# Patient Record
Sex: FEMALE | Race: WHITE | NOT HISPANIC OR LATINO | Employment: OTHER | URBAN - METROPOLITAN AREA
[De-identification: names, ages, dates, MRNs, and addresses within clinical notes are randomized per-mention and may not be internally consistent; named-entity substitution may affect disease eponyms.]

---

## 2018-04-25 ENCOUNTER — OFFICE VISIT (OUTPATIENT)
Dept: ORTHOPEDIC SURGERY | Facility: CLINIC | Age: 83
End: 2018-04-25

## 2018-04-25 VITALS
HEART RATE: 68 BPM | WEIGHT: 188.05 LBS | HEIGHT: 64 IN | DIASTOLIC BLOOD PRESSURE: 75 MMHG | SYSTOLIC BLOOD PRESSURE: 161 MMHG | BODY MASS INDEX: 32.11 KG/M2

## 2018-04-25 DIAGNOSIS — M17.0 PRIMARY OSTEOARTHRITIS OF BOTH KNEES: Primary | ICD-10-CM

## 2018-04-25 PROCEDURE — 99203 OFFICE O/P NEW LOW 30 MIN: CPT | Performed by: ORTHOPAEDIC SURGERY

## 2018-04-25 PROCEDURE — 20610 DRAIN/INJ JOINT/BURSA W/O US: CPT | Performed by: ORTHOPAEDIC SURGERY

## 2018-04-25 RX ORDER — EZETIMIBE 10 MG/1
TABLET ORAL
Refills: 11 | COMMUNITY
Start: 2018-04-17 | End: 2018-04-25

## 2018-04-25 RX ORDER — EZETIMIBE 10 MG/1
TABLET ORAL DAILY
COMMUNITY
Start: 2013-01-25

## 2018-04-25 RX ORDER — ROPIVACAINE HYDROCHLORIDE 5 MG/ML
4 INJECTION, SOLUTION EPIDURAL; INFILTRATION; PERINEURAL
Status: COMPLETED | OUTPATIENT
Start: 2018-04-25 | End: 2018-04-25

## 2018-04-25 RX ORDER — TRIAMCINOLONE ACETONIDE 40 MG/ML
40 INJECTION, SUSPENSION INTRA-ARTICULAR; INTRAMUSCULAR
Status: COMPLETED | OUTPATIENT
Start: 2018-04-25 | End: 2018-04-25

## 2018-04-25 RX ORDER — FLUOROMETHOLONE 0.1 %
SUSPENSION, DROPS(FINAL DOSAGE FORM)(ML) OPHTHALMIC (EYE)
Refills: 0 | COMMUNITY
Start: 2018-01-19

## 2018-04-25 RX ORDER — AMLODIPINE BESYLATE 5 MG/1
TABLET ORAL
Refills: 3 | COMMUNITY
Start: 2018-03-19

## 2018-04-25 RX ORDER — OMEPRAZOLE 40 MG/1
CAPSULE, DELAYED RELEASE ORAL
Refills: 3 | COMMUNITY
Start: 2018-03-19

## 2018-04-25 RX ORDER — CARVEDILOL 6.25 MG/1
6.25 TABLET ORAL 2 TIMES DAILY
Refills: 3 | COMMUNITY
Start: 2018-03-19

## 2018-04-25 RX ORDER — LOSARTAN POTASSIUM AND HYDROCHLOROTHIAZIDE 25; 100 MG/1; MG/1
TABLET ORAL
Refills: 3 | COMMUNITY
Start: 2018-04-13

## 2018-04-25 RX ORDER — GABAPENTIN 100 MG/1
300 CAPSULE ORAL
Refills: 1 | COMMUNITY
Start: 2018-02-16

## 2018-04-25 RX ORDER — LEVOTHYROXINE SODIUM 0.05 MG/1
TABLET ORAL
Refills: 3 | COMMUNITY
Start: 2018-04-02

## 2018-04-25 RX ADMIN — ROPIVACAINE HYDROCHLORIDE 4 ML: 5 INJECTION, SOLUTION EPIDURAL; INFILTRATION; PERINEURAL at 09:33

## 2018-04-25 RX ADMIN — TRIAMCINOLONE ACETONIDE 40 MG: 40 INJECTION, SUSPENSION INTRA-ARTICULAR; INTRAMUSCULAR at 09:35

## 2018-04-25 RX ADMIN — TRIAMCINOLONE ACETONIDE 40 MG: 40 INJECTION, SUSPENSION INTRA-ARTICULAR; INTRAMUSCULAR at 09:33

## 2018-04-25 RX ADMIN — ROPIVACAINE HYDROCHLORIDE 4 ML: 5 INJECTION, SOLUTION EPIDURAL; INFILTRATION; PERINEURAL at 09:35

## 2018-04-25 NOTE — PROGRESS NOTES
[facility] [place] [facility] [place]    Subjective     Chief Complaint   Patient presents with   • Right Knee - Pain   • Left Knee - Pain        HPI    Janna Willis is a 83 y.o. female. She presents today for bilateral knee pain.  She's had pain for several years, but worsening over the past few weeks.  The pain is mild-to-moderate, associated with swelling and stiffness, and worsens with standing and climbing stairs.  She responded well to Visco supplementation injections in the past.  She was interested in starting those injections today.      There is no problem list on file for this patient.    Past Medical History:   Diagnosis Date   • Diabetes       Past Surgical History:   Procedure Laterality Date   • OVARY SURGERY        Family History   Problem Relation Age of Onset   • Stroke Mother    • Heart attack Mother      Social History     Social History   • Marital status: Single     Spouse name: N/A   • Number of children: N/A   • Years of education: N/A     Occupational History   • Not on file.     Social History Main Topics   • Smoking status: Never Smoker   • Smokeless tobacco: Never Used   • Alcohol use No   • Drug use: No   • Sexual activity: Defer     Other Topics Concern   • Not on file     Social History Narrative   • No narrative on file      No current outpatient prescriptions on file prior to visit.     No current facility-administered medications on file prior to visit.       Allergies   Allergen Reactions   • 2,4-D Dimethylamine (Amisol) Hives   • Azithromycin Hives   • Lisinopril Hives   • Metoclopramide Hives   • Statins Hives        Review of Systems   Constitutional: Positive for fatigue.   HENT: Negative.    Eyes: Positive for pain and itching.   Respiratory: Negative.    Cardiovascular: Positive for leg swelling.   Gastrointestinal: Negative.    Endocrine: Negative.    Genitourinary: Negative.    Musculoskeletal: Positive for arthralgias.   Skin: Negative.    Allergic/Immunologic:  "Negative.    Neurological: Positive for dizziness and light-headedness.   Hematological: Negative.    Psychiatric/Behavioral: Positive for sleep disturbance.        Objective      Physical Exam  /75   Pulse 68   Ht 162.6 cm (64\")   Wt 85.3 kg (188 lb 0.8 oz)   BMI 32.28 kg/m²     Body mass index is 32.28 kg/m².    General:   Mental Status:  Alert   Appearance: Cooperative, in no acute distress   Build and Nutrition: Overweight female   Orientation: Alert and oriented to person, place and time   Posture: Normal   Gait: Antalgic on both lower extremities    Integument:   Right knee: no skin lesions, no rash, no ecchymosis   Left knee: no skin lesions, no rash, no ecchymosis    Neurologic:   Sensation:    Right foot: intact to light touch on the dorsal and plantar aspect    Left foot: intact to light touch on the dorsal and plantar aspect   Motor:  Right lower extremity: 5/5 quadriceps, hamstrings, ankle dorsiflexors, and ankle plantar flexors  Left lower extremity: 5/5 quadriceps, hamstrings, ankle dorsiflexors, and ankle plantar flexors  Vascular:   Right lower extremity: 2+ dorsalis pedis pulse, prompt capillary refill   Left lower extremity: 2+ dorsalis pedis pulse, prompt capillary refill    Lower Extremities:   Right Knee:    Tenderness:  Medial and lateral joint line tenderness    Effusion:  None    Swelling:  None    Crepitus:  Positive    Atrophy:  None    Range of motion:  Extension: 5°       Flexion: 120°  Instability:  No varus laxity, no valgus laxity, negative anterior drawer  Deformities:  None   Left Knee:    Tenderness:  Medial and lateral joint line tenderness    Effusion:  None    Swelling:  None    Crepitus: Positive    Atrophy:  None    Range of motion:  Extension: 5°       Flexion: 120°  Instability:  No varus laxity, no valgus laxity, negative anterior drawer  Deformities:  None      Imaging/Studies  Imaging Results (last 24 hours)     Procedure Component Value Units Date/Time    XR " Knee 4+ View Bilateral [[id number]] Resulted:  04/25/18 0925     Updated:  04/25/18 0926    Narrative:       Right Knee Radiographs  Indication: right knee pain  Views: Standing AP's and skiers of both knees, with lateral and sunrise   views of the right knee    Comparison: no prior studies available    Findings:   Arthritic changes are seen, with medial joint space narrowing and   patellofemoral bone-on-bone contact, with varus alignment.    Left Knee Radiographs  Indication: left knee pain  Views: Standing AP's and skiers of both knees, with lateral and sunrise   views of the left knee    Comparison: no prior studies available    Findings:   Arthritic changes are seen, with medial joint space narrowing and   patellofemoral bone-on-bone contact, with varus alignment.            Assessment and Plan     Janna was seen today for pain and pain.    Diagnoses and all orders for this visit:    Primary osteoarthritis of both knees  -     XR Knee 4+ View Bilateral  -     Large Joint Arthrocentesis  -     Large Joint Arthrocentesis  -     Large Joint Arthrocentesis        I reviewed my findings with patient today.  She has bilateral knee arthritis, and would like to try a Visco supplementation injections.  We will submit for approval, but in the meantime, we will do steroid injections.  I will see her back once the Visco supplementation injections are ready for administration.    Of note, she had 50% relief just a few minutes following the injection in both her knees.    Return for After approval of the visco injection.      Medical Decision Making  Management Options : prescription/IM medicine  Data/Risk: radiology tests and independent visualization of imaging, lab tests, or EMG/NCV      Kuldeep Jasso MD  04/25/18  9:47 AM

## 2018-04-25 NOTE — PROGRESS NOTES
Procedure   Large Joint Arthrocentesis  Date/Time: 4/25/2018 9:33 AM  Consent given by: patient  Site marked: site marked  Timeout: Immediately prior to procedure a time out was called to verify the correct patient, procedure, equipment, support staff and site/side marked as required   Supporting Documentation  Indications: pain   Procedure Details  Location: knee - R knee  Preparation: Patient was prepped and draped in the usual sterile fashion  Needle size: 22 G  Approach: anterolateral  Medications administered: 4 mL ropivacaine 0.5 %; 40 mg triamcinolone acetonide 40 MG/ML  Patient tolerance: patient tolerated the procedure well with no immediate complications

## 2018-04-25 NOTE — PROGRESS NOTES
Procedure   Large Joint Arthrocentesis  Date/Time: 4/25/2018 9:35 AM  Consent given by: patient  Site marked: site marked  Timeout: Immediately prior to procedure a time out was called to verify the correct patient, procedure, equipment, support staff and site/side marked as required   Supporting Documentation  Indications: pain   Procedure Details  Location: knee - L knee  Preparation: Patient was prepped and draped in the usual sterile fashion  Needle size: 22 G  Approach: anterolateral  Medications administered: 40 mg triamcinolone acetonide 40 MG/ML; 4 mL ropivacaine 0.5 %  Patient tolerance: patient tolerated the procedure well with no immediate complications

## 2018-06-06 ENCOUNTER — CLINICAL SUPPORT (OUTPATIENT)
Dept: ORTHOPEDIC SURGERY | Facility: CLINIC | Age: 83
End: 2018-06-06

## 2018-06-06 DIAGNOSIS — M17.0 PRIMARY OSTEOARTHRITIS OF BOTH KNEES: ICD-10-CM

## 2018-06-06 DIAGNOSIS — M17.0 BILATERAL PRIMARY OSTEOARTHRITIS OF KNEE: Primary | ICD-10-CM

## 2018-06-06 PROCEDURE — 20610 DRAIN/INJ JOINT/BURSA W/O US: CPT | Performed by: ORTHOPAEDIC SURGERY

## 2018-06-06 RX ORDER — ESCITALOPRAM OXALATE 20 MG/1
20 TABLET ORAL DAILY
Refills: 3 | COMMUNITY
Start: 2018-05-24

## 2018-06-06 NOTE — PROGRESS NOTES
[facility] – [place] [facility] Note    Subjective     Chief Complaint   Patient presents with   • Left Knee - Follow-up     Orthovisc Injection #1   • Right Knee - Follow-up     Orthovisc Injection #1        HPI    Janna Willis is a 83 y.o. female who presents for the First Orthovisc injections into both knee joints.    There is no problem list on file for this patient.    Past Medical History:   Diagnosis Date   • Diabetes       Past Surgical History:   Procedure Laterality Date   • OVARY SURGERY        Family History   Problem Relation Age of Onset   • Stroke Mother    • Heart attack Mother      Social History     Social History   • Marital status: Single     Spouse name: N/A   • Number of children: N/A   • Years of education: N/A     Occupational History   • Not on file.     Social History Main Topics   • Smoking status: Never Smoker   • Smokeless tobacco: Never Used   • Alcohol use No   • Drug use: No   • Sexual activity: Defer     Other Topics Concern   • Not on file     Social History Narrative   • No narrative on file      Current Outpatient Prescriptions on File Prior to Visit   Medication Sig Dispense Refill   • amLODIPine (NORVASC) 5 MG tablet TAKE ONE TABLET EVERY DAY FOR high blood PRESSURE  3   • carvedilol (COREG) 6.25 MG tablet Take 6.25 mg by mouth 2 (Two) Times a Day.  3   • ezetimibe (ZETIA) 10 MG tablet Take  by mouth Daily.     • fluorometholone (FML) 0.1 % ophthalmic suspension Instill 1 drop into both eyes three times a day  0   • gabapentin (NEURONTIN) 100 MG capsule Take 300 mg by mouth every night at bedtime.  1   • levothyroxine (SYNTHROID, LEVOTHROID) 50 MCG tablet TAKE ONE TABLET EVERY DAY FOR FOR THYROID  3   • losartan-hydrochlorothiazide (HYZAAR) 100-25 MG per tablet take ONE-HALF TABLET ONCE DAILY  3   • metFORMIN (GLUCOPHAGE) 500 MG tablet Take  by mouth 3 (Three) Times a Day.     • omeprazole (priLOSEC) 40 MG capsule TAKE ONE CAPSULE EVERY MORNING FOR gerd  3     No current  facility-administered medications on file prior to visit.       Allergies   Allergen Reactions   • 2,4-D Dimethylamine (Amisol) Hives   • Azithromycin Hives   • Lisinopril Hives   • Metoclopramide Hives   • Statins Hives        Review of Systems   Constitutional: Negative for activity change, appetite change, chills, diaphoresis, fatigue, fever and unexpected weight change.   HENT: Negative for congestion, dental problem, drooling, ear discharge, ear pain, facial swelling, hearing loss, mouth sores, nosebleeds, postnasal drip, rhinorrhea, sinus pressure, sneezing, sore throat, tinnitus, trouble swallowing and voice change.    Eyes: Negative for photophobia, pain, discharge, redness, itching and visual disturbance.   Respiratory: Negative for apnea, cough, choking, chest tightness, shortness of breath, wheezing and stridor.    Cardiovascular: Negative for chest pain, palpitations and leg swelling.   Gastrointestinal: Negative for abdominal distention, abdominal pain, anal bleeding, blood in stool, constipation, diarrhea, nausea, rectal pain and vomiting.   Endocrine: Negative for cold intolerance, heat intolerance, polydipsia, polyphagia and polyuria.   Genitourinary: Negative for decreased urine volume, difficulty urinating, dysuria, enuresis, flank pain, frequency, genital sores, hematuria and urgency.   Musculoskeletal: Positive for arthralgias. Negative for back pain, gait problem, joint swelling, myalgias, neck pain and neck stiffness.   Skin: Negative for color change, pallor, rash and wound.   Allergic/Immunologic: Negative for environmental allergies, food allergies and immunocompromised state.   Neurological: Negative for dizziness, tremors, seizures, syncope, facial asymmetry, speech difficulty, weakness, light-headedness, numbness and headaches.   Hematological: Negative for adenopathy. Does not bruise/bleed easily.   Psychiatric/Behavioral: Negative for agitation, behavioral problems, confusion, decreased  concentration, dysphoric mood, hallucinations, self-injury, sleep disturbance and suicidal ideas. The patient is not nervous/anxious and is not hyperactive.         Objective      Physical Exam  There were no vitals taken for this visit.    There is no height or weight on file to calculate BMI.    General:   Mental Status:  Alert   Appearance: Cooperative, in no acute distress   Posture: Normal    Assessment and Plan     Janna was seen today for follow-up and follow-up.    Diagnoses and all orders for this visit:    Bilateral primary osteoarthritis of knee  -     Large Joint Arthrocentesis        Administration of viscosupplementation today.  Please see my procedure note for details.    Return in about 1 week (around 6/13/2018) for Injection.    Kuldeep Jasso MD  06/06/18  9:30 AM

## 2018-06-06 NOTE — PROGRESS NOTES
Procedure   Large Joint Arthrocentesis  Date/Time: 6/6/2018 9:01 AM  Consent given by: patient  Site marked: site marked  Timeout: Immediately prior to procedure a time out was called to verify the correct patient, procedure, equipment, support staff and site/side marked as required   Supporting Documentation  Indications: pain   Procedure Details  Location: knee - R knee  Preparation: Patient was prepped and draped in the usual sterile fashion  Needle size: 22 G  Approach: anterolateral  Medications administered: 30 mg Hyaluronan 30 MG/2ML  Patient tolerance: patient tolerated the procedure well with no immediate complications    Large Joint Arthrocentesis  Date/Time: 6/6/2018 9:01 AM  Consent given by: patient  Site marked: site marked  Timeout: Immediately prior to procedure a time out was called to verify the correct patient, procedure, equipment, support staff and site/side marked as required   Supporting Documentation  Indications: pain   Procedure Details  Location: knee - L knee  Preparation: Patient was prepped and draped in the usual sterile fashion  Needle size: 22 G  Approach: anterolateral  Medications administered: 30 mg Hyaluronan 30 MG/2ML  Patient tolerance: patient tolerated the procedure well with no immediate complications

## 2018-06-13 ENCOUNTER — CLINICAL SUPPORT (OUTPATIENT)
Dept: ORTHOPEDIC SURGERY | Facility: CLINIC | Age: 83
End: 2018-06-13

## 2018-06-13 DIAGNOSIS — M17.0 PRIMARY OSTEOARTHRITIS OF BOTH KNEES: Primary | ICD-10-CM

## 2018-06-13 PROCEDURE — 20610 DRAIN/INJ JOINT/BURSA W/O US: CPT | Performed by: ORTHOPAEDIC SURGERY

## 2018-06-13 NOTE — PROGRESS NOTES
[facility] [place] [facility] [place]    Subjective     Chief Complaint   Patient presents with   • Left Knee - Follow-up     Bilateral Primary Osteoarthritis of Knee; Bilateral Knee Orthovisc Injection #2   • Right Knee - Follow-up     Bilateral Primary Osteoarthritis of Knee; Bilateral Knee Orthovisc Injection #2        HPI    Janna Willis is a 83 y.o. female who presents for the Second Orthovisc injections into both knees.  Of note, she has seen some improvement so far.    There is no problem list on file for this patient.    Past Medical History:   Diagnosis Date   • Diabetes       Past Surgical History:   Procedure Laterality Date   • OVARY SURGERY        Family History   Problem Relation Age of Onset   • Stroke Mother    • Heart attack Mother      Social History     Social History   • Marital status: Single     Spouse name: N/A   • Number of children: N/A   • Years of education: N/A     Occupational History   • Not on file.     Social History Main Topics   • Smoking status: Never Smoker   • Smokeless tobacco: Never Used   • Alcohol use No   • Drug use: No   • Sexual activity: Defer     Other Topics Concern   • Not on file     Social History Narrative   • No narrative on file      Current Outpatient Prescriptions on File Prior to Visit   Medication Sig Dispense Refill   • amLODIPine (NORVASC) 5 MG tablet TAKE ONE TABLET EVERY DAY FOR high blood PRESSURE  3   • carvedilol (COREG) 6.25 MG tablet Take 6.25 mg by mouth 2 (Two) Times a Day.  3   • escitalopram (LEXAPRO) 20 MG tablet Take 20 mg by mouth Daily.  3   • ezetimibe (ZETIA) 10 MG tablet Take  by mouth Daily.     • fluorometholone (FML) 0.1 % ophthalmic suspension Instill 1 drop into both eyes three times a day  0   • gabapentin (NEURONTIN) 100 MG capsule Take 300 mg by mouth every night at bedtime.  1   • levothyroxine (SYNTHROID, LEVOTHROID) 50 MCG tablet TAKE ONE TABLET EVERY DAY FOR FOR THYROID  3   • losartan-hydrochlorothiazide (HYZAAR) 100-25  MG per tablet take ONE-HALF TABLET ONCE DAILY  3   • metFORMIN (GLUCOPHAGE) 500 MG tablet Take  by mouth 3 (Three) Times a Day.     • omeprazole (priLOSEC) 40 MG capsule TAKE ONE CAPSULE EVERY MORNING FOR gerd  3     No current facility-administered medications on file prior to visit.       Allergies   Allergen Reactions   • 2,4-D Dimethylamine (Amisol) Hives   • Azithromycin Hives   • Lisinopril Hives   • Metoclopramide Hives   • Statins Hives        Review of Systems   Constitutional: Negative for activity change, appetite change, chills, diaphoresis, fatigue, fever and unexpected weight change.   HENT: Negative for congestion, dental problem, drooling, ear discharge, ear pain, facial swelling, hearing loss, mouth sores, nosebleeds, postnasal drip, rhinorrhea, sinus pressure, sneezing, sore throat, tinnitus, trouble swallowing and voice change.    Eyes: Negative for photophobia, pain, discharge, redness, itching and visual disturbance.   Respiratory: Negative for apnea, cough, choking, chest tightness, shortness of breath, wheezing and stridor.    Cardiovascular: Negative for chest pain, palpitations and leg swelling.   Gastrointestinal: Negative for abdominal distention, abdominal pain, anal bleeding, blood in stool, constipation, diarrhea, nausea, rectal pain and vomiting.   Endocrine: Negative for cold intolerance, heat intolerance, polydipsia, polyphagia and polyuria.   Genitourinary: Negative for decreased urine volume, difficulty urinating, dysuria, enuresis, flank pain, frequency, genital sores, hematuria and urgency.   Musculoskeletal: Positive for joint swelling. Negative for arthralgias, back pain, gait problem, myalgias, neck pain and neck stiffness.   Skin: Negative for color change, pallor, rash and wound.   Allergic/Immunologic: Negative for environmental allergies, food allergies and immunocompromised state.   Neurological: Negative for dizziness, tremors, seizures, syncope, facial asymmetry, speech  difficulty, weakness, light-headedness, numbness and headaches.   Hematological: Negative for adenopathy. Does not bruise/bleed easily.   Psychiatric/Behavioral: Negative for agitation, behavioral problems, confusion, decreased concentration, dysphoric mood, hallucinations, self-injury, sleep disturbance and suicidal ideas. The patient is not nervous/anxious and is not hyperactive.         Objective      Physical Exam  There were no vitals taken for this visit.    There is no height or weight on file to calculate BMI.    General:   Mental Status:  Alert   Appearance: Cooperative, in no acute distress   Posture: Normal    Assessment and Plan     Janna was seen today for follow-up and follow-up.    Diagnoses and all orders for this visit:    Primary osteoarthritis of both knees  -     Large Joint Arthrocentesis  -     Large Joint Arthrocentesis        Administration of viscosupplementation today.  Please see my procedure note for details.    Return in about 1 week (around 6/20/2018) for Injection.    Kuldeep Jasso MD  06/13/18  3:35 PM

## 2018-06-13 NOTE — PROGRESS NOTES
Procedure   Large Joint Injection   Date/Time: 6/13/2018 3:08 PM  Consent given by: patient  Site marked: site marked  Timeout: Immediately prior to procedure a time out was called to verify the correct patient, procedure, equipment, support staff and site/side marked as required   Supporting Documentation  Indications: pain   Procedure Details  Location: knee - R knee  Preparation: Patient was prepped and draped in the usual sterile fashion  Needle size: 22 G  Approach: anterolateral  Medications administered: 30 mg Hyaluronan 30 MG/2ML  Patient tolerance: patient tolerated the procedure well with no immediate complications    Large Joint Injection   Date/Time: 6/13/2018 3:09 PM  Consent given by: patient  Site marked: site marked  Timeout: Immediately prior to procedure a time out was called to verify the correct patient, procedure, equipment, support staff and site/side marked as required   Supporting Documentation  Indications: pain   Procedure Details  Location: knee - L knee  Preparation: Patient was prepped and draped in the usual sterile fashion  Needle size: 22 G  Approach: anterolateral  Medications administered: 30 mg Hyaluronan 30 MG/2ML  Patient tolerance: patient tolerated the procedure well with no immediate complications

## 2018-06-20 ENCOUNTER — CLINICAL SUPPORT (OUTPATIENT)
Dept: ORTHOPEDIC SURGERY | Facility: CLINIC | Age: 83
End: 2018-06-20

## 2018-06-20 DIAGNOSIS — M17.0 PRIMARY OSTEOARTHRITIS OF BOTH KNEES: Primary | ICD-10-CM

## 2018-06-20 PROCEDURE — 20610 DRAIN/INJ JOINT/BURSA W/O US: CPT | Performed by: ORTHOPAEDIC SURGERY

## 2018-06-20 NOTE — PROGRESS NOTES
Procedure   Large Joint Arthrocentesis  Date/Time: 6/20/2018 3:12 PM  Consent given by: patient  Site marked: site marked  Timeout: Immediately prior to procedure a time out was called to verify the correct patient, procedure, equipment, support staff and site/side marked as required   Supporting Documentation  Indications: pain   Procedure Details  Location: knee - R knee  Preparation: Patient was prepped and draped in the usual sterile fashion  Needle size: 22 G  Approach: anterolateral  Medications administered: 30 mg Hyaluronan 30 MG/2ML      Large Joint Arthrocentesis  Date/Time: 6/20/2018 3:13 PM  Consent given by: patient  Site marked: site marked  Timeout: Immediately prior to procedure a time out was called to verify the correct patient, procedure, equipment, support staff and site/side marked as required   Supporting Documentation  Indications: pain   Procedure Details  Location: knee - L knee  Needle size: 22 G  Approach: anterolateral  Medications administered: 30 mg Hyaluronan 30 MG/2ML

## 2018-06-20 NOTE — PROGRESS NOTES
[facility] [place] [facility] [place] [facility] [place]    Subjective     No chief complaint on file.       HPI    Janna Willis is a 83 y.o. female who presents for the Orthovisc injections into both knee joints.  Of note, she has already seen relief with the injection series.    There is no problem list on file for this patient.    Past Medical History:   Diagnosis Date   • Diabetes       Past Surgical History:   Procedure Laterality Date   • OVARY SURGERY        Family History   Problem Relation Age of Onset   • Stroke Mother    • Heart attack Mother      Social History     Social History   • Marital status: Single     Spouse name: N/A   • Number of children: N/A   • Years of education: N/A     Occupational History   • Not on file.     Social History Main Topics   • Smoking status: Never Smoker   • Smokeless tobacco: Never Used   • Alcohol use No   • Drug use: No   • Sexual activity: Defer     Other Topics Concern   • Not on file     Social History Narrative   • No narrative on file      Current Outpatient Prescriptions on File Prior to Visit   Medication Sig Dispense Refill   • amLODIPine (NORVASC) 5 MG tablet TAKE ONE TABLET EVERY DAY FOR high blood PRESSURE  3   • carvedilol (COREG) 6.25 MG tablet Take 6.25 mg by mouth 2 (Two) Times a Day.  3   • escitalopram (LEXAPRO) 20 MG tablet Take 20 mg by mouth Daily.  3   • ezetimibe (ZETIA) 10 MG tablet Take  by mouth Daily.     • fluorometholone (FML) 0.1 % ophthalmic suspension Instill 1 drop into both eyes three times a day  0   • gabapentin (NEURONTIN) 100 MG capsule Take 300 mg by mouth every night at bedtime.  1   • levothyroxine (SYNTHROID, LEVOTHROID) 50 MCG tablet TAKE ONE TABLET EVERY DAY FOR FOR THYROID  3   • losartan-hydrochlorothiazide (HYZAAR) 100-25 MG per tablet take ONE-HALF TABLET ONCE DAILY  3   • metFORMIN (GLUCOPHAGE) 500 MG tablet Take  by mouth 3 (Three) Times a Day.     • omeprazole (priLOSEC) 40 MG capsule TAKE ONE CAPSULE EVERY MORNING FOR gerd  3     No  current facility-administered medications on file prior to visit.       Allergies   Allergen Reactions   • 2,4-D Dimethylamine (Amisol) Hives   • Azithromycin Hives   • Lisinopril Hives   • Metoclopramide Hives   • Statins Hives        Review of Systems   Constitutional: Negative.    HENT: Negative.    Eyes: Negative.    Respiratory: Negative.    Cardiovascular: Negative.    Gastrointestinal: Negative.    Endocrine: Negative.    Genitourinary: Negative.    Musculoskeletal: Positive for arthralgias.   Skin: Negative.    Allergic/Immunologic: Negative.    Neurological: Negative.    Hematological: Negative.    Psychiatric/Behavioral: Negative.         Objective      Physical Exam  There were no vitals taken for this visit.    There is no height or weight on file to calculate BMI.    General:   Mental Status:  Alert   Appearance: Cooperative, in no acute distress   Posture: Normal    Assessment and Plan     Diagnoses and all orders for this visit:    Primary osteoarthritis of both knees  -     Large Joint Arthrocentesis  -     Large Joint Arthrocentesis        Administration of viscosupplementation today.  Please see my procedure note for details.    Return in about 6 months (around 12/20/2018).    Kuldeep Jasso MD  06/20/18  3:17 PM

## 2018-12-19 ENCOUNTER — OFFICE VISIT (OUTPATIENT)
Dept: ORTHOPEDIC SURGERY | Facility: CLINIC | Age: 83
End: 2018-12-19

## 2018-12-19 VITALS — HEIGHT: 66 IN | OXYGEN SATURATION: 97 % | WEIGHT: 195.99 LBS | BODY MASS INDEX: 31.5 KG/M2 | HEART RATE: 76 BPM

## 2018-12-19 DIAGNOSIS — M17.0 PRIMARY OSTEOARTHRITIS OF BOTH KNEES: Primary | ICD-10-CM

## 2018-12-19 PROCEDURE — 99213 OFFICE O/P EST LOW 20 MIN: CPT | Performed by: ORTHOPAEDIC SURGERY

## 2018-12-19 NOTE — PROGRESS NOTES
[facility] [place] [facility]opa[facility] Surgery Clinic Note    Subjective     Chief Complaint   Patient presents with   • Right Knee - Follow-up     6 month follow up    • Left Knee - Follow-up     6 month follow up        HPI    Janna Willis is a 83 y.o. female.  She presents today for both her knees.  She states had pain in both knees, 6 out of 10, aching and throbbing, worsening over time, with improvement with Visco supplementation injections in the past.  She believes that the Supartz worked better than the Orthovisc for her.      There is no problem list on file for this patient.    Past Medical History:   Diagnosis Date   • Diabetes (CMS/HCC)       Past Surgical History:   Procedure Laterality Date   • OVARY SURGERY        Family History   Problem Relation Age of Onset   • Stroke Mother    • Heart attack Mother      Social History     Socioeconomic History   • Marital status: Single     Spouse name: Not on file   • Number of children: Not on file   • Years of education: Not on file   • Highest education level: Not on file   Social Needs   • Financial resource strain: Not on file   • Food insecurity - worry: Not on file   • Food insecurity - inability: Not on file   • Transportation needs - medical: Not on file   • Transportation needs - non-medical: Not on file   Occupational History   • Not on file   Tobacco Use   • Smoking status: Never Smoker   • Smokeless tobacco: Never Used   Substance and Sexual Activity   • Alcohol use: No   • Drug use: No   • Sexual activity: Defer   Other Topics Concern   • Not on file   Social History Narrative   • Not on file      Current Outpatient Medications on File Prior to Visit   Medication Sig Dispense Refill   • amLODIPine (NORVASC) 5 MG tablet TAKE ONE TABLET EVERY DAY FOR high blood PRESSURE  3   • carvedilol (COREG) 6.25 MG tablet Take 6.25 mg by mouth 2 (Two) Times a Day.  3   • escitalopram (LEXAPRO) 20 MG tablet Take 20 mg by mouth Daily.  3   • ezetimibe (ZETIA) 10 MG tablet Take   "by mouth Daily.     • fluorometholone (FML) 0.1 % ophthalmic suspension Instill 1 drop into both eyes three times a day  0   • gabapentin (NEURONTIN) 100 MG capsule Take 300 mg by mouth every night at bedtime.  1   • levothyroxine (SYNTHROID, LEVOTHROID) 50 MCG tablet TAKE ONE TABLET EVERY DAY FOR FOR THYROID  3   • losartan-hydrochlorothiazide (HYZAAR) 100-25 MG per tablet take ONE-HALF TABLET ONCE DAILY  3   • metFORMIN (GLUCOPHAGE) 500 MG tablet Take  by mouth 3 (Three) Times a Day.     • omeprazole (priLOSEC) 40 MG capsule TAKE ONE CAPSULE EVERY MORNING FOR gerd  3     No current facility-administered medications on file prior to visit.       Allergies   Allergen Reactions   • 2,4-D Dimethylamine (Amisol) Hives   • Azithromycin Hives   • Lisinopril Hives   • Metoclopramide Hives   • Statins Hives        Review of Systems   Constitutional: Positive for fatigue.   HENT: Negative.    Eyes: Negative.    Respiratory: Negative.    Cardiovascular: Positive for leg swelling.   Gastrointestinal: Negative.    Endocrine: Negative.    Genitourinary: Negative.    Musculoskeletal: Positive for arthralgias and joint swelling.   Skin: Negative.    Allergic/Immunologic: Negative.    Neurological: Negative.    Hematological: Negative.    Psychiatric/Behavioral: Negative.         Objective      Physical Exam  Pulse 76   Ht 166.4 cm (65.5\")   Wt 88.9 kg (195 lb 15.8 oz)   SpO2 97%   BMI 32.12 kg/m²     Body mass index is 32.12 kg/m².    General:   Mental Status:  Alert   Appearance: Cooperative, in no acute distress   Build and Nutrition: Overweight female   Orientation: Alert and oriented to person, place and time   Posture: Normal   Gait: Normal    Integument:   Right knee: no skin lesions, no rash, no ecchymosis   Left knee: no skin lesions, no rash, no ecchymosis    Lower Extremities:   Right Knee:    Tenderness:  Medial and lateral joint line tenderness    Effusion:  None    Swelling:  None    Crepitus: "  Positive    Atrophy:  None    Range of motion:  Extension: 5°       Flexion: 120°  Instability:  No varus laxity, no valgus laxity, negative anterior drawer  Deformities:  None   Left Knee:    Tenderness:  Medial and lateral joint line tenderness    Effusion:  None    Swelling:  None    Crepitus: Positive    Atrophy:  None    Range of motion:  Extension: 5°       Flexion: 120°  Instability:  No varus laxity, no valgus laxity, negative anterior drawer  Deformities:  None          Assessment and Plan     Janna was seen today for follow-up and follow-up.    Diagnoses and all orders for this visit:    Primary osteoarthritis of both knees  -     Large Joint Arthrocentesis        I reviewed my findings with patient today.  She continues to complain of bilateral knee pain, and is not interested in surgical intervention.  She would prefer Visco supplementation injections shots, and the Supartz has worked better for her in the past.  We will submit for approval, and I will see her back once they are ready for administration.    Return for After approval of the visco injection.      Medical Decision Making  Management Options : prescription/IM medicine      Kuldeep Jasso MD  12/19/18  3:01 PM

## 2019-01-30 ENCOUNTER — CLINICAL SUPPORT (OUTPATIENT)
Dept: ORTHOPEDIC SURGERY | Facility: CLINIC | Age: 84
End: 2019-01-30

## 2019-01-30 DIAGNOSIS — M17.0 PRIMARY OSTEOARTHRITIS OF BOTH KNEES: Primary | ICD-10-CM

## 2019-01-30 PROCEDURE — 20610 DRAIN/INJ JOINT/BURSA W/O US: CPT | Performed by: ORTHOPAEDIC SURGERY

## 2019-01-30 NOTE — PROGRESS NOTES
[facility] [place] [facility] [place]    Subjective     Chief Complaint   Patient presents with   • Left Knee - Follow-up     Supartz injection #1 of series.   • Right Knee - Follow-up     Supartz injection #1 of series.        HPI    Janna Willis is a 83 y.o. female who presents for the first Supartz injections for both knees.    There is no problem list on file for this patient.    Past Medical History:   Diagnosis Date   • Diabetes (CMS/HCC)       Past Surgical History:   Procedure Laterality Date   • OVARY SURGERY        Family History   Problem Relation Age of Onset   • Stroke Mother    • Heart attack Mother      Social History     Socioeconomic History   • Marital status: Single     Spouse name: Not on file   • Number of children: Not on file   • Years of education: Not on file   • Highest education level: Not on file   Social Needs   • Financial resource strain: Not on file   • Food insecurity - worry: Not on file   • Food insecurity - inability: Not on file   • Transportation needs - medical: Not on file   • Transportation needs - non-medical: Not on file   Occupational History   • Not on file   Tobacco Use   • Smoking status: Never Smoker   • Smokeless tobacco: Never Used   Substance and Sexual Activity   • Alcohol use: No   • Drug use: No   • Sexual activity: Defer   Other Topics Concern   • Not on file   Social History Narrative   • Not on file      Current Outpatient Medications on File Prior to Visit   Medication Sig Dispense Refill   • amLODIPine (NORVASC) 5 MG tablet TAKE ONE TABLET EVERY DAY FOR high blood PRESSURE  3   • carvedilol (COREG) 6.25 MG tablet Take 6.25 mg by mouth 2 (Two) Times a Day.  3   • escitalopram (LEXAPRO) 20 MG tablet Take 20 mg by mouth Daily.  3   • ezetimibe (ZETIA) 10 MG tablet Take  by mouth Daily.     • fluorometholone (FML) 0.1 % ophthalmic suspension Instill 1 drop into both eyes three times a day  0   • gabapentin (NEURONTIN) 100 MG capsule Take 300 mg by mouth  every night at bedtime.  1   • levothyroxine (SYNTHROID, LEVOTHROID) 50 MCG tablet TAKE ONE TABLET EVERY DAY FOR FOR THYROID  3   • losartan-hydrochlorothiazide (HYZAAR) 100-25 MG per tablet take ONE-HALF TABLET ONCE DAILY  3   • metFORMIN (GLUCOPHAGE) 500 MG tablet Take  by mouth 3 (Three) Times a Day.     • omeprazole (priLOSEC) 40 MG capsule TAKE ONE CAPSULE EVERY MORNING FOR gerd  3     No current facility-administered medications on file prior to visit.       Allergies   Allergen Reactions   • 2,4-D Dimethylamine (Amisol) Hives   • Azithromycin Hives   • Lisinopril Hives   • Metoclopramide Hives   • Statins Hives        Review of Systems   Constitutional: Positive for activity change.   HENT: Negative.    Eyes: Negative.    Respiratory: Negative.    Cardiovascular: Negative.    Gastrointestinal: Negative.    Endocrine: Negative.    Genitourinary: Negative.    Musculoskeletal: Positive for arthralgias and joint swelling.   Skin: Negative.    Allergic/Immunologic: Negative.    Neurological: Negative.    Hematological: Negative.    Psychiatric/Behavioral: Negative.         Objective      Physical Exam  There were no vitals taken for this visit.    There is no height or weight on file to calculate BMI.    General:   Mental Status:  Alert   Appearance: Cooperative, in no acute distress   Posture: Normal    Assessment and Plan     Janna was seen today for follow-up and follow-up.    Diagnoses and all orders for this visit:    Primary osteoarthritis of both knees  -     Large Joint Arthrocentesis: R knee  -     Large Joint Arthrocentesis: L knee        Administration of viscosupplementation today.  Please see my procedure note for details.    Return in about 1 week (around 2/6/2019) for Injection.    Kuldeep Jasso MD  01/30/19  11:54 AM

## 2019-01-30 NOTE — PROGRESS NOTES
Procedure   Large Joint Arthrocentesis: R knee  Date/Time: 1/30/2019 11:40 AM  Consent given by: patient  Site marked: site marked  Timeout: Immediately prior to procedure a time out was called to verify the correct patient, procedure, equipment, support staff and site/side marked as required   Supporting Documentation  Indications: pain   Procedure Details  Location: knee - R knee  Preparation: Patient was prepped and draped in the usual sterile fashion  Needle size: 22 G  Medications administered: 25 mg sodium hyaluronate 25 MG/2.5ML  Patient tolerance: patient tolerated the procedure well with no immediate complications    Large Joint Arthrocentesis: L knee  Date/Time: 1/30/2019 11:41 AM  Consent given by: patient  Site marked: site marked  Timeout: Immediately prior to procedure a time out was called to verify the correct patient, procedure, equipment, support staff and site/side marked as required   Supporting Documentation  Indications: pain   Procedure Details  Location: knee - L knee  Preparation: Patient was prepped and draped in the usual sterile fashion  Needle size: 22 G  Medications administered: 25 mg sodium hyaluronate 25 MG/2.5ML  Patient tolerance: patient tolerated the procedure well with no immediate complications

## 2019-02-04 ENCOUNTER — CLINICAL SUPPORT (OUTPATIENT)
Dept: ORTHOPEDIC SURGERY | Facility: CLINIC | Age: 84
End: 2019-02-04

## 2019-02-04 DIAGNOSIS — M17.0 PRIMARY OSTEOARTHRITIS OF BOTH KNEES: Primary | ICD-10-CM

## 2019-02-04 PROCEDURE — 20610 DRAIN/INJ JOINT/BURSA W/O US: CPT | Performed by: ORTHOPAEDIC SURGERY

## 2019-02-04 NOTE — PROGRESS NOTES
[facility] [place] Orthopa[facility] Surgery Clinic Note    Subjective     Chief Complaint   Patient presents with   • Injections     Primary Osteoarthritis of Both Knees; Bilateral Knee Supartz Injection #2        HPI    Janna Willis is a 83 y.o. female who presents for the second Supartz injections into both knees.  Minimal relief with the first round.    There is no problem list on file for this patient.    Past Medical History:   Diagnosis Date   • Diabetes (CMS/HCC)       Past Surgical History:   Procedure Laterality Date   • OVARY SURGERY        Family History   Problem Relation Age of Onset   • Stroke Mother    • Heart attack Mother      Social History     Socioeconomic History   • Marital status: Single     Spouse name: Not on file   • Number of children: Not on file   • Years of education: Not on file   • Highest education level: Not on file   Social Needs   • Financial resource strain: Not on file   • Food insecurity - worry: Not on file   • Food insecurity - inability: Not on file   • Transportation needs - medical: Not on file   • Transportation needs - non-medical: Not on file   Occupational History   • Not on file   Tobacco Use   • Smoking status: Never Smoker   • Smokeless tobacco: Never Used   Substance and Sexual Activity   • Alcohol use: No   • Drug use: No   • Sexual activity: Defer   Other Topics Concern   • Not on file   Social History Narrative   • Not on file      Current Outpatient Medications on File Prior to Visit   Medication Sig Dispense Refill   • amLODIPine (NORVASC) 5 MG tablet TAKE ONE TABLET EVERY DAY FOR high blood PRESSURE  3   • carvedilol (COREG) 6.25 MG tablet Take 6.25 mg by mouth 2 (Two) Times a Day.  3   • escitalopram (LEXAPRO) 20 MG tablet Take 20 mg by mouth Daily.  3   • ezetimibe (ZETIA) 10 MG tablet Take  by mouth Daily.     • fluorometholone (FML) 0.1 % ophthalmic suspension Instill 1 drop into both eyes three times a day  0   • gabapentin (NEURONTIN) 100 MG capsule Take 300 mg by  mouth every night at bedtime.  1   • levothyroxine (SYNTHROID, LEVOTHROID) 50 MCG tablet TAKE ONE TABLET EVERY DAY FOR FOR THYROID  3   • losartan-hydrochlorothiazide (HYZAAR) 100-25 MG per tablet take ONE-HALF TABLET ONCE DAILY  3   • metFORMIN (GLUCOPHAGE) 500 MG tablet Take  by mouth 3 (Three) Times a Day.     • omeprazole (priLOSEC) 40 MG capsule TAKE ONE CAPSULE EVERY MORNING FOR gerd  3     No current facility-administered medications on file prior to visit.       Allergies   Allergen Reactions   • 2,4-D Dimethylamine (Amisol) Hives   • Azithromycin Hives   • Lisinopril Hives   • Metoclopramide Hives   • Statins Hives        Review of Systems   Constitutional: Negative for activity change, appetite change, chills, diaphoresis, fatigue, fever and unexpected weight change.   HENT: Negative for congestion, dental problem, drooling, ear discharge, ear pain, facial swelling, hearing loss, mouth sores, nosebleeds, postnasal drip, rhinorrhea, sinus pressure, sneezing, sore throat, tinnitus, trouble swallowing and voice change.    Eyes: Negative for photophobia, pain, discharge, redness, itching and visual disturbance.   Respiratory: Negative for apnea, cough, choking, chest tightness, shortness of breath, wheezing and stridor.    Cardiovascular: Negative for chest pain, palpitations and leg swelling.   Gastrointestinal: Negative for abdominal distention, abdominal pain, anal bleeding, blood in stool, constipation, diarrhea, nausea, rectal pain and vomiting.   Endocrine: Negative for cold intolerance, heat intolerance, polydipsia, polyphagia and polyuria.   Genitourinary: Negative for decreased urine volume, difficulty urinating, dysuria, enuresis, flank pain, frequency, genital sores, hematuria and urgency.   Musculoskeletal: Positive for joint swelling. Negative for arthralgias, back pain, gait problem, myalgias, neck pain and neck stiffness.   Skin: Negative for color change, pallor, rash and wound.    Allergic/Immunologic: Negative for environmental allergies, food allergies and immunocompromised state.   Neurological: Negative for dizziness, tremors, seizures, syncope, facial asymmetry, speech difficulty, weakness, light-headedness, numbness and headaches.   Hematological: Negative for adenopathy. Does not bruise/bleed easily.   Psychiatric/Behavioral: Negative for agitation, behavioral problems, confusion, decreased concentration, dysphoric mood, hallucinations, self-injury, sleep disturbance and suicidal ideas. The patient is not nervous/anxious and is not hyperactive.         Objective      Physical Exam  There were no vitals taken for this visit.    There is no height or weight on file to calculate BMI.    General:   Mental Status:  Alert   Appearance: Cooperative, in no acute distress   Posture: Normal    Assessment and Plan     Janna was seen today for injections.    Diagnoses and all orders for this visit:    Primary osteoarthritis of both knees  -     Large Joint Arthrocentesis: R knee  -     Large Joint Arthrocentesis: L knee        Administration of viscosupplementation today.  Please see my procedure note for details.    Return in about 1 week (around 2/11/2019) for Injection.    Kuldeep Jasso MD  02/07/19  9:32 AM

## 2019-02-11 ENCOUNTER — CLINICAL SUPPORT (OUTPATIENT)
Dept: ORTHOPEDIC SURGERY | Facility: CLINIC | Age: 84
End: 2019-02-11

## 2019-02-11 DIAGNOSIS — M17.0 PRIMARY OSTEOARTHRITIS OF BOTH KNEES: Primary | ICD-10-CM

## 2019-02-11 PROCEDURE — 20610 DRAIN/INJ JOINT/BURSA W/O US: CPT | Performed by: ORTHOPAEDIC SURGERY

## 2019-02-11 NOTE — PROGRESS NOTES
[facility] [place] [facility] [place]    Subjective     Chief Complaint   Patient presents with   • Follow-up     Supartz injection #3 bilateral        HPI    Janna Willis is a 83 y.o. female who presents for the third Supartz injections into both knees.  Of note, she has seen no significant relief so far.    There is no problem list on file for this patient.    Past Medical History:   Diagnosis Date   • Diabetes (CMS/HCC)       Past Surgical History:   Procedure Laterality Date   • OVARY SURGERY        Family History   Problem Relation Age of Onset   • Stroke Mother    • Heart attack Mother      Social History     Socioeconomic History   • Marital status: Single     Spouse name: Not on file   • Number of children: Not on file   • Years of education: Not on file   • Highest education level: Not on file   Social Needs   • Financial resource strain: Not on file   • Food insecurity - worry: Not on file   • Food insecurity - inability: Not on file   • Transportation needs - medical: Not on file   • Transportation needs - non-medical: Not on file   Occupational History   • Not on file   Tobacco Use   • Smoking status: Never Smoker   • Smokeless tobacco: Never Used   Substance and Sexual Activity   • Alcohol use: No   • Drug use: No   • Sexual activity: Defer   Other Topics Concern   • Not on file   Social History Narrative   • Not on file      Current Outpatient Medications on File Prior to Visit   Medication Sig Dispense Refill   • amLODIPine (NORVASC) 5 MG tablet TAKE ONE TABLET EVERY DAY FOR high blood PRESSURE  3   • carvedilol (COREG) 6.25 MG tablet Take 6.25 mg by mouth 2 (Two) Times a Day.  3   • escitalopram (LEXAPRO) 20 MG tablet Take 20 mg by mouth Daily.  3   • ezetimibe (ZETIA) 10 MG tablet Take  by mouth Daily.     • fluorometholone (FML) 0.1 % ophthalmic suspension Instill 1 drop into both eyes three times a day  0   • gabapentin (NEURONTIN) 100 MG capsule Take 300 mg by mouth every night at bedtime.   1   • levothyroxine (SYNTHROID, LEVOTHROID) 50 MCG tablet TAKE ONE TABLET EVERY DAY FOR FOR THYROID  3   • losartan-hydrochlorothiazide (HYZAAR) 100-25 MG per tablet take ONE-HALF TABLET ONCE DAILY  3   • metFORMIN (GLUCOPHAGE) 500 MG tablet Take  by mouth 3 (Three) Times a Day.     • omeprazole (priLOSEC) 40 MG capsule TAKE ONE CAPSULE EVERY MORNING FOR gerd  3     No current facility-administered medications on file prior to visit.       Allergies   Allergen Reactions   • 2,4-D Dimethylamine (Amisol) Hives   • Azithromycin Hives   • Lisinopril Hives   • Metoclopramide Hives   • Statins Hives        Review of Systems     Objective      Physical Exam  There were no vitals taken for this visit.    There is no height or weight on file to calculate BMI.    General:   Mental Status:  Alert   Appearance: Cooperative, in no acute distress   Posture: Normal    Assessment and Plan     Janna was seen today for follow-up.    Diagnoses and all orders for this visit:    Primary osteoarthritis of both knees  -     Large Joint Arthrocentesis: R knee  -     Large Joint Arthrocentesis: L knee        Administration of viscosupplementation today.  Please see my procedure note for details.    Return in about 1 week (around 2/18/2019) for Injection.    Kuldeep Jasso MD  02/11/19  10:09 AM

## 2019-02-11 NOTE — PROGRESS NOTES
Procedure   Large Joint Arthrocentesis: R knee  Date/Time: 2/11/2019 9:57 AM  Consent given by: patient  Site marked: site marked  Timeout: Immediately prior to procedure a time out was called to verify the correct patient, procedure, equipment, support staff and site/side marked as required   Supporting Documentation  Indications: pain   Procedure Details  Location: knee - R knee  Preparation: Patient was prepped and draped in the usual sterile fashion  Needle size: 20 G  Approach: anterolateral  Medications administered: 25 mg sodium hyaluronate 25 MG/2.5ML  Patient tolerance: patient tolerated the procedure well with no immediate complications    Large Joint Arthrocentesis: L knee  Date/Time: 2/11/2019 9:59 AM  Consent given by: patient  Site marked: site marked  Timeout: Immediately prior to procedure a time out was called to verify the correct patient, procedure, equipment, support staff and site/side marked as required   Supporting Documentation  Indications: pain   Procedure Details  Location: knee - L knee  Preparation: Patient was prepped and draped in the usual sterile fashion  Needle size: 20 G  Approach: anterolateral  Medications administered: 25 mg sodium hyaluronate 25 MG/2.5ML  Patient tolerance: patient tolerated the procedure well with no immediate complications

## 2019-02-18 ENCOUNTER — CLINICAL SUPPORT (OUTPATIENT)
Dept: ORTHOPEDIC SURGERY | Facility: CLINIC | Age: 84
End: 2019-02-18

## 2019-02-18 DIAGNOSIS — M17.0 PRIMARY OSTEOARTHRITIS OF BOTH KNEES: Primary | ICD-10-CM

## 2019-02-18 PROCEDURE — 20610 DRAIN/INJ JOINT/BURSA W/O US: CPT | Performed by: ORTHOPAEDIC SURGERY

## 2019-02-18 NOTE — PROGRESS NOTES
[facility] [place] [facility] Note    Subjective     Chief Complaint   Patient presents with   • Injections     Primary Osteoarthritis of Both Knees; Bilateral Knee Supartz Injection #4        HPI    Janna Willis is a 83 y.o. female who presents for the fourth Supartz injection into both knees.  Of note, she has seen some relief so far.    There is no problem list on file for this patient.    Past Medical History:   Diagnosis Date   • Diabetes (CMS/HCC)       Past Surgical History:   Procedure Laterality Date   • OVARY SURGERY        Family History   Problem Relation Age of Onset   • Stroke Mother    • Heart attack Mother      Social History     Socioeconomic History   • Marital status: Single     Spouse name: Not on file   • Number of children: Not on file   • Years of education: Not on file   • Highest education level: Not on file   Social Needs   • Financial resource strain: Not on file   • Food insecurity - worry: Not on file   • Food insecurity - inability: Not on file   • Transportation needs - medical: Not on file   • Transportation needs - non-medical: Not on file   Occupational History   • Not on file   Tobacco Use   • Smoking status: Never Smoker   • Smokeless tobacco: Never Used   Substance and Sexual Activity   • Alcohol use: No   • Drug use: No   • Sexual activity: Defer   Other Topics Concern   • Not on file   Social History Narrative   • Not on file      Current Outpatient Medications on File Prior to Visit   Medication Sig Dispense Refill   • amLODIPine (NORVASC) 5 MG tablet TAKE ONE TABLET EVERY DAY FOR high blood PRESSURE  3   • carvedilol (COREG) 6.25 MG tablet Take 6.25 mg by mouth 2 (Two) Times a Day.  3   • escitalopram (LEXAPRO) 20 MG tablet Take 20 mg by mouth Daily.  3   • ezetimibe (ZETIA) 10 MG tablet Take  by mouth Daily.     • fluorometholone (FML) 0.1 % ophthalmic suspension Instill 1 drop into both eyes three times a day  0   • gabapentin (NEURONTIN) 100 MG capsule Take 300 mg  by mouth every night at bedtime.  1   • levothyroxine (SYNTHROID, LEVOTHROID) 50 MCG tablet TAKE ONE TABLET EVERY DAY FOR FOR THYROID  3   • losartan-hydrochlorothiazide (HYZAAR) 100-25 MG per tablet take ONE-HALF TABLET ONCE DAILY  3   • metFORMIN (GLUCOPHAGE) 500 MG tablet Take  by mouth 3 (Three) Times a Day.     • omeprazole (priLOSEC) 40 MG capsule TAKE ONE CAPSULE EVERY MORNING FOR gerd  3     No current facility-administered medications on file prior to visit.       Allergies   Allergen Reactions   • 2,4-D Dimethylamine (Amisol) Hives   • Azithromycin Hives   • Lisinopril Hives   • Metoclopramide Hives   • Statins Hives        Review of Systems   Constitutional: Negative for activity change, appetite change, chills, diaphoresis, fatigue, fever and unexpected weight change.   HENT: Negative for congestion, dental problem, drooling, ear discharge, ear pain, facial swelling, hearing loss, mouth sores, nosebleeds, postnasal drip, rhinorrhea, sinus pressure, sneezing, sore throat, tinnitus, trouble swallowing and voice change.    Eyes: Negative for photophobia, pain, discharge, redness, itching and visual disturbance.   Respiratory: Negative for apnea, cough, choking, chest tightness, shortness of breath, wheezing and stridor.    Cardiovascular: Negative for chest pain, palpitations and leg swelling.   Gastrointestinal: Negative for abdominal distention, abdominal pain, anal bleeding, blood in stool, constipation, diarrhea, nausea, rectal pain and vomiting.   Endocrine: Negative for cold intolerance, heat intolerance, polydipsia, polyphagia and polyuria.   Genitourinary: Negative for decreased urine volume, difficulty urinating, dysuria, enuresis, flank pain, frequency, genital sores, hematuria and urgency.   Musculoskeletal: Positive for joint swelling. Negative for arthralgias, back pain, gait problem, myalgias, neck pain and neck stiffness.   Skin: Negative for color change, pallor, rash and wound.    Allergic/Immunologic: Negative for environmental allergies, food allergies and immunocompromised state.   Neurological: Negative for dizziness, tremors, seizures, syncope, facial asymmetry, speech difficulty, weakness, light-headedness, numbness and headaches.   Hematological: Negative for adenopathy. Does not bruise/bleed easily.   Psychiatric/Behavioral: Negative for agitation, behavioral problems, confusion, decreased concentration, dysphoric mood, hallucinations, self-injury, sleep disturbance and suicidal ideas. The patient is not nervous/anxious and is not hyperactive.         Objective      Physical Exam  There were no vitals taken for this visit.    There is no height or weight on file to calculate BMI.    General:   Mental Status:  Alert   Appearance: Cooperative, in no acute distress   Posture: Normal    Assessment and Plan     Janna was seen today for injections.    Diagnoses and all orders for this visit:    Primary osteoarthritis of both knees  -     Large Joint Arthrocentesis: R knee  -     Large Joint Arthrocentesis: L knee        Administration of viscosupplementation today.  Please see my procedure note for details.    Return in about 1 week (around 2/25/2019) for Injection.    Kuldeep Jasso MD  02/18/19  1:51 PM

## 2019-02-18 NOTE — PROGRESS NOTES
Procedure   Large Joint Arthrocentesis: R knee  Date/Time: 2/18/2019 1:45 PM  Consent given by: patient  Site marked: site marked  Timeout: Immediately prior to procedure a time out was called to verify the correct patient, procedure, equipment, support staff and site/side marked as required   Supporting Documentation  Indications: pain   Procedure Details  Location: knee - R knee  Preparation: Patient was prepped and draped in the usual sterile fashion  Needle size: 22 G  Approach: anterolateral  Medications administered: 25 mg sodium hyaluronate 25 MG/2.5ML  Patient tolerance: patient tolerated the procedure well with no immediate complications    Large Joint Arthrocentesis: L knee  Date/Time: 2/18/2019 1:45 PM  Consent given by: patient  Site marked: site marked  Timeout: Immediately prior to procedure a time out was called to verify the correct patient, procedure, equipment, support staff and site/side marked as required   Supporting Documentation  Indications: pain   Procedure Details  Location: knee - L knee  Preparation: Patient was prepped and draped in the usual sterile fashion  Needle size: 22 G  Approach: anterolateral  Medications administered: 25 mg sodium hyaluronate 25 MG/2.5ML  Patient tolerance: patient tolerated the procedure well with no immediate complications

## 2019-02-27 ENCOUNTER — CLINICAL SUPPORT (OUTPATIENT)
Dept: ORTHOPEDIC SURGERY | Facility: CLINIC | Age: 84
End: 2019-02-27

## 2019-02-27 DIAGNOSIS — M17.0 PRIMARY OSTEOARTHRITIS OF BOTH KNEES: Primary | ICD-10-CM

## 2019-02-27 PROCEDURE — 20610 DRAIN/INJ JOINT/BURSA W/O US: CPT | Performed by: ORTHOPAEDIC SURGERY

## 2019-02-27 NOTE — PROGRESS NOTES
[facility] [place] [facility] [place]    Subjective     Chief Complaint   Patient presents with   • Follow-up     1 week - Bilateral knee #5 Supartz injections        HPI    Janna Willis is a 83 y.o. female who presents for the fifth and final Supartz injections into both knees.  Of note, she has seen some improvement with the injections to this point.    There is no problem list on file for this patient.    Past Medical History:   Diagnosis Date   • Diabetes (CMS/HCC)       Past Surgical History:   Procedure Laterality Date   • OVARY SURGERY        Family History   Problem Relation Age of Onset   • Stroke Mother    • Heart attack Mother      Social History     Socioeconomic History   • Marital status: Single     Spouse name: Not on file   • Number of children: Not on file   • Years of education: Not on file   • Highest education level: Not on file   Social Needs   • Financial resource strain: Not on file   • Food insecurity - worry: Not on file   • Food insecurity - inability: Not on file   • Transportation needs - medical: Not on file   • Transportation needs - non-medical: Not on file   Occupational History   • Not on file   Tobacco Use   • Smoking status: Never Smoker   • Smokeless tobacco: Never Used   Substance and Sexual Activity   • Alcohol use: No   • Drug use: No   • Sexual activity: Defer   Other Topics Concern   • Not on file   Social History Narrative   • Not on file      Current Outpatient Medications on File Prior to Visit   Medication Sig Dispense Refill   • amLODIPine (NORVASC) 5 MG tablet TAKE ONE TABLET EVERY DAY FOR high blood PRESSURE  3   • carvedilol (COREG) 6.25 MG tablet Take 6.25 mg by mouth 2 (Two) Times a Day.  3   • escitalopram (LEXAPRO) 20 MG tablet Take 20 mg by mouth Daily.  3   • ezetimibe (ZETIA) 10 MG tablet Take  by mouth Daily.     • fluorometholone (FML) 0.1 % ophthalmic suspension Instill 1 drop into both eyes three times a day  0   • gabapentin (NEURONTIN) 100 MG  capsule Take 300 mg by mouth every night at bedtime.  1   • levothyroxine (SYNTHROID, LEVOTHROID) 50 MCG tablet TAKE ONE TABLET EVERY DAY FOR FOR THYROID  3   • losartan-hydrochlorothiazide (HYZAAR) 100-25 MG per tablet take ONE-HALF TABLET ONCE DAILY  3   • metFORMIN (GLUCOPHAGE) 500 MG tablet Take  by mouth 3 (Three) Times a Day.     • omeprazole (priLOSEC) 40 MG capsule TAKE ONE CAPSULE EVERY MORNING FOR gerd  3     No current facility-administered medications on file prior to visit.       Allergies   Allergen Reactions   • 2,4-D Dimethylamine (Amisol) Hives   • Azithromycin Hives   • Lisinopril Hives   • Metoclopramide Hives   • Statins Hives        Review of Systems   Constitutional: Negative.    HENT: Negative.    Eyes: Negative.    Respiratory: Negative.    Cardiovascular: Negative.    Gastrointestinal: Negative.    Endocrine: Negative.    Genitourinary: Negative.    Musculoskeletal: Positive for arthralgias and joint swelling.   Skin: Negative.    Allergic/Immunologic: Negative.    Neurological: Negative.    Hematological: Negative.    Psychiatric/Behavioral: Negative.         Objective      Physical Exam  There were no vitals taken for this visit.    There is no height or weight on file to calculate BMI.    General:   Mental Status:  Alert   Appearance: Cooperative, in no acute distress   Posture: Normal    Assessment and Plan     Janna was seen today for follow-up.    Diagnoses and all orders for this visit:    Primary osteoarthritis of both knees  -     Large Joint Arthrocentesis: R knee  -     Large Joint Arthrocentesis: L knee        Administration of viscosupplementation today.  Please see my procedure note for details.    Return in about 6 months (around 8/27/2019).    Kuldeep Jasso MD  02/27/19  12:25 PM

## 2019-02-27 NOTE — PROGRESS NOTES
Procedure   Large Joint Arthrocentesis: L knee  Date/Time: 2/27/2019 12:10 PM  Consent given by: patient  Site marked: site marked  Timeout: Immediately prior to procedure a time out was called to verify the correct patient, procedure, equipment, support staff and site/side marked as required   Supporting Documentation  Indications: pain   Procedure Details  Location: knee - L knee  Preparation: Patient was prepped and draped in the usual sterile fashion  Needle size: 22 G  Approach: anterolateral  Medications administered: 25 mg sodium hyaluronate 25 MG/2.5ML  Patient tolerance: patient tolerated the procedure well with no immediate complications

## 2019-02-27 NOTE — PROGRESS NOTES
Procedure   Large Joint Arthrocentesis: R knee  Date/Time: 2/27/2019 12:09 PM  Consent given by: patient  Site marked: site marked  Timeout: Immediately prior to procedure a time out was called to verify the correct patient, procedure, equipment, support staff and site/side marked as required   Supporting Documentation  Indications: pain   Procedure Details  Location: knee - R knee  Preparation: Patient was prepped and draped in the usual sterile fashion  Needle size: 22 G  Approach: anterolateral  Medications administered: 25 mg sodium hyaluronate 25 MG/2.5ML  Patient tolerance: patient tolerated the procedure well with no immediate complications

## 2019-08-28 ENCOUNTER — OFFICE VISIT (OUTPATIENT)
Dept: ORTHOPEDIC SURGERY | Facility: CLINIC | Age: 84
End: 2019-08-28

## 2019-08-28 VITALS — WEIGHT: 192.46 LBS | HEART RATE: 66 BPM | OXYGEN SATURATION: 98 % | HEIGHT: 66 IN | BODY MASS INDEX: 30.93 KG/M2

## 2019-08-28 DIAGNOSIS — M17.0 PRIMARY OSTEOARTHRITIS OF BOTH KNEES: Primary | ICD-10-CM

## 2019-08-28 PROCEDURE — 99213 OFFICE O/P EST LOW 20 MIN: CPT | Performed by: ORTHOPAEDIC SURGERY

## 2019-08-28 NOTE — PROGRESS NOTES
[facility] [place] [facility] [place] [facility] [place]    Subjective     Chief Complaint   Patient presents with   • Left Knee - Follow-up     6 month f/u  Primary Osteoarthritis of Both Knees  Bilateral Knee Orthovisc Injections 6 months ago   • Right Knee - Follow-up     6 month f/u  Primary Osteoarthritis of Both Knees  Bilateral Knee Orthovisc Injections 6 months ago        HPI    Janna Willis is a 84 y.o. female.  She follows up today for both her knees.  Knees are doing well today, with no pain.  Injection 6 months ago seem to have helped, which were Orthovisc injections.  Fully ambulatory without external aids.  She does have some pain with climbing stairs, with grinding and stiffness.      There is no problem list on file for this patient.    Past Medical History:   Diagnosis Date   • Diabetes (CMS/HCC)       Past Surgical History:   Procedure Laterality Date   • OVARY SURGERY        Family History   Problem Relation Age of Onset   • Stroke Mother    • Heart attack Mother      Social History     Socioeconomic History   • Marital status: Single     Spouse name: Not on file   • Number of children: Not on file   • Years of education: Not on file   • Highest education level: Not on file   Tobacco Use   • Smoking status: Never Smoker   • Smokeless tobacco: Never Used   Substance and Sexual Activity   • Alcohol use: No   • Drug use: No   • Sexual activity: Defer      Current Outpatient Medications on File Prior to Visit   Medication Sig Dispense Refill   • amLODIPine (NORVASC) 5 MG tablet TAKE ONE TABLET EVERY DAY FOR high blood PRESSURE  3   • carvedilol (COREG) 6.25 MG tablet Take 6.25 mg by mouth 2 (Two) Times a Day.  3   • escitalopram (LEXAPRO) 20 MG tablet Take 20 mg by mouth Daily.  3   • ezetimibe (ZETIA) 10 MG tablet Take  by mouth Daily.     • fluorometholone (FML) 0.1 % ophthalmic suspension Instill 1 drop into both eyes three times a day  0   • gabapentin (NEURONTIN) 100 MG capsule Take 300 mg by mouth every  night at bedtime.  1   • levothyroxine (SYNTHROID, LEVOTHROID) 50 MCG tablet TAKE ONE TABLET EVERY DAY FOR FOR THYROID  3   • losartan-hydrochlorothiazide (HYZAAR) 100-25 MG per tablet take ONE-HALF TABLET ONCE DAILY  3   • metFORMIN (GLUCOPHAGE) 500 MG tablet Take  by mouth 3 (Three) Times a Day.     • omeprazole (priLOSEC) 40 MG capsule TAKE ONE CAPSULE EVERY MORNING FOR gerd  3     No current facility-administered medications on file prior to visit.       Allergies   Allergen Reactions   • 2,4-D Dimethylamine (Amisol) Hives   • Azithromycin Hives   • Lisinopril Hives   • Metoclopramide Hives   • Statins Hives        Review of Systems   Constitutional: Negative for activity change, appetite change, chills, diaphoresis, fatigue, fever and unexpected weight change.   HENT: Negative for congestion, dental problem, drooling, ear discharge, ear pain, facial swelling, hearing loss, mouth sores, nosebleeds, postnasal drip, rhinorrhea, sinus pressure, sneezing, sore throat, tinnitus, trouble swallowing and voice change.    Eyes: Negative for photophobia, pain, discharge, redness, itching and visual disturbance.   Respiratory: Negative for apnea, cough, choking, chest tightness, shortness of breath, wheezing and stridor.    Cardiovascular: Negative for chest pain, palpitations and leg swelling.   Gastrointestinal: Negative for abdominal distention, abdominal pain, anal bleeding, blood in stool, constipation, diarrhea, nausea, rectal pain and vomiting.   Endocrine: Negative for cold intolerance, heat intolerance, polydipsia, polyphagia and polyuria.   Genitourinary: Negative for decreased urine volume, difficulty urinating, dysuria, enuresis, flank pain, frequency, genital sores, hematuria and urgency.   Musculoskeletal: Positive for arthralgias. Negative for back pain, gait problem, joint swelling, myalgias, neck pain and neck stiffness.   Skin: Negative for color change, pallor, rash and wound.   Allergic/Immunologic:  "Negative for environmental allergies, food allergies and immunocompromised state.   Neurological: Negative for dizziness, tremors, seizures, syncope, facial asymmetry, speech difficulty, weakness, light-headedness, numbness and headaches.   Hematological: Negative for adenopathy. Does not bruise/bleed easily.   Psychiatric/Behavioral: Negative for agitation, behavioral problems, confusion, decreased concentration, dysphoric mood, hallucinations, self-injury, sleep disturbance and suicidal ideas. The patient is not nervous/anxious and is not hyperactive.         Objective      Physical Exam  Pulse 66   Ht 166.4 cm (65.51\")   Wt 87.3 kg (192 lb 7.4 oz)   SpO2 98%   Breastfeeding? No   BMI 31.53 kg/m²     Body mass index is 31.53 kg/m².    General:   Mental Status:  Alert   Appearance: Cooperative, in no acute distress   Build and Nutrition: Well-nourished well-developed female   Orientation: Alert and oriented to person, place and time   Posture: Normal   Gait: Normal    Integument:   Right knee: no skin lesions, no rash, no ecchymosis   Left knee: no skin lesions, no rash, no ecchymosis    Lower Extremities:   Right Knee:    Tenderness:  None    Effusion:  None    Swelling:  None    Crepitus:  Positive    Atrophy:  None    Range of motion:  Extension: 0°       Flexion: 120°  Instability:  No varus laxity, no valgus laxity, negative anterior drawer  Deformities:  None   Left Knee:    Tenderness:  None    Effusion:  None    Swelling:  None    Crepitus: Positive    Atrophy:  None    Range of motion:  Extension: 0°       Flexion: 120°  Instability:  No varus laxity, no valgus laxity, negative anterior drawer  Deformities:  None      Imaging/Studies  Imaging Results (last 24 hours)     Procedure Component Value Units Date/Time    XR Knee 4+ View Bilateral [[id number]] Resulted:  08/28/19 1113     Updated:  08/28/19 1114    Narrative:       Right Knee Radiographs  Indication: right knee pain  Views: Standing AP's and " skiers of both knees, with lateral and sunrise   views of the right knee    Comparison: 4/25/2018    Findings:   Medial joint space narrowing, with advanced patellofemoral degeneration,   with no acute bony abnormalities.  No worsening compared to the previous   films.    Left Knee Radiographs  Indication: left knee pain  Views: Standing AP's and skiers of both knees, with lateral and sunrise   views of the left knee    Comparison: 4/25/2018    Findings:   Medial joint space narrowing, with advanced patellofemoral degeneration,   with no acute bony abnormalities.  No significant changes compared to   previous films.            Assessment and Plan     Janna was seen today for follow-up and follow-up.    Diagnoses and all orders for this visit:    Primary osteoarthritis of both knees  -     XR Knee 4+ View Bilateral        1. Primary osteoarthritis of both knees        I reviewed my findings with the patient today.  She has arthritis in both knees, but seems to have responded well to the Visco supplementation injections.  No pain today.  I will see her back in 6 months, but sooner for any worsening or problems.  Radiographs are stable.    Return in about 6 months (around 2/28/2020).      Medical Decision Making  Data/Risk: radiology tests and independent visualization of imaging, lab tests, or EMG/NCV      Kuldeep Jasso MD  08/28/19  11:20 AM

## 2020-03-02 ENCOUNTER — OFFICE VISIT (OUTPATIENT)
Dept: ORTHOPEDIC SURGERY | Facility: CLINIC | Age: 85
End: 2020-03-02

## 2020-03-02 VITALS — OXYGEN SATURATION: 95 % | HEART RATE: 77 BPM | HEIGHT: 66 IN | BODY MASS INDEX: 31.32 KG/M2 | WEIGHT: 194.89 LBS

## 2020-03-02 DIAGNOSIS — M17.0 PRIMARY OSTEOARTHRITIS OF BOTH KNEES: Primary | ICD-10-CM

## 2020-03-02 PROCEDURE — 99212 OFFICE O/P EST SF 10 MIN: CPT | Performed by: ORTHOPAEDIC SURGERY

## 2020-03-02 NOTE — PROGRESS NOTES
[facility] [place] [facility] Note    Subjective     Chief Complaint   Patient presents with   • Follow-up     6 months follow up for Primary osteoarthritis of both knees         HPI    It has been 6  month(s) since Ms. Willis's last visit. She returns to clinic today for follow-up of bilateral knee arthritis. She rates her pain a 7/10 on the pain scale. Previous/current treatments: weight loss. Current symptoms: pain and swelling. The pain is worse with walking, standing and climbing stairs; resting improve the pain. Overall, she is doing better.  No new complaints today.  Knees are tolerable.  Viscosupplementation injections helped in the past.    I have reviewed the following portions of the patient's history:History of Present Illness        There is no problem list on file for this patient.    Past Medical History:   Diagnosis Date   • Diabetes (CMS/HCC)       Past Surgical History:   Procedure Laterality Date   • OVARY SURGERY        Family History   Problem Relation Age of Onset   • Stroke Mother    • Heart attack Mother      Social History     Socioeconomic History   • Marital status: Single     Spouse name: Not on file   • Number of children: Not on file   • Years of education: Not on file   • Highest education level: Not on file   Tobacco Use   • Smoking status: Never Smoker   • Smokeless tobacco: Never Used   Substance and Sexual Activity   • Alcohol use: No   • Drug use: No   • Sexual activity: Defer      Current Outpatient Medications on File Prior to Visit   Medication Sig Dispense Refill   • amLODIPine (NORVASC) 5 MG tablet TAKE ONE TABLET EVERY DAY FOR high blood PRESSURE  3   • carvedilol (COREG) 6.25 MG tablet Take 6.25 mg by mouth 2 (Two) Times a Day.  3   • escitalopram (LEXAPRO) 20 MG tablet Take 20 mg by mouth Daily.  3   • ezetimibe (ZETIA) 10 MG tablet Take  by mouth Daily.     • fluorometholone (FML) 0.1 % ophthalmic suspension Instill 1 drop into both eyes three times a day  0   •  "gabapentin (NEURONTIN) 100 MG capsule Take 300 mg by mouth every night at bedtime.  1   • levothyroxine (SYNTHROID, LEVOTHROID) 50 MCG tablet TAKE ONE TABLET EVERY DAY FOR FOR THYROID  3   • losartan-hydrochlorothiazide (HYZAAR) 100-25 MG per tablet take ONE-HALF TABLET ONCE DAILY  3   • metFORMIN (GLUCOPHAGE) 500 MG tablet Take  by mouth 3 (Three) Times a Day.     • omeprazole (priLOSEC) 40 MG capsule TAKE ONE CAPSULE EVERY MORNING FOR gerd  3     No current facility-administered medications on file prior to visit.       Allergies   Allergen Reactions   • 2,4-D Dimethylamine (Amisol) Hives   • Azithromycin Hives   • Lisinopril Hives   • Metoclopramide Hives   • Statins Hives        Review of Systems   Constitutional: Negative.    HENT: Negative.    Eyes: Negative.    Respiratory: Negative.    Cardiovascular: Negative.    Gastrointestinal: Negative.    Endocrine: Negative.    Genitourinary: Negative.    Musculoskeletal: Positive for arthralgias.   Skin: Negative.    Allergic/Immunologic: Negative.    Neurological: Negative.    Hematological: Negative.    Psychiatric/Behavioral: Negative.         Objective      Physical Exam  Pulse 77   Ht 166.4 cm (65.51\")   Wt 88.4 kg (194 lb 14.2 oz)   SpO2 95%   BMI 31.93 kg/m²     Body mass index is 31.93 kg/m².    General:   Mental Status:  Alert   Appearance: Cooperative, in no acute distress   Build and Nutrition: Well-nourished well-developed female   Orientation: Alert and oriented to person, place and time   Posture: Normal   Gait: Normal    Lower Extremities:              Right Knee:                          Tenderness:    None                          Effusion:          None                          Swelling:          None                          Crepitus:          Positive                          Atrophy:           None                          Range of motion:        Extension:       0°                                                              Flexion:         "   120°  Instability:        No varus laxity, no valgus laxity, negative anterior drawer  Deformities:     None              Left Knee:                          Tenderness:    None                          Effusion:          None                          Swelling:          None                          Crepitus:          Positive                          Atrophy:           None                          Range of motion:        Extension:       0°                                                              Flexion:           120°  Instability:        No varus laxity, no valgus laxity, negative anterior drawer  Deformities:     None      Assessment and Plan     Janna was seen today for follow-up.    Diagnoses and all orders for this visit:    Primary osteoarthritis of both knees        1. Primary osteoarthritis of both knees        I reviewed my findings with the patient today.  She states that the pain is tolerable at the current time, and therefore no intervention is required at this time.  However she has worsening between now and his 6-month follow-up, she may be interested in a repeat Visco supplementation injection series.  If that is the case, we could get that approved before she comes.    Return in about 6 months (around 9/2/2020).            Kuldeep Jasso MD  03/02/20  10:26 AM

## 2020-08-24 ENCOUNTER — TELEPHONE (OUTPATIENT)
Dept: ORTHOPEDIC SURGERY | Facility: CLINIC | Age: 85
End: 2020-08-24

## 2020-08-24 DIAGNOSIS — M17.0 PRIMARY OSTEOARTHRITIS OF BOTH KNEES: Primary | ICD-10-CM

## 2020-08-24 NOTE — TELEPHONE ENCOUNTER
PATIENT IN ASKING IF SHE COULD HAVE THE SUPARTZ INJECTIONS AGAIN. PATIENT'S APPOINTMENT IS ON 09/02/2020. CAN YOU PUT AN ORDER IN SO THE PREAUTH CAN BE STARTED.

## 2020-09-14 ENCOUNTER — CLINICAL SUPPORT (OUTPATIENT)
Dept: ORTHOPEDIC SURGERY | Facility: CLINIC | Age: 85
End: 2020-09-14

## 2020-09-14 DIAGNOSIS — M17.0 PRIMARY OSTEOARTHRITIS OF BOTH KNEES: ICD-10-CM

## 2020-09-14 DIAGNOSIS — M17.12 PRIMARY OSTEOARTHRITIS OF LEFT KNEE: Primary | ICD-10-CM

## 2020-09-14 DIAGNOSIS — M17.11 PRIMARY OSTEOARTHRITIS OF RIGHT KNEE: ICD-10-CM

## 2020-09-14 PROCEDURE — 20610 DRAIN/INJ JOINT/BURSA W/O US: CPT | Performed by: ORTHOPAEDIC SURGERY

## 2020-09-14 NOTE — PROGRESS NOTES
[facility] [place] [facility] Note    Subjective     Chief Complaint   Patient presents with   • Injections     Bilateral knee supartz injection #1        HPI    Janna Willis is a 85 y.o. female who presents for the first Supartz injections into both knees.    There is no problem list on file for this patient.    Past Medical History:   Diagnosis Date   • Diabetes (CMS/HCC)       Past Surgical History:   Procedure Laterality Date   • OVARY SURGERY        Family History   Problem Relation Age of Onset   • Stroke Mother    • Heart attack Mother      Social History     Socioeconomic History   • Marital status: Single     Spouse name: Not on file   • Number of children: Not on file   • Years of education: Not on file   • Highest education level: Not on file   Tobacco Use   • Smoking status: Never Smoker   • Smokeless tobacco: Never Used   Substance and Sexual Activity   • Alcohol use: No   • Drug use: No   • Sexual activity: Defer      Current Outpatient Medications on File Prior to Visit   Medication Sig Dispense Refill   • amLODIPine (NORVASC) 5 MG tablet TAKE ONE TABLET EVERY DAY FOR high blood PRESSURE  3   • carvedilol (COREG) 6.25 MG tablet Take 6.25 mg by mouth 2 (Two) Times a Day.  3   • escitalopram (LEXAPRO) 20 MG tablet Take 20 mg by mouth Daily.  3   • ezetimibe (ZETIA) 10 MG tablet Take  by mouth Daily.     • fluorometholone (FML) 0.1 % ophthalmic suspension Instill 1 drop into both eyes three times a day  0   • gabapentin (NEURONTIN) 100 MG capsule Take 300 mg by mouth every night at bedtime.  1   • levothyroxine (SYNTHROID, LEVOTHROID) 50 MCG tablet TAKE ONE TABLET EVERY DAY FOR FOR THYROID  3   • losartan-hydrochlorothiazide (HYZAAR) 100-25 MG per tablet take ONE-HALF TABLET ONCE DAILY  3   • metFORMIN (GLUCOPHAGE) 500 MG tablet Take  by mouth 3 (Three) Times a Day.     • omeprazole (priLOSEC) 40 MG capsule TAKE ONE CAPSULE EVERY MORNING FOR gerd  3     No current facility-administered  medications on file prior to visit.       Allergies   Allergen Reactions   • 2,4-D Dimethylamine (Amisol) Hives   • Azithromycin Hives   • Lisinopril Hives   • Metoclopramide Hives   • Statins Hives        Review of Systems   Constitutional: Negative.    HENT: Negative.    Eyes: Negative.    Respiratory: Negative.    Cardiovascular: Negative.    Gastrointestinal: Negative.    Endocrine: Negative.    Genitourinary: Negative.    Musculoskeletal: Positive for arthralgias.   Skin: Negative.    Allergic/Immunologic: Negative.    Neurological: Negative.    Hematological: Negative.    Psychiatric/Behavioral: Negative.         Objective      Physical Exam  There were no vitals taken for this visit.    There is no height or weight on file to calculate BMI.    General:   Mental Status:  Alert   Appearance: Cooperative, in no acute distress   Posture: Normal    Assessment and Plan     Janna was seen today for injections.    Diagnoses and all orders for this visit:    Primary osteoarthritis of left knee  -     Large Joint Arthrocentesis: L knee    Primary osteoarthritis of right knee        1. Primary osteoarthritis of left knee    2. Primary osteoarthritis of right knee        Procedure Note:  The potential benefits of performing a therapeutic knee joint visco supplementation injection, as well as potential risks (including, but not limited to infection, swelling, pain, bleeding, bruising, nerve/blood vessel damage, and pseudoseptic reaction) have been discussed with the patient.  After informed consent, timeout procedure was performed, and the skin on the both knees were prepped with chlorhexidine soap and alcohol, after which ethyl chloride was applied to the skin at the injection sites. Via the anterolateral approach, Supartz was injected into the knee joints.  The patient tolerated the procedure well. There were no complications.  Band-Aids were applied to the injection sites. Post-procedural instructions discussed with the  patient and/or their caregiver.    Return in about 1 week (around 9/21/2020) for Injection.    Kuldeep Jasso MD  09/14/20  14:20 EDT    Dragon disclaimer:  Much of this encounter note is an electronic transcription/translation of spoken language to printed text. The electronic translation of spoken language may permit erroneous, or at times, nonsensical words or phrases to be inadvertently transcribed; Although I have reviewed the note for such errors, some may still exist.

## 2020-09-14 NOTE — PROGRESS NOTES
Procedure   Large Joint Arthrocentesis: R knee  Date/Time: 9/14/2020 1:58 PM  Consent given by: patient  Site marked: site marked  Timeout: Immediately prior to procedure a time out was called to verify the correct patient, procedure, equipment, support staff and site/side marked as required   Supporting Documentation  Indications: pain   Procedure Details  Location: knee - R knee  Preparation: Patient was prepped and draped in the usual sterile fashion  Needle size: 22 G  Approach: anterolateral  Medications administered: 25 mg sodium hyaluronate 25 MG/2.5ML  Patient tolerance: patient tolerated the procedure well with no immediate complications    Large Joint Arthrocentesis: L knee  Date/Time: 9/14/2020 1:59 PM  Consent given by: patient  Site marked: site marked  Timeout: Immediately prior to procedure a time out was called to verify the correct patient, procedure, equipment, support staff and site/side marked as required   Supporting Documentation  Indications: pain   Procedure Details  Location: knee - L knee  Preparation: Patient was prepped and draped in the usual sterile fashion  Needle size: 22 G  Approach: anterolateral  Medications administered: 25 mg sodium hyaluronate 25 MG/2.5ML  Patient tolerance: patient tolerated the procedure well with no immediate complications

## 2020-09-21 ENCOUNTER — CLINICAL SUPPORT (OUTPATIENT)
Dept: ORTHOPEDIC SURGERY | Facility: CLINIC | Age: 85
End: 2020-09-21

## 2020-09-21 DIAGNOSIS — M17.0 PRIMARY OSTEOARTHRITIS OF BOTH KNEES: Primary | ICD-10-CM

## 2020-09-21 PROCEDURE — 20610 DRAIN/INJ JOINT/BURSA W/O US: CPT | Performed by: ORTHOPAEDIC SURGERY

## 2020-09-21 NOTE — PROGRESS NOTES
Procedure   Large Joint Arthrocentesis: R knee  Date/Time: 9/21/2020 1:51 PM  Consent given by: patient  Site marked: site marked  Timeout: Immediately prior to procedure a time out was called to verify the correct patient, procedure, equipment, support staff and site/side marked as required   Supporting Documentation  Indications: pain   Procedure Details  Location: knee - R knee  Preparation: Patient was prepped and draped in the usual sterile fashion  Needle size: 22 G  Approach: anterolateral  Medications administered: 25 mg sodium hyaluronate 25 MG/2.5ML  Patient tolerance: patient tolerated the procedure well with no immediate complications    Large Joint Arthrocentesis: L knee  Date/Time: 9/21/2020 1:52 PM  Consent given by: patient  Site marked: site marked  Timeout: Immediately prior to procedure a time out was called to verify the correct patient, procedure, equipment, support staff and site/side marked as required   Supporting Documentation  Indications: pain   Procedure Details  Location: knee - L knee  Preparation: Patient was prepped and draped in the usual sterile fashion  Needle size: 22 G  Approach: anterolateral  Medications administered: 25 mg sodium hyaluronate 25 MG/2.5ML  Patient tolerance: patient tolerated the procedure well with no immediate complications

## 2020-09-21 NOTE — PROGRESS NOTES
[facility] [place] [facility] [place]    Subjective     Chief Complaint   Patient presents with   • Injections     Bilateral knee Supartz injection #2        HPI    Janna Willis is a 85 y.o. female who presents for the second Supartz injections into both knees.  Of note, no significant improvement so far.    There is no problem list on file for this patient.    Past Medical History:   Diagnosis Date   • Diabetes (CMS/HCC)       Past Surgical History:   Procedure Laterality Date   • OVARY SURGERY        Family History   Problem Relation Age of Onset   • Stroke Mother    • Heart attack Mother      Social History     Socioeconomic History   • Marital status: Single     Spouse name: Not on file   • Number of children: Not on file   • Years of education: Not on file   • Highest education level: Not on file   Tobacco Use   • Smoking status: Never Smoker   • Smokeless tobacco: Never Used   Substance and Sexual Activity   • Alcohol use: No   • Drug use: No   • Sexual activity: Defer      Current Outpatient Medications on File Prior to Visit   Medication Sig Dispense Refill   • amLODIPine (NORVASC) 5 MG tablet TAKE ONE TABLET EVERY DAY FOR high blood PRESSURE  3   • carvedilol (COREG) 6.25 MG tablet Take 6.25 mg by mouth 2 (Two) Times a Day.  3   • escitalopram (LEXAPRO) 20 MG tablet Take 20 mg by mouth Daily.  3   • ezetimibe (ZETIA) 10 MG tablet Take  by mouth Daily.     • fluorometholone (FML) 0.1 % ophthalmic suspension Instill 1 drop into both eyes three times a day  0   • gabapentin (NEURONTIN) 100 MG capsule Take 300 mg by mouth every night at bedtime.  1   • levothyroxine (SYNTHROID, LEVOTHROID) 50 MCG tablet TAKE ONE TABLET EVERY DAY FOR FOR THYROID  3   • losartan-hydrochlorothiazide (HYZAAR) 100-25 MG per tablet take ONE-HALF TABLET ONCE DAILY  3   • metFORMIN (GLUCOPHAGE) 500 MG tablet Take  by mouth 3 (Three) Times a Day.     • omeprazole (priLOSEC) 40 MG capsule TAKE ONE CAPSULE EVERY MORNING FOR gerd  3       No current facility-administered medications on file prior to visit.       Allergies   Allergen Reactions   • 2,4-D Dimethylamine (Amisol) Hives   • Azithromycin Hives   • Lisinopril Hives   • Metoclopramide Hives   • Statins Hives        Review of Systems   Constitutional: Negative.    HENT: Negative.    Eyes: Negative.    Respiratory: Negative.    Cardiovascular: Negative.    Gastrointestinal: Negative.    Endocrine: Negative.    Genitourinary: Negative.    Musculoskeletal: Positive for arthralgias.   Skin: Negative.    Allergic/Immunologic: Negative.    Neurological: Negative.    Hematological: Negative.    Psychiatric/Behavioral: Negative.         Objective      Physical Exam  There were no vitals taken for this visit.    There is no height or weight on file to calculate BMI.    General:   Mental Status:  Alert   Appearance: Cooperative, in no acute distress   Posture: Normal    Assessment and Plan     Janna was seen today for injections.    Diagnoses and all orders for this visit:    Primary osteoarthritis of both knees  -     Large Joint Arthrocentesis: R knee  -     Large Joint Arthrocentesis: L knee        1. Primary osteoarthritis of both knees        Procedure Note:  The potential benefits of performing a therapeutic knee joint visco supplementation injection, as well as potential risks (including, but not limited to infection, swelling, pain, bleeding, bruising, nerve/blood vessel damage, and pseudoseptic reaction) have been discussed with the patient.  After informed consent, timeout procedure was performed, and the skin on the right and left knees were prepped with chlorhexidine soap and alcohol, after which ethyl chloride was applied to the skin at the injection site. Via the anterolateral approach, Supartz was injected into the knee joints.  The patient tolerated the procedure well. There were no complications.  Band-Aids were applied to the injection sites. Post-procedural instructions discussed  with the patient and/or their caregiver.    Return in about 1 week (around 9/28/2020) for Injection.    Kuldeep Jasso MD  09/21/20  14:03 EDT    Dragon disclaimer:  Much of this encounter note is an electronic transcription/translation of spoken language to printed text. The electronic translation of spoken language may permit erroneous, or at times, nonsensical words or phrases to be inadvertently transcribed; Although I have reviewed the note for such errors, some may still exist.

## 2020-09-28 ENCOUNTER — CLINICAL SUPPORT (OUTPATIENT)
Dept: ORTHOPEDIC SURGERY | Facility: CLINIC | Age: 85
End: 2020-09-28

## 2020-09-28 DIAGNOSIS — M17.0 PRIMARY OSTEOARTHRITIS OF BOTH KNEES: Primary | ICD-10-CM

## 2020-09-28 PROCEDURE — 20610 DRAIN/INJ JOINT/BURSA W/O US: CPT | Performed by: ORTHOPAEDIC SURGERY

## 2020-09-28 NOTE — PROGRESS NOTES
Procedure   Large Joint Arthrocentesis: R knee  Date/Time: 9/28/2020 1:09 PM  Consent given by: patient  Site marked: site marked  Timeout: Immediately prior to procedure a time out was called to verify the correct patient, procedure, equipment, support staff and site/side marked as required   Supporting Documentation  Indications: pain   Procedure Details  Location: knee - R knee  Preparation: Patient was prepped and draped in the usual sterile fashion  Needle size: 22 G  Approach: anterolateral  Medications administered: 25 mg sodium hyaluronate 25 MG/2.5ML  Patient tolerance: patient tolerated the procedure well with no immediate complications    Large Joint Arthrocentesis: L knee  Date/Time: 9/28/2020 1:09 PM  Consent given by: patient  Site marked: site marked  Timeout: Immediately prior to procedure a time out was called to verify the correct patient, procedure, equipment, support staff and site/side marked as required   Supporting Documentation  Indications: pain   Procedure Details  Location: knee - L knee  Preparation: Patient was prepped and draped in the usual sterile fashion  Needle size: 22 G  Approach: anterolateral  Medications administered: 25 mg sodium hyaluronate 25 MG/2.5ML  Patient tolerance: patient tolerated the procedure well with no immediate complications

## 2020-09-28 NOTE — PROGRESS NOTES
[facility] [place] [facility] [place]    Subjective     Chief Complaint   Patient presents with   • Follow-up     bilateral knees supartz #3        HPI    Janna Willis is a 85 y.o. female who presents for the third Supartz injection into both knees.  Of note, she has seen some improvement up to this point.    There is no problem list on file for this patient.    Past Medical History:   Diagnosis Date   • Diabetes (CMS/HCC)       Past Surgical History:   Procedure Laterality Date   • OVARY SURGERY        Family History   Problem Relation Age of Onset   • Stroke Mother    • Heart attack Mother      Social History     Socioeconomic History   • Marital status: Single     Spouse name: Not on file   • Number of children: Not on file   • Years of education: Not on file   • Highest education level: Not on file   Tobacco Use   • Smoking status: Never Smoker   • Smokeless tobacco: Never Used   Substance and Sexual Activity   • Alcohol use: No   • Drug use: No   • Sexual activity: Defer      Current Outpatient Medications on File Prior to Visit   Medication Sig Dispense Refill   • amLODIPine (NORVASC) 5 MG tablet TAKE ONE TABLET EVERY DAY FOR high blood PRESSURE  3   • carvedilol (COREG) 6.25 MG tablet Take 6.25 mg by mouth 2 (Two) Times a Day.  3   • escitalopram (LEXAPRO) 20 MG tablet Take 20 mg by mouth Daily.  3   • ezetimibe (ZETIA) 10 MG tablet Take  by mouth Daily.     • fluorometholone (FML) 0.1 % ophthalmic suspension Instill 1 drop into both eyes three times a day  0   • gabapentin (NEURONTIN) 100 MG capsule Take 300 mg by mouth every night at bedtime.  1   • levothyroxine (SYNTHROID, LEVOTHROID) 50 MCG tablet TAKE ONE TABLET EVERY DAY FOR FOR THYROID  3   • losartan-hydrochlorothiazide (HYZAAR) 100-25 MG per tablet take ONE-HALF TABLET ONCE DAILY  3   • metFORMIN (GLUCOPHAGE) 500 MG tablet Take  by mouth 3 (Three) Times a Day.     • omeprazole (priLOSEC) 40 MG capsule TAKE ONE CAPSULE EVERY MORNING FOR gerd  3      No current facility-administered medications on file prior to visit.       Allergies   Allergen Reactions   • 2,4-D Dimethylamine (Amisol) Hives   • Azithromycin Hives   • Lisinopril Hives   • Metoclopramide Hives   • Statins Hives        Review of Systems     Objective      Physical Exam  There were no vitals taken for this visit.    There is no height or weight on file to calculate BMI.    General:   Mental Status:  Alert   Appearance: Cooperative, in no acute distress   Posture: Normal    Assessment and Plan     Janna was seen today for follow-up.    Diagnoses and all orders for this visit:    Primary osteoarthritis of both knees  -     Large Joint Arthrocentesis: R knee  -     Large Joint Arthrocentesis: L knee        1. Primary osteoarthritis of both knees        Procedure Note:  The potential benefits of performing a therapeutic knee joint visco supplementation injection, as well as potential risks (including, but not limited to infection, swelling, pain, bleeding, bruising, nerve/blood vessel damage, and pseudoseptic reaction) have been discussed with the patient.  After informed consent, timeout procedure was performed, and the skin on the right and left knee was prepped with chlorhexidine soap and alcohol, after which ethyl chloride was applied to the skin at the injection site. Via the anterolateral approach, Supartz was injected into the knee joints.  The patient tolerated the procedure well. There were no complications.  Band-Aids were applied to the injection sites. Post-procedural instructions discussed with the patient and/or their caregiver.    Return in about 1 week (around 10/5/2020) for Injection.    Kuldeep Jasso MD  09/28/20  13:58 BERONICAT    Dragon disclaimer:  Much of this encounter note is an electronic transcription/translation of spoken language to printed text. The electronic translation of spoken language may permit erroneous, or at times, nonsensical words or phrases to be  inadvertently transcribed; Although I have reviewed the note for such errors, some may still exist.

## 2020-10-05 ENCOUNTER — CLINICAL SUPPORT (OUTPATIENT)
Dept: ORTHOPEDIC SURGERY | Facility: CLINIC | Age: 85
End: 2020-10-05

## 2020-10-05 DIAGNOSIS — M17.0 PRIMARY OSTEOARTHRITIS OF BOTH KNEES: Primary | ICD-10-CM

## 2020-10-05 PROCEDURE — 20610 DRAIN/INJ JOINT/BURSA W/O US: CPT | Performed by: ORTHOPAEDIC SURGERY

## 2020-10-05 NOTE — PROGRESS NOTES
[facility] – [place] Orthopaedic Surgery Clinic Note    Subjective     Chief Complaint   Patient presents with   • Injections     Bilateral knee supartz injection #4         HPI    Janna Willis is a 85 y.o. female who presents for the fourth Supartz injection into both knees.  Of note, she has seen improvement so far.    There is no problem list on file for this patient.    Past Medical History:   Diagnosis Date   • Diabetes (CMS/HCC)       Past Surgical History:   Procedure Laterality Date   • OVARY SURGERY        Family History   Problem Relation Age of Onset   • Stroke Mother    • Heart attack Mother      Social History     Socioeconomic History   • Marital status: Single     Spouse name: Not on file   • Number of children: Not on file   • Years of education: Not on file   • Highest education level: Not on file   Tobacco Use   • Smoking status: Never Smoker   • Smokeless tobacco: Never Used   Substance and Sexual Activity   • Alcohol use: No   • Drug use: No   • Sexual activity: Defer      Current Outpatient Medications on File Prior to Visit   Medication Sig Dispense Refill   • amLODIPine (NORVASC) 5 MG tablet TAKE ONE TABLET EVERY DAY FOR high blood PRESSURE  3   • carvedilol (COREG) 6.25 MG tablet Take 6.25 mg by mouth 2 (Two) Times a Day.  3   • escitalopram (LEXAPRO) 20 MG tablet Take 20 mg by mouth Daily.  3   • ezetimibe (ZETIA) 10 MG tablet Take  by mouth Daily.     • fluorometholone (FML) 0.1 % ophthalmic suspension Instill 1 drop into both eyes three times a day  0   • gabapentin (NEURONTIN) 100 MG capsule Take 300 mg by mouth every night at bedtime.  1   • levothyroxine (SYNTHROID, LEVOTHROID) 50 MCG tablet TAKE ONE TABLET EVERY DAY FOR FOR THYROID  3   • losartan-hydrochlorothiazide (HYZAAR) 100-25 MG per tablet take ONE-HALF TABLET ONCE DAILY  3   • metFORMIN (GLUCOPHAGE) 500 MG tablet Take  by mouth 3 (Three) Times a Day.     • omeprazole (priLOSEC) 40 MG capsule TAKE ONE CAPSULE EVERY MORNING FOR gerd  3         No current facility-administered medications on file prior to visit.       Allergies   Allergen Reactions   • 2,4-D Dimethylamine (Amisol) Hives   • Azithromycin Hives   • Lisinopril Hives   • Metoclopramide Hives   • Statins Hives        Review of Systems   Constitutional: Negative.    HENT: Negative.    Eyes: Negative.    Respiratory: Negative.    Cardiovascular: Negative.    Gastrointestinal: Negative.    Endocrine: Negative.    Genitourinary: Negative.    Musculoskeletal: Positive for arthralgias.   Skin: Negative.    Allergic/Immunologic: Negative.    Neurological: Negative.    Hematological: Negative.    Psychiatric/Behavioral: Negative.         Objective      Physical Exam  There were no vitals taken for this visit.    There is no height or weight on file to calculate BMI.    General:   Mental Status:  Alert   Appearance: Cooperative, in no acute distress   Posture: Normal    Assessment and Plan     Janna was seen today for injections.    Diagnoses and all orders for this visit:    Primary osteoarthritis of both knees  -     Large Joint Arthrocentesis: R knee  -     Large Joint Arthrocentesis: L knee        1. Primary osteoarthritis of both knees        Procedure Note:  The potential benefits of performing a therapeutic knee joint visco supplementation injection, as well as potential risks (including, but not limited to infection, swelling, pain, bleeding, bruising, nerve/blood vessel damage, and pseudoseptic reaction) have been discussed with the patient.  After informed consent, timeout procedure was performed, and the skin on the right and left knee was prepped with chlorhexidine soap and alcohol, after which ethyl chloride was applied to the skin at the injection site. Via the anterolateral approach, Supartz was injected into the knee joint.  The patient tolerated the procedure well. There were no complications.  Band-Aid was applied to the injection site. Post-procedural instructions discussed with  the patient and/or their caregiver.    Return in about 1 week (around 10/12/2020) for Injection.    Kuldeep Jasso MD  10/05/20  13:29 EDT    Dragon disclaimer:  Much of this encounter note is an electronic transcription/translation of spoken language to printed text. The electronic translation of spoken language may permit erroneous, or at times, nonsensical words or phrases to be inadvertently transcribed; Although I have reviewed the note for such errors, some may still exist.

## 2020-10-05 NOTE — PROGRESS NOTES
Procedure   Large Joint Arthrocentesis: R knee  Date/Time: 10/5/2020 1:19 PM  Consent given by: patient  Site marked: site marked  Timeout: Immediately prior to procedure a time out was called to verify the correct patient, procedure, equipment, support staff and site/side marked as required   Supporting Documentation  Indications: pain   Procedure Details  Location: knee - R knee  Preparation: Patient was prepped and draped in the usual sterile fashion  Needle size: 22 G  Approach: anterolateral  Medications administered: 25 mg sodium hyaluronate 25 MG/2.5ML  Patient tolerance: patient tolerated the procedure well with no immediate complications    Large Joint Arthrocentesis: L knee  Date/Time: 10/5/2020 1:19 PM  Consent given by: patient  Site marked: site marked  Timeout: Immediately prior to procedure a time out was called to verify the correct patient, procedure, equipment, support staff and site/side marked as required   Supporting Documentation  Indications: pain   Procedure Details  Location: knee - L knee  Preparation: Patient was prepped and draped in the usual sterile fashion  Needle size: 22 G  Approach: anterolateral  Medications administered: 25 mg sodium hyaluronate 25 MG/2.5ML  Patient tolerance: patient tolerated the procedure well with no immediate complications

## 2020-10-12 ENCOUNTER — CLINICAL SUPPORT (OUTPATIENT)
Dept: ORTHOPEDIC SURGERY | Facility: CLINIC | Age: 85
End: 2020-10-12

## 2020-10-12 DIAGNOSIS — M17.0 PRIMARY OSTEOARTHRITIS OF BOTH KNEES: Primary | ICD-10-CM

## 2020-10-12 PROCEDURE — 20610 DRAIN/INJ JOINT/BURSA W/O US: CPT | Performed by: ORTHOPAEDIC SURGERY

## 2020-10-12 NOTE — PROGRESS NOTES
Procedure   Large Joint Arthrocentesis: R knee  Date/Time: 10/12/2020 1:19 PM  Consent given by: patient  Site marked: site marked  Timeout: Immediately prior to procedure a time out was called to verify the correct patient, procedure, equipment, support staff and site/side marked as required   Supporting Documentation  Indications: pain   Procedure Details  Location: knee - R knee  Preparation: Patient was prepped and draped in the usual sterile fashion  Needle size: 22 G  Approach: anterolateral  Medications administered: 25 mg sodium hyaluronate 25 MG/2.5ML  Patient tolerance: patient tolerated the procedure well with no immediate complications    Large Joint Arthrocentesis: L knee  Date/Time: 10/12/2020 1:20 PM  Consent given by: patient  Site marked: site marked  Timeout: Immediately prior to procedure a time out was called to verify the correct patient, procedure, equipment, support staff and site/side marked as required   Supporting Documentation  Indications: pain   Procedure Details  Location: knee - L knee  Preparation: Patient was prepped and draped in the usual sterile fashion  Needle size: 22 G  Approach: anterolateral  Medications administered: 25 mg sodium hyaluronate 25 MG/2.5ML  Patient tolerance: patient tolerated the procedure well with no immediate complications

## 2020-10-12 NOTE — PROGRESS NOTES
[facility] – [place] Orthopaedic Surgery Clinic Note    Subjective     Chief Complaint   Patient presents with   • Injections     Bilateral knee Supartz injections #5        HPI    Janna Willis is a 85 y.o. female who presents for the fifth and final Supartz injection into both knees.  Of note, she has seen improvement so far.    There is no problem list on file for this patient.    Past Medical History:   Diagnosis Date   • Diabetes (CMS/HCC)       Past Surgical History:   Procedure Laterality Date   • OVARY SURGERY        Family History   Problem Relation Age of Onset   • Stroke Mother    • Heart attack Mother      Social History     Socioeconomic History   • Marital status: Single     Spouse name: Not on file   • Number of children: Not on file   • Years of education: Not on file   • Highest education level: Not on file   Tobacco Use   • Smoking status: Never Smoker   • Smokeless tobacco: Never Used   Substance and Sexual Activity   • Alcohol use: No   • Drug use: No   • Sexual activity: Defer      Current Outpatient Medications on File Prior to Visit   Medication Sig Dispense Refill   • amLODIPine (NORVASC) 5 MG tablet TAKE ONE TABLET EVERY DAY FOR high blood PRESSURE  3   • carvedilol (COREG) 6.25 MG tablet Take 6.25 mg by mouth 2 (Two) Times a Day.  3   • escitalopram (LEXAPRO) 20 MG tablet Take 20 mg by mouth Daily.  3   • ezetimibe (ZETIA) 10 MG tablet Take  by mouth Daily.     • fluorometholone (FML) 0.1 % ophthalmic suspension Instill 1 drop into both eyes three times a day  0   • gabapentin (NEURONTIN) 100 MG capsule Take 300 mg by mouth every night at bedtime.  1   • levothyroxine (SYNTHROID, LEVOTHROID) 50 MCG tablet TAKE ONE TABLET EVERY DAY FOR FOR THYROID  3   • losartan-hydrochlorothiazide (HYZAAR) 100-25 MG per tablet take ONE-HALF TABLET ONCE DAILY  3   • metFORMIN (GLUCOPHAGE) 500 MG tablet Take  by mouth 3 (Three) Times a Day.     • omeprazole (priLOSEC) 40 MG capsule TAKE ONE CAPSULE EVERY MORNING FOR  gerd  3     No current facility-administered medications on file prior to visit.       Allergies   Allergen Reactions   • 2,4-D Dimethylamine (Amisol) Hives   • Azithromycin Hives   • Lisinopril Hives   • Metoclopramide Hives   • Statins Hives        Review of Systems   Constitutional: Negative for activity change, appetite change, chills, diaphoresis, fatigue, fever and unexpected weight change.   HENT: Negative for congestion, dental problem, drooling, ear discharge, ear pain, facial swelling, hearing loss, mouth sores, nosebleeds, postnasal drip, rhinorrhea, sinus pressure, sneezing, sore throat, tinnitus, trouble swallowing and voice change.    Eyes: Negative for photophobia, pain, discharge, redness, itching and visual disturbance.   Respiratory: Negative for apnea, cough, choking, chest tightness, shortness of breath, wheezing and stridor.    Cardiovascular: Negative for chest pain, palpitations and leg swelling.   Gastrointestinal: Negative for abdominal distention, abdominal pain, anal bleeding, blood in stool, constipation, diarrhea, nausea, rectal pain and vomiting.   Endocrine: Negative for cold intolerance, heat intolerance, polydipsia, polyphagia and polyuria.   Genitourinary: Negative for decreased urine volume, difficulty urinating, dysuria, enuresis, flank pain, frequency, genital sores, hematuria and urgency.   Musculoskeletal: Positive for arthralgias. Negative for back pain, gait problem, joint swelling, myalgias, neck pain and neck stiffness.   Skin: Negative for color change, pallor, rash and wound.   Allergic/Immunologic: Negative for environmental allergies, food allergies and immunocompromised state.   Neurological: Negative for dizziness, tremors, seizures, syncope, facial asymmetry, speech difficulty, weakness, light-headedness, numbness and headaches.   Hematological: Negative for adenopathy. Does not bruise/bleed easily.   Psychiatric/Behavioral: Negative for agitation, behavioral  problems, confusion, decreased concentration, dysphoric mood, hallucinations, self-injury, sleep disturbance and suicidal ideas. The patient is not nervous/anxious and is not hyperactive.         Objective      Physical Exam  There were no vitals taken for this visit.    There is no height or weight on file to calculate BMI.    General:   Mental Status:  Alert   Appearance: Cooperative, in no acute distress   Posture: Normal    Assessment and Plan     Janna was seen today for injections.    Diagnoses and all orders for this visit:    Primary osteoarthritis of both knees  -     Large Joint Arthrocentesis: R knee  -     Large Joint Arthrocentesis: L knee        1. Primary osteoarthritis of both knees        Procedure Note:  The potential benefits of performing a therapeutic knee joint visco supplementation injection, as well as potential risks (including, but not limited to infection, swelling, pain, bleeding, bruising, nerve/blood vessel damage, and pseudoseptic reaction) have been discussed with the patient.  After informed consent, timeout procedure was performed, and the skin on the right and left knee was prepped with chlorhexidine soap and alcohol, after which ethyl chloride was applied to the skin at the injection site. Via the anterolateral approach, Supartz was injected into the knee joint.  The patient tolerated the procedure well. There were no complications.  Band-Aid was applied to the injection site. Post-procedural instructions discussed with the patient and/or their caregiver.    Return in about 6 months (around 4/12/2021).    Kuldeep Jasso MD  10/12/20  13:30 BERONICAT    Dragon disclaimer:  Much of this encounter note is an electronic transcription/translation of spoken language to printed text. The electronic translation of spoken language may permit erroneous, or at times, nonsensical words or phrases to be inadvertently transcribed; Although I have reviewed the note for such errors, some may still  exist.

## 2021-06-11 ENCOUNTER — TELEPHONE (OUTPATIENT)
Dept: ORTHOPEDIC SURGERY | Facility: CLINIC | Age: 86
End: 2021-06-11

## 2021-06-11 DIAGNOSIS — M17.0 PRIMARY OSTEOARTHRITIS OF BOTH KNEES: Primary | ICD-10-CM

## 2021-06-11 NOTE — TELEPHONE ENCOUNTER
Caller: BRE ROSENTHAL    Relationship to patient: SELF    Best call back number: [phone]    Chief complaint: SCHEDULING REQUEST    Type of visit: FOLLOW UP    Requested date: ASAP    If rescheduling, when is the original appointment: 8/2/21    Additional notes: PATIENT CALLED STATING THAT SHE IS HAVING BILATERAL KNEE PAIN. PATIENT WAS SCHEDULED FOR F/U FOR NEXT AVAILABLE. SHE STATED THAT SHE DOES NOT DRIVE SO HER SISTER TAKES HER TO APPT'S. HER SISTER WILL BE OUT OF TOWN 7/28 AND THEN HAVING A TKA THE FOLLOWING WEEK. PATIENT WANTED TO SEE IF IT MIGHT BE POSSIBLE TO SCHEDULE THAT FOLLOW UP SOONER ON ACCOUNT OF HER OWN SCHEDULING DIFFICULTIES. PATIENT IS VERY APPRECIATIVE OF ANY ACCOMMODATIONS THAT COULD BE MADE.     PLEASE ADVISE PATIENT.

## 2021-06-14 NOTE — TELEPHONE ENCOUNTER
Spoke to patient and offered her cancellations on Dr. Jasso's schedule for this Wednesday, 6/16. Patient unable to make this. Patient then stated she would like gel injections at this visit. I told patient we would work on getting these approved through her insurance company then call her back to scheduled. Dr. Jasso, can you place order for viscosupplementation for patient?

## 2021-06-15 NOTE — TELEPHONE ENCOUNTER
Orthovis approved and scheduled- Visit 1 & 2 with Anh Alejo (patient understands and is agreeable- wants pain relief as soon as possible), and shot 3 with Dr. Jasso.

## 2021-06-23 ENCOUNTER — CLINICAL SUPPORT (OUTPATIENT)
Dept: ORTHOPEDIC SURGERY | Facility: CLINIC | Age: 86
End: 2021-06-23

## 2021-06-23 DIAGNOSIS — M17.0 PRIMARY OSTEOARTHRITIS OF BOTH KNEES: Primary | ICD-10-CM

## 2021-06-23 PROCEDURE — 20610 DRAIN/INJ JOINT/BURSA W/O US: CPT | Performed by: PHYSICIAN ASSISTANT

## 2021-06-23 NOTE — PROGRESS NOTES
Procedure   Large Joint Arthrocentesis: R knee  Date/Time: 6/23/2021 2:28 PM  Consent given by: patient  Site marked: site marked  Timeout: Immediately prior to procedure a time out was called to verify the correct patient, procedure, equipment, support staff and site/side marked as required   Supporting Documentation  Indications: pain   Procedure Details  Location: knee - R knee  Preparation: Patient was prepped and draped in the usual sterile fashion  Needle size: 23 G  Approach: anterolateral  Medications administered: 30 mg Hyaluronan 30 MG/2ML  Patient tolerance: patient tolerated the procedure well with no immediate complications    Large Joint Arthrocentesis: L knee  Date/Time: 6/23/2021 2:28 PM  Consent given by: patient  Site marked: site marked  Timeout: Immediately prior to procedure a time out was called to verify the correct patient, procedure, equipment, support staff and site/side marked as required   Supporting Documentation  Indications: pain   Procedure Details  Location: knee - L knee  Preparation: Patient was prepped and draped in the usual sterile fashion  Needle size: 23 G  Approach: anterolateral  Medications administered: 30 mg Hyaluronan 30 MG/2ML  Patient tolerance: patient tolerated the procedure well with no immediate complications

## 2021-06-23 NOTE — PROGRESS NOTES
Using sterile technique, the left knee was sterilely prepped with Hibiclens.  Following time out, using a 22 gauge needle the left knee was aspirated and then injected with 2 ml Orthovisc. Approximately 0.5 mm of straw-colored fluid was obtained.  Patient tolerated the procedure well.  No complications.      Using sterile technique, the right knee was sterilely prepped with Hibiclens.  Following time out, using a 22 gauge needle the right knee was aspirated and then injected with 2 ml Orthovisc. Approximately 0.5 mm of straw-colored fluid was obtained.  Patient tolerated the procedure well.  No complications.      RTC 1 week

## 2021-06-30 ENCOUNTER — CLINICAL SUPPORT (OUTPATIENT)
Dept: ORTHOPEDIC SURGERY | Facility: CLINIC | Age: 86
End: 2021-06-30

## 2021-06-30 DIAGNOSIS — M17.0 PRIMARY OSTEOARTHRITIS OF BOTH KNEES: Primary | ICD-10-CM

## 2021-06-30 PROCEDURE — 20610 DRAIN/INJ JOINT/BURSA W/O US: CPT | Performed by: PHYSICIAN ASSISTANT

## 2021-07-01 NOTE — PROGRESS NOTES
Patient presents for the final injection of Orthovisc in bilateral knees.  She has tolerated previous injections well.    Using sterile technique, the left knee was sterilely prepped with Hibiclens.  Following time out, using a 22 gauge needle the left knee was aspirated and then injected with 2 ml Orthovisc. Approximately 0.5 mm of straw-colored fluid was obtained.  Patient tolerated the procedure well.  No complications.      Using sterile technique, the right knee was sterilely prepped with Hibiclens.  Following time out, using a 22 gauge needle the right knee was aspirated and then injected with 2 ml Orthovisc. Approximately 0.5 mm of straw-colored fluid was obtained.  Patient tolerated the procedure well.  No complications.      Return in 6 months to see Dr. Jasso or sooner if needed.

## 2021-07-14 ENCOUNTER — CLINICAL SUPPORT (OUTPATIENT)
Dept: ORTHOPEDIC SURGERY | Facility: CLINIC | Age: 86
End: 2021-07-14

## 2021-07-14 DIAGNOSIS — M17.0 PRIMARY OSTEOARTHRITIS OF BOTH KNEES: Primary | ICD-10-CM

## 2021-07-14 PROCEDURE — 20610 DRAIN/INJ JOINT/BURSA W/O US: CPT | Performed by: ORTHOPAEDIC SURGERY

## 2021-07-14 NOTE — PROGRESS NOTES
[facility] [place] [facility] Note    Subjective     Chief Complaint   Patient presents with   • Injections     Bilateral knee OrthoVisc injection #3        HPI    Janna Willis is a 86 y.o. female who presents for the third and final Orthovisc injection into the both knees.  Of note, she has seen some improvement so far.    There is no problem list on file for this patient.    Past Medical History:   Diagnosis Date   • Diabetes (CMS/HCC)       Past Surgical History:   Procedure Laterality Date   • OVARY SURGERY        Family History   Problem Relation Age of Onset   • Stroke Mother    • Heart attack Mother      Social History     Socioeconomic History   • Marital status: Single     Spouse name: Not on file   • Number of children: Not on file   • Years of education: Not on file   • Highest education level: Not on file   Tobacco Use   • Smoking status: Never Smoker   • Smokeless tobacco: Never Used   Substance and Sexual Activity   • Alcohol use: No   • Drug use: No   • Sexual activity: Defer      Current Outpatient Medications on File Prior to Visit   Medication Sig Dispense Refill   • amLODIPine (NORVASC) 5 MG tablet TAKE ONE TABLET EVERY DAY FOR high blood PRESSURE  3   • carvedilol (COREG) 6.25 MG tablet Take 6.25 mg by mouth 2 (Two) Times a Day.  3   • escitalopram (LEXAPRO) 20 MG tablet Take 20 mg by mouth Daily.  3   • ezetimibe (ZETIA) 10 MG tablet Take  by mouth Daily.     • fluorometholone (FML) 0.1 % ophthalmic suspension Instill 1 drop into both eyes three times a day  0   • gabapentin (NEURONTIN) 100 MG capsule Take 300 mg by mouth every night at bedtime.  1   • levothyroxine (SYNTHROID, LEVOTHROID) 50 MCG tablet TAKE ONE TABLET EVERY DAY FOR FOR THYROID  3   • losartan-hydrochlorothiazide (HYZAAR) 100-25 MG per tablet take ONE-HALF TABLET ONCE DAILY  3   • metFORMIN (GLUCOPHAGE) 500 MG tablet Take  by mouth 3 (Three) Times a Day.     • omeprazole (priLOSEC) 40 MG capsule TAKE ONE CAPSULE EVERY  MORNING FOR gerd  3     No current facility-administered medications on file prior to visit.      Allergies   Allergen Reactions   • 2,4-D Dimethylamine (Amisol) Hives   • Azithromycin Hives   • Lisinopril Hives   • Metoclopramide Hives   • Statins Hives        Review of Systems   Constitutional: Negative for activity change, appetite change, chills, diaphoresis, fatigue, fever and unexpected weight change.   HENT: Negative for congestion, dental problem, drooling, ear discharge, ear pain, facial swelling, hearing loss, mouth sores, nosebleeds, postnasal drip, rhinorrhea, sinus pressure, sneezing, sore throat, tinnitus, trouble swallowing and voice change.    Eyes: Negative for photophobia, pain, discharge, redness, itching and visual disturbance.   Respiratory: Negative for apnea, cough, choking, chest tightness, shortness of breath, wheezing and stridor.    Cardiovascular: Negative for chest pain, palpitations and leg swelling.   Gastrointestinal: Negative for abdominal distention, abdominal pain, anal bleeding, blood in stool, constipation, diarrhea, nausea, rectal pain and vomiting.   Endocrine: Negative for cold intolerance, heat intolerance, polydipsia, polyphagia and polyuria.   Genitourinary: Negative for decreased urine volume, difficulty urinating, dysuria, enuresis, flank pain, frequency, genital sores, hematuria and urgency.   Musculoskeletal: Positive for arthralgias. Negative for back pain, gait problem, joint swelling, myalgias, neck pain and neck stiffness.   Skin: Negative for color change, pallor, rash and wound.   Allergic/Immunologic: Negative for environmental allergies, food allergies and immunocompromised state.   Neurological: Negative for dizziness, tremors, seizures, syncope, facial asymmetry, speech difficulty, weakness, light-headedness, numbness and headaches.   Hematological: Negative for adenopathy. Does not bruise/bleed easily.   Psychiatric/Behavioral: Negative for agitation,  behavioral problems, confusion, decreased concentration, dysphoric mood, hallucinations, self-injury, sleep disturbance and suicidal ideas. The patient is not nervous/anxious and is not hyperactive.         Objective      Physical Exam  There were no vitals taken for this visit.    There is no height or weight on file to calculate BMI.    General:   Mental Status:  Alert   Appearance: Cooperative, in no acute distress   Posture: Normal    Assessment and Plan     Diagnoses and all orders for this visit:    1. Primary osteoarthritis of both knees (Primary)  -     Large Joint Arthrocentesis: R knee  -     Large Joint Arthrocentesis: L knee        1. Primary osteoarthritis of both knees        Procedure Note:  The potential benefits of performing a therapeutic knee joint visco supplementation injection, as well as potential risks (including, but not limited to infection, swelling, pain, bleeding, bruising, nerve/blood vessel damage, and pseudoseptic reaction) have been discussed with the patient.  After informed consent, timeout procedure was performed, and the skin on the right and left knee was prepped with chlorhexidine soap and alcohol, after which ethyl chloride was applied to the skin at the injection site. Via the anterolateral approach, Orthovisc was injected into the knee joint.  The patient tolerated the procedure well. There were no complications.  Band-Aid was applied to the injection site. Post-procedural instructions discussed with the patient and/or their caregiver.    Return in about 6 months (around 1/14/2022).    Kuldeep Jasso MD  07/14/21  14:19 EDT

## 2022-02-21 ENCOUNTER — TELEPHONE (OUTPATIENT)
Dept: ORTHOPEDIC SURGERY | Facility: CLINIC | Age: 87
End: 2022-02-21

## 2022-02-21 DIAGNOSIS — M17.0 PRIMARY OSTEOARTHRITIS OF BOTH KNEES: Primary | ICD-10-CM

## 2022-02-23 ENCOUNTER — CLINICAL SUPPORT (OUTPATIENT)
Dept: ORTHOPEDIC SURGERY | Facility: CLINIC | Age: 87
End: 2022-02-23

## 2022-02-23 DIAGNOSIS — M17.11 PRIMARY OSTEOARTHRITIS OF RIGHT KNEE: ICD-10-CM

## 2022-02-23 DIAGNOSIS — M17.12 PRIMARY OSTEOARTHRITIS OF LEFT KNEE: Primary | ICD-10-CM

## 2022-02-23 PROCEDURE — 20610 DRAIN/INJ JOINT/BURSA W/O US: CPT | Performed by: ORTHOPAEDIC SURGERY

## 2022-02-23 RX ORDER — VENLAFAXINE HYDROCHLORIDE 150 MG/1
CAPSULE, EXTENDED RELEASE ORAL
COMMUNITY
Start: 2021-12-03

## 2022-02-23 RX ORDER — OMEPRAZOLE 20 MG/1
CAPSULE, DELAYED RELEASE ORAL
COMMUNITY
Start: 2021-12-08

## 2022-02-23 NOTE — PROGRESS NOTES
Procedure   Large Joint Arthrocentesis: R knee  Date/Time: 2/23/2022 3:18 PM  Consent given by: patient  Site marked: site marked  Timeout: Immediately prior to procedure a time out was called to verify the correct patient, procedure, equipment, support staff and site/side marked as required   Supporting Documentation  Indications: pain   Procedure Details  Location: knee - R knee  Preparation: Patient was prepped and draped in the usual sterile fashion  Needle size: 22 G  Approach: anterolateral  Medications administered: 30 mg Hyaluronan 30 MG/2ML  Patient tolerance: patient tolerated the procedure well with no immediate complications    Large Joint Arthrocentesis: L knee  Date/Time: 2/23/2022 3:18 PM  Consent given by: patient  Site marked: site marked  Timeout: Immediately prior to procedure a time out was called to verify the correct patient, procedure, equipment, support staff and site/side marked as required   Supporting Documentation  Indications: pain   Procedure Details  Location: knee - L knee  Preparation: Patient was prepped and draped in the usual sterile fashion  Needle size: 22 G  Approach: anterolateral  Medications administered: 30 mg Hyaluronan 30 MG/2ML  Patient tolerance: patient tolerated the procedure well with no immediate complications

## 2022-02-23 NOTE — PROGRESS NOTES
Skin    [facility] [place] Orthopaedic Surgery Clinic Note    Subjective     Chief Complaint   Patient presents with   • Follow-up     bilateral knees orthovisc #1 injections        HPI    Janna Willis is a 86 y.o. female who presents for the first Orthovisc injections into both knees.  Previous Orthovisc injections have provided relief.  She is not interested in surgical intervention.    There is no problem list on file for this patient.    Past Medical History:   Diagnosis Date   • Diabetes (HCC)       Past Surgical History:   Procedure Laterality Date   • OVARY SURGERY        Family History   Problem Relation Age of Onset   • Stroke Mother    • Heart attack Mother      Social History     Socioeconomic History   • Marital status: Single   Tobacco Use   • Smoking status: Never Smoker   • Smokeless tobacco: Never Used   Substance and Sexual Activity   • Alcohol use: No   • Drug use: No   • Sexual activity: Defer      Current Outpatient Medications on File Prior to Visit   Medication Sig Dispense Refill   • amLODIPine (NORVASC) 5 MG tablet TAKE ONE TABLET EVERY DAY FOR high blood PRESSURE  3   • carvedilol (COREG) 6.25 MG tablet Take 6.25 mg by mouth 2 (Two) Times a Day.  3   • escitalopram (LEXAPRO) 20 MG tablet Take 20 mg by mouth Daily.  3   • ezetimibe (ZETIA) 10 MG tablet Take  by mouth Daily.     • fluorometholone (FML) 0.1 % ophthalmic suspension Instill 1 drop into both eyes three times a day  0   • gabapentin (NEURONTIN) 100 MG capsule Take 300 mg by mouth every night at bedtime.  1   • levothyroxine (SYNTHROID, LEVOTHROID) 50 MCG tablet TAKE ONE TABLET EVERY DAY FOR FOR THYROID  3   • losartan-hydrochlorothiazide (HYZAAR) 100-25 MG per tablet take ONE-HALF TABLET ONCE DAILY  3   • metFORMIN (GLUCOPHAGE) 500 MG tablet Take  by mouth 3 (Three) Times a Day.     • omeprazole (priLOSEC) 20 MG capsule take 1 capsule each morning for gerd.     • omeprazole (priLOSEC) 40 MG capsule TAKE ONE CAPSULE EVERY MORNING FOR gerd  3    • venlafaxine XR (EFFEXOR-XR) 150 MG 24 hr capsule elva 1 capsule with food Once a day for 90 days       No current facility-administered medications on file prior to visit.      Allergies   Allergen Reactions   • 2,4-D Dimethylamine (Amisol) Hives   • Azithromycin Hives   • Lisinopril Hives   • Metoclopramide Hives   • Statins Hives        Review of Systems   Constitutional: Negative for activity change, appetite change, chills, diaphoresis, fatigue, fever and unexpected weight change.   HENT: Negative for congestion, dental problem, drooling, ear discharge, ear pain, facial swelling, hearing loss, mouth sores, nosebleeds, postnasal drip, rhinorrhea, sinus pressure, sneezing, sore throat, tinnitus, trouble swallowing and voice change.    Eyes: Negative for photophobia, pain, discharge, redness, itching and visual disturbance.   Respiratory: Negative for apnea, cough, choking, chest tightness, shortness of breath, wheezing and stridor.    Cardiovascular: Negative for chest pain, palpitations and leg swelling.   Gastrointestinal: Negative for abdominal distention, abdominal pain, anal bleeding, blood in stool, constipation, diarrhea, nausea, rectal pain and vomiting.   Endocrine: Negative for cold intolerance, heat intolerance, polydipsia, polyphagia and polyuria.   Genitourinary: Negative for decreased urine volume, difficulty urinating, dysuria, enuresis, flank pain, frequency, genital sores, hematuria and urgency.   Musculoskeletal: Positive for arthralgias. Negative for back pain, gait problem, joint swelling, myalgias, neck pain and neck stiffness.   Skin: Negative for color change, pallor, rash and wound.   Allergic/Immunologic: Negative for environmental allergies, food allergies and immunocompromised state.   Neurological: Negative for dizziness, tremors, seizures, syncope, facial asymmetry, speech difficulty, weakness, light-headedness, numbness and headaches.   Hematological: Negative for adenopathy. Does  not bruise/bleed easily.   Psychiatric/Behavioral: Negative for agitation, behavioral problems, confusion, decreased concentration, dysphoric mood, hallucinations, self-injury, sleep disturbance and suicidal ideas. The patient is not nervous/anxious and is not hyperactive.         Objective      Physical Exam  There were no vitals taken for this visit.    There is no height or weight on file to calculate BMI.    General:   Mental Status:  Alert   Appearance: Cooperative, in no acute distress   Posture: Normal    Assessment and Plan     Diagnoses and all orders for this visit:    1. Primary osteoarthritis of left knee (Primary)  -     Large Joint Arthrocentesis: L knee    2. Primary osteoarthritis of right knee  -     Large Joint Arthrocentesis: R knee        1. Primary osteoarthritis of left knee    2. Primary osteoarthritis of right knee        Procedure Note:  The potential benefits of performing a therapeutic knee joint visco supplementation injection, as well as potential risks (including, but not limited to infection, swelling, pain, bleeding, bruising, nerve/blood vessel damage, and pseudoseptic reaction) have been discussed with the patient.  After informed consent, timeout procedure was performed, and the skin on the left right and left knee was prepped with chlorhexidine soap and alcohol, after which ethyl chloride was applied to the skin at the injection site. Via the anterolateral approach, Orthovisc was injected into the knee joint.  The patient tolerated the procedure well. There were no complications.  Band-Aid was applied to the injection site. Post-procedural instructions discussed with the patient and/or their caregiver.    Return in about 1 week (around 3/2/2022) for Injection.    Kuldeep Jasso MD  02/23/22  15:50 EST

## 2022-03-02 ENCOUNTER — CLINICAL SUPPORT (OUTPATIENT)
Dept: ORTHOPEDIC SURGERY | Facility: CLINIC | Age: 87
End: 2022-03-02

## 2022-03-02 DIAGNOSIS — M17.0 PRIMARY OSTEOARTHRITIS OF BOTH KNEES: ICD-10-CM

## 2022-03-02 PROCEDURE — 20610 DRAIN/INJ JOINT/BURSA W/O US: CPT | Performed by: ORTHOPAEDIC SURGERY

## 2022-03-02 NOTE — PROGRESS NOTES
[facility] [place] [facility] Clinic Note    Subjective     Chief Complaint   Patient presents with   • Injections     Bilateral Knee Orthovisc #2        HPI    Janna Willis is a 86 y.o. female who presents for the second Orthovisc injection into both knees.  Of note, no significant improvement so far.    There is no problem list on file for this patient.    Past Medical History:   Diagnosis Date   • Diabetes (HCC)       Past Surgical History:   Procedure Laterality Date   • OVARY SURGERY        Family History   Problem Relation Age of Onset   • Stroke Mother    • Heart attack Mother      Social History     Socioeconomic History   • Marital status: Single   Tobacco Use   • Smoking status: Never Smoker   • Smokeless tobacco: Never Used   Substance and Sexual Activity   • Alcohol use: No   • Drug use: No   • Sexual activity: Defer      Current Outpatient Medications on File Prior to Visit   Medication Sig Dispense Refill   • amLODIPine (NORVASC) 5 MG tablet TAKE ONE TABLET EVERY DAY FOR high blood PRESSURE  3   • carvedilol (COREG) 6.25 MG tablet Take 6.25 mg by mouth 2 (Two) Times a Day.  3   • escitalopram (LEXAPRO) 20 MG tablet Take 20 mg by mouth Daily.  3   • ezetimibe (ZETIA) 10 MG tablet Take  by mouth Daily.     • fluorometholone (FML) 0.1 % ophthalmic suspension Instill 1 drop into both eyes three times a day  0   • gabapentin (NEURONTIN) 100 MG capsule Take 300 mg by mouth every night at bedtime.  1   • levothyroxine (SYNTHROID, LEVOTHROID) 50 MCG tablet TAKE ONE TABLET EVERY DAY FOR FOR THYROID  3   • losartan-hydrochlorothiazide (HYZAAR) 100-25 MG per tablet take ONE-HALF TABLET ONCE DAILY  3   • metFORMIN (GLUCOPHAGE) 500 MG tablet Take  by mouth 3 (Three) Times a Day.     • omeprazole (priLOSEC) 20 MG capsule take 1 capsule each morning for gerd.     • omeprazole (priLOSEC) 40 MG capsule TAKE ONE CAPSULE EVERY MORNING FOR gerd  3   • venlafaxine XR (EFFEXOR-XR) 150 MG 24 hr capsule elva 1 capsule with  food Once a day for 90 days       No current facility-administered medications on file prior to visit.      Allergies   Allergen Reactions   • 2,4-D Dimethylamine (Amisol) Hives   • Azithromycin Hives   • Lisinopril Hives   • Metoclopramide Hives   • Statins Hives        Review of Systems     Objective      Physical Exam  There were no vitals taken for this visit.    There is no height or weight on file to calculate BMI.    General:   Mental Status:  Alert   Appearance: Cooperative, in no acute distress   Posture: Normal    Assessment and Plan     Diagnoses and all orders for this visit:    1. Primary osteoarthritis of both knees  -     Large Joint Arthrocentesis  -     Large Joint Arthrocentesis: R knee  -     Large Joint Arthrocentesis: L knee        1. Primary osteoarthritis of both knees        Procedure Note:  The potential benefits of performing a therapeutic knee joint visco supplementation injection, as well as potential risks (including, but not limited to infection, swelling, pain, bleeding, bruising, nerve/blood vessel damage, and pseudoseptic reaction) have been discussed with the patient.  After informed consent, timeout procedure was performed, and the skin on the right and left knee was prepped with chlorhexidine soap and alcohol, after which ethyl chloride was applied to the skin at the injection site. Via the anterolateral approach, Orthovisc was injected into the knee joint.  The patient tolerated the procedure well. There were no complications.  Band-Aid was applied to the injection site. Post-procedural instructions discussed with the patient and/or their caregiver.    Return in about 1 week (around 3/9/2022) for Injection.    Kuldeep Jasso MD  03/02/22  14:11 EST

## 2022-03-02 NOTE — PROGRESS NOTES
Procedure   Large Joint Arthrocentesis: R knee  Date/Time: 3/2/2022 1:59 PM  Consent given by: patient  Site marked: site marked  Timeout: Immediately prior to procedure a time out was called to verify the correct patient, procedure, equipment, support staff and site/side marked as required   Supporting Documentation  Indications: pain   Procedure Details  Location: knee - R knee  Preparation: Patient was prepped and draped in the usual sterile fashion  Needle size: 22 G  Approach: anterolateral  Medications administered: 30 mg Hyaluronan 30 MG/2ML  Patient tolerance: patient tolerated the procedure well with no immediate complications    Large Joint Arthrocentesis: L knee  Date/Time: 3/2/2022 1:59 PM  Consent given by: patient  Site marked: site marked  Timeout: Immediately prior to procedure a time out was called to verify the correct patient, procedure, equipment, support staff and site/side marked as required   Supporting Documentation  Indications: pain   Procedure Details  Location: knee - L knee  Preparation: Patient was prepped and draped in the usual sterile fashion  Needle size: 22 G  Approach: anterolateral  Medications administered: 30 mg Hyaluronan 30 MG/2ML  Patient tolerance: patient tolerated the procedure well with no immediate complications

## 2022-03-09 ENCOUNTER — CLINICAL SUPPORT (OUTPATIENT)
Dept: ORTHOPEDIC SURGERY | Facility: CLINIC | Age: 87
End: 2022-03-09

## 2022-03-09 DIAGNOSIS — M17.0 PRIMARY OSTEOARTHRITIS OF BOTH KNEES: Primary | ICD-10-CM

## 2022-03-09 PROCEDURE — 20610 DRAIN/INJ JOINT/BURSA W/O US: CPT | Performed by: ORTHOPAEDIC SURGERY

## 2022-03-09 NOTE — PROGRESS NOTES
[facility] [place] Orthopaedic Surgery Clinic Note    Subjective     Chief Complaint   Patient presents with   • Follow-up     Bilateral knees orthovisc #3 injections        HPI    Janna Willis is a 86 y.o. female who presents for the third and final Orthovisc injections into both knees.  Of note, she has seen some improvement so far.    There is no problem list on file for this patient.    Past Medical History:   Diagnosis Date   • Diabetes (HCC)       Past Surgical History:   Procedure Laterality Date   • OVARY SURGERY        Family History   Problem Relation Age of Onset   • Stroke Mother    • Heart attack Mother      Social History     Socioeconomic History   • Marital status: Single   Tobacco Use   • Smoking status: Never Smoker   • Smokeless tobacco: Never Used   Substance and Sexual Activity   • Alcohol use: No   • Drug use: No   • Sexual activity: Defer      Current Outpatient Medications on File Prior to Visit   Medication Sig Dispense Refill   • amLODIPine (NORVASC) 5 MG tablet TAKE ONE TABLET EVERY DAY FOR high blood PRESSURE  3   • carvedilol (COREG) 6.25 MG tablet Take 6.25 mg by mouth 2 (Two) Times a Day.  3   • escitalopram (LEXAPRO) 20 MG tablet Take 20 mg by mouth Daily.  3   • ezetimibe (ZETIA) 10 MG tablet Take  by mouth Daily.     • fluorometholone (FML) 0.1 % ophthalmic suspension Instill 1 drop into both eyes three times a day  0   • gabapentin (NEURONTIN) 100 MG capsule Take 300 mg by mouth every night at bedtime.  1   • levothyroxine (SYNTHROID, LEVOTHROID) 50 MCG tablet TAKE ONE TABLET EVERY DAY FOR FOR THYROID  3   • losartan-hydrochlorothiazide (HYZAAR) 100-25 MG per tablet take ONE-HALF TABLET ONCE DAILY  3   • metFORMIN (GLUCOPHAGE) 500 MG tablet Take  by mouth 3 (Three) Times a Day.     • omeprazole (priLOSEC) 20 MG capsule take 1 capsule each morning for gerd.     • omeprazole (priLOSEC) 40 MG capsule TAKE ONE CAPSULE EVERY MORNING FOR gerd  3   • venlafaxine XR (EFFEXOR-XR) 150 MG 24 hr  capsule elva 1 capsule with food Once a day for 90 days       No current facility-administered medications on file prior to visit.      Allergies   Allergen Reactions   • 2,4-D Dimethylamine (Amisol) Hives   • Azithromycin Hives   • Lisinopril Hives   • Metoclopramide Hives   • Statins Hives        Review of Systems   Constitutional: Negative for activity change, appetite change, chills, diaphoresis, fatigue, fever and unexpected weight change.   HENT: Negative for congestion, dental problem, drooling, ear discharge, ear pain, facial swelling, hearing loss, mouth sores, nosebleeds, postnasal drip, rhinorrhea, sinus pressure, sneezing, sore throat, tinnitus, trouble swallowing and voice change.    Eyes: Negative for photophobia, pain, discharge, redness, itching and visual disturbance.   Respiratory: Negative for apnea, cough, choking, chest tightness, shortness of breath, wheezing and stridor.    Cardiovascular: Negative for chest pain, palpitations and leg swelling.   Gastrointestinal: Negative for abdominal distention, abdominal pain, anal bleeding, blood in stool, constipation, diarrhea, nausea, rectal pain and vomiting.   Endocrine: Negative for cold intolerance, heat intolerance, polydipsia, polyphagia and polyuria.   Genitourinary: Negative for decreased urine volume, difficulty urinating, dysuria, enuresis, flank pain, frequency, genital sores, hematuria and urgency.   Musculoskeletal: Positive for arthralgias. Negative for back pain, gait problem, joint swelling, myalgias, neck pain and neck stiffness.   Skin: Negative for color change, pallor, rash and wound.   Allergic/Immunologic: Negative for environmental allergies, food allergies and immunocompromised state.   Neurological: Negative for dizziness, tremors, seizures, syncope, facial asymmetry, speech difficulty, weakness, light-headedness, numbness and headaches.   Hematological: Negative for adenopathy. Does not bruise/bleed easily.    Psychiatric/Behavioral: Negative for agitation, behavioral problems, confusion, decreased concentration, dysphoric mood, hallucinations, self-injury, sleep disturbance and suicidal ideas. The patient is not nervous/anxious and is not hyperactive.         Objective      Physical Exam  There were no vitals taken for this visit.    There is no height or weight on file to calculate BMI.    General:   Mental Status:  Alert   Appearance: Cooperative, in no acute distress   Posture: Normal    Assessment and Plan     Diagnoses and all orders for this visit:    1. Primary osteoarthritis of both knees (Primary)  -     Large Joint Arthrocentesis: R knee  -     Large Joint Arthrocentesis: L knee        1. Primary osteoarthritis of both knees        Procedure Note:  The potential benefits of performing a therapeutic knee joint visco supplementation injection, as well as potential risks (including, but not limited to infection, swelling, pain, bleeding, bruising, nerve/blood vessel damage, and pseudoseptic reaction) have been discussed with the patient.  After informed consent, timeout procedure was performed, and the skin on the right and left knee was prepped with chlorhexidine soap and alcohol, after which ethyl chloride was applied to the skin at the injection site. Via the anterolateral approach, Orthovisc was injected into the knee joint.  The patient tolerated the procedure well. There were no complications.  Band-Aid was applied to the injection site. Post-procedural instructions discussed with the patient and/or their caregiver.    Return in about 6 months (around 9/9/2022).    Kuldeep Jasso MD  03/09/22  14:17 EST

## 2022-03-09 NOTE — PROGRESS NOTES
Procedure   Large Joint Arthrocentesis: R knee  Date/Time: 3/9/2022 2:06 PM  Consent given by: patient  Site marked: site marked  Timeout: Immediately prior to procedure a time out was called to verify the correct patient, procedure, equipment, support staff and site/side marked as required   Supporting Documentation  Indications: pain   Procedure Details  Location: knee - R knee  Preparation: Patient was prepped and draped in the usual sterile fashion  Needle size: 22 G  Approach: anterolateral  Medications administered: 30 mg Hyaluronan 30 MG/2ML  Patient tolerance: patient tolerated the procedure well with no immediate complications    Large Joint Arthrocentesis: L knee  Date/Time: 3/9/2022 2:06 PM  Consent given by: patient  Site marked: site marked  Timeout: Immediately prior to procedure a time out was called to verify the correct patient, procedure, equipment, support staff and site/side marked as required   Supporting Documentation  Indications: pain   Procedure Details  Location: knee - L knee  Preparation: Patient was prepped and draped in the usual sterile fashion  Needle size: 22 G  Approach: anterolateral  Medications administered: 30 mg Hyaluronan 30 MG/2ML  Patient tolerance: patient tolerated the procedure well with no immediate complications

## 2023-06-05 ENCOUNTER — APPOINTMENT (OUTPATIENT)
Dept: GENERAL RADIOLOGY | Facility: HOSPITAL | Age: 88
End: 2023-06-05
Payer: MEDICARE

## 2023-06-05 ENCOUNTER — HOSPITAL ENCOUNTER (INPATIENT)
Facility: HOSPITAL | Age: 88
LOS: 10 days | Discharge: NURSING, REHAB OR LONG-TERM CARE | End: 2023-06-15
Attending: EMERGENCY MEDICINE | Admitting: INTERNAL MEDICINE
Payer: MEDICARE

## 2023-06-05 DIAGNOSIS — R55 NEAR SYNCOPE: ICD-10-CM

## 2023-06-05 DIAGNOSIS — I44.2 THIRD DEGREE HEART BLOCK: ICD-10-CM

## 2023-06-05 DIAGNOSIS — I44.2 HEART BLOCK AV THIRD DEGREE: Primary | ICD-10-CM

## 2023-06-05 DIAGNOSIS — R53.1 GENERALIZED WEAKNESS: ICD-10-CM

## 2023-06-05 LAB
ALBUMIN SERPL-MCNC: 3.3 G/DL (ref 3.5–5.2)
ALBUMIN/GLOB SERPL: 1.1 G/DL
ALP SERPL-CCNC: 60 U/L (ref 39–117)
ALT SERPL W P-5'-P-CCNC: 70 U/L (ref 1–33)
ANION GAP SERPL CALCULATED.3IONS-SCNC: 13 MMOL/L (ref 5–15)
APTT PPP: 24.2 SECONDS (ref 60–90)
AST SERPL-CCNC: 78 U/L (ref 1–32)
BASOPHILS # BLD AUTO: 0.03 10*3/MM3 (ref 0–0.2)
BASOPHILS NFR BLD AUTO: 0.4 % (ref 0–1.5)
BILIRUB SERPL-MCNC: 0.6 MG/DL (ref 0–1.2)
BUN BLDA-MCNC: 25 MG/DL (ref 8–26)
BUN SERPL-MCNC: 23 MG/DL (ref 8–23)
BUN/CREAT SERPL: 18.4 (ref 7–25)
CA-I BLDA-SCNC: 1.15 MMOL/L (ref 1.2–1.32)
CALCIUM SPEC-SCNC: 8.5 MG/DL (ref 8.6–10.5)
CHLORIDE BLDA-SCNC: 98 MMOL/L (ref 98–109)
CHLORIDE SERPL-SCNC: 97 MMOL/L (ref 98–107)
CO2 BLDA-SCNC: 21 MMOL/L (ref 24–29)
CO2 SERPL-SCNC: 22 MMOL/L (ref 22–29)
CREAT BLDA-MCNC: 1.4 MG/DL (ref 0.6–1.3)
CREAT SERPL-MCNC: 1.25 MG/DL (ref 0.57–1)
D DIMER PPP FEU-MCNC: 0.44 MCGFEU/ML (ref 0–0.87)
DEPRECATED RDW RBC AUTO: 41.7 FL (ref 37–54)
EGFRCR SERPLBLD CKD-EPI 2021: 36.5 ML/MIN/1.73
EGFRCR SERPLBLD CKD-EPI 2021: 41.8 ML/MIN/1.73
EOSINOPHIL # BLD AUTO: 0.1 10*3/MM3 (ref 0–0.4)
EOSINOPHIL NFR BLD AUTO: 1.3 % (ref 0.3–6.2)
ERYTHROCYTE [DISTWIDTH] IN BLOOD BY AUTOMATED COUNT: 12.7 % (ref 12.3–15.4)
GLOBULIN UR ELPH-MCNC: 3.1 GM/DL
GLUCOSE BLDC GLUCOMTR-MCNC: 171 MG/DL (ref 70–130)
GLUCOSE SERPL-MCNC: 175 MG/DL (ref 65–99)
HCT VFR BLD AUTO: 37.8 % (ref 34–46.6)
HCT VFR BLDA CALC: 35 % (ref 38–51)
HGB BLD-MCNC: 12.2 G/DL (ref 12–15.9)
HGB BLDA-MCNC: 11.9 G/DL (ref 12–17)
HOLD SPECIMEN: NORMAL
IMM GRANULOCYTES # BLD AUTO: 0.04 10*3/MM3 (ref 0–0.05)
IMM GRANULOCYTES NFR BLD AUTO: 0.5 % (ref 0–0.5)
INR PPP: 1.07 (ref 0.89–1.12)
LYMPHOCYTES # BLD AUTO: 1.31 10*3/MM3 (ref 0.7–3.1)
LYMPHOCYTES NFR BLD AUTO: 17.5 % (ref 19.6–45.3)
MAGNESIUM SERPL-MCNC: 1.9 MG/DL (ref 1.6–2.4)
MCH RBC QN AUTO: 29.3 PG (ref 26.6–33)
MCHC RBC AUTO-ENTMCNC: 32.3 G/DL (ref 31.5–35.7)
MCV RBC AUTO: 90.9 FL (ref 79–97)
MONOCYTES # BLD AUTO: 0.51 10*3/MM3 (ref 0.1–0.9)
MONOCYTES NFR BLD AUTO: 6.8 % (ref 5–12)
NEUTROPHILS NFR BLD AUTO: 5.48 10*3/MM3 (ref 1.7–7)
NEUTROPHILS NFR BLD AUTO: 73.5 % (ref 42.7–76)
NRBC BLD AUTO-RTO: 0 /100 WBC (ref 0–0.2)
NT-PROBNP SERPL-MCNC: 6329 PG/ML (ref 0–1800)
PLATELET # BLD AUTO: 142 10*3/MM3 (ref 140–450)
PMV BLD AUTO: 10.2 FL (ref 6–12)
POTASSIUM BLDA-SCNC: 4.6 MMOL/L (ref 3.5–4.9)
POTASSIUM SERPL-SCNC: 4.7 MMOL/L (ref 3.5–5.2)
PROT SERPL-MCNC: 6.4 G/DL (ref 6–8.5)
PROTHROMBIN TIME: 14 SECONDS (ref 12.2–14.5)
RBC # BLD AUTO: 4.16 10*6/MM3 (ref 3.77–5.28)
SODIUM BLD-SCNC: 132 MMOL/L (ref 138–146)
SODIUM SERPL-SCNC: 132 MMOL/L (ref 136–145)
T4 FREE SERPL-MCNC: 1 NG/DL (ref 0.93–1.7)
TROPONIN T SERPL HS-MCNC: 30 NG/L
TSH SERPL DL<=0.05 MIU/L-ACNC: 11.53 UIU/ML (ref 0.27–4.2)
UFH PPP CHRO-ACNC: 0.1 IU/ML (ref 0.3–0.7)
WBC NRBC COR # BLD: 7.47 10*3/MM3 (ref 3.4–10.8)
WHOLE BLOOD HOLD COAG: NORMAL
WHOLE BLOOD HOLD SPECIMEN: NORMAL

## 2023-06-05 PROCEDURE — 71045 X-RAY EXAM CHEST 1 VIEW: CPT

## 2023-06-05 PROCEDURE — C1887 CATHETER, GUIDING: HCPCS | Performed by: INTERNAL MEDICINE

## 2023-06-05 PROCEDURE — C1769 GUIDE WIRE: HCPCS | Performed by: INTERNAL MEDICINE

## 2023-06-05 PROCEDURE — 99222 1ST HOSP IP/OBS MODERATE 55: CPT | Performed by: INTERNAL MEDICINE

## 2023-06-05 PROCEDURE — 93005 ELECTROCARDIOGRAM TRACING: CPT

## 2023-06-05 PROCEDURE — 85379 FIBRIN DEGRADATION QUANT: CPT | Performed by: INTERNAL MEDICINE

## 2023-06-05 PROCEDURE — 85610 PROTHROMBIN TIME: CPT | Performed by: INTERNAL MEDICINE

## 2023-06-05 PROCEDURE — 25010000002 LORAZEPAM PER 2 MG: Performed by: EMERGENCY MEDICINE

## 2023-06-05 PROCEDURE — C9460 INJECTION, CANGRELOR: HCPCS | Performed by: INTERNAL MEDICINE

## 2023-06-05 PROCEDURE — 83880 ASSAY OF NATRIURETIC PEPTIDE: CPT | Performed by: EMERGENCY MEDICINE

## 2023-06-05 PROCEDURE — 99223 1ST HOSP IP/OBS HIGH 75: CPT | Performed by: INTERNAL MEDICINE

## 2023-06-05 PROCEDURE — 25010000002 DOPAMINE PER 40 MG: Performed by: EMERGENCY MEDICINE

## 2023-06-05 PROCEDURE — C1894 INTRO/SHEATH, NON-LASER: HCPCS | Performed by: INTERNAL MEDICINE

## 2023-06-05 PROCEDURE — 93571 IV DOP VEL&/PRESS C FLO 1ST: CPT | Performed by: INTERNAL MEDICINE

## 2023-06-05 PROCEDURE — 80047 BASIC METABLC PNL IONIZED CA: CPT

## 2023-06-05 PROCEDURE — 85025 COMPLETE CBC W/AUTO DIFF WBC: CPT

## 2023-06-05 PROCEDURE — C1874 STENT, COATED/COV W/DEL SYS: HCPCS | Performed by: INTERNAL MEDICINE

## 2023-06-05 PROCEDURE — 85520 HEPARIN ASSAY: CPT | Performed by: INTERNAL MEDICINE

## 2023-06-05 PROCEDURE — 84439 ASSAY OF FREE THYROXINE: CPT | Performed by: INTERNAL MEDICINE

## 2023-06-05 PROCEDURE — 25010000002 MORPHINE PER 10 MG: Performed by: NURSE PRACTITIONER

## 2023-06-05 PROCEDURE — 25510000001 IOPAMIDOL PER 1 ML: Performed by: INTERNAL MEDICINE

## 2023-06-05 PROCEDURE — 80053 COMPREHEN METABOLIC PANEL: CPT | Performed by: EMERGENCY MEDICINE

## 2023-06-05 PROCEDURE — 25010000002 HEPARIN (PORCINE) 25000-0.45 UT/250ML-% SOLUTION: Performed by: INTERNAL MEDICINE

## 2023-06-05 PROCEDURE — 93458 L HRT ARTERY/VENTRICLE ANGIO: CPT | Performed by: INTERNAL MEDICINE

## 2023-06-05 PROCEDURE — 25010000002 FENTANYL CITRATE (PF) 50 MCG/ML SOLUTION: Performed by: INTERNAL MEDICINE

## 2023-06-05 PROCEDURE — C1725 CATH, TRANSLUMIN NON-LASER: HCPCS | Performed by: INTERNAL MEDICINE

## 2023-06-05 PROCEDURE — 84484 ASSAY OF TROPONIN QUANT: CPT | Performed by: EMERGENCY MEDICINE

## 2023-06-05 PROCEDURE — C9600 PERC DRUG-EL COR STENT SING: HCPCS | Performed by: INTERNAL MEDICINE

## 2023-06-05 PROCEDURE — 93005 ELECTROCARDIOGRAM TRACING: CPT | Performed by: EMERGENCY MEDICINE

## 2023-06-05 PROCEDURE — 85730 THROMBOPLASTIN TIME PARTIAL: CPT | Performed by: INTERNAL MEDICINE

## 2023-06-05 PROCEDURE — 83735 ASSAY OF MAGNESIUM: CPT | Performed by: EMERGENCY MEDICINE

## 2023-06-05 PROCEDURE — 25010000002 CANGRELOR TETRASODIUM 50 MG RECONSTITUTED SOLUTION 1 EACH VIAL: Performed by: INTERNAL MEDICINE

## 2023-06-05 PROCEDURE — 92928 PRQ TCAT PLMT NTRAC ST 1 LES: CPT | Performed by: INTERNAL MEDICINE

## 2023-06-05 PROCEDURE — 25010000002 MIDAZOLAM PER 1 MG: Performed by: INTERNAL MEDICINE

## 2023-06-05 PROCEDURE — 85014 HEMATOCRIT: CPT

## 2023-06-05 PROCEDURE — 25010000002 HEPARIN (PORCINE) PER 1000 UNITS: Performed by: INTERNAL MEDICINE

## 2023-06-05 PROCEDURE — 84443 ASSAY THYROID STIM HORMONE: CPT | Performed by: EMERGENCY MEDICINE

## 2023-06-05 PROCEDURE — 99284 EMERGENCY DEPT VISIT MOD MDM: CPT

## 2023-06-05 PROCEDURE — C1760 CLOSURE DEV, VASC: HCPCS | Performed by: INTERNAL MEDICINE

## 2023-06-05 DEVICE — XIENCE SKYPOINT™ EVEROLIMUS ELUTING CORONARY STENT SYSTEM 2.25 MM X 12 MM / RAPID-EXCHANGE
Type: IMPLANTABLE DEVICE | Status: FUNCTIONAL
Brand: XIENCE SKYPOINT™

## 2023-06-05 RX ORDER — HEPARIN SODIUM 1000 [USP'U]/ML
4000 INJECTION, SOLUTION INTRAVENOUS; SUBCUTANEOUS ONCE
Status: COMPLETED | OUTPATIENT
Start: 2023-06-05 | End: 2023-06-05

## 2023-06-05 RX ORDER — ASPIRIN 300 MG/1
300 SUPPOSITORY RECTAL ONCE
Status: DISCONTINUED | OUTPATIENT
Start: 2023-06-05 | End: 2023-06-06

## 2023-06-05 RX ORDER — LANOLIN ALCOHOL/MO/W.PET/CERES
1000 CREAM (GRAM) TOPICAL WEEKLY
COMMUNITY

## 2023-06-05 RX ORDER — ACETAMINOPHEN 325 MG/1
650 TABLET ORAL EVERY 4 HOURS PRN
Status: DISCONTINUED | OUTPATIENT
Start: 2023-06-05 | End: 2023-06-15 | Stop reason: HOSPADM

## 2023-06-05 RX ORDER — ASPIRIN 81 MG/1
81 TABLET ORAL DAILY
COMMUNITY
End: 2023-06-15 | Stop reason: HOSPADM

## 2023-06-05 RX ORDER — CLOPIDOGREL BISULFATE 75 MG/1
75 TABLET ORAL DAILY
Status: DISCONTINUED | OUTPATIENT
Start: 2023-06-06 | End: 2023-06-15 | Stop reason: HOSPADM

## 2023-06-05 RX ORDER — NICOTINE POLACRILEX 4 MG
15 LOZENGE BUCCAL
Status: DISCONTINUED | OUTPATIENT
Start: 2023-06-05 | End: 2023-06-15 | Stop reason: HOSPADM

## 2023-06-05 RX ORDER — MIDAZOLAM HYDROCHLORIDE 1 MG/ML
INJECTION INTRAMUSCULAR; INTRAVENOUS
Status: DISCONTINUED | OUTPATIENT
Start: 2023-06-05 | End: 2023-06-05 | Stop reason: HOSPADM

## 2023-06-05 RX ORDER — FENTANYL CITRATE 50 UG/ML
INJECTION, SOLUTION INTRAMUSCULAR; INTRAVENOUS
Status: DISCONTINUED | OUTPATIENT
Start: 2023-06-05 | End: 2023-06-05 | Stop reason: HOSPADM

## 2023-06-05 RX ORDER — HEPARIN SODIUM 1000 [USP'U]/ML
2000 INJECTION, SOLUTION INTRAVENOUS; SUBCUTANEOUS AS NEEDED
Status: DISCONTINUED | OUTPATIENT
Start: 2023-06-05 | End: 2023-06-05

## 2023-06-05 RX ORDER — MELATONIN
2000 DAILY
COMMUNITY

## 2023-06-05 RX ORDER — HEPARIN SODIUM 1000 [USP'U]/ML
INJECTION, SOLUTION INTRAVENOUS; SUBCUTANEOUS
Status: DISCONTINUED | OUTPATIENT
Start: 2023-06-05 | End: 2023-06-05 | Stop reason: HOSPADM

## 2023-06-05 RX ORDER — INSULIN LISPRO 100 [IU]/ML
2-9 INJECTION, SOLUTION INTRAVENOUS; SUBCUTANEOUS
Status: DISCONTINUED | OUTPATIENT
Start: 2023-06-06 | End: 2023-06-14

## 2023-06-05 RX ORDER — HEPARIN SODIUM 10000 [USP'U]/100ML
11 INJECTION, SOLUTION INTRAVENOUS
Status: DISCONTINUED | OUTPATIENT
Start: 2023-06-05 | End: 2023-06-05

## 2023-06-05 RX ORDER — DOPAMINE HYDROCHLORIDE 160 MG/100ML
2-20 INJECTION, SOLUTION INTRAVENOUS
Status: DISCONTINUED | OUTPATIENT
Start: 2023-06-05 | End: 2023-06-07

## 2023-06-05 RX ORDER — ASPIRIN 81 MG/1
81 TABLET ORAL DAILY
Status: DISCONTINUED | OUTPATIENT
Start: 2023-06-06 | End: 2023-06-15 | Stop reason: HOSPADM

## 2023-06-05 RX ORDER — CLOPIDOGREL BISULFATE 75 MG/1
600 TABLET ORAL ONCE
Status: COMPLETED | OUTPATIENT
Start: 2023-06-05 | End: 2023-06-05

## 2023-06-05 RX ORDER — HEPARIN SODIUM 1000 [USP'U]/ML
4000 INJECTION, SOLUTION INTRAVENOUS; SUBCUTANEOUS AS NEEDED
Status: DISCONTINUED | OUTPATIENT
Start: 2023-06-05 | End: 2023-06-05

## 2023-06-05 RX ORDER — LEVOTHYROXINE SODIUM 0.05 MG/1
50 TABLET ORAL
Status: DISCONTINUED | OUTPATIENT
Start: 2023-06-06 | End: 2023-06-15 | Stop reason: HOSPADM

## 2023-06-05 RX ORDER — MORPHINE SULFATE 2 MG/ML
1 INJECTION, SOLUTION INTRAMUSCULAR; INTRAVENOUS EVERY 4 HOURS PRN
Status: DISPENSED | OUTPATIENT
Start: 2023-06-05 | End: 2023-06-12

## 2023-06-05 RX ORDER — PANTOPRAZOLE SODIUM 40 MG/1
40 TABLET, DELAYED RELEASE ORAL EVERY MORNING
Status: DISCONTINUED | OUTPATIENT
Start: 2023-06-06 | End: 2023-06-15 | Stop reason: HOSPADM

## 2023-06-05 RX ORDER — HYDRALAZINE HYDROCHLORIDE 25 MG/1
25 TABLET, FILM COATED ORAL EVERY 6 HOURS PRN
Status: DISCONTINUED | OUTPATIENT
Start: 2023-06-05 | End: 2023-06-15 | Stop reason: HOSPADM

## 2023-06-05 RX ORDER — DEXTROSE MONOHYDRATE 25 G/50ML
25 INJECTION, SOLUTION INTRAVENOUS
Status: DISCONTINUED | OUTPATIENT
Start: 2023-06-05 | End: 2023-06-15 | Stop reason: HOSPADM

## 2023-06-05 RX ORDER — NITROGLYCERIN 0.4 MG/1
0.4 TABLET SUBLINGUAL
Status: DISCONTINUED | OUTPATIENT
Start: 2023-06-05 | End: 2023-06-15 | Stop reason: HOSPADM

## 2023-06-05 RX ORDER — LORAZEPAM 2 MG/ML
1 INJECTION INTRAMUSCULAR ONCE
Status: COMPLETED | OUTPATIENT
Start: 2023-06-05 | End: 2023-06-05

## 2023-06-05 RX ORDER — VENLAFAXINE HYDROCHLORIDE 75 MG/1
150 CAPSULE, EXTENDED RELEASE ORAL
Status: DISCONTINUED | OUTPATIENT
Start: 2023-06-06 | End: 2023-06-13

## 2023-06-05 RX ORDER — IPRATROPIUM BROMIDE AND ALBUTEROL SULFATE 2.5; .5 MG/3ML; MG/3ML
3 SOLUTION RESPIRATORY (INHALATION) EVERY 4 HOURS PRN
Status: DISCONTINUED | OUTPATIENT
Start: 2023-06-05 | End: 2023-06-15 | Stop reason: HOSPADM

## 2023-06-05 RX ORDER — SODIUM CHLORIDE 0.9 % (FLUSH) 0.9 %
10 SYRINGE (ML) INJECTION AS NEEDED
Status: DISCONTINUED | OUTPATIENT
Start: 2023-06-05 | End: 2023-06-06

## 2023-06-05 RX ORDER — LIDOCAINE HYDROCHLORIDE 10 MG/ML
INJECTION, SOLUTION EPIDURAL; INFILTRATION; INTRACAUDAL; PERINEURAL
Status: DISCONTINUED | OUTPATIENT
Start: 2023-06-05 | End: 2023-06-05 | Stop reason: HOSPADM

## 2023-06-05 RX ADMIN — HEPARIN SODIUM 4000 UNITS: 1000 INJECTION INTRAVENOUS; SUBCUTANEOUS at 18:20

## 2023-06-05 RX ADMIN — DOPAMINE HYDROCHLORIDE IN DEXTROSE 5 MCG/KG/MIN: 1.6 INJECTION, SOLUTION INTRAVENOUS at 18:21

## 2023-06-05 RX ADMIN — MORPHINE SULFATE 1 MG: 2 INJECTION, SOLUTION INTRAMUSCULAR; INTRAVENOUS at 21:34

## 2023-06-05 RX ADMIN — CLOPIDOGREL BISULFATE 600 MG: 75 TABLET ORAL at 22:06

## 2023-06-05 RX ADMIN — LORAZEPAM 1 MG: 2 INJECTION INTRAMUSCULAR; INTRAVENOUS at 17:58

## 2023-06-05 RX ADMIN — ISOPROTERENOL HYDROCHLORIDE 2 MCG/MIN: 0.2 INJECTION, SOLUTION INTRACARDIAC; INTRAMUSCULAR; INTRAVENOUS; SUBCUTANEOUS at 23:59

## 2023-06-05 RX ADMIN — HEPARIN SODIUM 11 UNITS/KG/HR: 10000 INJECTION, SOLUTION INTRAVENOUS at 18:27

## 2023-06-05 NOTE — Clinical Note
First balloon inflation max pressure = 12 onur. First balloon inflation duration = 10 seconds. Second inflation of balloon - Max pressure = 12 onur. 2nd Inflation of balloon - Duration = 10 seconds. 2nd inflation was done at 19:18 EDT.

## 2023-06-05 NOTE — Clinical Note
A 6 fr sheath was  inserted using micropuncture technique with ultrasound guidance into the right femoral vein.

## 2023-06-05 NOTE — Clinical Note
8 fr Dilator exchanged for 12fr dilator    33-year-old with dysphagia unlikely IBS D that improved on Xifaxan here for evaluation of intermittent hematochezia

## 2023-06-05 NOTE — PROGRESS NOTES
HEPARIN INFUSION  Janna Willis is a  88 y.o. female receiving heparin infusion.     Therapy for (VTE/Cardiac):  Cardiac   Patient Weight: 90.7 kg   Initial Bolus (Y/N):  Yes  Any Bolus (Y/N):  Yes        Signs or Symptoms of Bleeding: No    Cardiac or Other (Not VTE)   Initial Bolus: 60 units/kg (Max 4,000 units)  Initial rate: 12 units/kg/hr (Max 1,000 units/hr)   Anti Xa Rebolus Infusion Hold time Change infusion Dose (Units/kg/hr) Next Anti Xa or aPTT Level Due   < 0.11 50 Units/kg  (4000 Units Max) None Increase by  3 Units/kg/hr 6 hours   0.11- 0.19 25 Units/kg  (2000 Units Max) None Increase by  2 Units/kg/hr 6 hours   0.2 - 0.29 0 None Increase by  1 Units/kg/hr 6 hours   0.3 - 0.5 0 None No Change 6 hours (after 2 consecutive levels in range check qAM)   0.51 - 0.6 0 None Decrease by  1 Units/kg/hr 6 hours   0.61 - 0.8 0 30 Minutes Decrease by  2 Units/kg/hr 6 hours   0.81 - 1 0 60 Minutes Decrease by  3 Units/kg/hr 6 hours   >1 0 Hold  After Anti Xa less than 0.5 decrease previous rate by  4 Units/kg/hr  Every 2 hours until Anti Xa  less than 0.5 then when infusion restarts in 6 hours     Recommend Xa every 6 hours.     Results from last 7 days   Lab Units 06/05/23  1757 06/05/23  1756   HEMOGLOBIN g/dL 12.2  --    HEMOGLOBIN, POC g/dL  --  11.9*   HEMATOCRIT % 37.8  --    HEMATOCRIT POC %  --  35*   PLATELETS 10*3/mm3 142  --           Date   Time   Anti-Xa Current Rate (Unit/kg/hr) Bolus   (Units) Rate Change   (Unit/kg/hr) New Rate (Unit/kg/hr) Next   Anti-Xa Comments  Pump Check Daily   6/5 STAT  -- New 4000 +11 [phone] D/w BLANKA Maldonado [facility]  6/5/2023  18:27 EDT

## 2023-06-05 NOTE — Clinical Note
First balloon inflation max pressure = 14 onur. First balloon inflation duration = 10 seconds. Second inflation of balloon - Max pressure = 14 onur. 2nd Inflation of balloon - Duration = 10 seconds. 2nd inflation was done at 19:23 EDT.

## 2023-06-05 NOTE — H&P
[facility] Cardiology   [address], [address]  [place], [place], [address]    [phone]  WWW.[facility]           INPATIENT HISTORY AND PHYSICAL NOTE    Patient Care Team:  Patient Care Team:  Hever Ibrahim APRN as PCP - General (Nurse Practitioner)      Chief complaint:   Chief Complaint   Patient presents with    Slow Heart Rate            Subjective:     Cardiac focused problem list:  Chest pain  Complete heart block  Hypertension  Mixed hyperlipidemia  Hypothyroidism  GERD    HPI:      Janna Willis is a 88 y.o. female.  Patient presents with acute onset chest pressure earlier this afternoon.  Called her sister, who upon arrival noticed the patient become unresponsive, have a blank stare, become pale.  Had a brief tremor of the left hand.  Once the patient regained consciousness, she complained of chest pressure/heaviness, centrally located.  EMS called and upon arrival noted to be hypertensive and developed eventually bradycardia and heart block.    No prior cardiac diagnosis    Review of Systems:  As noted in the HPI    PFSH:  There is no problem list on file for this patient.      No current facility-administered medications on file prior to encounter.     Current Outpatient Medications on File Prior to Encounter   Medication Sig Dispense Refill    amLODIPine (NORVASC) 5 MG tablet TAKE ONE TABLET EVERY DAY FOR high blood PRESSURE  3    carvedilol (COREG) 6.25 MG tablet Take 6.25 mg by mouth 2 (Two) Times a Day.  3    escitalopram (LEXAPRO) 20 MG tablet Take 20 mg by mouth Daily.  3    ezetimibe (ZETIA) 10 MG tablet Take  by mouth Daily.      fluorometholone (FML) 0.1 % ophthalmic suspension Instill 1 drop into both eyes three times a day  0    gabapentin (NEURONTIN) 100 MG capsule Take 300 mg by mouth every night at bedtime.  1    levothyroxine (SYNTHROID, LEVOTHROID) 50 MCG tablet TAKE ONE TABLET EVERY DAY FOR FOR THYROID  3    losartan-hydrochlorothiazide  (HYZAAR) 100-25 MG per tablet take ONE-HALF TABLET ONCE DAILY  3    metFORMIN (GLUCOPHAGE) 500 MG tablet Take  by mouth 3 (Three) Times a Day.      omeprazole (priLOSEC) 20 MG capsule take 1 capsule each morning for gerd.      omeprazole (priLOSEC) 40 MG capsule TAKE ONE CAPSULE EVERY MORNING FOR gerd  3    venlafaxine XR (EFFEXOR-XR) 150 MG 24 hr capsule elva 1 capsule with food Once a day for 90 days         Social History     Socioeconomic History    Marital status: Single   Tobacco Use    Smoking status: Never    Smokeless tobacco: Never   Substance and Sexual Activity    Alcohol use: No    Drug use: No    Sexual activity: Defer            Objective:     Vital Sign Min/Max for last 24 hours  No data recorded   BP  Min: 133/121  Max: 133/121   Pulse  Min: 0  Max: 0   Resp  Min: 24  Max: 24   SpO2  Min: 91 %  Max: 91 %   No data recorded    No intake or output data in the 24 hours ending 06/05/23 1826        Vitals:    06/05/23 1800   BP:    Pulse:    Resp: 24   SpO2:      CONSTITUTIONAL: Sedated, in mild distress  RESPIRATORY: Normal effort. Clear to auscultation bilaterally without rhonchi anteriorly  CARDIOVASCULAR: Regular rate and rhythm with normal S1 and S2. Without murmur.  PERIPHERAL VASCULAR: Normal radial pulse. There is no lower extremity edema bilaterally.  Normal right femoral pulse.  Nonpalpable pedal pulses.    Labs and radiologic results:  Today's results were reviewed by myself.    Cardiac Data:    EKG: Complete heart block, very slow ventricular escape        Tele: V paced         Assessment and Plan:     Problem list:    * No active hospital problems. *      ASSESSMENT:  Chest pain  Inferior ST depression on EMS twelve-lead  Complete heart block  Very slow ventricular escape rhythm  Respiratory distress  History of hypertension  Dyslipidemia    PLAN:  Recommend emergent venous temporary pacemaker wire placement, coronary angiogram, possible PCI.  Discussed with patient as well as her sister, Carlee  CARMELA Kohli.  Both are agreeable to proceed in an urgent fashion  BiPap  Labs, chest x-ray pending  Aspirin suppository, heparin bolus  ICU admission post procedurally  Further recommendations to follow    Kuldeep Miner MD, MSc, FACC, [facility]  Interventional Cardiology  [facility]

## 2023-06-06 ENCOUNTER — APPOINTMENT (OUTPATIENT)
Dept: CARDIOLOGY | Facility: HOSPITAL | Age: 88
End: 2023-06-06
Payer: MEDICARE

## 2023-06-06 PROBLEM — E66.9 CLASS 1 OBESITY IN ADULT: Status: ACTIVE | Noted: 2023-06-06

## 2023-06-06 PROBLEM — K21.9 GERD (GASTROESOPHAGEAL REFLUX DISEASE): Status: ACTIVE | Noted: 2023-06-06

## 2023-06-06 PROBLEM — E78.2 MIXED HYPERLIPIDEMIA: Status: ACTIVE | Noted: 2023-06-06

## 2023-06-06 PROBLEM — E66.811 CLASS 1 OBESITY IN ADULT: Status: ACTIVE | Noted: 2023-06-06

## 2023-06-06 PROBLEM — I25.10 CAD (CORONARY ARTERY DISEASE): Status: ACTIVE | Noted: 2023-06-06

## 2023-06-06 PROBLEM — E03.9 HYPOTHYROID: Status: ACTIVE | Noted: 2023-06-06

## 2023-06-06 PROBLEM — I10 PRIMARY HYPERTENSION: Status: ACTIVE | Noted: 2023-06-06

## 2023-06-06 LAB
ANION GAP SERPL CALCULATED.3IONS-SCNC: 13 MMOL/L (ref 5–15)
BASOPHILS # BLD AUTO: 0.04 10*3/MM3 (ref 0–0.2)
BASOPHILS NFR BLD AUTO: 0.4 % (ref 0–1.5)
BH CV ECHO MEAS - AO MAX PG: 14.6 MMHG
BH CV ECHO MEAS - AO MEAN PG: 8 MMHG
BH CV ECHO MEAS - AO ROOT DIAM: 2.7 CM
BH CV ECHO MEAS - AO V2 MAX: 191 CM/SEC
BH CV ECHO MEAS - AO V2 VTI: 40.9 CM
BH CV ECHO MEAS - AVA(I,D): 1.67 CM2
BH CV ECHO MEAS - EDV(CUBED): 74.1 ML
BH CV ECHO MEAS - EDV(MOD-SP2): 64.5 ML
BH CV ECHO MEAS - EDV(MOD-SP4): 57.2 ML
BH CV ECHO MEAS - EF(MOD-BP): 61.7 %
BH CV ECHO MEAS - EF(MOD-SP2): 60.9 %
BH CV ECHO MEAS - EF(MOD-SP4): 62.2 %
BH CV ECHO MEAS - ESV(CUBED): 32.8 ML
BH CV ECHO MEAS - ESV(MOD-SP2): 25.2 ML
BH CV ECHO MEAS - ESV(MOD-SP4): 21.6 ML
BH CV ECHO MEAS - FS: 23.8 %
BH CV ECHO MEAS - IVS/LVPW: 1.1 CM
BH CV ECHO MEAS - IVSD: 1.1 CM
BH CV ECHO MEAS - LA DIMENSION: 4.6 CM
BH CV ECHO MEAS - LAT PEAK E' VEL: 6.7 CM/SEC
BH CV ECHO MEAS - LV MASS(C)D: 147 GRAMS
BH CV ECHO MEAS - LV MAX PG: 4.1 MMHG
BH CV ECHO MEAS - LV MEAN PG: 2 MMHG
BH CV ECHO MEAS - LV V1 MAX: 101 CM/SEC
BH CV ECHO MEAS - LV V1 VTI: 21.8 CM
BH CV ECHO MEAS - LVIDD: 4.2 CM
BH CV ECHO MEAS - LVIDS: 3.2 CM
BH CV ECHO MEAS - LVOT AREA: 3.1 CM2
BH CV ECHO MEAS - LVOT DIAM: 2 CM
BH CV ECHO MEAS - LVPWD: 1 CM
BH CV ECHO MEAS - MED PEAK E' VEL: 6.5 CM/SEC
BH CV ECHO MEAS - MV A MAX VEL: 104 CM/SEC
BH CV ECHO MEAS - MV DEC SLOPE: 375 CM/SEC2
BH CV ECHO MEAS - MV DEC TIME: 0.23 MSEC
BH CV ECHO MEAS - MV E MAX VEL: 148 CM/SEC
BH CV ECHO MEAS - MV E/A: 1.42
BH CV ECHO MEAS - MV MAX PG: 10.7 MMHG
BH CV ECHO MEAS - MV MEAN PG: 5.6 MMHG
BH CV ECHO MEAS - MV P1/2T: 121.8 MSEC
BH CV ECHO MEAS - MV V2 VTI: 52.6 CM
BH CV ECHO MEAS - MVA(P1/2T): 1.81 CM2
BH CV ECHO MEAS - MVA(VTI): 1.3 CM2
BH CV ECHO MEAS - PA ACC TIME: 0.09 SEC
BH CV ECHO MEAS - PA PR(ACCEL): 37.6 MMHG
BH CV ECHO MEAS - PI END-D VEL: 136 CM/SEC
BH CV ECHO MEAS - RAP SYSTOLE: 3 MMHG
BH CV ECHO MEAS - RVSP: 40 MMHG
BH CV ECHO MEAS - SV(LVOT): 68.5 ML
BH CV ECHO MEAS - SV(MOD-SP2): 39.3 ML
BH CV ECHO MEAS - SV(MOD-SP4): 35.6 ML
BH CV ECHO MEAS - TAPSE (>1.6): 2.24 CM
BH CV ECHO MEAS - TR MAX PG: 37.4 MMHG
BH CV ECHO MEAS - TR MAX VEL: 303.8 CM/SEC
BH CV ECHO MEASUREMENTS AVERAGE E/E' RATIO: 22.42
BH CV VAS BP RIGHT ARM: NORMAL MMHG
BH CV XLRA - RV BASE: 3.5 CM
BH CV XLRA - RV LENGTH: 7.3 CM
BH CV XLRA - RV MID: 2.9 CM
BH CV XLRA - TDI S': 10.3 CM/SEC
BUN SERPL-MCNC: 18 MG/DL (ref 8–23)
BUN/CREAT SERPL: 21.7 (ref 7–25)
CALCIUM SPEC-SCNC: 8.6 MG/DL (ref 8.6–10.5)
CHLORIDE SERPL-SCNC: 100 MMOL/L (ref 98–107)
CHOLEST SERPL-MCNC: 150 MG/DL (ref 0–200)
CO2 SERPL-SCNC: 22 MMOL/L (ref 22–29)
CREAT SERPL-MCNC: 0.83 MG/DL (ref 0.57–1)
DEPRECATED RDW RBC AUTO: 42.4 FL (ref 37–54)
EGFRCR SERPLBLD CKD-EPI 2021: 68.3 ML/MIN/1.73
EOSINOPHIL # BLD AUTO: 0.05 10*3/MM3 (ref 0–0.4)
EOSINOPHIL NFR BLD AUTO: 0.5 % (ref 0.3–6.2)
ERYTHROCYTE [DISTWIDTH] IN BLOOD BY AUTOMATED COUNT: 12.9 % (ref 12.3–15.4)
GLUCOSE BLDC GLUCOMTR-MCNC: 114 MG/DL (ref 70–130)
GLUCOSE BLDC GLUCOMTR-MCNC: 133 MG/DL (ref 70–130)
GLUCOSE BLDC GLUCOMTR-MCNC: 140 MG/DL (ref 70–130)
GLUCOSE BLDC GLUCOMTR-MCNC: 144 MG/DL (ref 70–130)
GLUCOSE SERPL-MCNC: 119 MG/DL (ref 65–99)
HCT VFR BLD AUTO: 35.4 % (ref 34–46.6)
HDLC SERPL-MCNC: 48 MG/DL (ref 40–60)
HGB BLD-MCNC: 11.3 G/DL (ref 12–15.9)
IMM GRANULOCYTES # BLD AUTO: 0.03 10*3/MM3 (ref 0–0.05)
IMM GRANULOCYTES NFR BLD AUTO: 0.3 % (ref 0–0.5)
LDLC SERPL CALC-MCNC: 84 MG/DL (ref 0–100)
LDLC/HDLC SERPL: 1.73 {RATIO}
LEFT ATRIUM VOLUME INDEX: 38.3 ML/M2
LYMPHOCYTES # BLD AUTO: 1.62 10*3/MM3 (ref 0.7–3.1)
LYMPHOCYTES NFR BLD AUTO: 16.6 % (ref 19.6–45.3)
MCH RBC QN AUTO: 28.8 PG (ref 26.6–33)
MCHC RBC AUTO-ENTMCNC: 31.9 G/DL (ref 31.5–35.7)
MCV RBC AUTO: 90.3 FL (ref 79–97)
MONOCYTES # BLD AUTO: 0.88 10*3/MM3 (ref 0.1–0.9)
MONOCYTES NFR BLD AUTO: 9 % (ref 5–12)
NEUTROPHILS NFR BLD AUTO: 7.15 10*3/MM3 (ref 1.7–7)
NEUTROPHILS NFR BLD AUTO: 73.2 % (ref 42.7–76)
NRBC BLD AUTO-RTO: 0 /100 WBC (ref 0–0.2)
PA ADP PRP-ACNC: 274 PRU
PLATELET # BLD AUTO: 151 10*3/MM3 (ref 140–450)
PMV BLD AUTO: 10.3 FL (ref 6–12)
POTASSIUM SERPL-SCNC: 3.8 MMOL/L (ref 3.5–5.2)
RBC # BLD AUTO: 3.92 10*6/MM3 (ref 3.77–5.28)
SODIUM SERPL-SCNC: 135 MMOL/L (ref 136–145)
TRIGL SERPL-MCNC: 94 MG/DL (ref 0–150)
VLDLC SERPL-MCNC: 18 MG/DL (ref 5–40)
WBC NRBC COR # BLD: 9.77 10*3/MM3 (ref 3.4–10.8)

## 2023-06-06 PROCEDURE — 80048 BASIC METABOLIC PNL TOTAL CA: CPT | Performed by: INTERNAL MEDICINE

## 2023-06-06 PROCEDURE — 85025 COMPLETE CBC W/AUTO DIFF WBC: CPT | Performed by: INTERNAL MEDICINE

## 2023-06-06 PROCEDURE — 25010000002 ONDANSETRON PER 1 MG: Performed by: NURSE PRACTITIONER

## 2023-06-06 PROCEDURE — 99232 SBSQ HOSP IP/OBS MODERATE 35: CPT

## 2023-06-06 PROCEDURE — C1894 INTRO/SHEATH, NON-LASER: HCPCS | Performed by: INTERNAL MEDICINE

## 2023-06-06 PROCEDURE — C1769 GUIDE WIRE: HCPCS | Performed by: INTERNAL MEDICINE

## 2023-06-06 PROCEDURE — 83036 HEMOGLOBIN GLYCOSYLATED A1C: CPT | Performed by: INTERNAL MEDICINE

## 2023-06-06 PROCEDURE — 25010000002 KETOROLAC TROMETHAMINE PER 15 MG: Performed by: INTERNAL MEDICINE

## 2023-06-06 PROCEDURE — 80061 LIPID PANEL: CPT | Performed by: INTERNAL MEDICINE

## 2023-06-06 PROCEDURE — 25010000002 MIDAZOLAM PER 1 MG: Performed by: INTERNAL MEDICINE

## 2023-06-06 PROCEDURE — 99152 MOD SED SAME PHYS/QHP 5/>YRS: CPT | Performed by: INTERNAL MEDICINE

## 2023-06-06 PROCEDURE — 93306 TTE W/DOPPLER COMPLETE: CPT | Performed by: INTERNAL MEDICINE

## 2023-06-06 PROCEDURE — 25510000001 IOPAMIDOL PER 1 ML: Performed by: INTERNAL MEDICINE

## 2023-06-06 PROCEDURE — 25010000002 CEFAZOLIN IN DEXTROSE 2-4 GM/100ML-% SOLUTION: Performed by: PHYSICIAN ASSISTANT

## 2023-06-06 PROCEDURE — 82948 REAGENT STRIP/BLOOD GLUCOSE: CPT

## 2023-06-06 PROCEDURE — 85576 BLOOD PLATELET AGGREGATION: CPT | Performed by: INTERNAL MEDICINE

## 2023-06-06 PROCEDURE — 25010000002 FENTANYL CITRATE (PF) 50 MCG/ML SOLUTION: Performed by: INTERNAL MEDICINE

## 2023-06-06 PROCEDURE — C1786 PMKR, SINGLE, RATE-RESP: HCPCS | Performed by: INTERNAL MEDICINE

## 2023-06-06 PROCEDURE — 93306 TTE W/DOPPLER COMPLETE: CPT

## 2023-06-06 PROCEDURE — 0 LIDOCAINE 1 % SOLUTION: Performed by: INTERNAL MEDICINE

## 2023-06-06 PROCEDURE — 33274 TCAT INSJ/RPL PERM LDLS PM: CPT | Performed by: INTERNAL MEDICINE

## 2023-06-06 PROCEDURE — 25010000002 MORPHINE PER 10 MG: Performed by: NURSE PRACTITIONER

## 2023-06-06 PROCEDURE — 93005 ELECTROCARDIOGRAM TRACING: CPT | Performed by: INTERNAL MEDICINE

## 2023-06-06 PROCEDURE — 99153 MOD SED SAME PHYS/QHP EA: CPT | Performed by: INTERNAL MEDICINE

## 2023-06-06 DEVICE — GEN PM TPS LEADLESS MICRA/AV VVIR RR 1.75G 0.8CC 18MM: Type: IMPLANTABLE DEVICE | Status: FUNCTIONAL

## 2023-06-06 RX ORDER — LIDOCAINE HYDROCHLORIDE 10 MG/ML
INJECTION, SOLUTION INFILTRATION; PERINEURAL
Status: DISCONTINUED | OUTPATIENT
Start: 2023-06-06 | End: 2023-06-06 | Stop reason: HOSPADM

## 2023-06-06 RX ORDER — SODIUM CHLORIDE 0.9 % (FLUSH) 0.9 %
3 SYRINGE (ML) INJECTION EVERY 12 HOURS SCHEDULED
Status: DISCONTINUED | OUTPATIENT
Start: 2023-06-06 | End: 2023-06-15 | Stop reason: HOSPADM

## 2023-06-06 RX ORDER — MIDAZOLAM HYDROCHLORIDE 1 MG/ML
INJECTION INTRAMUSCULAR; INTRAVENOUS
Status: DISCONTINUED | OUTPATIENT
Start: 2023-06-06 | End: 2023-06-06 | Stop reason: HOSPADM

## 2023-06-06 RX ORDER — KETOROLAC TROMETHAMINE 15 MG/ML
15 INJECTION, SOLUTION INTRAMUSCULAR; INTRAVENOUS EVERY 8 HOURS
Status: DISPENSED | OUTPATIENT
Start: 2023-06-06 | End: 2023-06-08

## 2023-06-06 RX ORDER — MECLIZINE HYDROCHLORIDE 25 MG/1
25 TABLET ORAL 3 TIMES DAILY PRN
COMMUNITY

## 2023-06-06 RX ORDER — SODIUM CHLORIDE 0.9 % (FLUSH) 0.9 %
10 SYRINGE (ML) INJECTION AS NEEDED
Status: DISCONTINUED | OUTPATIENT
Start: 2023-06-06 | End: 2023-06-15 | Stop reason: HOSPADM

## 2023-06-06 RX ORDER — FENTANYL CITRATE 50 UG/ML
INJECTION, SOLUTION INTRAMUSCULAR; INTRAVENOUS
Status: DISCONTINUED | OUTPATIENT
Start: 2023-06-06 | End: 2023-06-06 | Stop reason: HOSPADM

## 2023-06-06 RX ORDER — SODIUM CHLORIDE 9 MG/ML
INJECTION, SOLUTION INTRAVENOUS
Status: COMPLETED | OUTPATIENT
Start: 2023-06-06 | End: 2023-06-06

## 2023-06-06 RX ORDER — BUPIVACAINE HYDROCHLORIDE 5 MG/ML
INJECTION, SOLUTION PERINEURAL
Status: DISCONTINUED | OUTPATIENT
Start: 2023-06-06 | End: 2023-06-06 | Stop reason: HOSPADM

## 2023-06-06 RX ORDER — SODIUM CHLORIDE 9 MG/ML
40 INJECTION, SOLUTION INTRAVENOUS AS NEEDED
Status: DISCONTINUED | OUTPATIENT
Start: 2023-06-06 | End: 2023-06-15 | Stop reason: HOSPADM

## 2023-06-06 RX ORDER — ONDANSETRON 2 MG/ML
4 INJECTION INTRAMUSCULAR; INTRAVENOUS EVERY 6 HOURS PRN
Status: DISCONTINUED | OUTPATIENT
Start: 2023-06-06 | End: 2023-06-15 | Stop reason: HOSPADM

## 2023-06-06 RX ORDER — CEFAZOLIN SODIUM 2 G/100ML
2 INJECTION, SOLUTION INTRAVENOUS ONCE
Status: COMPLETED | OUTPATIENT
Start: 2023-06-06 | End: 2023-06-06

## 2023-06-06 RX ADMIN — ASPIRIN 81 MG: 81 TABLET, COATED ORAL at 09:06

## 2023-06-06 RX ADMIN — ISOPROTERENOL HYDROCHLORIDE 2 MCG/MIN: 0.2 INJECTION, SOLUTION INTRACARDIAC; INTRAMUSCULAR; INTRAVENOUS; SUBCUTANEOUS at 07:32

## 2023-06-06 RX ADMIN — KETOROLAC TROMETHAMINE 15 MG: 15 INJECTION, SOLUTION INTRAMUSCULAR; INTRAVENOUS at 19:42

## 2023-06-06 RX ADMIN — MORPHINE SULFATE 1 MG: 2 INJECTION, SOLUTION INTRAMUSCULAR; INTRAVENOUS at 05:48

## 2023-06-06 RX ADMIN — ONDANSETRON 4 MG: 2 INJECTION INTRAMUSCULAR; INTRAVENOUS at 04:31

## 2023-06-06 RX ADMIN — Medication 3 ML: at 20:26

## 2023-06-06 RX ADMIN — ACETAMINOPHEN 650 MG: 325 TABLET ORAL at 05:40

## 2023-06-06 RX ADMIN — VENLAFAXINE HYDROCHLORIDE 150 MG: 75 CAPSULE, EXTENDED RELEASE ORAL at 09:07

## 2023-06-06 RX ADMIN — CLOPIDOGREL BISULFATE 75 MG: 75 TABLET ORAL at 09:06

## 2023-06-06 RX ADMIN — Medication 10 ML: at 20:26

## 2023-06-06 RX ADMIN — LEVOTHYROXINE SODIUM 50 MCG: 0.05 TABLET ORAL at 05:40

## 2023-06-06 RX ADMIN — CEFAZOLIN SODIUM 2 G: 2 INJECTION, SOLUTION INTRAVENOUS at 16:39

## 2023-06-06 RX ADMIN — PANTOPRAZOLE SODIUM 40 MG: 40 TABLET, DELAYED RELEASE ORAL at 09:06

## 2023-06-06 NOTE — CASE MANAGEMENT/SOCIAL WORK
Discharge Planning Assessment  [facility]     Patient Name: Janna Willis  MRN: [id number]  Today's Date: 6/6/2023    Admit Date: 6/5/2023    Plan: Home   Discharge Needs Assessment       Row Name 06/06/23 1524       Living Environment    People in Home alone    Current Living Arrangements home    Primary Care Provided by self    Provides Primary Care For no one    Family Caregiver if Needed other relative(s);sibling(s)    Able to Return to Prior Arrangements yes       Transition Planning    Patient/Family Anticipates Transition to home    Transportation Anticipated family or friend will provide       Discharge Needs Assessment    Readmission Within the Last 30 Days no previous admission in last 30 days    Equipment Currently Used at Home none    Current Discharge Risk lives alone                   Discharge Plan       Row Name 06/06/23 1525       Plan    Plan Home    Patient/Family in Agreement with Plan yes    Plan Comments I spoke with Ms. Willis's niece, Chuyita, on the phone today and she provides all the following information. Ms. Willis lives alone in [facility]. She is independent with mobility and activities of daily living. She drives herself when leaving the home and is not current with any home or outpatient services. She has no medical equipment and denies any difficulty affording her medications. She anticipates home at discharge with family to transport. No discharge needs identified at this time. CM will continue to follow.    Final Discharge Disposition Code 01 - home or self-care                  Continued Care and Services - Admitted Since 6/5/2023    Coordination has not been started for this encounter.       Expected Discharge Date and Time       Expected Discharge Date Expected Discharge Time    Jun 9, 2023            Demographic Summary       Row Name 06/06/23 1519       General Information    General Information Comments Confirmed PCP to be Hever Ibrahim and [employer] to be  insurer.                   Functional Status       Row Name 06/06/23 1524       Functional Status, IADL    Medications independent    Meal Preparation independent    Housekeeping independent    Laundry independent    Shopping independent       Employment/    Employment Status retired                   Psychosocial    No documentation.                  Abuse/Neglect    No documentation.                  Legal    No documentation.                  Substance Abuse    No documentation.                  Patient Forms    No documentation.                     Michael Gold RN

## 2023-06-06 NOTE — PROGRESS NOTES
"Intensive Care Follow-up     Hospital:  LOS: 1 day   Ms. Janna Willis, 88 y.o. female is followed for:   Heart block AV third degree      Subjective   Interval History:  Chart reviewed. VSS. Patient alert and oriented, pleasant and conversant, lying in the bed. Echo at bedside. Patient states she is \"feeling good but ready to get out of here.\" Patient endorses pain and soreness at right groin site with transvenous pacemaker. Denies SOA, chest pain, palpitations, N/V. No other complaints at this time.     The patient's past medical, surgical and social history were reviewed and updated in Epic as appropriate.     Objective     Infusions:  DOPamine, 2-20 mcg/kg/min, Last Rate: 5 mcg/kg/min (06/05/23 1821)      Medications:  aspirin, 81 mg, Oral, Daily  aspirin, 300 mg, Rectal, Once  clopidogrel, 75 mg, Oral, Daily  insulin lispro, 2-9 Units, Subcutaneous, 4x Daily AC & at Bedtime  levothyroxine, 50 mcg, Oral, Q AM  pantoprazole, 40 mg, Oral, QAM  [START ON 6/7/2023] pharmacy consult - [facility], , Does not apply, Daily  venlafaxine XR, 150 mg, Oral, Daily With Breakfast      I reviewed the patient's medications.    Vital Sign Min/Max for last 24 hours  Temp  Min: 97.7 °F (36.5 °C)  Max: 98.3 °F (36.8 °C)   BP  Min: 87/47  Max: 148/92   Pulse  Min: 0  Max: 88   Resp  Min: 14  Max: 24   SpO2  Min: 89 %  Max: 100 %   Flow (L/min)  Min: 2  Max: 2       Input/Output for last 24 hour shift  06/05 0701 - 06/06 0700  In: 870 [P.O.:440; I.V.:170]  Out: 800 [Urine:800]      Physical Exam:  GENERAL: Patient lying in bed and conversant. No acute distress.   HEENT: Normocephalic and atraumatic. Trachea midline. PER. EOM WNL.   LUNGS: Chest rise of normal depth and symmetric. Lungs clear to auscultation bilaterally. No wheezes, rhonchi, or rales.   HEART: Paced rhythm. No rub, murmur, or gallop.   ABDOMEN: Soft, round, nondistended, and nontender. Bowel sounds present.   EXTREMITIES: No clubbing, edema, or cyanosis. Peripheral pulses " present. Skin warm and dry. Transvenous pacer in right groin. Large hematoma noted.   NEURO/PSYCH: Alert and oriented. Follows commands. Moves all extremities. No focal deficits.      Results from last 7 days   Lab Units 06/06/23 0457 06/05/23 1757 06/05/23 1756   WBC 10*3/mm3 9.77 7.47  --    HEMOGLOBIN g/dL 11.3* 12.2  --    HEMOGLOBIN, POC g/dL  --   --  11.9*   PLATELETS 10*3/mm3 151 142  --      Results from last 7 days   Lab Units 06/06/23 0457 06/05/23 1757 06/05/23 1756   SODIUM mmol/L 135* 132*  --    POTASSIUM mmol/L 3.8 4.7  --    CO2 mmol/L 22.0 22.0  --    BUN mg/dL 18 23  --    CREATININE mg/dL 0.83 1.25* 1.40*   MAGNESIUM mg/dL  --  1.9  --    GLUCOSE mg/dL 119* 175*  --      Estimated Creatinine Clearance: 52.1 mL/min (by C-G formula based on SCr of 0.83 mg/dL).          I reviewed the patient's new clinical results.  I reviewed the patient's new imaging results/reports including actual images and agree with reports.     Imaging Results (Last 24 Hours)       Procedure Component Value Units Date/Time    XR Chest 1 View [[id number]] Collected: 06/05/23 1812     Updated: 06/05/23 1819    Narrative:      XR CHEST 1 VW    Date of Exam: 6/5/2023 5:47 PM EDT    Indication: Dysrhythmia triage protocol    Comparison: Chest x-ray 9/1/2012  Findings:  There is cardiomegaly. There is fullness of the AP window which can be seen with lymphadenopathy or ascending thoracic aortic abnormality. A contrast-enhanced CT chest is recommended. There is cardiomegaly. Overlying devices limit evaluation. There is   septal thickening.      Impression:      Impression:  Increased opacity at the AP window could be seen with lymphadenopathy or vascular abnormality including aneurysm. A contrast-enhanced CT chest is recommended.  Cardiomegaly.  Pulmonary edema suspected.      Electronically Signed: Rhona Gonzalez    6/5/2023 6:16 PM EDT    Workstation ID: [id number]            Assessment & Plan   Impression      Heart block AV  third degree    CAD (coronary artery disease)    Primary hypertension    Mixed hyperlipidemia    GERD (gastroesophageal reflux disease)    Hypothyroid    Class 1 obesity in adult       Plan      Janna Willis is a 88 y.o. female with PMHx HTN, T2DM, and dyslipidemia who presented to [facility] ED on 6/5/23 for evaluationacute onset chest pressure/heaviness. Per report, the patient called her sister who arrived at the patient's home to find her unresponsive and pale. EMS was called and patient was noted to be hypertensive with bradycardia and complete heart block. Upon arrival to [facility], EKG showed inferior ST depression and complete heart block and emergent venous tremporary pacemaker wire placement and coronary angiography with possible PCI was recommended. Dr. Miner with Cardiology took patient to cath lab and placed a HANK to the RCA and a temporary pacemaker. She was admitted to the ICU post-procedure for further monitoring and medical management.     Heart block AV third degree s/p temporary pacemaker 6/5/23  CAD s/p HANK to RCA 6/5/23  Temporary pacemaker in place  Was initially on Isuprel; discontinued as not effective  PCI with HANK to RCA (80% stenosis)  CTA chest recommended when rhythm more stable  Echocardiogram pending  EP consulted and plans for PPM placement this afternoon  AM labs  AM CXR    Primary hypertension  Mixed hyperlipidemia  GERD (gastroesophageal reflux disease)  ASA/Plavix  PPI  Statin intolerant; resume Zetia upon discharge  Resume home antihypertensives as needed; currently normotensive    Hypothyroid  Continue Synthroid    Class 1 obesity in adult  T2DM  Current BMI 34.31 kg/m2. Lifestyle modification recommendations  Correction scale insulin    GI Prophylaxis: PPI  Dispo: Keep in ICU    Time spent: 30 minutes  Plan of care and goals reviewed with multidisciplinary/antibiotic stewardship team during rounds.   I discussed the patient's findings and my recommendations with patient, nursing staff,  and primary care team     Mercedes Kevin, MSN, APRN, ACNPC-AG  Pulmonary and Critical Care Medicine  Electronically signed by MARIELA Cunningham, 06/06/23, 11:03 AM EDT.

## 2023-06-06 NOTE — OP NOTE
Cardiac Electrophysiology Procedure Note       [facility]    PROCEDURE: PERMANENT LEADLESS CARDIAC PACEMAKER    OPERATION PERFORMED:     Implantation of Medtronic Micra AV model leadless cardiac pacemaker ( LCP ), with serial number [id number].      ATTENDING SURGEON: Dudley Rodriguez DO     MODERATE SEDATION FOR PROCEDURE:    Moderate sedation was given during this procedure.    I supervised and directed RN to administer this sedation.  This staff member also monitored the patient's hemodynamic and respiratory status and response to these medications.  Please see the full detailed procedure report generated by the electrophysiology laboratory staff.  The patient tolerated moderate sedation well.  There were no complications regarding sedation.  The total dose of fentanyl was 25 mcg and the total dose of midazolam was 1 mg.  The total dose of Brevital was 30 mg.  First sedation was administered at 4:50 PM and continued through 5:22 PM.    ESTIMATED BLOOD LOSS: less than 20 mL    COMPLICATIONS: None.    TIME OUT: Time out was completed with verification of the correct patient identity, procedure to be performed, procedure site and implanted equipment.    INDICATION FOR PROCEDURE:  Briefly, Janna Willis is a 88 y.o. year old female with a history of symptomatic complete heart block.     PROCEDURE AND FINDINGS:  The patient was brought to the electrophysiology laboratory in a post absorptive state.  Informed consent was given by the patient sister prior to the procedure and confirmed.  Intravenous prophylactic antibiotics were administered prior to the procedure.  After the groin was prepped and draped in the usual sterile fashion and after adequate anesthesia was given, the skin was infiltrated with 1% lidocaine and 0.5% bupivicaine 50/50 mixture.   Venous access was obtained with Seldinger technique.  A J tip wire was advanced to the superior vena cava under  fluoroscopic guidance.  The 27 [place]ian LCP delivery sheath was then delivered to right atrium.  5000 units of heparin was given intravenously.  The delivery sheath was then advanced to the septal aspect of the right ventricle under fluoroscopic guidance using multiple projections including LEAL, Togolese and AP.   The LCP was then deployed.  Adequate pacing sensing and impedance values were obtained.  We documented that at least 2 of the tines were attached to septal trabeculae.  Tug testing demonstrated that the device remained stable and in correct position.  The device was once again tested and we documented stable pacing sensing and impedance parameters.  The retention cord was then detached and the delivery sheath was withdrawn from the heart.  The sheath was then removed from the body and the femoral vein was secured with excellent hemostasis with the aid of a figure of eight suture and the patient's temporary transvenous pacemaker was removed also.  Manual pressures was applied.    MEASURED DEVICE DATA:                     sensing:                  None                   impedance:            670 ohms                   Threshold:             1 Volts at 0.24 ms                   Mode:                      VDD  Lower Rate:                50 pulses per minute  Upper Tracking Rate:         120 pulses per minute    CONCLUSION:  Successful implantation of a leadless cardiac pacemaker system.      RECOMMENDATION:    Patient is to follow up with our clinic per protocol.  FOR THE PATIENT    Avoid activities that involve heavy lifting or rough contact that could result in blows to your implant site and to allow your groin puncture site time to heal.    Avoid hot tubs or pools until groin puncture site is completely healed.    No driving for 24 hours post operatively after device implant.    Call your doctor if you have any swelling, redness or discharge around your incision, notice anything unusual or unexpected, or you  develop a fever that does not go away in two or three days.    Carry your Medical Device ID Card with you at all times.    Please call our office () with any questions about the device.        Dudley Rodriguez DO, FACC, RS  Cardiac Electrophysiologist  [facility] / [facility]

## 2023-06-06 NOTE — NURSING NOTE
Cardiac rehab staff to see patient regarding referral but patient  receiving direct patient care at this time. Staff will follow up.

## 2023-06-06 NOTE — PLAN OF CARE
Problem: Adult Inpatient Plan of Care  Goal: Plan of Care Review  6/6/2023 0201 by Yola Saenz, RN  Outcome: Ongoing, Progressing  6/6/2023 0159 by Yola Saenz RN  Outcome: Ongoing, Progressing  Flowsheets (Taken 6/5/2023 2100)  Progress: improving  Plan of Care Reviewed With: (niece)   patient   sibling   other (see comments)     Problem: Adjustment to Illness (Acute Coronary Syndrome)  Goal: Optimal Adaptation to Illness  Outcome: Ongoing, Progressing     Problem: Dysrhythmia (Acute Coronary Syndrome)  Goal: Normalized Cardiac Rhythm  Outcome: Ongoing, Progressing     Problem: Bleeding (Cardiac Rhythm Management Device)  Goal: Absence of Bleeding  Outcome: Ongoing, Progressing     Problem: Dysrhythmia  Goal: Normalized Cardiac Rhythm  Outcome: Ongoing, Progressing   Goal Outcome Evaluation:  Plan of Care Reviewed With: patient, sibling, other (see comments) (niece)        Progress: improving

## 2023-06-06 NOTE — CONSULTS
"[facility] Electrophyiology        Date of Hospital Visit: 23      Place of Service: [facility]    Patient Name: Janna Willis  :1935    Referral Provider: Kuldeep Miner MD  Primary Care Provider: Hever Ibrahim APRN      Chief complaint/Reason for Consultation:  Heart Block         Problem List:  Active Hospital Problems    Diagnosis  POA    **Heart block AV third degree [I44.2]  Yes    CAD (coronary artery disease) [I25.10]  Yes     Priority: Medium     [facility] 23, no clear obstructive disease to explain the patient's complete heart block, but did have an 80% proximal RCA stenosis and a small sized vessel.  In the lieu of ongoing chest discomfort, decision was made to proceed with HANK x1       Primary hypertension [I10]  Yes     Priority: Low    Mixed hyperlipidemia [E78.2]  Yes     Priority: Low    GERD (gastroesophageal reflux disease) [K21.9]  Yes    Hypothyroid [E03.9]  Yes    Class 1 obesity in adult [E66.9]  Yes           History of Present Illness:  This is an 88-year-old female with no cardiac history prior to yesterday.  She presented to the [facility] emergency department with a complaint of chest pain.  Her initial EKG showed severe, complete heart block.  She had positive troponins in the setting of chest pain consistent with unstable angina.  She was taken to the cardiac Cath Lab and received a stent to the RCA and a temporary pacemaker.  This morning she remains in 2-1 AV block.  She denies any recent dizziness or syncope but states that she is just \"not felt like herself\" for several weeks.          Past Surgical History:   Procedure Laterality Date    CARDIAC CATHETERIZATION N/A 2023    Procedure: Left Heart Cath;  Surgeon: Kuldeep Miner MD;  Location: [facility] CATH INVASIVE LOCATION;  Service: Cardiology;  Laterality: N/A;    CARDIAC ELECTROPHYSIOLOGY PROCEDURE N/A 2023    Procedure: Temporary Pacemaker;  Surgeon: Kuldeep Miner MD;  " Location: [facility] INVASIVE LOCATION;  Service: Cardiology;  Laterality: N/A;    OVARY SURGERY         Allergies   Allergen Reactions    Avelox [Moxifloxacin] Rash    2,4-D Dimethylamine Hives    Azithromycin Hives    Lisinopril Hives    Metoclopramide Hives    Statins Hives       Current Outpatient Medications   Medication Instructions    aspirin 81 mg, Oral, Daily    carvedilol (COREG) 6.25 mg, Oral, 2 Times Daily    cholecalciferol (VITAMIN D3) 2,000 Units, Oral, Daily    escitalopram (LEXAPRO) 20 mg, Oral, Daily    ezetimibe (ZETIA) 10 MG tablet Oral, Daily    fluorometholone (FML) 0.1 % ophthalmic suspension Instill 1 drop into both eyes three times a day    gabapentin (NEURONTIN) 300 mg, Oral, Every Night at Bedtime    levothyroxine (SYNTHROID, LEVOTHROID) 50 MCG tablet TAKE ONE TABLET EVERY DAY FOR FOR THYROID    losartan-hydrochlorothiazide (HYZAAR) 100-25 MG per tablet take ONE-HALF TABLET ONCE DAILY    omeprazole (priLOSEC) 40 MG capsule TAKE ONE CAPSULE EVERY MORNING FOR gerd    venlafaxine XR (EFFEXOR-XR) 150 MG 24 hr capsule elva 1 capsule with food Once a day for 90 days    vitamin B-12 (CYANOCOBALAMIN) 1,000 mcg, Oral, Weekly          Scheduled Meds:  aspirin, 81 mg, Oral, Daily  aspirin, 300 mg, Rectal, Once  clopidogrel, 75 mg, Oral, Daily  insulin lispro, 2-9 Units, Subcutaneous, 4x Daily AC & at Bedtime  levothyroxine, 50 mcg, Oral, Q AM  pantoprazole, 40 mg, Oral, QAM  [START ON 6/7/2023] pharmacy consult - [place], , Does not apply, Daily  venlafaxine XR, 150 mg, Oral, Daily With Breakfast      Continuous Infusions:DOPamine, 2-20 mcg/kg/min, Last Rate: 5 mcg/kg/min (06/05/23 1821)            Social History     Socioeconomic History    Marital status: Single   Tobacco Use    Smoking status: Never    Smokeless tobacco: Never   Vaping Use    Vaping Use: Never used   Substance and Sexual Activity    Alcohol use: No    Drug use: No    Sexual activity: Defer       Family History   Problem Relation Age  "of Onset    Stroke Mother     Heart attack Mother        REVIEW OF SYSTEMS:   Review of Systems   Constitutional: Positive for malaise/fatigue.   HENT: Negative.     Eyes: Negative.    Cardiovascular:  Positive for chest pain.   Respiratory: Negative.     Endocrine: Negative.    Hematologic/Lymphatic: Negative.    Skin: Negative.    Musculoskeletal: Negative.    Gastrointestinal: Negative.    Genitourinary: Negative.    Neurological: Negative.    Psychiatric/Behavioral: Negative.     Allergic/Immunologic: Negative.    All other systems reviewed and are negative.         Objective:  Vitals:    06/06/23 0600 06/06/23 0700 06/06/23 0800 06/06/23 0900   BP: 112/45 (!) 87/47 94/49 97/48   BP Location: Left arm      Patient Position: Lying      Pulse: 60 57 60 69   Resp: 16  16 18   Temp:    98.3 °F (36.8 °C)   TempSrc:    Oral   SpO2: 95% 93% 94% 96%   Weight:       Height:         Body mass index is 34.33 kg/m².  Flowsheet Rows      Flowsheet Row First Filed Value   Admission Height 162.6 cm (64\") Documented at 06/05/2023 1759   Admission Weight 90.7 kg (200 lb) Documented at 06/05/2023 1759            Intake/Output Summary (Last 24 hours) at 6/6/2023 0938  Last data filed at 6/6/2023 0855  Gross per 24 hour   Intake 967 ml   Output 800 ml   Net 167 ml       Vitals and nursing note reviewed.   Constitutional:       Appearance: Healthy appearance. Not in distress.   Eyes:      Conjunctiva/sclera: Conjunctivae normal.      Pupils: Pupils are equal, round, and reactive to light.   Neck:      Vascular: JVD normal.   Pulmonary:      Effort: Pulmonary effort is normal.      Breath sounds: Normal breath sounds.   Chest:      Chest wall: Not tender to palpatation.   Cardiovascular:      Normal rate. Regular rhythm.      Murmurs: There is no murmur.      No gallop.  No click. No rub.   Pulses:     Intact distal pulses.   Edema:     Peripheral edema absent.   Abdominal:      General: Bowel sounds are normal.      Palpations: " Abdomen is soft.   Musculoskeletal: Normal range of motion.         General: No deformity.      Cervical back: Normal range of motion. Skin:     General: Skin is warm and dry.   Neurological:      General: No focal deficit present.      Mental Status: Alert.             Lab Review:                CBC:      Lab 06/06/23 0457 06/05/23 1757   WBC 9.77 7.47   HEMOGLOBIN 11.3* 12.2   HEMATOCRIT 35.4 37.8   PLATELETS 151 142   NEUTROS ABS 7.15* 5.48   IMMATURE GRANS (ABS) 0.03 0.04   LYMPHS ABS 1.62 1.31   MONOS ABS 0.88 0.51   EOS ABS 0.05 0.10   MCV 90.3 90.9     CMP:        Lab 06/06/23 0457 06/05/23 1757 06/05/23  1756   SODIUM 135* 132*  --    POTASSIUM 3.8 4.7  --    CHLORIDE 100 97*  --    CO2 22.0 22.0  --    ANION GAP 13.0 13.0  --    BUN 18 23  --    CREATININE 0.83 1.25* 1.40*   EGFR 68.3 41.8* 36.5*   GLUCOSE 119* 175*  --    CALCIUM 8.6 8.5*  --    MAGNESIUM  --  1.9  --    TOTAL PROTEIN  --  6.4  --    ALBUMIN  --  3.3*  --    GLOBULIN  --  3.1  --    ALT (SGPT)  --  70*  --    AST (SGOT)  --  78*  --    BILIRUBIN  --  0.6  --    ALK PHOS  --  60  --      Results from last 7 days   Lab Units 06/05/23  1757   MAGNESIUM mg/dL 1.9     Estimated Creatinine Clearance: 52.1 mL/min (by C-G formula based on SCr of 0.83 mg/dL).    CARDIAC LABS:      Lab 06/05/23 1757   PROBNP 6,329.0*   HSTROP T 30*   PROTIME 14.0   INR 1.07           EKG/ Telemetry:               Result Review:  I have personally reviewed the results from the time of admission and agree with these findings:  [x]  Laboratory  []  Radiology  [x]  EKG/Telemetry   []  Pathology  [x]  Old records  []  Other:        Assessment and Plan:     Complete heart block on initial presentation now with 2-1 AV block underneath temporary pacing  Permanent pacemaker implantation later today.                Electronically signed by ANJUM De La Garza, 06/06/23, 11:32 AM EDT.

## 2023-06-06 NOTE — PROGRESS NOTES
[facility] Cardiology    Inpatient Progress Note      Chief Complaint/Reason for service:    Complete heart block, CAD         Subjective:       No recurrent chest pain overnight.  Breathing comfortably at rest.  Some discomfort laying flat for a prolonged period of time    Past medical, surgical, social and family history reviewed in the patient's electronic medical record.    Problem List  Active Hospital Problems    Diagnosis  POA    **Heart block AV third degree [I44.2]  Yes      Resolved Hospital Problems   No resolved problems to display.            Objective:      Infusions:  DOPamine, 2-20 mcg/kg/min, Last Rate: 5 mcg/kg/min (06/05/23 1821)  isoproterenol (ISUPREL) infusion 4 mcg/mL, 2 mcg/min, Last Rate: 2 mcg/min (06/06/23 0732)         Medications:    Current Facility-Administered Medications:     acetaminophen (TYLENOL) tablet 650 mg, 650 mg, Oral, Q4H PRN, Kuldeep Miner MD, 650 mg at 06/06/23 0540    aspirin EC tablet 81 mg, 81 mg, Oral, Daily, Kuldeep Miner MD    aspirin suppository 300 mg, 300 mg, Rectal, Once, Kuldeep Miner MD    clopidogrel (PLAVIX) tablet 75 mg, 75 mg, Oral, Daily, Kuldeep Miner MD    dextrose (D50W) (25 g/50 mL) IV injection 25 g, 25 g, Intravenous, Q15 Min PRN, Parth Saldana MD    dextrose (GLUTOSE) oral gel 15 g, 15 g, Oral, Q15 Min PRN, Parth Saldana MD    DOPamine 400 mg in 250 mL D5W infusion, 2-20 mcg/kg/min, Intravenous, Titrated, Kuldeep Miner MD, Last Rate: 17.01 mL/hr at 06/05/23 1821, 5 mcg/kg/min at 06/05/23 1821    hydrALAZINE (APRESOLINE) tablet 25 mg, 25 mg, Oral, Q6H PRN, Kuldeep Miner MD    Insulin Lispro (humaLOG) injection 2-9 Units, 2-9 Units, Subcutaneous, 4x Daily AC & at Bedtime, Parth Saldana MD    ipratropium-albuterol (DUO-NEB) nebulizer solution 3 mL, 3 mL, Nebulization, Q4H PRN, Mary Ang APRN    isoproterenol (ISUPREL) 1,000 mcg in sodium chloride 0.9 % 250 mL (4 mcg/mL) infusion, 2 mcg/min, Intravenous, Continuous,  Kuldeep Miner MD, Last Rate: 30 mL/hr at 06/06/23 0732, 2 mcg/min at 06/06/23 0732    levothyroxine (SYNTHROID, LEVOTHROID) tablet 50 mcg, 50 mcg, Oral, Q AM, Kuldeep Miner MD, 50 mcg at 06/06/23 0540    morphine injection 1 mg, 1 mg, Intravenous, Q4H PRN, Mary Ang, APRN, 1 mg at 06/06/23 0548    nitroglycerin (NITROSTAT) SL tablet 0.4 mg, 0.4 mg, Sublingual, Q5 Min PRN, Kuldeep Miner MD    ondansetron (ZOFRAN) injection 4 mg, 4 mg, Intravenous, Q6H PRN, Mary Ang, APRN, 4 mg at 06/06/23 0431    pantoprazole (PROTONIX) EC tablet 40 mg, 40 mg, Oral, QAM, Kuldeep Miner MD    sodium chloride 0.9 % flush 10 mL, 10 mL, Intravenous, PRN, Kuldeep Miner MD    venlafaxine XR (EFFEXOR-XR) 24 hr capsule 150 mg, 150 mg, Oral, Daily With Breakfast, Kuldeep Miner MD    Vital Sign Min/Max for last 24 hours  Temp  Min: 97.7 °F (36.5 °C)  Max: 98.3 °F (36.8 °C)   BP  Min: 87/47  Max: 148/92   Pulse  Min: 0  Max: 88   Resp  Min: 14  Max: 24   SpO2  Min: 89 %  Max: 100 %   No data recorded      Intake/Output Summary (Last 24 hours) at 6/6/2023 0827  Last data filed at 6/6/2023 0600  Gross per 24 hour   Intake 870 ml   Output 800 ml   Net 70 ml           CONSTITUTIONAL: No acute distress  RESPIRATORY: Normal effort. Clear to auscultation bilaterally without wheezing or rales  CARDIOVASCULAR: Regular rate and rhythm with normal S1 and S2. There is no significant lower extremity edema bilaterally. Normal radial pulse.     Labs/studies:  Available lab and imaging results were reviewed by myself today        Tele: V paced, underlying rhythm is complete heart block again         Assessment/Plan:       ASSESSMENT:  Complete heart block with very slow ventricular response with a ventricular rate of less than 20 bpm  Second-degree AV block, 2-1 AV conduction  CAD   [facility] 6/5, no clear obstructive disease to explain the patient's complete heart block, but did have an 80% proximal RCA stenosis and a small sized vessel.  In the lieu  of ongoing chest discomfort, decision was made to proceed with HANK x1   CAROL  Mixed hyperlipidemia  Statin intolerant  History of hypertension prior to admission  Possible aortic aneurysm    PLAN:  Continue temporary venous pacemaker  Discontinue Isuprel  EP consult for possible permanent pacemaker.  Tentatively n.p.o.  Continue aspirin, clopidogrel.  Statin intolerant.  Resume Zetia upon discharge  Not on a beta-blocker due to bradycardia arrhythmia  Restart home antihypertensives as needed.  Currently hypotensive  May warrant diuresis.  Holding off for now due to stable breathing status, NPO, admitted with renal insufficiency  CTA of the chest when rhythm more stable for possible aortic aneurysm, can be done as an outpatient    Kuldeep Miner MD, MSc, FACC, [facility]  Interventional Cardiology  [facility]

## 2023-06-06 NOTE — ED PROVIDER NOTES
Subjective   History of Present Illness  Patient is a pleasant 88-year-old female who presents secondary to feeling poorly and essentially having significant heart block.  EMS states they were called out for stroke but on arrival patient was poorly responsive and bradycardic.  They suspected a heart block and patient was given 2 doses of atropine in route.  Atropine was not successful in increasing her heart rate.  They began externally pacing her with intermittent capture and this is her presentation to the emergency department.  Patient received 5 mg of Valium in route for sedation with the pacing.  Patient is unable to supply history as that she has arousable but poorly responsive at this time.  The onset time is not clear but later in emergency department stay it was communicated that she called her family earlier this morning and told him that she vaguely felt bad.  No reported chest pain.  No abdominal pain.  Patient does admit to had nausea and has been nauseous intermittently since arrival to the emergency department.  No reported fever or infectious symptoms.      Review of Systems   Unable to perform ROS: Acuity of condition     Past Medical History:   Diagnosis Date    Diabetes        Allergies   Allergen Reactions    Avelox [Moxifloxacin] Rash    2,4-D Dimethylamine Hives    Azithromycin Hives    Lisinopril Hives    Metoclopramide Hives    Statins Hives       Past Surgical History:   Procedure Laterality Date    CARDIAC CATHETERIZATION N/A 6/5/2023    Procedure: Left Heart Cath;  Surgeon: Kuldeep Miner MD;  Location:  [facility];  Service: Cardiology;  Laterality: N/A;    CARDIAC ELECTROPHYSIOLOGY PROCEDURE N/A 6/5/2023    Procedure: Temporary Pacemaker;  Surgeon: Kuldeep Miner MD;  Location:  [facility];  Service: Cardiology;  Laterality: N/A;    OVARY SURGERY         Family History   Problem Relation Age of Onset    Stroke Mother     Heart attack Mother         Social History     Socioeconomic History    Marital status: Single   Tobacco Use    Smoking status: Never    Smokeless tobacco: Never   Vaping Use    Vaping Use: Never used   Substance and Sexual Activity    Alcohol use: No    Drug use: No    Sexual activity: Defer           Objective   Physical Exam  Vitals and nursing note reviewed.   Constitutional:       General: She is in acute distress.      Appearance: She is ill-appearing and toxic-appearing.   HENT:      Head: Normocephalic and atraumatic.      Mouth/Throat:      Mouth: Mucous membranes are moist.   Eyes:      Extraocular Movements: Extraocular movements intact.      Pupils: Pupils are equal, round, and reactive to light.   Cardiovascular:      Rate and Rhythm: Normal rate and regular rhythm.      Heart sounds: Normal heart sounds. No murmur heard.     Comments: Bradycardic.  Rate in the low 20s.  Pulses weak.  External pacing pads are in place and active rate is set at 70 bpm but is not reliably capturing  Pulmonary:      Effort: Pulmonary effort is normal. No respiratory distress.      Breath sounds: Normal breath sounds. No wheezing or rhonchi.   Abdominal:      General: Bowel sounds are normal.      Palpations: Abdomen is soft.   Musculoskeletal:         General: No swelling, deformity or signs of injury. Normal range of motion.      Cervical back: Normal range of motion.   Skin:     General: Skin is warm.      Coloration: Skin is pale.   Neurological:      General: No focal deficit present.      Mental Status: She is oriented to person, place, and time.      Comments: Eyes are closed and patient is poorly responsive but is arousable with loud verbal stimuli.  Surprisingly, she is oriented.  Generalized weakness but no focal deficits appreciated.       Critical Care  Performed by: Harshal Isbell DO  Authorized by: Harshal Isbell DO     Critical care provider statement:     Critical care time (minutes):  75    Critical care time was exclusive of:   Separately billable procedures and treating other patients    Critical care was necessary to treat or prevent imminent or life-threatening deterioration of the following conditions:  Circulatory failure and cardiac failure    Critical care was time spent personally by me on the following activities:  Ordering and performing treatments and interventions, ordering and review of laboratory studies, ordering and review of radiographic studies, pulse oximetry, re-evaluation of patient's condition, review of old charts, obtaining history from patient or surrogate, examination of patient, evaluation of patient's response to treatment, discussions with consultants and development of treatment plan with patient or surrogate    I assumed direction of critical care for this patient from another provider in my specialty: no      Care discussed with: admitting provider             ED Course      Patient switched over to our pads and the capture voltage was increased.  Patient was given additional benzodiazepine including 1 mg of Ativan for sedation, in addition to the 5 mg of Valium she received in route.  Immediately discussed the case with Dr. Miner, cardiology.  He came to the emergency department to evaluate the patient and was determined the patient will benefit from emergent transport to the Cath Lab for further management.  A 12-lead without the external pacing was requested by cardiology for further diagnostics and treatment plan.  Impressively, patient had no significant ventricular activity during the twelve-lead EKG and the patient was immediately restarted.         Recent Results (from the past 24 hour(s))   POC CHEM 8    Collection Time: 06/05/23  5:56 PM    Specimen: Blood   Result Value Ref Range    Glucose 171 (H) 70 - 130 mg/dL    BUN 25 8 - 26 mg/dL    Creatinine 1.40 (H) 0.60 - 1.30 mg/dL    Sodium 132 (L) 138 - 146 mmol/L    POC Potassium 4.6 3.5 - 4.9 mmol/L    Chloride 98 98 - 109 mmol/L    Total CO2 21 (L)  24 - 29 mmol/L    Hemoglobin 11.9 (L) 12.0 - 17.0 g/dL    Hematocrit 35 (L) 38 - 51 %    Ionized Calcium 1.15 (L) 1.20 - 1.32 mmol/L    eGFR 36.5 (L) >60.0 mL/min/1.73   Comprehensive Metabolic Panel    Collection Time: 06/05/23  5:57 PM    Specimen: Blood   Result Value Ref Range    Glucose 175 (H) 65 - 99 mg/dL    BUN 23 8 - 23 mg/dL    Creatinine 1.25 (H) 0.57 - 1.00 mg/dL    Sodium 132 (L) 136 - 145 mmol/L    Potassium 4.7 3.5 - 5.2 mmol/L    Chloride 97 (L) 98 - 107 mmol/L    CO2 22.0 22.0 - 29.0 mmol/L    Calcium 8.5 (L) 8.6 - 10.5 mg/dL    Total Protein 6.4 6.0 - 8.5 g/dL    Albumin 3.3 (L) 3.5 - 5.2 g/dL    ALT (SGPT) 70 (H) 1 - 33 U/L    AST (SGOT) 78 (H) 1 - 32 U/L    Alkaline Phosphatase 60 39 - 117 U/L    Total Bilirubin 0.6 0.0 - 1.2 mg/dL    Globulin 3.1 gm/dL    A/G Ratio 1.1 g/dL    BUN/Creatinine Ratio 18.4 7.0 - 25.0    Anion Gap 13.0 5.0 - 15.0 mmol/L    eGFR 41.8 (L) >60.0 mL/min/1.73   Magnesium    Collection Time: 06/05/23  5:57 PM    Specimen: Blood   Result Value Ref Range    Magnesium 1.9 1.6 - 2.4 mg/dL   Single High Sensitivity Troponin T    Collection Time: 06/05/23  5:57 PM    Specimen: Blood   Result Value Ref Range    HS Troponin T 30 (H) <10 ng/L   TSH    Collection Time: 06/05/23  5:57 PM    Specimen: Blood   Result Value Ref Range    TSH 11.530 (H) 0.270 - 4.200 uIU/mL   BNP    Collection Time: 06/05/23  5:57 PM    Specimen: Blood   Result Value Ref Range    proBNP 6,329.0 (H) 0.0 - 1,800.0 pg/mL   Green Top (Gel)    Collection Time: 06/05/23  5:57 PM   Result Value Ref Range    Extra Tube Hold for add-ons.    Lavender Top    Collection Time: 06/05/23  5:57 PM   Result Value Ref Range    Extra Tube hold for add-on    Gold Top - SST    Collection Time: 06/05/23  5:57 PM   Result Value Ref Range    Extra Tube Hold for add-ons.    Gray Top    Collection Time: 06/05/23  5:57 PM   Result Value Ref Range    Extra Tube Hold for add-ons.    Light Blue Top    Collection Time: 06/05/23  5:57  PM   Result Value Ref Range    Extra Tube Hold for add-ons.    CBC Auto Differential    Collection Time: 06/05/23  5:57 PM    Specimen: Blood   Result Value Ref Range    WBC 7.47 3.40 - 10.80 10*3/mm3    RBC 4.16 3.77 - 5.28 10*6/mm3    Hemoglobin 12.2 12.0 - 15.9 g/dL    Hematocrit 37.8 34.0 - 46.6 %    MCV 90.9 79.0 - 97.0 fL    MCH 29.3 26.6 - 33.0 pg    MCHC 32.3 31.5 - 35.7 g/dL    RDW 12.7 12.3 - 15.4 %    RDW-SD 41.7 37.0 - 54.0 fl    MPV 10.2 6.0 - 12.0 fL    Platelets 142 140 - 450 10*3/mm3    Neutrophil % 73.5 42.7 - 76.0 %    Lymphocyte % 17.5 (L) 19.6 - 45.3 %    Monocyte % 6.8 5.0 - 12.0 %    Eosinophil % 1.3 0.3 - 6.2 %    Basophil % 0.4 0.0 - 1.5 %    Immature Grans % 0.5 0.0 - 0.5 %    Neutrophils, Absolute 5.48 1.70 - 7.00 10*3/mm3    Lymphocytes, Absolute 1.31 0.70 - 3.10 10*3/mm3    Monocytes, Absolute 0.51 0.10 - 0.90 10*3/mm3    Eosinophils, Absolute 0.10 0.00 - 0.40 10*3/mm3    Basophils, Absolute 0.03 0.00 - 0.20 10*3/mm3    Immature Grans, Absolute 0.04 0.00 - 0.05 10*3/mm3    nRBC 0.0 0.0 - 0.2 /100 WBC   Heparin Anti-Xa    Collection Time: 06/05/23  5:57 PM    Specimen: Blood   Result Value Ref Range    Heparin Anti-Xa (UFH) 0.10 (L) 0.30 - 0.70 IU/ml   Protime-INR    Collection Time: 06/05/23  5:57 PM    Specimen: Blood   Result Value Ref Range    Protime 14.0 12.2 - 14.5 Seconds    INR 1.07 0.89 - 1.12   aPTT    Collection Time: 06/05/23  5:57 PM    Specimen: Blood   Result Value Ref Range    PTT 24.2 (L) 60.0 - 90.0 seconds   D-dimer, Quantitative    Collection Time: 06/05/23  5:57 PM    Specimen: Blood   Result Value Ref Range    D-Dimer, Quantitative 0.44 0.00 - 0.87 MCGFEU/mL   T4, Free    Collection Time: 06/05/23  5:57 PM    Specimen: Blood   Result Value Ref Range    Free T4 1.00 0.93 - 1.70 ng/dL   ECG 12 Lead ED Triage Standing Order; Dysrhythmia    Collection Time: 06/05/23  6:10 PM   Result Value Ref Range    QT Interval 268 ms    QTC Interval 320 ms   ECG 12 Lead Rhythm Change     Collection Time: 06/06/23 12:18 AM   Result Value Ref Range    QT Interval 468 ms    QTC Interval 439 ms   ECG 12 Lead    Collection Time: 06/06/23  4:12 AM   Result Value Ref Range    QT Interval 460 ms    QTC Interval 407 ms   Basic Metabolic Panel    Collection Time: 06/06/23  4:57 AM    Specimen: Blood   Result Value Ref Range    Glucose 119 (H) 65 - 99 mg/dL    BUN 18 8 - 23 mg/dL    Creatinine 0.83 0.57 - 1.00 mg/dL    Sodium 135 (L) 136 - 145 mmol/L    Potassium 3.8 3.5 - 5.2 mmol/L    Chloride 100 98 - 107 mmol/L    CO2 22.0 22.0 - 29.0 mmol/L    Calcium 8.6 8.6 - 10.5 mg/dL    BUN/Creatinine Ratio 21.7 7.0 - 25.0    Anion Gap 13.0 5.0 - 15.0 mmol/L    eGFR 68.3 >60.0 mL/min/1.73   Lipid Panel    Collection Time: 06/06/23  4:57 AM    Specimen: Blood   Result Value Ref Range    Total Cholesterol 150 0 - 200 mg/dL    Triglycerides 94 0 - 150 mg/dL    HDL Cholesterol 48 40 - 60 mg/dL    LDL Cholesterol  84 0 - 100 mg/dL    VLDL Cholesterol 18 5 - 40 mg/dL    LDL/HDL Ratio 1.73    P2Y12 Platelet Inhibition    Collection Time: 06/06/23  4:57 AM    Specimen: Blood   Result Value Ref Range    P2Y12 Reactivity Unit 274 PRU   CBC Auto Differential    Collection Time: 06/06/23  4:57 AM    Specimen: Blood   Result Value Ref Range    WBC 9.77 3.40 - 10.80 10*3/mm3    RBC 3.92 3.77 - 5.28 10*6/mm3    Hemoglobin 11.3 (L) 12.0 - 15.9 g/dL    Hematocrit 35.4 34.0 - 46.6 %    MCV 90.3 79.0 - 97.0 fL    MCH 28.8 26.6 - 33.0 pg    MCHC 31.9 31.5 - 35.7 g/dL    RDW 12.9 12.3 - 15.4 %    RDW-SD 42.4 37.0 - 54.0 fl    MPV 10.3 6.0 - 12.0 fL    Platelets 151 140 - 450 10*3/mm3    Neutrophil % 73.2 42.7 - 76.0 %    Lymphocyte % 16.6 (L) 19.6 - 45.3 %    Monocyte % 9.0 5.0 - 12.0 %    Eosinophil % 0.5 0.3 - 6.2 %    Basophil % 0.4 0.0 - 1.5 %    Immature Grans % 0.3 0.0 - 0.5 %    Neutrophils, Absolute 7.15 (H) 1.70 - 7.00 10*3/mm3    Lymphocytes, Absolute 1.62 0.70 - 3.10 10*3/mm3    Monocytes, Absolute 0.88 0.10 - 0.90 10*3/mm3     Eosinophils, Absolute 0.05 0.00 - 0.40 10*3/mm3    Basophils, Absolute 0.04 0.00 - 0.20 10*3/mm3    Immature Grans, Absolute 0.03 0.00 - 0.05 10*3/mm3    nRBC 0.0 0.0 - 0.2 /100 WBC   POC Glucose Once    Collection Time: 06/06/23  7:28 AM    Specimen: Blood   Result Value Ref Range    Glucose 140 (H) 70 - 130 mg/dL   Adult Transthoracic Echo Complete w/ Color, Spectral and Contrast if Necessary Per Protocol    Collection Time: 06/06/23 11:11 AM   Result Value Ref Range    EF(MOD-bp) 61.7 %    LVIDd 4.2 cm    LVIDs 3.2 cm    IVSd 1.10 cm    LVPWd 1.00 cm    FS 23.8 %    IVS/LVPW 1.10 cm    ESV(cubed) 32.8 ml    EDV(cubed) 74.1 ml    LVOT area 3.1 cm2    LV mass(C)d 147.0 grams    LVOT diam 2.00 cm    EDV(MOD-sp2) 64.5 ml    EDV(MOD-sp4) 57.2 ml    ESV(MOD-sp2) 25.2 ml    ESV(MOD-sp4) 21.6 ml    SV(MOD-sp2) 39.3 ml    SV(MOD-sp4) 35.6 ml    EF(MOD-sp2) 60.9 %    EF(MOD-sp4) 62.2 %    MV E max kaushik 148.0 cm/sec    MV A max kaushik 104.0 cm/sec    MV dec time 0.23 msec    MV E/A 1.42     LA ESV Index (BP) 38.3 ml/m2    Med Peak E' Kaushik 6.5 cm/sec    Lat Peak E' Kaushik 6.7 cm/sec    Avg E/e' ratio 22.42     SV(LVOT) 68.5 ml    RV Base 3.5 cm    RV Mid 2.9 cm    RV Length 7.3 cm    TAPSE (>1.6) 2.24 cm    RV S' 10.3 cm/sec    LA dimension (2D)  4.6 cm    LV V1 max 101.0 cm/sec    LV V1 max PG 4.1 mmHg    LV V1 mean PG 2.00 mmHg    LV V1 VTI 21.8 cm    Ao pk kaushik 191.0 cm/sec    Ao max PG 14.6 mmHg    Ao mean PG 8.0 mmHg    Ao V2 VTI 40.9 cm    JUAN MANUEL(I,D) 1.67 cm2    MV max PG 10.7 mmHg    MV mean PG 5.6 mmHg    MV V2 VTI 52.6 cm    MV P1/2t 121.8 msec    MVA(P1/2t) 1.81 cm2    MVA(VTI) 1.30 cm2    MV dec slope 375.0 cm/sec2    TR max kaushik 303.8 cm/sec    TR max PG 37.4 mmHg    PA acc time 0.09 sec    PA pr(Accel) 37.6 mmHg    PI end-d kaushik 136.0 cm/sec    Ao root diam 2.7 cm    RVSP(TR) 40 mmHg    RAP systole 3 mmHg    BH CV VAS BP RIGHT ARM 91/55 mmHg   POC Glucose Once    Collection Time: 06/06/23 11:17 AM    Specimen: Blood  "  Result Value Ref Range    Glucose 133 (H) 70 - 130 mg/dL     Note: In addition to lab results from this visit, the labs listed above may include labs taken at another facility or during a different encounter within the last 24 hours. Please correlate lab times with ED admission and discharge times for further clarification of the services performed during this visit.    XR Chest 1 View   Final Result   Impression:   Increased opacity at the AP window could be seen with lymphadenopathy or vascular abnormality including aneurysm. A contrast-enhanced CT chest is recommended.   Cardiomegaly.   Pulmonary edema suspected.         Electronically Signed: Rhona Gonzalez     6/5/2023 6:16 PM EDT     Workstation ID: [id number]        Vitals:    06/06/23 1000 06/06/23 1100 06/06/23 1111 06/06/23 1200   BP: 91/55 124/75  118/59   BP Location:       Patient Position:       Pulse: 70 70  70   Resp: 18 16  18   Temp:    98.1 °F (36.7 °C)   TempSrc:    Oral   SpO2: 97% 99%  91%   Weight:   90.7 kg (199 lb 15.3 oz)    Height:   162.6 cm (64.02\")      Medications   sodium chloride 0.9 % flush 10 mL ( Intravenous MAR Unhold 6/5/23 2035)   DOPamine 400 mg in 250 mL D5W infusion (5 mcg/kg/min × 90.7 kg Intravenous New Bag 6/5/23 1821)   aspirin suppository 300 mg ( Rectal MAR Unhold 6/5/23 2035)   nitroglycerin (NITROSTAT) SL tablet 0.4 mg (has no administration in time range)   acetaminophen (TYLENOL) tablet 650 mg (650 mg Oral Given 6/6/23 0540)   clopidogrel (PLAVIX) tablet 75 mg (75 mg Oral Given 6/6/23 0906)   aspirin EC tablet 81 mg (81 mg Oral Given 6/6/23 0906)   levothyroxine (SYNTHROID, LEVOTHROID) tablet 50 mcg (50 mcg Oral Given 6/6/23 0540)   venlafaxine XR (EFFEXOR-XR) 24 hr capsule 150 mg (150 mg Oral Given 6/6/23 0907)   pantoprazole (PROTONIX) EC tablet 40 mg (40 mg Oral Given 6/6/23 0906)   hydrALAZINE (APRESOLINE) tablet 25 mg (has no administration in time range)   ipratropium-albuterol (DUO-NEB) nebulizer solution 3 mL ( " Nebulization Return to Cabinet 6/5/23 2207)   morphine injection 1 mg (1 mg Intravenous Given 6/6/23 0548)   dextrose (GLUTOSE) oral gel 15 g (has no administration in time range)   dextrose (D50W) (25 g/50 mL) IV injection 25 g (has no administration in time range)   Insulin Lispro (humaLOG) injection 2-9 Units (2 Units Subcutaneous Not Given 6/6/23 1130)   ondansetron (ZOFRAN) injection 4 mg (4 mg Intravenous Given 6/6/23 0431)   Pharmacy Consult - MTM (has no administration in time range)   LORazepam (ATIVAN) injection 1 mg (1 mg Intravenous Given 6/5/23 1758)   heparin (porcine) injection 4,000 Units (4,000 Units Intravenous Given 6/5/23 1820)   Cangrelor Tetrasodium (KENGREAL) 50,000 mcg in sodium chloride 0.9 % 250 mL iv infusion (4 mcg/kg/min × 90.7 kg Intravenous New Bag 6/5/23 1913)   clopidogrel (PLAVIX) tablet 600 mg (600 mg Oral Given 6/5/23 2206)     ECG/EMG Results (last 24 hours)       Procedure Component Value Units Date/Time    ECG 12 Lead ED Triage Standing Order; Dysrhythmia [[id number]] Collected: 06/05/23 1810     Updated: 06/06/23 0707     QT Interval 268 ms      QTC Interval 320 ms     Narrative:      Test Reason : ED Triage Standing Order~  Blood Pressure :   */*   mmHG  Vent. Rate :  86 BPM     Atrial Rate :  87 BPM     P-R Int : 104 ms          QRS Dur :  22 ms      QT Int : 268 ms       P-R-T Axes : 112   0 184 degrees     QTc Int : 320 ms    Undetermined rhythm  Indeterminate axis  Pulmonary disease pattern  Marked ST abnormality, possible lateral subendocardial injury  Abnormal ECG  When compared with ECG of 18-AUG-2012 15:54,  Significant changes have occurred    Referred By: ROHIT           Confirmed By:     ECG 12 Lead Rhythm Change [[id number]] Collected: 06/06/23 0018     Updated: 06/06/23 0732     QT Interval 468 ms      QTC Interval 439 ms     Narrative:      Test Reason : Rhythm Change  Blood Pressure :   */*   mmHG  Vent. Rate :  53 BPM     Atrial Rate :  53 BPM     P-R Int : 420  ms          QRS Dur :  98 ms      QT Int : 468 ms       P-R-T Axes :  29  -7 153 degrees     QTc Int : 439 ms    Sinus bradycardia with 1st degree AV block  ST & T wave abnormality, consider lateral ischemia  Abnormal ECG  When compared with ECG of 05-JUN-2023 18:10, (Unconfirmed)  Previous ECG has undetermined rhythm, needs review  QRS duration has increased  ST no longer depressed in Lateral leads  T wave inversion now evident in Lateral leads  QT has lengthened    Referred By:            Confirmed By:     ECG 12 Lead [[id number]] Collected: 06/06/23 0412     Updated: 06/06/23 0736     QT Interval 460 ms      QTC Interval 407 ms     Narrative:      Test Reason : s/p temporary venous pacemaker  Blood Pressure :   */*   mmHG  Vent. Rate :  47 BPM     Atrial Rate :  47 BPM     P-R Int : 426 ms          QRS Dur :  92 ms      QT Int : 460 ms       P-R-T Axes :  39 -17  72 degrees     QTc Int : 407 ms    ** Poor data quality, interpretation may be adversely affected  Marked sinus bradycardia with 1st degree AV block  Possible Left atrial enlargement  Nonspecific ST and T wave abnormality  Abnormal ECG  When compared with ECG of 06-JUN-2023 00:18, (Unconfirmed)  No significant change was found    Referred By: [facility]           Confirmed By:     EP/CRM Study [[id number]] Resulted: 06/05/23 1842     Updated: 06/06/23 0821    Narrative:        Complete heart block with very slow ventricular escape rhythm, status   post placement of a temporary venous pacemaker wire.  Pacemaker wire at   the 61 cm marker.  Heart rate 80 bpm.    80% proximal RCA stenosis status post intervention with a Xience   Skypoint 2.25 x 12 mm drug-eluting stent and proximal optimization up to   2.5 mm.    Elevated LVEDP of 18 mmHg.      Adult Transthoracic Echo Complete w/ Color, Spectral and Contrast if Necessary Per Protocol [[id number]] Resulted: 06/06/23 1203     Updated: 06/06/23 1207     EF(MOD-bp) 61.7 %      LVIDd 4.2 cm      LVIDs 3.2 cm      IVSd  1.10 cm      LVPWd 1.00 cm      FS 23.8 %      IVS/LVPW 1.10 cm      ESV(cubed) 32.8 ml      EDV(cubed) 74.1 ml      LVOT area 3.1 cm2      LV mass(C)d 147.0 grams      LVOT diam 2.00 cm      EDV(MOD-sp2) 64.5 ml      EDV(MOD-sp4) 57.2 ml      ESV(MOD-sp2) 25.2 ml      ESV(MOD-sp4) 21.6 ml      SV(MOD-sp2) 39.3 ml      SV(MOD-sp4) 35.6 ml      EF(MOD-sp2) 60.9 %      EF(MOD-sp4) 62.2 %      MV E max kaushik 148.0 cm/sec      MV A max kaushik 104.0 cm/sec      MV dec time 0.23 msec      MV E/A 1.42     LA ESV Index (BP) 38.3 ml/m2      Med Peak E' Kaushik 6.5 cm/sec      Lat Peak E' Kaushik 6.7 cm/sec      Avg E/e' ratio 22.42     SV(LVOT) 68.5 ml      RV Base 3.5 cm      RV Mid 2.9 cm      RV Length 7.3 cm      TAPSE (>1.6) 2.24 cm      RV S' 10.3 cm/sec      LA dimension (2D)  4.6 cm      LV V1 max 101.0 cm/sec      LV V1 max PG 4.1 mmHg      LV V1 mean PG 2.00 mmHg      LV V1 VTI 21.8 cm      Ao pk kaushik 191.0 cm/sec      Ao max PG 14.6 mmHg      Ao mean PG 8.0 mmHg      Ao V2 VTI 40.9 cm      JUAN MANUEL(I,D) 1.67 cm2      MV max PG 10.7 mmHg      MV mean PG 5.6 mmHg      MV V2 VTI 52.6 cm      MV P1/2t 121.8 msec      MVA(P1/2t) 1.81 cm2      MVA(VTI) 1.30 cm2      MV dec slope 375.0 cm/sec2      TR max kaushik 303.8 cm/sec      TR max PG 37.4 mmHg      PA acc time 0.09 sec      PA pr(Accel) 37.6 mmHg      PI end-d kaushik 136.0 cm/sec      Ao root diam 2.7 cm      RVSP(TR) 40 mmHg      RAP systole 3 mmHg      BH CV VAS BP RIGHT ARM 91/55 mmHg     Narrative:        Left ventricular ejection fraction appears to be 61 - 65%.    Left ventricular wall thickness is consistent with mild concentric   hypertrophy.    The left atrial cavity is mildly dilated.    Left atrial volume is mildly increased.    There is calcification of the aortic valve.    Mild mitral valve stenosis is present. The mitral valve mean gradient   is 6 mmHg. The mitral valve area (PHT) is 1.8 cm2.    Moderate tricuspid valve regurgitation is present.    Estimated right ventricular  systolic pressure from tricuspid   regurgitation is mildly elevated (35-45 mmHg). Calculated right   ventricular systolic pressure from tricuspid regurgitation is 40 mmHg.      Impression:                ECG 12 Lead   Preliminary Result   Test Reason : s/p temporary venous pacemaker   Blood Pressure :   */*   mmHG   Vent. Rate :  47 BPM     Atrial Rate :  47 BPM      P-R Int : 426 ms          QRS Dur :  92 ms       QT Int : 460 ms       P-R-T Axes :  39 -17  72 degrees      QTc Int : 407 ms      ** Poor data quality, interpretation may be adversely affected   Marked sinus bradycardia with 1st degree AV block   Possible Left atrial enlargement   Nonspecific ST and T wave abnormality   Abnormal ECG   When compared with ECG of 06-JUN-2023 00:18, (Unconfirmed)   No significant change was found      Referred By: REJI           Confirmed By:       ECG 12 Lead Rhythm Change   Preliminary Result   Test Reason : Rhythm Change   Blood Pressure :   */*   mmHG   Vent. Rate :  53 BPM     Atrial Rate :  53 BPM      P-R Int : 420 ms          QRS Dur :  98 ms       QT Int : 468 ms       P-R-T Axes :  29  -7 153 degrees      QTc Int : 439 ms      Sinus bradycardia with 1st degree AV block   ST & T wave abnormality, consider lateral ischemia   Abnormal ECG   When compared with ECG of 05-JUN-2023 18:10, (Unconfirmed)   Previous ECG has undetermined rhythm, needs review   QRS duration has increased   ST no longer depressed in Lateral leads   T wave inversion now evident in Lateral leads   QT has lengthened      Referred By:            Confirmed By:       ECG 12 Lead ED Triage Standing Order; Dysrhythmia   Preliminary Result   Test Reason : ED Triage Standing Order~   Blood Pressure :   */*   mmHG   Vent. Rate :  86 BPM     Atrial Rate :  87 BPM      P-R Int : 104 ms          QRS Dur :  22 ms       QT Int : 268 ms       P-R-T Axes : 112   0 184 degrees      QTc Int : 320 ms      Undetermined rhythm   Indeterminate axis   Pulmonary  disease pattern   Marked ST abnormality, possible lateral subendocardial injury   Abnormal ECG   When compared with ECG of 18-AUG-2012 15:54,   Significant changes have occurred      Referred By: ROHIT           Confirmed By:                                           Medical Decision Making  Amount and/or Complexity of Data Reviewed  Independent Historian: EMS  External Data Reviewed: notes.  Labs: ordered. Decision-making details documented in ED Course.  Radiology: ordered and independent interpretation performed. Decision-making details documented in ED Course.  ECG/medicine tests: ordered and independent interpretation performed. Decision-making details documented in ED Course.    Risk  Prescription drug management.        Final diagnoses:   Generalized weakness   Near syncope   Third degree heart block       ED Disposition  ADMITTED     Harshal Isbell,   06/06/23 1255       Harshal Isbell,   06/06/23 1300

## 2023-06-07 ENCOUNTER — APPOINTMENT (OUTPATIENT)
Dept: GENERAL RADIOLOGY | Facility: HOSPITAL | Age: 88
End: 2023-06-07
Payer: MEDICARE

## 2023-06-07 LAB
ALBUMIN SERPL-MCNC: 3.3 G/DL (ref 3.5–5.2)
ALBUMIN/GLOB SERPL: 1.2 G/DL
ALP SERPL-CCNC: 55 U/L (ref 39–117)
ALT SERPL W P-5'-P-CCNC: 45 U/L (ref 1–33)
ANION GAP SERPL CALCULATED.3IONS-SCNC: 7 MMOL/L (ref 5–15)
ANION GAP SERPL CALCULATED.3IONS-SCNC: 9 MMOL/L (ref 5–15)
AST SERPL-CCNC: 34 U/L (ref 1–32)
BASOPHILS # BLD AUTO: 0.02 10*3/MM3 (ref 0–0.2)
BASOPHILS NFR BLD AUTO: 0.2 % (ref 0–1.5)
BILIRUB SERPL-MCNC: 0.4 MG/DL (ref 0–1.2)
BUN SERPL-MCNC: 18 MG/DL (ref 8–23)
BUN SERPL-MCNC: 19 MG/DL (ref 8–23)
BUN/CREAT SERPL: 19.8 (ref 7–25)
BUN/CREAT SERPL: 24 (ref 7–25)
CALCIUM SPEC-SCNC: 8.1 MG/DL (ref 8.6–10.5)
CALCIUM SPEC-SCNC: 8.2 MG/DL (ref 8.6–10.5)
CHLORIDE SERPL-SCNC: 96 MMOL/L (ref 98–107)
CHLORIDE SERPL-SCNC: 98 MMOL/L (ref 98–107)
CO2 SERPL-SCNC: 23 MMOL/L (ref 22–29)
CO2 SERPL-SCNC: 27 MMOL/L (ref 22–29)
CREAT SERPL-MCNC: 0.75 MG/DL (ref 0.57–1)
CREAT SERPL-MCNC: 0.96 MG/DL (ref 0.57–1)
DEPRECATED RDW RBC AUTO: 42.4 FL (ref 37–54)
EGFRCR SERPLBLD CKD-EPI 2021: 57.4 ML/MIN/1.73
EGFRCR SERPLBLD CKD-EPI 2021: 77.2 ML/MIN/1.73
EOSINOPHIL # BLD AUTO: 0.2 10*3/MM3 (ref 0–0.4)
EOSINOPHIL NFR BLD AUTO: 2.4 % (ref 0.3–6.2)
ERYTHROCYTE [DISTWIDTH] IN BLOOD BY AUTOMATED COUNT: 12.7 % (ref 12.3–15.4)
GLOBULIN UR ELPH-MCNC: 2.8 GM/DL
GLUCOSE BLDC GLUCOMTR-MCNC: 110 MG/DL (ref 70–130)
GLUCOSE BLDC GLUCOMTR-MCNC: 121 MG/DL (ref 70–130)
GLUCOSE BLDC GLUCOMTR-MCNC: 122 MG/DL (ref 70–130)
GLUCOSE BLDC GLUCOMTR-MCNC: 127 MG/DL (ref 70–130)
GLUCOSE SERPL-MCNC: 115 MG/DL (ref 65–99)
GLUCOSE SERPL-MCNC: 153 MG/DL (ref 65–99)
HBA1C MFR BLD: 6 % (ref 4.8–5.6)
HCT VFR BLD AUTO: 31 % (ref 34–46.6)
HGB BLD-MCNC: 10 G/DL (ref 12–15.9)
IMM GRANULOCYTES # BLD AUTO: 0.05 10*3/MM3 (ref 0–0.05)
IMM GRANULOCYTES NFR BLD AUTO: 0.6 % (ref 0–0.5)
LYMPHOCYTES # BLD AUTO: 1.07 10*3/MM3 (ref 0.7–3.1)
LYMPHOCYTES NFR BLD AUTO: 13 % (ref 19.6–45.3)
MAGNESIUM SERPL-MCNC: 1.8 MG/DL (ref 1.6–2.4)
MCH RBC QN AUTO: 29.4 PG (ref 26.6–33)
MCHC RBC AUTO-ENTMCNC: 32.3 G/DL (ref 31.5–35.7)
MCV RBC AUTO: 91.2 FL (ref 79–97)
MONOCYTES # BLD AUTO: 0.62 10*3/MM3 (ref 0.1–0.9)
MONOCYTES NFR BLD AUTO: 7.5 % (ref 5–12)
NEUTROPHILS NFR BLD AUTO: 6.29 10*3/MM3 (ref 1.7–7)
NEUTROPHILS NFR BLD AUTO: 76.3 % (ref 42.7–76)
NRBC BLD AUTO-RTO: 0 /100 WBC (ref 0–0.2)
PHOSPHATE SERPL-MCNC: 2.7 MG/DL (ref 2.5–4.5)
PLATELET # BLD AUTO: 126 10*3/MM3 (ref 140–450)
PMV BLD AUTO: 10.3 FL (ref 6–12)
POTASSIUM SERPL-SCNC: 4.1 MMOL/L (ref 3.5–5.2)
POTASSIUM SERPL-SCNC: 4.3 MMOL/L (ref 3.5–5.2)
PROT SERPL-MCNC: 6.1 G/DL (ref 6–8.5)
QT INTERVAL: 460 MS
QT INTERVAL: 468 MS
QTC INTERVAL: 407 MS
QTC INTERVAL: 439 MS
RBC # BLD AUTO: 3.4 10*6/MM3 (ref 3.77–5.28)
SODIUM SERPL-SCNC: 128 MMOL/L (ref 136–145)
SODIUM SERPL-SCNC: 132 MMOL/L (ref 136–145)
WBC NRBC COR # BLD: 8.25 10*3/MM3 (ref 3.4–10.8)

## 2023-06-07 PROCEDURE — 25010000002 FUROSEMIDE PER 20 MG: Performed by: INTERNAL MEDICINE

## 2023-06-07 PROCEDURE — 71046 X-RAY EXAM CHEST 2 VIEWS: CPT

## 2023-06-07 PROCEDURE — 83735 ASSAY OF MAGNESIUM: CPT

## 2023-06-07 PROCEDURE — 99233 SBSQ HOSP IP/OBS HIGH 50: CPT | Performed by: INTERNAL MEDICINE

## 2023-06-07 PROCEDURE — 25010000002 KETOROLAC TROMETHAMINE PER 15 MG: Performed by: INTERNAL MEDICINE

## 2023-06-07 PROCEDURE — 85025 COMPLETE CBC W/AUTO DIFF WBC: CPT | Performed by: INTERNAL MEDICINE

## 2023-06-07 PROCEDURE — 84100 ASSAY OF PHOSPHORUS: CPT

## 2023-06-07 PROCEDURE — 80053 COMPREHEN METABOLIC PANEL: CPT

## 2023-06-07 PROCEDURE — 82948 REAGENT STRIP/BLOOD GLUCOSE: CPT

## 2023-06-07 PROCEDURE — 71045 X-RAY EXAM CHEST 1 VIEW: CPT

## 2023-06-07 RX ORDER — POLYETHYLENE GLYCOL 3350 17 G/17G
17 POWDER, FOR SOLUTION ORAL DAILY PRN
Status: DISCONTINUED | OUTPATIENT
Start: 2023-06-07 | End: 2023-06-15 | Stop reason: HOSPADM

## 2023-06-07 RX ORDER — BISACODYL 10 MG
10 SUPPOSITORY, RECTAL RECTAL DAILY PRN
Status: DISCONTINUED | OUTPATIENT
Start: 2023-06-07 | End: 2023-06-15 | Stop reason: HOSPADM

## 2023-06-07 RX ORDER — AMOXICILLIN 250 MG
2 CAPSULE ORAL 2 TIMES DAILY
Status: DISCONTINUED | OUTPATIENT
Start: 2023-06-07 | End: 2023-06-15 | Stop reason: HOSPADM

## 2023-06-07 RX ORDER — BISACODYL 5 MG/1
5 TABLET, DELAYED RELEASE ORAL DAILY PRN
Status: DISCONTINUED | OUTPATIENT
Start: 2023-06-07 | End: 2023-06-15 | Stop reason: HOSPADM

## 2023-06-07 RX ORDER — FUROSEMIDE 10 MG/ML
40 INJECTION INTRAMUSCULAR; INTRAVENOUS ONCE
Status: COMPLETED | OUTPATIENT
Start: 2023-06-07 | End: 2023-06-07

## 2023-06-07 RX ADMIN — PANTOPRAZOLE SODIUM 40 MG: 40 TABLET, DELAYED RELEASE ORAL at 06:00

## 2023-06-07 RX ADMIN — Medication 3 ML: at 21:25

## 2023-06-07 RX ADMIN — LEVOTHYROXINE SODIUM 50 MCG: 0.05 TABLET ORAL at 06:00

## 2023-06-07 RX ADMIN — ACETAMINOPHEN 650 MG: 325 TABLET ORAL at 12:44

## 2023-06-07 RX ADMIN — SENNOSIDES AND DOCUSATE SODIUM 2 TABLET: 50; 8.6 TABLET ORAL at 21:25

## 2023-06-07 RX ADMIN — CLOPIDOGREL BISULFATE 75 MG: 75 TABLET ORAL at 09:12

## 2023-06-07 RX ADMIN — FUROSEMIDE 40 MG: 40 INJECTION, SOLUTION INTRAMUSCULAR; INTRAVENOUS at 12:44

## 2023-06-07 RX ADMIN — KETOROLAC TROMETHAMINE 15 MG: 15 INJECTION, SOLUTION INTRAMUSCULAR; INTRAVENOUS at 21:25

## 2023-06-07 RX ADMIN — VENLAFAXINE HYDROCHLORIDE 150 MG: 75 CAPSULE, EXTENDED RELEASE ORAL at 09:12

## 2023-06-07 RX ADMIN — ASPIRIN 81 MG: 81 TABLET, COATED ORAL at 09:12

## 2023-06-07 NOTE — PLAN OF CARE
"Goal Outcome Evaluation:  Plan of Care Reviewed With: patient         Outcome Evaluation: No acute events overnight. VSS and no arrhythmias on monitor. Patient remains on 2LNC. Voided 350mL clear yellow urine via external catheter, 0 BM. Patient continues to been confused on assessment, disoriented to place and situation as well as impulsive at times, attempting to get out of bed unassisted. Also experienced intermittent hallucinations of \"a spider on the ceiling\" and \"a man in the room\". Reorientation provided. Hematoma to right groin remains unchanged from beginning of shift. Pt states pain well-controlled, just mild soreness to right groin.         "

## 2023-06-07 NOTE — PROGRESS NOTES
Pt. Referred for Phase II Cardiac Rehab. Staff discussed benefits of exercise, program protocol, and educational material provided. Teach back verified.  Pt. Wants to think about doing Cardiac Rehab before sending information to [facility]. Staff gave a [facility] brochure, [facility] home exercise guidelines and benefits. Teach back verified. Pt. To call [facility] Cardiac Rehab if she decides to do Cardiac Rehab. Staff informed Pt she has one year to use these benefits. Teach back verified.

## 2023-06-07 NOTE — PROGRESS NOTES
"INTENSIVIST   PROGRESS NOTE     Hospital:  LOS: 2 days     Chief Complaint: Arrhythmia, heart block    Subjective   S     Interval History: No acute issues overnight.  Tolerated pacemaker placement.  Doing well this morning and denies chest pain, dyspnea, nausea, vomiting, diarrhea.  Review of systems positive for poor appetite, fatigue and malaise.  She is afebrile.  Hemodynamically stable.    The patient's relevant past medical, surgical and social history were reviewed and updated in Epic as appropriate.      ROS: Fourteen point review of system performed and negative except for that mentioned in HPI.     Objective   O     Intake/Ouptut 24 hrs (7:00AM - 6:59 AM)  Intake & Output (last 3 days)         06/04 0701  06/05 0700 06/05 0701  06/06 0700 06/06 0701  06/07 0700 06/07 0701  06/08 0700    P.O.  440  450    I.V. (mL/kg)  170 (1.9) 397 (4.5)     IV Piggyback  260      Total Intake(mL/kg)  870 (9.6) 397 (4.5) 450 (5.1)    Urine (mL/kg/hr)  800 900 (0.4) 300 (0.4)    Total Output  800 900 300    Net  +70 -503 +150                  Respiratory Support: NC    Physical Examination:  Vital Signs: Blood pressure 113/56, pulse 65, temperature 98.3 °F (36.8 °C), temperature source Oral, resp. rate 18, height 162.6 cm (64.02\"), weight 88.3 kg (194 lb 10.7 oz), SpO2 96 %, not currently breastfeeding.    General: The patient appears in no acute distress. Alert, cooperative and interactive.  Chest: Clear to auscultation bilaterally, No wheezing, rhonchi, or rales. Normal work of breathing. Equal chest rise.  Cardiac: Regular rhythm, normal rate, S1S2 auscultated. No murmurs, rubs or gallops.   Extremities: 1+ lower extremity edema. No clubbing or cyanosis.  Neuro: Motor power grossly intact bilaterally. Speech fluid and fluent. Thought process coherent.  Psych: Alert and oriented x3. Mood stable.    Lines, Drains & Airways       Active LDAs       Name Placement date Placement time Site Days    Peripheral IV 06/05/23 2822 " Anterior;Left Forearm 06/05/23  1748  Forearm  1    External Urinary Catheter 06/05/23  2017  --  1        Results from last 7 days   Lab Units 06/07/23  1415 06/06/23 0457 06/05/23  1757   WBC 10*3/mm3 8.25 9.77 7.47   HEMOGLOBIN g/dL 10.0* 11.3* 12.2   MCV fL 91.2 90.3 90.9   PLATELETS 10*3/mm3 126* 151 142     Results from last 7 days   Lab Units 06/07/23  1415 06/07/23  0449 06/06/23 0457 06/05/23  1757   SODIUM mmol/L 128* 132* 135* 132*   POTASSIUM mmol/L 4.1 4.3 3.8 4.7   CO2 mmol/L 23.0 27.0 22.0 22.0   CREATININE mg/dL 0.96 0.75 0.83 1.25*   GLUCOSE mg/dL 153* 115* 119* 175*   MAGNESIUM mg/dL  --  1.8  --  1.9   PHOSPHORUS mg/dL  --  2.7  --   --      Estimated Creatinine Clearance: 44.4 mL/min (by C-G formula based on SCr of 0.96 mg/dL).  Results from last 7 days   Lab Units 06/07/23 0449 06/05/23  1757   ALK PHOS U/L 55 60   BILIRUBIN mg/dL 0.4 0.6   ALT (SGPT) U/L 45* 70*   AST (SGOT) U/L 34* 78*     CXR (6/07/2023):  Radiology Impression:   1. Small bilateral effusions.   2. Status post internal pacemaker placement. No evidence of CHF.   3. Stable cardiomegaly.     ECHO (6/07/2023):    Left ventricular systolic function is normal. Left ventricular ejection fraction appears to be 61 - 65%.    Left ventricular wall thickness is consistent with borderline concentric hypertrophy.    Left ventricular diastolic function is consistent with (grade II w/high LAP) pseudonormalization.    Left atrial volume is mildly increased.    There is moderate calcification of the aortic valve.    Moderate mitral annular calcification is present.    Mild mitral valve stenosis is present.    Mild mitral valve regurgitation is present.    Mild pulmonic valve regurgitation is present.    Moderate tricuspid valve regurgitation is present.    Estimated right ventricular systolic pressure from tricuspid regurgitation is mildly elevated (35-45 mmHg).    Results: Reviewed.    I reviewed the patient's new laboratory and imaging  results.  I independently reviewed the patient's new images.    Medications: Reviewed.    Assessment & Plan   A / P     Active Hospital Problems    Diagnosis     CAD (coronary artery disease)     Primary hypertension     Mixed hyperlipidemia     GERD (gastroesophageal reflux disease)     Hypothyroid     Class 1 obesity in adult     Heart block AV third degree      Patient Alba is an88 y.o. female with PMHx HTN, T2DM, and dyslipidemia who presented to [facility] ED on 6/5/23 for evaluationacute onset chest pressure/heaviness. Per report, the patient called her sister who arrived at the patient's home to find her unresponsive and pale. EMS was called and patient was noted to be hypertensive with bradycardia and complete heart block. Upon arrival to [facility], EKG showed inferior ST depression and complete heart block and emergent venous tremporary pacemaker wire placement and coronary angiography with possible PCI was recommended. Dr. Miner with Cardiology took patient to cath lab and placed a HANK to the RCA and a temporary pacemaker. She was admitted to the ICU post-procedure for further monitoring and medical management.     Heart block AV third degree s/p temporary pacemaker 6/5/23  CAD s/p HANK to RCA 6/5/23  Was initially on Isuprel but discontinued as not effective  PCI with HANK to RCA (80% stenosis)  Echocardiogram (6/06)  SP pacemaker placement (6/06)     Primary hypertension  Mixed hyperlipidemia  GERD (gastroesophageal reflux disease)  ASA/Plavix  PPI  Statin intolerant; resume Zetia upon discharge  Resume home antihypertensives as needed; currently normotensive     Hypothyroid  Continue Synthroid     Class 1 obesity in adult  T2DM  Current BMI 34.31 kg/m2. Lifestyle modification recommendations  Correction scale insulin    Telemetry per cardiology     Advance Directives:   Code Status and Medical Interventions:   Ordered at: 06/05/23 1950     Level Of Support Discussed With:    Patient     Code Status (Patient has no  pulse and is not breathing):    CPR (Attempt to Resuscitate)     Medical Interventions (Patient has pulse or is breathing):    Full     High level of risk due to severe exacerbation of chronic illness and illness with threat to life or bodily function.    I conducted multidisciplinary rounds in the plan of care was discussed with the multidisciplinary team at that time. In attendance at multidisciplinary rounds was clinical pharmacist, dietitian, nursing staff and case management.    I discussed the patient's findings and my recommendations with patient, family, nursing staff, and consulting provider    -- Rome Hsu MD  Pulmonary/Critical Care

## 2023-06-07 NOTE — PROGRESS NOTES
"[facility] Electrophysiologty  In Patient progress note   LOS: 2 days   Patient Care Team:  Hever Ibrahim APRN as PCP - General (Nurse Practitioner)    Chief Complaint: Follow up for Heart Block    Subjective Denies Chest Pain, Denies Dyspnea, Eating OK, Not Ambulating yet.           Active Hospital Problems    Diagnosis  POA    Heart block AV third degree [I44.2]  Yes     Priority: High     Implantation of Medtronic Micra AV model leadless cardiac pacemaker ( LCP ), with serial number [id number].         CAD (coronary artery disease) [I25.10]  Yes     Priority: Medium     [facility] 6/5/23, no clear obstructive disease to explain the patient's complete heart block, but did have an 80% proximal RCA stenosis and a small sized vessel.  In the lieu of ongoing chest discomfort, decision was made to proceed with HANK x1       Primary hypertension [I10]  Yes     Priority: Low    Mixed hyperlipidemia [E78.2]  Yes     Priority: Low    GERD (gastroesophageal reflux disease) [K21.9]  Yes    Hypothyroid [E03.9]  Yes    Class 1 obesity in adult [E66.9]  Yes         Tele: Paced    Vitals:  /79 (BP Location: Left arm, Patient Position: Lying)   Pulse 76   Temp 98.3 °F (36.8 °C) (Oral)   Resp 16   Ht 162.6 cm (64.02\")   Wt 88.3 kg (194 lb 10.7 oz)   SpO2 96%   BMI 33.40 kg/m²    Body mass index is 33.4 kg/m².    Intake/Output Summary (Last 24 hours) at 6/7/2023 0802  Last data filed at 6/6/2023 2300  Gross per 24 hour   Intake 397 ml   Output 900 ml   Net -503 ml       Physical Exam:      General: alert, no acute distress, acyanotic, well developed, well nourished   Chest: Clear Auscultation   CV: Heart regular rate and rhythm   Extremities: negative,  moderate hematoma and ecchymosis R groin    Results Review:         Labs:  Results from last 7 days   Lab Units 06/06/23  0457 06/05/23  1757   WBC 10*3/mm3 9.77 7.47   HEMOGLOBIN g/dL 11.3* 12.2   HEMATOCRIT % 35.4 37.8   PLATELETS 10*3/mm3 151 142 "   INR   --  1.07     Results from last 7 days   Lab Units 06/07/23  0449 06/06/23  0457 06/05/23  1757   SODIUM mmol/L 132*   < > 132*   POTASSIUM mmol/L 4.3   < > 4.7   CHLORIDE mmol/L 98   < > 97*   CO2 mmol/L 27.0   < > 22.0   BUN mg/dL 18   < > 23   CREATININE mg/dL 0.75   < > 1.25*   GLUCOSE mg/dL 115*   < > 175*   CALCIUM mg/dL 8.1*   < > 8.5*   ALT (SGPT) U/L 45*  --  70*   AST (SGOT) U/L 34*  --  78*    < > = values in this interval not displayed.     Estimated Creatinine Clearance: 56.8 mL/min (by C-G formula based on SCr of 0.75 mg/dL).  Results from last 7 days   Lab Units 06/05/23  1757   HSTROP T ng/L 30*     Results from last 7 days   Lab Units 06/05/23  1757   PROBNP pg/mL 6,329.0*         Scheduled Meds:  aspirin, 81 mg, Oral, Daily  clopidogrel, 75 mg, Oral, Daily  insulin lispro, 2-9 Units, Subcutaneous, 4x Daily AC & at Bedtime  ketorolac, 15 mg, Intravenous, Q8H  levothyroxine, 50 mcg, Oral, Q AM  pantoprazole, 40 mg, Oral, QAM  pharmacy consult - [place], , Does not apply, Daily  sodium chloride, 3 mL, Intravenous, Q12H  venlafaxine XR, 150 mg, Oral, Daily With Breakfast      Continuous Infusions:DOPamine, 2-20 mcg/kg/min, Last Rate: 5 mcg/kg/min (06/05/23 1091)          Assessment: Plan:    Complete heart block  S/P leadless PPM  Post procedure R groin hematoma, continue to watch, appears stable  Groin stitch removed  Follow up with Dr. Rodriguez in 3 months          Electronically signed by ANJUM De La Garza, 06/07/23, 10:20 AM EDT.

## 2023-06-07 NOTE — PROGRESS NOTES
"[facility] Progress Note     LOS: 2 days   Patient Care Team:  Hever Ibrahim APRN as PCP - General (Nurse Practitioner)  PCP:  Hever Ibrahim APRN    Chief Complaint: Follow-up complete heart block    Subjective: Patient is status post leadless pacemaker placement.  She still does endorse some chest pressure but states it is improved compared with admission.  Also with some pain from right femoral hematoma      Review of Systems:   All systems have been reviewed and are negative with the exception of those mentioned above.      Objective:    Vital Sign Min/Max for last 24 hours  Temp  Min: 97.7 °F (36.5 °C)  Max: 98.3 °F (36.8 °C)   BP  Min: 96/84  Max: 157/71   Pulse  Min: 68  Max: 79   Resp  Min: 10  Max: 19   SpO2  Min: 87 %  Max: 98 %   No data recorded   Weight  Min: 88.3 kg (194 lb 10.7 oz)  Max: 88.3 kg (194 lb 10.7 oz)     Flowsheet Rows      Flowsheet Row First Filed Value   Admission Height 162.6 cm (64\") Documented at 06/05/2023 1759   Admission Weight 90.7 kg (200 lb) Documented at 06/05/2023 1759            Telemetry: Ventricular paced rhythm      Intake/Output Summary (Last 24 hours) at 6/7/2023 1225  Last data filed at 6/7/2023 0800  Gross per 24 hour   Intake 750 ml   Output 900 ml   Net -150 ml     Intake & Output (last 3 days)         06/04 0701  06/05 0700 06/05 0701  06/06 0700 06/06 0701  06/07 0700 06/07 0701  06/08 0700    P.O.  440  450    I.V. (mL/kg)  170 (1.9) 397 (4.5)     IV Piggyback  260      Total Intake(mL/kg)  870 (9.6) 397 (4.5) 450 (5.1)    Urine (mL/kg/hr)  800 900 (0.4)     Total Output  800 900     Net  +70 -503 +450                     Physical Exam:  Constitutional:       Appearance: Not in distress.   Pulmonary:      Effort: Pulmonary effort is normal.      Comments: Diminished at bases bilaterally  Cardiovascular:      Normal rate. Regular rhythm.      Murmurs: There is no murmur.      Comments: Soft " hematoma right groin.  Some oozing from venous site  Edema:     Peripheral edema absent.        LABS/DIAGNOSTIC DATA:  Results from last 7 days   Lab Units 06/06/23  0457 06/05/23 1757 06/05/23 1756   WBC 10*3/mm3 9.77 7.47  --    HEMOGLOBIN g/dL 11.3* 12.2  --    HEMOGLOBIN, POC g/dL  --   --  11.9*   HEMATOCRIT % 35.4 37.8  --    HEMATOCRIT POC %  --   --  35*   PLATELETS 10*3/mm3 151 142  --      Lab Results   Lab Value Date/Time    TROPONINT 30 (H) 06/05/2023 1757     Results from last 7 days   Lab Units 06/05/23  1757   INR  1.07   APTT seconds 24.2*     Results from last 7 days   Lab Units 06/07/23 0449 06/06/23 0457 06/05/23 1757   SODIUM mmol/L 132* 135* 132*   POTASSIUM mmol/L 4.3 3.8 4.7   CHLORIDE mmol/L 98 100 97*   CO2 mmol/L 27.0 22.0 22.0   BUN mg/dL 18 18 23   CREATININE mg/dL 0.75 0.83 1.25*   CALCIUM mg/dL 8.1* 8.6 8.5*   BILIRUBIN mg/dL 0.4  --  0.6   ALK PHOS U/L 55  --  60   ALT (SGPT) U/L 45*  --  70*   AST (SGOT) U/L 34*  --  78*   GLUCOSE mg/dL 115* 119* 175*         Results from last 7 days   Lab Units 06/06/23 0457   CHOLESTEROL mg/dL 150   TRIGLYCERIDES mg/dL 94   HDL CHOL mg/dL 48   LDL CHOL mg/dL 84     Results from last 7 days   Lab Units 06/05/23 1757   TSH uIU/mL 11.530*   FREE T4 ng/dL 1.00           Echo:    Left ventricular systolic function is normal. Left ventricular ejection fraction appears to be 61 - 65%.    Left ventricular wall thickness is consistent with borderline concentric hypertrophy.    Left ventricular diastolic function is consistent with (grade II w/high LAP) pseudonormalization.    Left atrial volume is mildly increased.    There is moderate calcification of the aortic valve.    Moderate mitral annular calcification is present.    Mild mitral valve stenosis is present.    Mild mitral valve regurgitation is present.    Mild pulmonic valve regurgitation is present.    Moderate tricuspid valve regurgitation is present.    Estimated right ventricular systolic  pressure from tricuspid regurgitation is mildly elevated (35-45 mmHg).    Medication Review:   aspirin, 81 mg, Oral, Daily  clopidogrel, 75 mg, Oral, Daily  furosemide, 40 mg, Intravenous, Once  insulin lispro, 2-9 Units, Subcutaneous, 4x Daily AC & at Bedtime  ketorolac, 15 mg, Intravenous, Q8H  levothyroxine, 50 mcg, Oral, Q AM  pantoprazole, 40 mg, Oral, QAM  pharmacy consult - [facility], , Does not apply, Daily  sodium chloride, 3 mL, Intravenous, Q12H  venlafaxine XR, 150 mg, Oral, Daily With Breakfast               Heart block AV third degree    CAD (coronary artery disease)    Primary hypertension    Mixed hyperlipidemia    GERD (gastroesophageal reflux disease)    Hypothyroid    Class 1 obesity in adult        ASSESSMENT:  Complete heart block with very slow ventricular response with a ventricular rate of less than 20 bpm  Status post Medtronic leadless pacemaker placement  Second-degree AV block, 2-1 AV conduction  CAD   [facility] 6/5, no clear obstructive disease to explain the patient's complete heart block, but did have an 80% proximal RCA stenosis and a small sized vessel.  In the lieu of ongoing chest discomfort, decision was made to proceed with HANK x1   CAROL  Mixed hyperlipidemia  Statin intolerant  History of hypertension prior to admission  Possible aortic aneurysm     PLAN:  Status post Medtronic leadless pacemaker placement  Continue aspirin, clopidogrel.  P2Y12 levels noted.  Statin intolerant.  Resume Zetia upon discharge  Lasix 40 mg IV x1 today.  Monitor right groin hematoma.  Repeat CBC.  figure-of-eight stitch removed by EP team today.  Okay to transfer to telemetry  Physical therapy consult.  CTA of the chest when rhythm more stable for possible aortic aneurysm, can be done as an outpatient          Humberto Campbell MD [facility]  06/07/23

## 2023-06-07 NOTE — PROGRESS NOTES
Order received for Phase II Cardiac Rehab. Pt. Was not in room during time of consultation. Staff will follow up at a later time.

## 2023-06-08 LAB
ANION GAP SERPL CALCULATED.3IONS-SCNC: 10 MMOL/L (ref 5–15)
BUN SERPL-MCNC: 23 MG/DL (ref 8–23)
BUN/CREAT SERPL: 28.8 (ref 7–25)
CALCIUM SPEC-SCNC: 8.3 MG/DL (ref 8.6–10.5)
CHLORIDE SERPL-SCNC: 95 MMOL/L (ref 98–107)
CO2 SERPL-SCNC: 26 MMOL/L (ref 22–29)
CREAT SERPL-MCNC: 0.8 MG/DL (ref 0.57–1)
DEPRECATED RDW RBC AUTO: 42 FL (ref 37–54)
EGFRCR SERPLBLD CKD-EPI 2021: 71.4 ML/MIN/1.73
ERYTHROCYTE [DISTWIDTH] IN BLOOD BY AUTOMATED COUNT: 12.7 % (ref 12.3–15.4)
GLUCOSE BLDC GLUCOMTR-MCNC: 107 MG/DL (ref 70–130)
GLUCOSE BLDC GLUCOMTR-MCNC: 122 MG/DL (ref 70–130)
GLUCOSE BLDC GLUCOMTR-MCNC: 124 MG/DL (ref 70–130)
GLUCOSE BLDC GLUCOMTR-MCNC: 145 MG/DL (ref 70–130)
GLUCOSE SERPL-MCNC: 100 MG/DL (ref 65–99)
HCT VFR BLD AUTO: 30.9 % (ref 34–46.6)
HGB BLD-MCNC: 9.9 G/DL (ref 12–15.9)
MCH RBC QN AUTO: 29.1 PG (ref 26.6–33)
MCHC RBC AUTO-ENTMCNC: 32 G/DL (ref 31.5–35.7)
MCV RBC AUTO: 90.9 FL (ref 79–97)
NT-PROBNP SERPL-MCNC: 2299 PG/ML (ref 0–1800)
PA ADP PRP-ACNC: 281 PRU
PLATELET # BLD AUTO: 137 10*3/MM3 (ref 140–450)
PMV BLD AUTO: 10.5 FL (ref 6–12)
POTASSIUM SERPL-SCNC: 4.1 MMOL/L (ref 3.5–5.2)
RBC # BLD AUTO: 3.4 10*6/MM3 (ref 3.77–5.28)
SODIUM SERPL-SCNC: 131 MMOL/L (ref 136–145)
WBC NRBC COR # BLD: 6.35 10*3/MM3 (ref 3.4–10.8)

## 2023-06-08 PROCEDURE — 97162 PT EVAL MOD COMPLEX 30 MIN: CPT

## 2023-06-08 PROCEDURE — 82948 REAGENT STRIP/BLOOD GLUCOSE: CPT

## 2023-06-08 PROCEDURE — 83880 ASSAY OF NATRIURETIC PEPTIDE: CPT | Performed by: HOSPITALIST

## 2023-06-08 PROCEDURE — 85576 BLOOD PLATELET AGGREGATION: CPT | Performed by: INTERNAL MEDICINE

## 2023-06-08 PROCEDURE — 80048 BASIC METABOLIC PNL TOTAL CA: CPT | Performed by: INTERNAL MEDICINE

## 2023-06-08 PROCEDURE — 25010000002 FUROSEMIDE PER 20 MG: Performed by: HOSPITALIST

## 2023-06-08 PROCEDURE — 97530 THERAPEUTIC ACTIVITIES: CPT

## 2023-06-08 PROCEDURE — 85027 COMPLETE CBC AUTOMATED: CPT | Performed by: INTERNAL MEDICINE

## 2023-06-08 RX ORDER — METOPROLOL SUCCINATE 25 MG/1
25 TABLET, EXTENDED RELEASE ORAL
Status: DISCONTINUED | OUTPATIENT
Start: 2023-06-08 | End: 2023-06-12

## 2023-06-08 RX ORDER — FUROSEMIDE 10 MG/ML
40 INJECTION INTRAMUSCULAR; INTRAVENOUS ONCE
Status: COMPLETED | OUTPATIENT
Start: 2023-06-08 | End: 2023-06-08

## 2023-06-08 RX ADMIN — CLOPIDOGREL BISULFATE 75 MG: 75 TABLET ORAL at 08:15

## 2023-06-08 RX ADMIN — ASPIRIN 81 MG: 81 TABLET, COATED ORAL at 08:15

## 2023-06-08 RX ADMIN — SENNOSIDES AND DOCUSATE SODIUM 2 TABLET: 50; 8.6 TABLET ORAL at 20:52

## 2023-06-08 RX ADMIN — VENLAFAXINE HYDROCHLORIDE 150 MG: 75 CAPSULE, EXTENDED RELEASE ORAL at 08:15

## 2023-06-08 RX ADMIN — LEVOTHYROXINE SODIUM 50 MCG: 0.05 TABLET ORAL at 05:21

## 2023-06-08 RX ADMIN — Medication 3 ML: at 20:52

## 2023-06-08 RX ADMIN — Medication 3 ML: at 08:15

## 2023-06-08 RX ADMIN — FUROSEMIDE 40 MG: 40 INJECTION, SOLUTION INTRAMUSCULAR; INTRAVENOUS at 14:53

## 2023-06-08 RX ADMIN — METOPROLOL SUCCINATE 25 MG: 25 TABLET, EXTENDED RELEASE ORAL at 17:51

## 2023-06-08 RX ADMIN — ACETAMINOPHEN 650 MG: 325 TABLET ORAL at 14:53

## 2023-06-08 RX ADMIN — PANTOPRAZOLE SODIUM 40 MG: 40 TABLET, DELAYED RELEASE ORAL at 08:15

## 2023-06-08 RX ADMIN — ACETAMINOPHEN 650 MG: 325 TABLET ORAL at 08:15

## 2023-06-08 NOTE — PROGRESS NOTES
[facility] Medicine Services  PROGRESS NOTE    Patient Name: Janna Willis  : 1935  MRN: [id number]    Date of Admission: 2023  Primary Care Physician: Hever Ibrahim APRN    Subjective   Subjective     CC: Dyspnea    HPI: Up in chair. Sore/tired. Mild COHN. No n/v. Tolerating PO. No other issues.    ROS:  As above     Objective   Objective     Vital Signs:   Temp:  [97.6 °F (36.4 °C)-98.2 °F (36.8 °C)] 98.2 °F (36.8 °C)  Heart Rate:  [63-76] 63  Resp:  [16-18] 16  BP: (113-152)/(50-79) 118/65  Flow (L/min):  [2] 2     Physical Exam:  NAD, alert and oriented  OP clear, dry MM  Neck supple  No LAD  RRR  CTAB  +BS, soft  ROBERTO  Normal affect  No rashes    Results Reviewed:  LAB RESULTS:      Lab 23  1415 23  04523  1757 23  175   WBC 6.35 8.25 9.77 7.47  --    HEMOGLOBIN 9.9* 10.0* 11.3* 12.2  --    HEMOGLOBIN, POC  --   --   --   --  11.9*   HEMATOCRIT 30.9* 31.0* 35.4 37.8  --    HEMATOCRIT POC  --   --   --   --  35*   PLATELETS 137* 126* 151 142  --    NEUTROS ABS  --  6.29 7.15* 5.48  --    IMMATURE GRANS (ABS)  --  0.05 0.03 0.04  --    LYMPHS ABS  --  1.07 1.62 1.31  --    MONOS ABS  --  0.62 0.88 0.51  --    EOS ABS  --  0.20 0.05 0.10  --    MCV 90.9 91.2 90.3 90.9  --    PROTIME  --   --   --  14.0  --    APTT  --   --   --  24.2*  --    HEPARIN ANTI-XA  --   --   --  0.10*  --    D DIMER QUANT  --   --   --  0.44  --          Lab 239 23  1415 23  0449 23  1757   SODIUM 131* 128* 132* 135* 132*   POTASSIUM 4.1 4.1 4.3 3.8 4.7   CHLORIDE 95* 96* 98 100 97*   CO2 26.0 23.0 27.0 22.0 22.0   ANION GAP 10.0 9.0 7.0 13.0 13.0   BUN 23 19 18 18 23   CREATININE 0.80 0.96 0.75 0.83 1.25*   EGFR 71.4 57.4* 77.2 68.3 41.8*   GLUCOSE 100* 153* 115* 119* 175*   CALCIUM 8.3* 8.2* 8.1* 8.6 8.5*   MAGNESIUM  --   --  1.8  --  1.9   PHOSPHORUS  --   --  2.7  --   --    HEMOGLOBIN A1C  --   --    --  6.00*  --    TSH  --   --   --   --  11.530*         Lab 06/07/23  0449 06/05/23  1757   TOTAL PROTEIN 6.1 6.4   ALBUMIN 3.3* 3.3*   GLOBULIN 2.8 3.1   ALT (SGPT) 45* 70*   AST (SGOT) 34* 78*   BILIRUBIN 0.4 0.6   ALK PHOS 55 60         Lab 06/05/23  1757   PROBNP 6,329.0*   HSTROP T 30*   PROTIME 14.0   INR 1.07         Lab 06/06/23  0457   CHOLESTEROL 150   LDL CHOL 84   HDL CHOL 48   TRIGLYCERIDES 94             Brief Urine Lab Results       None            Microbiology Results Abnormal       None            Adult Transthoracic Echo Complete w/ Color, Spectral and Contrast if Necessary Per Protocol    Result Date: 6/6/2023    Left ventricular systolic function is normal. Left ventricular ejection fraction appears to be 61 - 65%.   Left ventricular wall thickness is consistent with borderline concentric hypertrophy.   Left ventricular diastolic function is consistent with (grade II w/high LAP) pseudonormalization.   Left atrial volume is mildly increased.   There is moderate calcification of the aortic valve.   Moderate mitral annular calcification is present.   Mild mitral valve stenosis is present.   Mild mitral valve regurgitation is present.   Mild pulmonic valve regurgitation is present.   Moderate tricuspid valve regurgitation is present.   Estimated right ventricular systolic pressure from tricuspid regurgitation is mildly elevated (35-45 mmHg).     XR Chest 1 View    Result Date: 6/7/2023  XR CHEST 1 VW Date of Exam: 6/7/2023 3:40 AM EDT Indication: F/U Comparison: 6/5/2023. Findings: There is stable pulmonary vascular engorgement, without evidence of overt decompensated edema. There is no definite enlarging effusion or distinct pneumothorax. Unchanged cardiac enlargement.     Impression: Impression: There is stable pulmonary vascular engorgement, without evidence of overt decompensated edema. There is no definite enlarging effusion or distinct pneumothorax. Unchanged cardiac enlargement.  Electronically Signed: Kris Basurto  6/7/2023 8:39 AM EDT  Workstation ID: [id number]    EP/CRM Study    Result Date: 6/6/2023  Images from the original result were not included.   Cardiac Electrophysiology Procedure Note     [facility] PROCEDURE: PERMANENT LEADLESS CARDIAC PACEMAKER OPERATION PERFORMED: Implantation of Medtronic Micra AV model leadless cardiac pacemaker ( LCP ), with serial number [id number].  ATTENDING SURGEON: Dudley Rodriguez, DO MODERATE SEDATION FOR PROCEDURE: Moderate sedation was given during this procedure.    I supervised and directed RN to administer this sedation.  This staff member also monitored the patient's hemodynamic and respiratory status and response to these medications.  Please see the full detailed procedure report generated by the electrophysiology laboratory staff.  The patient tolerated moderate sedation well.  There were no complications regarding sedation.  The total dose of fentanyl was 25 mcg and the total dose of midazolam was 1 mg.  The total dose of Brevital was 30 mg.  First sedation was administered at 4:50 PM and continued through 5:22 PM. ESTIMATED BLOOD LOSS: less than 20 mL COMPLICATIONS: None. TIME OUT: Time out was completed with verification of the correct patient identity, procedure to be performed, procedure site and implanted equipment. INDICATION FOR PROCEDURE:  Briefly, Janna Willis is a 88 y.o. year old female with a history of symptomatic complete heart block. PROCEDURE AND FINDINGS:  The patient was brought to the electrophysiology laboratory in a post absorptive state.  Informed consent was given by the patient sister prior to the procedure and confirmed.  Intravenous prophylactic antibiotics were administered prior to the procedure.  After the groin was prepped and draped in the usual sterile fashion and after adequate anesthesia was given, the skin was infiltrated with 1% lidocaine and 0.5% bupivicaine 50/50  mixture.   Venous access was obtained with Seldinger technique.  A J tip wire was advanced to the superior vena cava under fluoroscopic guidance.  The 27 [place] LCP delivery sheath was then delivered to right atrium.  5000 units of heparin was given intravenously.  The delivery sheath was then advanced to the septal aspect of the right ventricle under fluoroscopic guidance using multiple projections including LEAL, AGAPITO and AP.   The LCP was then deployed.  Adequate pacing sensing and impedance values were obtained.  We documented that at least 2 of the tines were attached to septal trabeculae.  Tug testing demonstrated that the device remained stable and in correct position.  The device was once again tested and we documented stable pacing sensing and impedance parameters.  The retention cord was then detached and the delivery sheath was withdrawn from the heart.  The sheath was then removed from the body and the femoral vein was secured with excellent hemostasis with the aid of a figure of eight suture and the patient's temporary transvenous pacemaker was removed also.  Manual pressures was applied. MEASURED DEVICE DATA:                  sensing:                  None                  impedance:            670 ohms                  Threshold:             1 Volts at 0.24 ms                Mode:                      VDD Lower Rate:                50 pulses per minute Upper Tracking Rate:         120 pulses per minute CONCLUSION:  Successful implantation of a leadless cardiac pacemaker system.  RECOMMENDATION: Patient is to follow up with our clinic per protocol. FOR THE PATIENT Avoid activities that involve heavy lifting or rough contact that could result in blows to your implant site and to allow your groin puncture site time to heal. Avoid hot tubs or pools until groin puncture site is completely healed. No driving for 24 hours post operatively after device implant. Call your doctor if you have any swelling, redness  or discharge around your incision, notice anything unusual or unexpected, or you develop a fever that does not go away in two or three days. Carry your Medical Device ID Card with you at all times. Please call our office () with any questions about the device. Dudley Rodriguez, DO, [facility], [facility] Cardiac Electrophysiologist [facility]     XR Chest PA & Lateral    Result Date: 6/7/2023  XR CHEST PA AND LATERAL Date of Exam: 6/7/2023 10:13 AM EDT Indication: ppm post pacemaker. Comparison: 6/7/2023 Findings: There has been interval placement of an internal pacemaker, projecting over the anterior left heart. Heart size is again mildly prominent. No pneumothorax is seen. Pulmonary vascularity appears within normal limits. Small amounts of pleural fluid are noted posteriorly. Aortic vascular calcification is noted.     Impression: Impression: 1. Small bilateral effusions. 2. Status post internal pacemaker placement. No evidence of CHF. 3. Stable cardiomegaly. Electronically Signed: Willy Lozano  6/7/2023 10:33 AM EDT  Workstation ID: [id number]     Results for orders placed during the hospital encounter of 06/05/23    Adult Transthoracic Echo Complete w/ Color, Spectral and Contrast if Necessary Per Protocol    Interpretation Summary    Left ventricular systolic function is normal. Left ventricular ejection fraction appears to be 61 - 65%.    Left ventricular wall thickness is consistent with borderline concentric hypertrophy.    Left ventricular diastolic function is consistent with (grade II w/high LAP) pseudonormalization.    Left atrial volume is mildly increased.    There is moderate calcification of the aortic valve.    Moderate mitral annular calcification is present.    Mild mitral valve stenosis is present.    Mild mitral valve regurgitation is present.    Mild pulmonic valve regurgitation is present.    Moderate tricuspid valve regurgitation is present.    Estimated  right ventricular systolic pressure from tricuspid regurgitation is mildly elevated (35-45 mmHg).      Current medications:  Scheduled Meds:aspirin, 81 mg, Oral, Daily  clopidogrel, 75 mg, Oral, Daily  insulin lispro, 2-9 Units, Subcutaneous, 4x Daily AC & at Bedtime  levothyroxine, 50 mcg, Oral, Q AM  pantoprazole, 40 mg, Oral, QAM  pharmacy consult - [facility], , Does not apply, Daily  senna-docusate sodium, 2 tablet, Oral, BID  sodium chloride, 3 mL, Intravenous, Q12H  venlafaxine XR, 150 mg, Oral, Daily With Breakfast      Continuous Infusions:   PRN Meds:.  acetaminophen    senna-docusate sodium **AND** polyethylene glycol **AND** bisacodyl **AND** bisacodyl    dextrose    dextrose    hydrALAZINE    ipratropium-albuterol    Morphine    nitroglycerin    ondansetron    sodium chloride    sodium chloride    Assessment & Plan   Assessment & Plan     Active Hospital Problems    Diagnosis  POA    CAD (coronary artery disease) [I25.10]  Yes    Primary hypertension [I10]  Yes    Mixed hyperlipidemia [E78.2]  Yes    GERD (gastroesophageal reflux disease) [K21.9]  Yes    Hypothyroid [E03.9]  Yes    Class 1 obesity in adult [E66.9]  Yes    Heart block AV third degree [I44.2]  Yes      Resolved Hospital Problems   No resolved problems to display.        Brief Hospital Course to date:  Patient Alba is an88 y.o. female with PMHx HTN, T2DM, and dyslipidemia who presented to [facility] ED on 6/5/23 for evaluationacute onset chest pressure/heaviness. Per report, the patient called her sister who arrived at the patient's home to find her unresponsive and pale. EMS was called and patient was noted to be hypertensive with bradycardia and complete heart block. Upon arrival to [facility], EKG showed inferior ST depression and complete heart block and emergent venous tremporary pacemaker wire placement and coronary angiography with possible PCI was recommended. Dr. Miner with Cardiology took patient to cath lab and placed a HANK to the RCA and a  temporary pacemaker. She was admitted to the ICU post-procedure for further monitoring and medical management.     Third degree heart block s/p temporary pacer 6/5  CAD s/p HANK to RCA  -PCI with HANK to RCA  -s/p ECHO with diastolic dysfunction  -s/p PPM    HTN  HL  GERD  -PPI  -statin intolerant    Hypothyroid  -synthroid    DM  -SSI    Expected Discharge Location and Transportation: [facility]me  Expected Discharge   Expected Discharge Date: 6/9/2023; Expected Discharge Time:      DVT prophylaxis:  No DVT prophylaxis order currently exists.          CODE STATUS:   Code Status and Medical Interventions:   Ordered at: 06/05/23 1950     Level Of Support Discussed With:    Patient     Code Status (Patient has no pulse and is not breathing):    CPR (Attempt to Resuscitate)     Medical Interventions (Patient has pulse or is breathing):    Full       Chace Ware MD  06/08/23

## 2023-06-08 NOTE — CASE MANAGEMENT/SOCIAL WORK
Continued Stay Note  [facility]     Patient Name: Janna Willis  MRN: [id number]  Today's Date: 6/8/2023    Admit Date: 6/5/2023    Plan: Home with HH vs IPR   Discharge Plan       Row Name 06/08/23 1605       Plan    Plan Home with HH vs IPR    Patient/Family in Agreement with Plan yes    Plan Comments I have met with Ms. Willis and her family at the bedside today to discuss the discharge plan which includes PT/OT recommended inpatient rehab.  A family member who only identifies herself as a nurse practitioner states they will only consider acute rehab at [facility].  She also requests that PT/OT work with Ms. Willis in the afternoon because of her sleep habits.  I have offered to submit this referral to [facility] however they ask for time to think about it.  CM will cont to follow the plan of care and assist with discharge needs as recommendations become available.    Final Discharge Disposition Code 30 - still a patient                   Discharge Codes    No documentation.                 Expected Discharge Date and Time       Expected Discharge Date Expected Discharge Time    Jun 9, 2023               Maggy Quigley RN

## 2023-06-08 NOTE — THERAPY EVALUATION
Patient Name: Janna Willis  : 1935    MRN: [id number]                              Today's Date: 2023       Admit Date: 2023    Visit Dx:     ICD-10-CM ICD-9-CM   1. Heart block AV third degree  I44.2 426.0   2. Generalized weakness  R53.1 780.79   3. Near syncope  R55 780.2   4. Third degree heart block  I44.2 426.0     Patient Active Problem List   Diagnosis    Heart block AV third degree    CAD (coronary artery disease)    Primary hypertension    Mixed hyperlipidemia    GERD (gastroesophageal reflux disease)    Hypothyroid    Class 1 obesity in adult     Past Medical History:   Diagnosis Date    Diabetes      Past Surgical History:   Procedure Laterality Date    CARDIAC CATHETERIZATION N/A 2023    Procedure: Left Heart Cath;  Surgeon: Kuldeep Miner MD;  Location:  [facility]ASIVE [facility];  Service: Cardiology;  Laterality: N/A;    CARDIAC ELECTROPHYSIOLOGY PROCEDURE N/A 2023    Procedure: Temporary Pacemaker;  Surgeon: Kuldeep Miner MD;  Location:  [facility] CATH INVASIVE LOCATION;  Service: Cardiology;  Laterality: N/A;    CARDIAC ELECTROPHYSIOLOGY PROCEDURE N/A 2023    Procedure: Pacemaker DC new;  Surgeon: Dudley Rodriguez DO;  Location:  [facility] EP INVASIVE LOCATION;  Service: Cardiology;  Laterality: N/A;    OVARY SURGERY        General Information       Row Name 23 1100          Physical Therapy Time and Intention    Document Type evaluation  -BA     Mode of Treatment physical therapy  -BA       Row Name 23 1100          General Information    Patient Profile Reviewed yes  -BA     Prior Level of Function independent:;all household mobility;community mobility;gait;transfer;bed mobility;ADL's;home management;cooking;cleaning;using stairs;driving;shopping  Reported she did not use an AD for ambulation; has cane available if needed.  Hx falls, with last fall ~3 months ago.  No O2 at home.  -BA     Existing Precautions/Restrictions fall;oxygen therapy device and  L/min;other (see comments)  s/p leadless pacemaker placement 6/6; R groin hematoma; possible aortic aneurysm; monitor O2 sats  -     Barriers to Rehab medically complex  -       Row Name 06/08/23 1100          Living Environment    People in Home alone  Reported sister checks on her.  -       Row Name 06/08/23 1100          Home Main Entrance    Number of Stairs, Main Entrance one  -     Stair Railings, Main Entrance railing on left side (ascending)  -       Row Name 06/08/23 1100          Stairs Within Home, Primary    Number of Stairs, Within Home, Primary none  -       Row Name 06/08/23 1100          Cognition    Orientation Status (Cognition) oriented x 3;verbal cues/prompts needed for orientation;time;other (see comments)  Oriented to year, but not month.  Reported it was May.  -       Row Name 06/08/23 1100          Safety Issues, Functional Mobility    Safety Issues Affecting Function (Mobility) awareness of need for assistance;insight into deficits/self-awareness;safety precaution awareness;safety precautions follow-through/compliance;sequencing abilities  -     Impairments Affecting Function (Mobility) balance;endurance/activity tolerance;pain;postural/trunk control;shortness of breath;strength  -               User Key  (r) = Recorded By, (t) = Taken By, (c) = Cosigned By      Initials Name Provider Type     Millicent Dan, PT Physical Therapist                   Mobility       Row Name 06/08/23 1108          Bed Mobility    Bed Mobility supine-sit;scooting/bridging  -     Scooting/Bridging Day (Bed Mobility) contact guard;verbal cues;nonverbal cues (demo/gesture)  -     Supine-Sit Day (Bed Mobility) minimum assist (75% patient effort);verbal cues;nonverbal cues (demo/gesture)  -     Assistive Device (Bed Mobility) bed rails;draw sheet;head of bed elevated  -     Comment, (Bed Mobility) Increased time and effort.  VCs/TCs for sequencing and hand placement.   Reported no dizziness upon sitting EOB.  -       Row Name 06/08/23 1108          Sit-Stand Transfer    Sit-Stand [place] (Transfers) minimum assist (75% patient effort);verbal cues;nonverbal cues (demo/gesture)  -     Assistive Device (Sit-Stand Transfers) walker, front-wheeled  -BA     Comment, (Sit-Stand Transfer) STS x 3 reps from EOB with VCs/TCs for sequencing and safe hand placement.  Reported sustained dizziness upon standing; BP stable.  -       Row Name 06/08/23 1108          Gait/Stairs (Locomotion)    [place] Level (Gait) minimum assist (75% patient effort);verbal cues  -     Assistive Device (Gait) walker, front-wheeled  -BA     Distance in Feet (Gait) 20  -     Deviations/Abnormal Patterns (Gait) bilateral deviations;gilberto decreased;gait speed decreased;stride length decreased  -     Bilateral Gait Deviations forward flexed posture;heel strike decreased  -     Comment, (Gait/Stairs) Demonstrated step through gait pattern with decreased gilberto and short shuffled steps.  Noted COHN/SOA.  O2 sats with delayed decrease to 85% on RA; recovered to >90% in ~1 min with seated rest, focus on PLB, and reapplication of 2L O2.  VCs/TCs for walker management, improved stride length, PLB, and upright posture.  Gait distance limited by fatigue and COHN/SOA.  Reported sustained dizziness throughout ambulation; BP stable.  -               User Key  (r) = Recorded By, (t) = Taken By, (c) = Cosigned By      Initials Name Provider Type     Millicent Dan, PT Physical Therapist                   Obj/Interventions       Row Name 06/08/23 1112          Range of Motion Comprehensive    General Range of Motion bilateral lower extremity ROM WFL  -       Row Name 06/08/23 1112          Strength Comprehensive (MMT)    General Manual Muscle Testing (MMT) Assessment lower extremity strength deficits identified  -     Comment, General Manual Muscle Testing (MMT) Assessment B hip grossly 3+/5, B  knee and ankle grossly 4-/5.  -[facility] Name 06/08/23 1112          Motor Skills    Motor Skills functional endurance  -     Functional Endurance Decreased functional endurance.  Easily fatigues with activity with noted COHN/SOA and decreased O2 sats on RA.  Required several rest breaks throughout for recovery.  -[facility] Name 06/08/23 1112          Balance    Balance Assessment sitting static balance;sitting dynamic balance;sit to stand dynamic balance;standing static balance;standing dynamic balance  -     Static Sitting Balance standby assist  -     Dynamic Sitting Balance contact guard  -     Position, Sitting Balance unsupported;sitting edge of bed;sitting in chair  -     Sit to Stand Dynamic Balance minimal assist;verbal cues;non-verbal cues (demo/gesture)  -     Static Standing Balance contact guard;verbal cues  -     Dynamic Standing Balance minimal assist;verbal cues  -     Position/Device Used, Standing Balance supported;walker, front-wheeled  -     Comment, Balance Mild instability with standing and ambulation activity with FWW; no overt LOB noted.  Rec use of FWW for mobility at this time for improved balance and support.  -[facility] Name 06/08/23 1112          Sensory Assessment (Somatosensory)    Sensory Assessment (Somatosensory) LE sensation intact  -               User Key  (r) = Recorded By, (t) = Taken By, (c) = Cosigned By      Initials Name Provider Type     Millicent Dna, PT Physical Therapist                   Goals/Plan       Row Name 06/08/23 1118          Bed Mobility Goal 1 (PT)    Activity/Assistive Device (Bed Mobility Goal 1, PT) sit to supine/supine to sit  -     [place] Level/Cues Needed (Bed Mobility Goal 1, PT) modified independence  -     Time Frame (Bed Mobility Goal 1, PT) long term goal (LTG);10 days  -[facility] Name 06/08/23 1118          Transfer Goal 1 (PT)    Activity/Assistive Device (Transfer Goal 1, PT)  sit-to-stand/stand-to-sit;bed-to-chair/chair-to-bed;walker, rolling  -BA     [place] Level/Cues Needed (Transfer Goal 1, PT) standby assist  -BA     Time Frame (Transfer Goal 1, PT) long term goal (LTG);10 days  -BA       Row Name 06/08/23 1118          Gait Training Goal 1 (PT)    Activity/Assistive Device (Gait Training Goal 1, PT) gait (walking locomotion);assistive device use;walker, rolling  -BA     [place] Level (Gait Training Goal 1, PT) standby assist  -BA     Distance (Gait Training Goal 1, PT) 150  -BA     Time Frame (Gait Training Goal 1, PT) long term goal (LTG);10 days  -BA       Row Name 06/08/23 1118          Stairs Goal 1 (PT)    Activity/Assistive Device (Stairs Goal 1, PT) ascending stairs;descending stairs;using handrail, left  -BA     [place] Level/Cues Needed (Stairs Goal 1, PT) contact guard required  -BA     Number of Stairs (Stairs Goal 1, PT) 1  -BA     Time Frame (Stairs Goal 1, PT) long term goal (LTG);10 days  -BA       Row Name 06/08/23 1118          Patient Education Goal (PT)    Activity (Patient Education Goal, PT) HEP  -BA     [place]/Cues/Accuracy (Memory Goal 2, PT) demonstrates adequately  -BA     Time Frame (Patient Education Goal, PT) long term goal (LTG);10 days  -BA       Row Name 06/08/23 1118          Therapy Assessment/Plan (PT)    Planned Therapy Interventions (PT) balance training;bed mobility training;gait training;home exercise program;neuromuscular re-education;patient/family education;postural re-education;stair training;strengthening;transfer training  -BA               User Key  (r) = Recorded By, (t) = Taken By, (c) = Cosigned By      Initials Name Provider Type    Millicent Zarco, PT Physical Therapist                   Clinical Impression       Row Name 06/08/23 1114          Pain    Pretreatment Pain Rating 8/10  -BA     Posttreatment Pain Rating 7/10  -BA     Pain Location generalized  -BA     Pain Location - back  -BA      Pre/Posttreatment Pain Comment Tolerated activity; RN aware and managing.  -     Pain Intervention(s) Ambulation/increased activity;Repositioned  -       Row Name 06/08/23 1114          Plan of Care Review    Plan of Care Reviewed With patient  -BA     Outcome Evaluation PT initial Eval completed.  Pt presents with generalized weakness, balance deficits, decreased functional endurance, increased O2 requirements, and decreased functional mobility compared to baseline level of function.  Ambulated 20ft with Wei and FWW.  O2 sats with delayed decrease to 85% on RA; recovered to >90% in ~1 min with seated rest, focus on PLB, and reapplication of 2L O2.  Pt warrants skilled IP PT to improve indep with mobility and return to PLOF.  Rec IPR upon d/c.  -       Row Name 06/08/23 1114          Therapy Assessment/Plan (PT)    Rehab Potential (PT) good, to achieve stated therapy goals  -     Criteria for Skilled Interventions Met (PT) yes;meets criteria;skilled treatment is necessary  -     Therapy Frequency (PT) daily  -       Row Name 06/08/23 1114          Vital Signs    Pre Systolic BP Rehab 118  supine  -BA     Pre Treatment Diastolic BP 65  -BA     Intra Systolic BP Rehab 110  standing  -BA     Intra Treatment Diastolic BP 54  -BA     Post Systolic BP Rehab 120  sitting in chair following ambulation  -BA     Post Treatment Diastolic BP 56  -BA     Pretreatment Heart Rate (beats/min) 69  -BA     Posttreatment Heart Rate (beats/min) 65  -BA     Pre SpO2 (%) 96  -BA     O2 Delivery Pre Treatment nasal cannula  -BA     Intra SpO2 (%) 85  -BA     O2 Delivery Intra Treatment room air  -BA     Post SpO2 (%) 97  -BA     O2 Delivery Post Treatment nasal cannula  -BA     Pre Patient Position Supine  -BA     Intra Patient Position Standing  -BA     Post Patient Position Sitting  -       Row Name 06/08/23 1114          Positioning and Restraints    Pre-Treatment Position in bed  -BA     Post Treatment Position chair   -BA     In Chair notified nsg;reclined;sitting;call light within reach;encouraged to call for assist;exit alarm on;waffle cushion;legs elevated  -BA               User Key  (r) = Recorded By, (t) = Taken By, (c) = Cosigned By      Initials Name Provider Type    Millicent Zarco, BOGDAN Physical Therapist                   Outcome Measures       Row Name 06/08/23 1121          How much help from another person do you currently need...    Turning from your back to your side while in flat bed without using bedrails? 3  -BA     Moving from lying on back to sitting on the side of a flat bed without bedrails? 3  -BA     Moving to and from a bed to a chair (including a wheelchair)? 3  -BA     Standing up from a chair using your arms (e.g., wheelchair, bedside chair)? 3  -BA     Climbing 3-5 steps with a railing? 2  -BA     To walk in hospital room? 3  -BA     AM-PAC 6 Clicks Score (PT) 17  -BA     Highest level of mobility 5 --> Static standing  -BA       Row Name 06/08/23 1121          Functional Assessment    Outcome Measure Options AM-PAC 6 Clicks Basic Mobility (PT)  -BA               User Key  (r) = Recorded By, (t) = Taken By, (c) = Cosigned By      Initials Name Provider Type    Millicent Zarco, BOGDAN Physical Therapist                                 Physical Therapy Education       Title: PT OT SLP Therapies (In Progress)       Topic: Physical Therapy (In Progress)       Point: Mobility training (Done)       Learning Progress Summary             Patient Acceptance, E, VU,NR by EBER at 6/8/2023 1121                         Point: Home exercise program (Not Started)       Learner Progress:  Not documented in this visit.              Point: Body mechanics (Done)       Learning Progress Summary             Patient Acceptance, E, VU,NR by EBER at 6/8/2023 1121                         Point: Precautions (Done)       Learning Progress Summary             Patient Acceptance, E, VU,NR by EBER at 6/8/2023 1121                                          User Key       Initials Effective Dates Name Provider Type Discipline     09/21/21 -  Millicent Dan PT Physical Therapist PT                  PT Recommendation and Plan  Planned Therapy Interventions (PT): balance training, bed mobility training, gait training, home exercise program, neuromuscular re-education, patient/family education, postural re-education, stair training, strengthening, transfer training  Plan of Care Reviewed With: patient  Outcome Evaluation: PT initial Eval completed.  Pt presents with generalized weakness, balance deficits, decreased functional endurance, increased O2 requirements, and decreased functional mobility compared to baseline level of function.  Ambulated 20ft with Wei and FWW.  O2 sats with delayed decrease to 85% on RA; recovered to >90% in ~1 min with seated rest, focus on PLB, and reapplication of 2L O2.  Pt warrants skilled IP PT to improve indep with mobility and return to PLOF.  Rec IPR upon d/c.     Time Calculation:    PT Charges       Row Name 06/08/23 1122             Time Calculation    Start Time 0846  -BA      PT Received On 06/08/23  -BA      PT Goal Re-Cert Due Date 06/18/23  -BA         Time Calculation- PT    Total Timed Code Minutes- PT 13 minute(s)  -BA         Timed Charges    30509 - PT Therapeutic Activity Minutes 13  -BA         Untimed Charges    PT Eval/Re-eval Minutes 58  -BA         Total Minutes    Timed Charges Total Minutes 13  -BA      Untimed Charges Total Minutes 58  -BA       Total Minutes 71  -BA                User Key  (r) = Recorded By, (t) = Taken By, (c) = Cosigned By      Initials Name Provider Type     Millicent Dan PT Physical Therapist                  Therapy Charges for Today       Code Description Service Date Service Provider Modifiers Qty    81989031311 HC PT THERAPEUTIC ACT EA 15 MIN 6/8/2023 Millicent Dan, PT GP 1    94482509472 HC PT EVAL MOD COMPLEXITY 4 6/8/2023 Millicent Dan,  PT GP 1            PT G-Codes  Outcome Measure Options: AM-PAC 6 Clicks Basic Mobility (PT)  AM-PAC 6 Clicks Score (PT): 17  PT Discharge Summary  Anticipated Discharge Disposition (PT): inpatient rehabilitation facility    Millicent Dan, PT  6/8/2023

## 2023-06-08 NOTE — PLAN OF CARE
Goal Outcome Evaluation:  Plan of Care Reviewed With: patient, pt is resting comfortably in bed, no acute distress noted, no c/o voiced. Will continue to monitor

## 2023-06-08 NOTE — PROGRESS NOTES
[facility]    Inpatient Progress Note      Chief Complaint/Reason for service:    Follow up complete heart block, CAD         Subjective:       Patient resting in recliner.  Continues to have shortness of breath.  Remains on 2L O2.  Has some mild chest heaviness but has improved some.  Feels weak.     Past medical, surgical, social and family history reviewed in the patient's electronic medical record.    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            Objective:      Infusions:        Medications:    Current Facility-Administered Medications:     acetaminophen (TYLENOL) tablet 650 mg, 650 mg, Oral, Q4H PRN, Kuldeep Miner MD, 650 mg at 06/08/23 0815    aspirin EC tablet 81 mg, 81 mg, Oral, Daily, Kuldeep Miner MD, 81 mg at 06/08/23 0815    sennosides-docusate (PERICOLACE) 8.6-50 MG per tablet 2 tablet, 2 tablet, Oral, BID, 2 tablet at 06/07/23 2125 **AND** polyethylene glycol (MIRALAX) packet 17 g, 17 g, Oral, Daily PRN **AND** bisacodyl (DULCOLAX) EC tablet 5 mg, 5 mg, Oral, Daily PRN **AND** bisacodyl (DULCOLAX) suppository 10 mg, 10 mg, Rectal, Daily PRN, Filiberto Hsu MD    clopidogrel (PLAVIX) tablet 75 mg, 75 mg, Oral, Daily, Kuldeep Miner MD, 75 mg at 06/08/23 0815    dextrose (D50W) (25 g/50 mL) IV injection 25 g, 25 g, Intravenous, Q15 Min PRN, Parth Saldana MD    dextrose (GLUTOSE) oral gel 15 g, 15 g, Oral, Q15 Min PRN, Parth Sladana MD    hydrALAZINE (APRESOLINE) tablet 25 mg, 25 mg, Oral, Q6H PRN, Kuldeep Miner MD    Insulin Lispro (humaLOG) injection 2-9 Units, 2-9 Units, Subcutaneous, 4x Daily AC  & at Bedtime, Parth Saldana MD    ipratropium-albuterol (DUO-NEB) nebulizer solution 3 mL, 3 mL, Nebulization, Q4H PRN, Mary Ang APRN    levothyroxine (SYNTHROID, LEVOTHROID) tablet 50 mcg, 50 mcg, Oral, Q AM, Kuldeep Miner MD, 50 mcg at 06/08/23 0521    morphine injection 1 mg, 1 mg, Intravenous, Q4H PRN, Mary Ang APRN, 1 mg at 06/06/23 0548    nitroglycerin (NITROSTAT) SL tablet 0.4 mg, 0.4 mg, Sublingual, Q5 Min PRN, Kuldeep Miner MD    ondansetron (ZOFRAN) injection 4 mg, 4 mg, Intravenous, Q6H PRN, Mary Ang APRN, 4 mg at 06/06/23 0431    pantoprazole (PROTONIX) EC tablet 40 mg, 40 mg, Oral, QAM, Kuldeep Miner MD, 40 mg at 06/08/23 0815    Pharmacy Consult - [place], , Does not apply, Daily, Petey Machuca, PharmD    sodium chloride 0.9 % flush 10 mL, 10 mL, Intravenous, PRN, John Sahni PA, 10 mL at 06/06/23 2026    sodium chloride 0.9 % flush 3 mL, 3 mL, Intravenous, Q12H, John Sahni PA, 3 mL at 06/08/23 0815    sodium chloride 0.9 % infusion 40 mL, 40 mL, Intravenous, PRN, John Sahni PA    venlafaxine XR (EFFEXOR-XR) 24 hr capsule 150 mg, 150 mg, Oral, Daily With Breakfast, Kuldeep Miner MD, 150 mg at 06/08/23 0815    Vital Sign Min/Max for last 24 hours  Temp  Min: 97.6 °F (36.4 °C)  Max: 98.2 °F (36.8 °C)   BP  Min: 113/56  Max: 152/65   Pulse  Min: 63  Max: 76   Resp  Min: 16  Max: 18   SpO2  Min: 94 %  Max: 98 %   No data recorded      Intake/Output Summary (Last 24 hours) at 6/8/2023 1241  Last data filed at 6/8/2023 1139  Gross per 24 hour   Intake 720 ml   Output 800 ml   Net -80 ml           CONSTITUTIONAL: No acute distress  RESPIRATORY: Normal effort. Clear to auscultation bilaterally without wheezing or rales  CARDIOVASCULAR: Regular rate and rhythm with normal S1 and S2. Without murmur. There is no lower extremity edema bilaterally. Normal radial pulse.     Labs/studies:  Available lab and imaging results were reviewed by myself today    Results for  orders placed during the hospital encounter of 06/05/23    Adult Transthoracic Echo Complete w/ Color, Spectral and Contrast if Necessary Per Protocol    Interpretation Summary    Left ventricular systolic function is normal. Left ventricular ejection fraction appears to be 61 - 65%.    Left ventricular wall thickness is consistent with borderline concentric hypertrophy.    Left ventricular diastolic function is consistent with (grade II w/high LAP) pseudonormalization.    Left atrial volume is mildly increased.    There is moderate calcification of the aortic valve.    Moderate mitral annular calcification is present.    Mild mitral valve stenosis is present.    Mild mitral valve regurgitation is present.    Mild pulmonic valve regurgitation is present.    Moderate tricuspid valve regurgitation is present.    Estimated right ventricular systolic pressure from tricuspid regurgitation is mildly elevated (35-45 mmHg).      Tele:  Paced          Assessment/Plan:       ASSESSMENT:  Complete heart block with very slow ventricular response with a ventricular rate of less than 20 bpm  Status post Medtronic leadless pacemaker placement  Second-degree AV block, 2-1 AV conduction  CAD   [facility] [place] 6/5, no clear obstructive disease to explain the patient's complete heart block, but did have an 80% proximal RCA stenosis and a small sized vessel.  In the lieu of ongoing chest discomfort, decision was made to proceed with HANK x1   CAROL  Mixed hyperlipidemia  Statin intolerant  History of hypertension prior to admission  Possible aortic aneurysm    PLAN:  Status post Medtronic leadless pacemaker placement. Status post HANK to RCA.  Continue aspirin, clopidogrel.   Will diurese again today with Lasix 40 mg IV x 1. ProBNP improved but remains elevated.   Monitor renal function and electrolytes in the morning.   Case management consult for IP rehab placement.   Historically intolerant to statins.  Will resume Zetia upon discharge.  Will  consider PCSK-9 inhibitor at follow up.   CTA chest as outpatient for possible aortic aneurysm.     Electronically signed by MARIELA Flaherty, 06/08/23, 12:42 PM EDT.

## 2023-06-08 NOTE — PLAN OF CARE
Goal Outcome Evaluation:  Plan of Care Reviewed With: patient           Outcome Evaluation: PT initial Eval completed.  Pt presents with generalized weakness, balance deficits, decreased functional endurance, increased O2 requirements, and decreased functional mobility compared to baseline level of function.  Ambulated 20ft with Wei and FWW.  O2 sats with delayed decrease to 85% on RA; recovered to >90% in ~1 min with seated rest, focus on PLB, and reapplication of 2L O2.  Pt warrants skilled IP PT to improve indep with mobility and return to PLOF.  Rec IPR upon d/c.

## 2023-06-09 ENCOUNTER — APPOINTMENT (OUTPATIENT)
Dept: CT IMAGING | Facility: HOSPITAL | Age: 88
End: 2023-06-09
Payer: MEDICARE

## 2023-06-09 LAB
BASOPHILS # BLD AUTO: 0.04 10*3/MM3 (ref 0–0.2)
BASOPHILS NFR BLD AUTO: 0.6 % (ref 0–1.5)
DEPRECATED RDW RBC AUTO: 40 FL (ref 37–54)
EOSINOPHIL # BLD AUTO: 0.26 10*3/MM3 (ref 0–0.4)
EOSINOPHIL NFR BLD AUTO: 3.8 % (ref 0.3–6.2)
ERYTHROCYTE [DISTWIDTH] IN BLOOD BY AUTOMATED COUNT: 12.3 % (ref 12.3–15.4)
GLUCOSE BLDC GLUCOMTR-MCNC: 106 MG/DL (ref 70–130)
GLUCOSE BLDC GLUCOMTR-MCNC: 118 MG/DL (ref 70–130)
GLUCOSE BLDC GLUCOMTR-MCNC: 119 MG/DL (ref 70–130)
GLUCOSE BLDC GLUCOMTR-MCNC: 120 MG/DL (ref 70–130)
HCT VFR BLD AUTO: 29.6 % (ref 34–46.6)
HGB BLD-MCNC: 9.7 G/DL (ref 12–15.9)
IMM GRANULOCYTES # BLD AUTO: 0.02 10*3/MM3 (ref 0–0.05)
IMM GRANULOCYTES NFR BLD AUTO: 0.3 % (ref 0–0.5)
LYMPHOCYTES # BLD AUTO: 1.61 10*3/MM3 (ref 0.7–3.1)
LYMPHOCYTES NFR BLD AUTO: 23.4 % (ref 19.6–45.3)
MCH RBC QN AUTO: 29 PG (ref 26.6–33)
MCHC RBC AUTO-ENTMCNC: 32.8 G/DL (ref 31.5–35.7)
MCV RBC AUTO: 88.4 FL (ref 79–97)
MONOCYTES # BLD AUTO: 0.59 10*3/MM3 (ref 0.1–0.9)
MONOCYTES NFR BLD AUTO: 8.6 % (ref 5–12)
NEUTROPHILS NFR BLD AUTO: 4.36 10*3/MM3 (ref 1.7–7)
NEUTROPHILS NFR BLD AUTO: 63.3 % (ref 42.7–76)
NRBC BLD AUTO-RTO: 0 /100 WBC (ref 0–0.2)
PLATELET # BLD AUTO: 147 10*3/MM3 (ref 140–450)
PMV BLD AUTO: 10.4 FL (ref 6–12)
RBC # BLD AUTO: 3.35 10*6/MM3 (ref 3.77–5.28)
WBC NRBC COR # BLD: 6.88 10*3/MM3 (ref 3.4–10.8)

## 2023-06-09 PROCEDURE — 25510000001 IOPAMIDOL PER 1 ML: Performed by: INTERNAL MEDICINE

## 2023-06-09 PROCEDURE — 82948 REAGENT STRIP/BLOOD GLUCOSE: CPT

## 2023-06-09 PROCEDURE — 71275 CT ANGIOGRAPHY CHEST: CPT

## 2023-06-09 PROCEDURE — 85025 COMPLETE CBC W/AUTO DIFF WBC: CPT | Performed by: INTERNAL MEDICINE

## 2023-06-09 RX ADMIN — ASPIRIN 81 MG: 81 TABLET, COATED ORAL at 10:40

## 2023-06-09 RX ADMIN — PANTOPRAZOLE SODIUM 40 MG: 40 TABLET, DELAYED RELEASE ORAL at 10:40

## 2023-06-09 RX ADMIN — METOPROLOL SUCCINATE 25 MG: 25 TABLET, EXTENDED RELEASE ORAL at 10:40

## 2023-06-09 RX ADMIN — VENLAFAXINE HYDROCHLORIDE 150 MG: 75 CAPSULE, EXTENDED RELEASE ORAL at 10:39

## 2023-06-09 RX ADMIN — LEVOTHYROXINE SODIUM 50 MCG: 0.05 TABLET ORAL at 07:00

## 2023-06-09 RX ADMIN — CLOPIDOGREL BISULFATE 75 MG: 75 TABLET ORAL at 10:40

## 2023-06-09 RX ADMIN — Medication 3 ML: at 10:41

## 2023-06-09 RX ADMIN — SENNOSIDES AND DOCUSATE SODIUM 2 TABLET: 50; 8.6 TABLET ORAL at 20:57

## 2023-06-09 RX ADMIN — IOPAMIDOL 80 ML: 755 INJECTION, SOLUTION INTRAVENOUS at 20:11

## 2023-06-09 RX ADMIN — Medication 10 ML: at 20:57

## 2023-06-09 RX ADMIN — ACETAMINOPHEN 650 MG: 325 TABLET ORAL at 10:40

## 2023-06-09 NOTE — CASE MANAGEMENT/SOCIAL WORK
Continued Stay Note  [facility]     Patient Name: Janna Willis  MRN: [id number]  Today's Date: 6/9/2023    Admit Date: 6/5/2023    Plan: Ongoing   Discharge Plan     Row Name 06/09/23 1556       Plan    Plan Ongoing    Patient/Family in Agreement with Plan yes    Plan Comments I spoke with pt and her sister at the bedside. I spoke with pt's niece, on the phone. Pt and family only want rehab if it is acute rehab at [facility]. Pt has [employer] Medicare. We discussed that it is not likely [employer] would approve Acute rehab. We discussed home with HH. Pt states she is not ready to go home and would like to work with PT/OT over the weekend. Pt's niece stated she is not an early morning person and everyone requested that PT/OT work with her a little later. I sent an email to  Rehab requesting PT/OT to see pt over the weekend and to work with her after 1100. Family and pt will then see what PT/OT recommend and go from there for D/C plan. D/C plan is ongoing.  will continue to follow. I updated Maricel/MAR[facility]EL[facility]-Cardiology through messaging.    Final Discharge Disposition Code 30 - still a patient               Discharge Codes    No documentation.               Expected Discharge Date and Time     Expected Discharge Date Expected Discharge Time    Jun 11, 2023             Taty Goins RN

## 2023-06-09 NOTE — PLAN OF CARE
Goal Outcome Evaluation:   Op site to R+ groin to pubic area,very bruise/ecchymotic, no swelling. Dressing patch to right groin try and intact. No complain voiced. Movement and sensation of limbs adequate.Due care and observation continue.  Problem: Adult Inpatient Plan of Care  Goal: Plan of Care Review  Outcome: Ongoing, Progressing  Goal: Patient-Specific Goal (Individualized)  Outcome: Ongoing, Progressing  Goal: Absence of Hospital-Acquired Illness or Injury  Outcome: Ongoing, Progressing  Intervention: Identify and Manage Fall Risk  Recent Flowsheet Documentation  Taken 6/9/2023 0400 by Jyothi Nogueira RN  Safety Promotion/Fall Prevention:   clutter free environment maintained   fall prevention program maintained   lighting adjusted   safety round/check completed  Taken 6/9/2023 0200 by Jyothi Nogueira RN  Safety Promotion/Fall Prevention:   clutter free environment maintained   fall prevention program maintained   lighting adjusted   safety round/check completed   nonskid shoes/slippers when out of bed   assistive device/personal items within reach   gait belt  Taken 6/9/2023 0000 by Jyothi Nogueira RN  Safety Promotion/Fall Prevention:   clutter free environment maintained   fall prevention program maintained   safety round/check completed   assistive device/personal items within reach  Taken 6/8/2023 2200 by Jyothi Nogueira RN  Safety Promotion/Fall Prevention:   assistive device/personal items within reach   clutter free environment maintained   fall prevention program maintained   toileting scheduled   safety round/check completed  Taken 6/8/2023 1950 by Jyothi Nogueira RN  Safety Promotion/Fall Prevention:   assistive device/personal items within reach   clutter free environment maintained   fall prevention program maintained   lighting adjusted   mobility aid in reach   muscle strengthening facilitated   gait belt   nonskid shoes/slippers when out of bed   room organization consistent    safety round/check completed   toileting scheduled  Intervention: Prevent Skin Injury  Recent Flowsheet Documentation  Taken 6/9/2023 0200 by Jyothi Nogueira RN  Body Position:   position changed independently   weight shifting  Taken 6/9/2023 0000 by Jyothi Nogueira RN  Body Position: position changed independently  Taken 6/8/2023 1950 by Jyothi Nogueira RN  Body Position:   position changed independently   weight shifting   legs elevated  Skin Protection:   adhesive use limited   incontinence pads utilized   transparent dressing maintained   tubing/devices free from skin contact  Intervention: Prevent and Manage VTE (Venous Thromboembolism) Risk  Recent Flowsheet Documentation  Taken 6/9/2023 0400 by Jyothi Nogueira RN  Activity Management: activity encouraged  Taken 6/9/2023 0200 by Jyothi Nogueira RN  Activity Management: activity encouraged  Taken 6/9/2023 0000 by Jyothi Nogueira RN  Activity Management: activity minimized  Taken 6/8/2023 2200 by Jyothi Nogueira RN  Activity Management:   activity encouraged   up to bedside commode  Taken 6/8/2023 1950 by Jyothi Nogueira RN  Activity Management: up in chair  Intervention: Prevent Infection  Recent Flowsheet Documentation  Taken 6/9/2023 0400 by Jyothi Nogueira RN  Infection Prevention:   hand hygiene promoted   personal protective equipment utilized   rest/sleep promoted   visitors restricted/screened   single patient room provided   environmental surveillance performed  Taken 6/9/2023 0200 by Jyothi Nogueira RN  Infection Prevention:   hand hygiene promoted   rest/sleep promoted   environmental surveillance performed   equipment surfaces disinfected  Taken 6/9/2023 0000 by Jyothi Nogueira RN  Infection Prevention:   environmental surveillance performed   equipment surfaces disinfected   hand hygiene promoted   personal protective equipment utilized  Taken 6/8/2023 2200 by Jyothi Nogueira RN  Infection Prevention:    environmental surveillance performed   equipment surfaces disinfected   hand hygiene promoted  Taken 6/8/2023 1950 by Jyothi Nogueira, RN  Infection Prevention:   environmental surveillance performed   equipment surfaces disinfected   hand hygiene promoted   personal protective equipment utilized   rest/sleep promoted   single patient room provided  Goal: Optimal Comfort and Wellbeing  Outcome: Ongoing, Progressing  Intervention: Provide Person-Centered Care  Recent Flowsheet Documentation  Taken 6/8/2023 1950 by Jyothi Nogueira RN  Trust Relationship/Rapport:   care explained   emotional support provided   choices provided   empathic listening provided   questions answered   questions encouraged   reassurance provided   thoughts/feelings acknowledged  Goal: Readiness for Transition of Care  Outcome: Ongoing, Progressing

## 2023-06-09 NOTE — PROGRESS NOTES
[facility] Medicine Services  PROGRESS NOTE    Patient Name: Janna Willis  : 1935  MRN: [id number]    Date of Admission: 2023  Primary Care Physician: Hever Ibrahim APRN    Subjective   Subjective     CC: Dyspnea    HPI: Up in bed. Anxious. No f/c. No n/v. No issues.    ROS:  As above     Objective   Objective     Vital Signs:   Temp:  [97.8 °F (36.6 °C)-98.5 °F (36.9 °C)] 97.8 °F (36.6 °C)  Heart Rate:  [63-76] 65  Resp:  [16-18] 16  BP: (131-172)/(55-79) 131/59  Flow (L/min):  [2] 2     Physical Exam:  NAD, alert and oriented  OP clear, dry MM  Neck supple  No LAD  RRR  CTAB  +BS, soft  ROBERTO  Normal affect  No rashes  No changes from     Results Reviewed:  LAB RESULTS:      Lab 23  14123  1757   WBC 6.88 6.35 8.25 9.77 7.47   HEMOGLOBIN 9.7* 9.9* 10.0* 11.3* 12.2   HEMATOCRIT 29.6* 30.9* 31.0* 35.4 37.8   PLATELETS 147 137* 126* 151 142   NEUTROS ABS 4.36  --  6.29 7.15* 5.48   IMMATURE GRANS (ABS) 0.02  --  0.05 0.03 0.04   LYMPHS ABS 1.61  --  1.07 1.62 1.31   MONOS ABS 0.59  --  0.62 0.88 0.51   EOS ABS 0.26  --  0.20 0.05 0.10   MCV 88.4 90.9 91.2 90.3 90.9   PROTIME  --   --   --   --  14.0   APTT  --   --   --   --  24.2*   HEPARIN ANTI-XA  --   --   --   --  0.10*   D DIMER QUANT  --   --   --   --  0.44         Lab 23  0459 23  1415 23  0449 23 23  1757   SODIUM 131* 128* 132* 135* 132*   POTASSIUM 4.1 4.1 4.3 3.8 4.7   CHLORIDE 95* 96* 98 100 97*   CO2 26.0 23.0 27.0 22.0 22.0   ANION GAP 10.0 9.0 7.0 13.0 13.0   BUN 23 19 18 18 23   CREATININE 0.80 0.96 0.75 0.83 1.25*   EGFR 71.4 57.4* 77.2 68.3 41.8*   GLUCOSE 100* 153* 115* 119* 175*   CALCIUM 8.3* 8.2* 8.1* 8.6 8.5*   MAGNESIUM  --   --  1.8  --  1.9   PHOSPHORUS  --   --  2.7  --   --    HEMOGLOBIN A1C  --   --   --  6.00*  --    TSH  --   --   --   --  11.530*         Lab 23  7859  06/05/23  1757   TOTAL PROTEIN 6.1 6.4   ALBUMIN 3.3* 3.3*   GLOBULIN 2.8 3.1   ALT (SGPT) 45* 70*   AST (SGOT) 34* 78*   BILIRUBIN 0.4 0.6   ALK PHOS 55 60         Lab 06/08/23  0459 06/05/23  1757   PROBNP 2,299.0* 6,329.0*   HSTROP T  --  30*   PROTIME  --  14.0   INR  --  1.07         Lab 06/06/23  0457   CHOLESTEROL 150   LDL CHOL 84   HDL CHOL 48   TRIGLYCERIDES 94             Brief Urine Lab Results       None            Microbiology Results Abnormal       None            XR Chest PA & Lateral    Result Date: 6/7/2023  XR CHEST PA AND LATERAL Date of Exam: 6/7/2023 10:13 AM EDT Indication: ppm post pacemaker. Comparison: 6/7/2023 Findings: There has been interval placement of an internal pacemaker, projecting over the anterior left heart. Heart size is again mildly prominent. No pneumothorax is seen. Pulmonary vascularity appears within normal limits. Small amounts of pleural fluid are noted posteriorly. Aortic vascular calcification is noted.     Impression: Impression: 1. Small bilateral effusions. 2. Status post internal pacemaker placement. No evidence of CHF. 3. Stable cardiomegaly. Electronically Signed: Willy Lozano  6/7/2023 10:33 AM EDT  Workstation ID: [id number]     Results for orders placed during the hospital encounter of 06/05/23    Adult Transthoracic Echo Complete w/ Color, Spectral and Contrast if Necessary Per Protocol    Interpretation Summary    Left ventricular systolic function is normal. Left ventricular ejection fraction appears to be 61 - 65%.    Left ventricular wall thickness is consistent with borderline concentric hypertrophy.    Left ventricular diastolic function is consistent with (grade II w/high LAP) pseudonormalization.    Left atrial volume is mildly increased.    There is moderate calcification of the aortic valve.    Moderate mitral annular calcification is present.    Mild mitral valve stenosis is present.    Mild mitral valve regurgitation is present.    Mild pulmonic  valve regurgitation is present.    Moderate tricuspid valve regurgitation is present.    Estimated right ventricular systolic pressure from tricuspid regurgitation is mildly elevated (35-45 mmHg).      Current medications:  Scheduled Meds:aspirin, 81 mg, Oral, Daily  clopidogrel, 75 mg, Oral, Daily  insulin lispro, 2-9 Units, Subcutaneous, 4x Daily AC & at Bedtime  levothyroxine, 50 mcg, Oral, Q AM  metoprolol succinate XL, 25 mg, Oral, Q24H  pantoprazole, 40 mg, Oral, QAM  pharmacy consult - [facility], , Does not apply, Daily  senna-docusate sodium, 2 tablet, Oral, BID  sodium chloride, 3 mL, Intravenous, Q12H  venlafaxine XR, 150 mg, Oral, Daily With Breakfast      Continuous Infusions:   PRN Meds:.  acetaminophen    senna-docusate sodium **AND** polyethylene glycol **AND** bisacodyl **AND** bisacodyl    dextrose    dextrose    hydrALAZINE    ipratropium-albuterol    Morphine    nitroglycerin    ondansetron    sodium chloride    sodium chloride    Assessment & Plan   Assessment & Plan     Active Hospital Problems    Diagnosis  POA    CAD (coronary artery disease) [I25.10]  Yes    Primary hypertension [I10]  Yes    Mixed hyperlipidemia [E78.2]  Yes    GERD (gastroesophageal reflux disease) [K21.9]  Yes    Hypothyroid [E03.9]  Yes    Class 1 obesity in adult [E66.9]  Yes    Heart block AV third degree [I44.2]  Yes      Resolved Hospital Problems   No resolved problems to display.        Brief Hospital Course to date:  Patient Alba is an88 y.o. female with PMHx HTN, T2DM, and dyslipidemia who presented to [facility] ED on 6/5/23 for evaluationacute onset chest pressure/heaviness. Per report, the patient called her sister who arrived at the patient's home to find her unresponsive and pale. EMS was called and patient was noted to be hypertensive with bradycardia and complete heart block. Upon arrival to [facility], EKG showed inferior ST depression and complete heart block and emergent venous tremporary pacemaker wire placement and  coronary angiography with possible PCI was recommended. Dr. Miner with Cardiology took patient to cath lab and placed a HANK to the RCA and a temporary pacemaker. She was admitted to the ICU post-procedure for further monitoring and medical management.     Third degree heart block s/p temporary pacer 6/5  -PCI with HANK to RCA  -s/p ECHO with diastolic dysfunction  -s/p PPM    HTN  HL  GERD  -PPI  -statin intolerant    Hypothyroid  -synthroid    DM  -SSI    Expected Discharge Location and Transportation: [facility]  Expected Discharge   Expected Discharge Date: 6/9/2023; Expected Discharge Time:      DVT prophylaxis:  No DVT prophylaxis order currently exists.     AM-PAC 6 Clicks Score (PT): 17 (06/08/23 1121)    CODE STATUS:   Code Status and Medical Interventions:   Ordered at: 06/05/23 1950     Level Of Support Discussed With:    Patient     Code Status (Patient has no pulse and is not breathing):    CPR (Attempt to Resuscitate)     Medical Interventions (Patient has pulse or is breathing):    Full       Chace Ware MD  06/09/23

## 2023-06-09 NOTE — PROGRESS NOTES
[facility] Cardiology    Inpatient Progress Note      Chief Complaint/Reason for service:    Follow up complete heart block, CAD         Subjective:       Patient resting in bed.  Continues to feel weak and tired.  Denies chest pain, shortness of breath at rest.      Past medical, surgical, social and family history reviewed in the patient's electronic medical record.    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            Objective:      Infusions:        Medications:    Current Facility-Administered Medications:     acetaminophen (TYLENOL) tablet 650 mg, 650 mg, Oral, Q4H PRN, Kuldeep Miner MD, 650 mg at 06/09/23 1040    aspirin EC tablet 81 mg, 81 mg, Oral, Daily, Kuldeep Miner MD, 81 mg at 06/09/23 1040    sennosides-docusate (PERICOLACE) 8.6-50 MG per tablet 2 tablet, 2 tablet, Oral, BID, 2 tablet at 06/08/23 2052 **AND** polyethylene glycol (MIRALAX) packet 17 g, 17 g, Oral, Daily PRN **AND** bisacodyl (DULCOLAX) EC tablet 5 mg, 5 mg, Oral, Daily PRN **AND** bisacodyl (DULCOLAX) suppository 10 mg, 10 mg, Rectal, Daily PRN, Filiberto Hsu MD    clopidogrel (PLAVIX) tablet 75 mg, 75 mg, Oral, Daily, Kuldeep Miner MD, 75 mg at 06/09/23 1040    dextrose (D50W) (25 g/50 mL) IV injection 25 g, 25 g, Intravenous, Q15 Min PRN, Parth Saldana MD    dextrose (GLUTOSE) oral gel 15 g, 15 g, Oral, Q15 Min PRN, Parth Saldana MD    hydrALAZINE (APRESOLINE) tablet 25 mg, 25 mg, Oral, Q6H PRN, Kuldeep Miner MD    Insulin Lispro (humaLOG) injection 2-9 Units, 2-9 Units, Subcutaneous, 4x Daily AC & at Bedtime, Parth Saldana MD     ipratropium-albuterol (DUO-NEB) nebulizer solution 3 mL, 3 mL, Nebulization, Q4H PRN, Mary Ang APRN    levothyroxine (SYNTHROID, LEVOTHROID) tablet 50 mcg, 50 mcg, Oral, Q AM, Kuldeep Miner MD, 50 mcg at 06/09/23 0700    metoprolol succinate XL (TOPROL-XL) 24 hr tablet 25 mg, 25 mg, Oral, Q24H, Maricel Weaver APRN, 25 mg at 06/09/23 1040    morphine injection 1 mg, 1 mg, Intravenous, Q4H PRN, Mary Ang APRN, 1 mg at 06/06/23 0548    nitroglycerin (NITROSTAT) SL tablet 0.4 mg, 0.4 mg, Sublingual, Q5 Min PRN, Kuldeep Miner MD    ondansetron (ZOFRAN) injection 4 mg, 4 mg, Intravenous, Q6H PRN, Mary Ang APRN, 4 mg at 06/06/23 0431    pantoprazole (PROTONIX) EC tablet 40 mg, 40 mg, Oral, QAM, Kuldeep Miner MD, 40 mg at 06/09/23 1040    Pharmacy Consult - [facility], , Does not apply, Daily, Petey Machuca, PharmD    sodium chloride 0.9 % flush 10 mL, 10 mL, Intravenous, PRN, John Sahni, PA, 10 mL at 06/06/23 2026    sodium chloride 0.9 % flush 3 mL, 3 mL, Intravenous, Q12H, John Sahni PA, 3 mL at 06/09/23 1041    sodium chloride 0.9 % infusion 40 mL, 40 mL, Intravenous, PRN, John Sahni, PA    venlafaxine XR (EFFEXOR-XR) 24 hr capsule 150 mg, 150 mg, Oral, Daily With Breakfast, Kuldeep Miner MD, 150 mg at 06/09/23 1039    Vital Sign Min/Max for last 24 hours  Temp  Min: 97.8 °F (36.6 °C)  Max: 98.5 °F (36.9 °C)   BP  Min: 131/59  Max: 172/72   Pulse  Min: 63  Max: 76   Resp  Min: 16  Max: 18   SpO2  Min: 90 %  Max: 100 %   No data recorded      Intake/Output Summary (Last 24 hours) at 6/9/2023 1318  Last data filed at 6/9/2023 0749  Gross per 24 hour   Intake 170 ml   Output 1600 ml   Net -1430 ml           CONSTITUTIONAL: No acute distress  RESPIRATORY: Normal effort. Clear to auscultation bilaterally without wheezing or rales  CARDIOVASCULAR: Regular rate and rhythm with normal S1 and S2. Without murmur. There is no lower extremity edema bilaterally. Normal radial pulse.      Labs/studies:  Available lab and imaging results were reviewed by myself today    Results for orders placed during the hospital encounter of 06/05/23    Adult Transthoracic Echo Complete w/ Color, Spectral and Contrast if Necessary Per Protocol    Interpretation Summary    Left ventricular systolic function is normal. Left ventricular ejection fraction appears to be 61 - 65%.    Left ventricular wall thickness is consistent with borderline concentric hypertrophy.    Left ventricular diastolic function is consistent with (grade II w/high LAP) pseudonormalization.    Left atrial volume is mildly increased.    There is moderate calcification of the aortic valve.    Moderate mitral annular calcification is present.    Mild mitral valve stenosis is present.    Mild mitral valve regurgitation is present.    Mild pulmonic valve regurgitation is present.    Moderate tricuspid valve regurgitation is present.    Estimated right ventricular systolic pressure from tricuspid regurgitation is mildly elevated (35-45 mmHg).      Tele:  Paced          Assessment/Plan:       ASSESSMENT:  Complete heart block with very slow ventricular response with a ventricular rate of less than 20 bpm  Status post [employer]tr[employer] leadless pacemaker placement  Second-degree AV block, 2-1 AV conduction  CAD   [facility] 6/5, no clear obstructive disease to explain the patient's complete heart block, but did have an 80% proximal RCA stenosis and a small sized vessel.  In the lieu of ongoing chest discomfort, decision was made to proceed with HANK x1   CAROL  Mixed hyperlipidemia  Statin intolerant  History of hypertension prior to admission  Possible aortic aneurysm    PLAN:  Status post Medtronic leadless pacemaker placement. Status post HANK to RCA.  Continue aspirin, clopidogrel.   Monitor renal function and electrolytes in the morning.   Historically intolerant to statins.  Will resume Zetia upon discharge.  Will consider PCSK-9 inhibitor at follow up.   Will  order CTA chest during for possible aortic aneurysm.   Case management consulted for IP rehab. Awaiting placement.     Electronically signed by MARIELA Flaherty, 06/09/23, 1:18 PM EDT.

## 2023-06-10 ENCOUNTER — APPOINTMENT (OUTPATIENT)
Dept: GENERAL RADIOLOGY | Facility: HOSPITAL | Age: 88
End: 2023-06-10
Payer: MEDICARE

## 2023-06-10 LAB
GLUCOSE BLDC GLUCOMTR-MCNC: 109 MG/DL (ref 70–130)
GLUCOSE BLDC GLUCOMTR-MCNC: 116 MG/DL (ref 70–130)
GLUCOSE BLDC GLUCOMTR-MCNC: 160 MG/DL (ref 70–130)
GLUCOSE BLDC GLUCOMTR-MCNC: 90 MG/DL (ref 70–130)
NT-PROBNP SERPL-MCNC: 3061 PG/ML (ref 0–1800)

## 2023-06-10 PROCEDURE — 63710000001 INSULIN LISPRO (HUMAN) PER 5 UNITS: Performed by: INTERNAL MEDICINE

## 2023-06-10 PROCEDURE — 97110 THERAPEUTIC EXERCISES: CPT

## 2023-06-10 PROCEDURE — 82948 REAGENT STRIP/BLOOD GLUCOSE: CPT

## 2023-06-10 PROCEDURE — 97116 GAIT TRAINING THERAPY: CPT

## 2023-06-10 PROCEDURE — 83880 ASSAY OF NATRIURETIC PEPTIDE: CPT | Performed by: INTERNAL MEDICINE

## 2023-06-10 PROCEDURE — 97530 THERAPEUTIC ACTIVITIES: CPT

## 2023-06-10 PROCEDURE — 25010000002 FUROSEMIDE PER 20 MG: Performed by: INTERNAL MEDICINE

## 2023-06-10 PROCEDURE — 71045 X-RAY EXAM CHEST 1 VIEW: CPT

## 2023-06-10 RX ORDER — FUROSEMIDE 10 MG/ML
40 INJECTION INTRAMUSCULAR; INTRAVENOUS ONCE
Status: COMPLETED | OUTPATIENT
Start: 2023-06-10 | End: 2023-06-10

## 2023-06-10 RX ADMIN — CLOPIDOGREL BISULFATE 75 MG: 75 TABLET ORAL at 09:02

## 2023-06-10 RX ADMIN — ASPIRIN 81 MG: 81 TABLET, COATED ORAL at 09:02

## 2023-06-10 RX ADMIN — FUROSEMIDE 40 MG: 40 INJECTION, SOLUTION INTRAMUSCULAR; INTRAVENOUS at 13:59

## 2023-06-10 RX ADMIN — VENLAFAXINE HYDROCHLORIDE 150 MG: 75 CAPSULE, EXTENDED RELEASE ORAL at 09:02

## 2023-06-10 RX ADMIN — SENNOSIDES AND DOCUSATE SODIUM 2 TABLET: 50; 8.6 TABLET ORAL at 21:17

## 2023-06-10 RX ADMIN — Medication 3 ML: at 09:03

## 2023-06-10 RX ADMIN — Medication 3 ML: at 21:18

## 2023-06-10 RX ADMIN — SENNOSIDES AND DOCUSATE SODIUM 2 TABLET: 50; 8.6 TABLET ORAL at 09:02

## 2023-06-10 RX ADMIN — LEVOTHYROXINE SODIUM 50 MCG: 0.05 TABLET ORAL at 06:12

## 2023-06-10 RX ADMIN — Medication 10 ML: at 09:02

## 2023-06-10 RX ADMIN — EMPAGLIFLOZIN 10 MG: 10 TABLET, FILM COATED ORAL at 13:59

## 2023-06-10 RX ADMIN — INSULIN LISPRO 2 UNITS: 100 INJECTION, SOLUTION INTRAVENOUS; SUBCUTANEOUS at 18:02

## 2023-06-10 RX ADMIN — PANTOPRAZOLE SODIUM 40 MG: 40 TABLET, DELAYED RELEASE ORAL at 06:12

## 2023-06-10 RX ADMIN — METOPROLOL SUCCINATE 25 MG: 25 TABLET, EXTENDED RELEASE ORAL at 09:02

## 2023-06-10 NOTE — THERAPY TREATMENT NOTE
Patient Name: Janna Willis  : 1935    MRN: [id number]                              Today's Date: 6/10/2023       Admit Date: 2023    Visit Dx:     ICD-10-CM ICD-9-CM   1. Heart block AV third degree  I44.2 426.0   2. Generalized weakness  R53.1 780.79   3. Near syncope  R55 780.2   4. Third degree heart block  I44.2 426.0     Patient Active Problem List   Diagnosis    Heart block AV third degree    CAD (coronary artery disease)    Primary hypertension    Mixed hyperlipidemia    GERD (gastroesophageal reflux disease)    Hypothyroid    Class 1 obesity in adult     Past Medical History:   Diagnosis Date    Diabetes      Past Surgical History:   Procedure Laterality Date    CARDIAC CATHETERIZATION N/A 2023    Procedure: Left Heart Cath;  Surgeon: Kuldeep Miner MD;  Location:  [facility];  Service: Cardiology;  Laterality: N/A;    CARDIAC ELECTROPHYSIOLOGY PROCEDURE N/A 2023    Procedure: Temporary Pacemaker;  Surgeon: Kuldeep Miner MD;  Location:  [facility]VAS[facility]ATION;  Service: Cardiology;  Laterality: N/A;    CARDIAC ELECTROPHYSIOLOGY PROCEDURE N/A 2023    Procedure: Pacemaker DC new;  Surgeon: Dudley Rodriguez DO;  Location:  [facility] EP INVASIVE LOCATION;  Service: Cardiology;  Laterality: N/A;    OVARY SURGERY        General Information       Row Name 06/10/23 1545          Physical Therapy Time and Intention    Document Type therapy note (daily note)  -BA     Mode of Treatment physical therapy  -BA       Row Name 06/10/23 1548          General Information    Patient Profile Reviewed yes  -BA     Existing Precautions/Restrictions cardiac;fall;other (see comments)  s/p leadless pacemaker placement ; R groin hematoma; possible aortic aneurysm; BUE tremors; monitor O2 sats and HR  -BA     Barriers to Rehab medically complex  -BA       Row Name 06/10/23 1726          Cognition    Orientation Status (Cognition) oriented x 3;verbal cues/prompts needed for  orientation;time;other (see comments)  Oriented to year, but not month.  Reported it was May.  -       Row Name 06/10/23 1542          Safety Issues, Functional Mobility    Safety Issues Affecting Function (Mobility) awareness of need for assistance;insight into deficits/self-awareness;judgment;positioning of assistive device;safety precaution awareness;safety precautions follow-through/compliance;sequencing abilities  -     Impairments Affecting Function (Mobility) balance;coordination;endurance/activity tolerance;postural/trunk control;shortness of breath;strength;motor control  -     Comment, Safety Issues/Impairments (Mobility) BUE tremors and 1-2 episodes of whole body jerking movement with static standing; reported dizziness.  Lethargic throughout session, but with good effort and participation.  -               User Key  (r) = Recorded By, (t) = Taken By, (c) = Cosigned By      Initials Name Provider Type    Millicent Zarco, PT Physical Therapist                   Mobility       Row Name 06/10/23 1546          Bed Mobility    Comment, (Bed Mobility) Received [facility] upon arrival and returned to chair.  -       Row Name 06/10/23 1545          Sit-Stand Transfer    Sit-Stand [place] (Transfers) minimum assist (75% patient effort);verbal cues;nonverbal cues (demo/gesture)  -     Assistive Device (Sit-Stand Transfers) walker, front-wheeled  -BA     Comment, (Sit-Stand Transfer) STS x 4 reps from chair and toilet.  Increased effort and slow movements. VCs/TCs for sequencing and safe hand placement on FWW for each rep with no carryover.  Reported no dizziness on first 2 stands from chair and toilet, but stated dizziness on last 2 stands from chair.  -       Row Name 06/10/23 1545          Gait/Stairs (Locomotion)    [place] Level (Gait) minimum assist (75% patient effort);verbal cues  -     Assistive Device (Gait) walker, front-wheeled  -BA     Distance in Feet (Gait) 10+10  -BA      Deviations/Abnormal Patterns (Gait) bilateral deviations;gilberto decreased;gait speed decreased;stride length decreased  -     Bilateral Gait Deviations forward flexed posture;heel strike decreased  -     Comment, (Gait/Stairs) Ambulated to restroom and back.  Demonstrated step through gait pattern with decreased gilberto and step length.  Exhibited 1-2 imbalances/LOB that pt was able to primarily self-correct.  Reported COHN/SOA.  O2 sats stable on RA following ambulation.  HR elevated to 138 bpm for brief period upon return to seated position in chair.  VCs/TCs for walker management, optimal walker positioning with staying closer to FWW; upright posture, improved stride length, PLB, and improved safety awareness.  Gait distance limited by fatigue, weakness, COHN/SOA, and dizziness.  Attempted further ambulation distance, but unable to tolerate today.  -               User Key  (r) = Recorded By, (t) = Taken By, (c) = Cosigned By      Initials Name Provider Type     Millicent Dan PT Physical Therapist                   Obj/Interventions       Row Name 06/10/23 1600          Motor Skills    Therapeutic Exercise hip;knee;ankle  -       Row Name 06/10/23 1600          Hip (Therapeutic Exercise)    Hip (Therapeutic Exercise) strengthening exercise  -     Hip Strengthening (Therapeutic Exercise) bilateral;marching while seated;aBduction;aDduction;sitting;10 repetitions  -       Row Name 06/10/23 1600          Knee (Therapeutic Exercise)    Knee (Therapeutic Exercise) strengthening exercise  -     Knee Strengthening (Therapeutic Exercise) bilateral;LAQ (long arc quad);sitting;10 repetitions  -       Row Name 06/10/23 1600          Ankle (Therapeutic Exercise)    Ankle (Therapeutic Exercise) AROM (active range of motion)  -     Ankle AROM (Therapeutic Exercise) bilateral;dorsiflexion;plantarflexion;sitting;10 repetitions  -       Row Name 06/10/23 1600          Balance    Balance Assessment  sitting static balance;sitting dynamic balance;sit to stand dynamic balance;standing static balance;standing dynamic balance  -     Static Sitting Balance standby assist  -     Dynamic Sitting Balance contact guard  -     Position, Sitting Balance unsupported;sitting in chair;other (see comments)  sitting on toilet  -     Sit to Stand Dynamic Balance minimal assist;verbal cues;non-verbal cues (demo/gesture)  -     Static Standing Balance contact guard  -BA     Dynamic Standing Balance minimal assist;verbal cues  -     Position/Device Used, Standing Balance supported;walker, front-wheeled  -BA     Balance Interventions sitting;sit to stand;standing;supported;static;dynamic;occupation based/functional task  -     Comment, Balance Intermittent mild instability with static/dynamic sitting balance on toilet for hygiene activity; no overt LOB noted.  Mild instability with standing and ambulation activity with FWW.  Exhibited 1-2 imbalances/LOB during ambulation that pt was primarily able to self-correct.  Prolonged static standing for hygiene activity at sink; no overt LOB noted, but with increased shakiness.  -               User Key  (r) = Recorded By, (t) = Taken By, (c) = Cosigned By      Initials Name Provider Type     Millicent Dan, PT Physical Therapist                   Goals/Plan    No documentation.                  Clinical Impression       Row Name 06/10/23 1608          Pain    Pretreatment Pain Rating 0/10 - no pain  -     Posttreatment Pain Rating 0/10 - no pain  -       Row Name 06/10/23 1608          Plan of Care Review    Plan of Care Reviewed With patient;family  -     Progress no change  -     Outcome Evaluation Con to require increased assistance for functional mobility compared to baseline level of function.  STS x 4 reps with Wei and FWW.  Ambulated 10+10ft with Wei and FWW.  Exhibited 1-2 imbalances/LOB that pt was primarily able to self-correct.  Limited by  fatigue, weakness, and reported COHN/SOA and dizziness.  VSS throughout.  Con to demonstrate gait instability, decreased functional endurance, decreased balance, weakness, and decreased activity tolerance.  Con to progress pt as able per PT POC.  Rec IPR upon d/c for best fxl outcome.  -BA       Row Name 06/10/23 1608          Vital Signs    Pre Systolic BP Rehab 150  -BA     Pre Treatment Diastolic BP 71  -BA     Intra Systolic BP Rehab 138  -BA     Intra Treatment Diastolic BP 62  -BA     Post Systolic BP Rehab 136  -BA     Post Treatment Diastolic BP 80  -BA     Pretreatment Heart Rate (beats/min) 64  -BA     Intratreatment Heart Rate (beats/min) 138  -BA     Posttreatment Heart Rate (beats/min) 63  -BA     Pre SpO2 (%) 93  -BA     O2 Delivery Pre Treatment room air  -BA     O2 Delivery Intra Treatment room air  -BA     Post SpO2 (%) 92  -BA     O2 Delivery Post Treatment room air  -BA     Pre Patient Position Sitting  -BA     Intra Patient Position Standing  -BA     Post Patient Position Sitting  -BA       Row Name 06/10/23 1608          Positioning and Restraints    Pre-Treatment Position sitting in chair/recliner  -BA     Post Treatment Position chair  -BA     In Chair notified nsg;reclined;sitting;call light within reach;encouraged to call for assist;exit alarm on;with family/caregiver;waffle cushion;legs elevated  -BA               User Key  (r) = Recorded By, (t) = Taken By, (c) = Cosigned By      Initials Name Provider Type    Millicent Zarco, PT Physical Therapist                   Outcome Measures       Row Name 06/10/23 1614 06/10/23 0800       How much help from another person do you currently need...    Turning from your back to your side while in flat bed without using bedrails? 3  -BA 4  -AM    Moving from lying on back to sitting on the side of a flat bed without bedrails? 3  -BA 3  -AM    Moving to and from a bed to a chair (including a wheelchair)? 3  -BA 3  -AM    Standing up from a chair  using your arms (e.g., wheelchair, bedside chair)? 3  -BA 3  -AM    Climbing 3-5 steps with a railing? 2  -BA 2  -AM    To walk in hospital room? 3  -BA 3  -AM    AM-PAC 6 Clicks Score (PT) 17  -BA 18  -AM    Highest level of mobility 5 --> Static standing  -BA 6 --> Walked 10 steps or more  -AM      Row Name 06/10/23 1614          Functional Assessment    Outcome Measure Options AM-PAC 6 Clicks Basic Mobility (PT)  -               User Key  (r) = Recorded By, (t) = Taken By, (c) = Cosigned By      Initials Name Provider Type    AM Regla Wilcox, RN Registered Nurse    BA Millicent Dan, PT Physical Therapist                                 Physical Therapy Education       Title: PT OT SLP Therapies (In Progress)       Topic: Physical Therapy (In Progress)       Point: Mobility training (In Progress)       Learning Progress Summary             Patient Acceptance, E, NR by  at 6/10/2023 1614    Acceptance, E, VU,NR by  at 6/8/2023 1121                         Point: Home exercise program (In Progress)       Learning Progress Summary             Patient Acceptance, E, NR by  at 6/10/2023 1614                         Point: Body mechanics (In Progress)       Learning Progress Summary             Patient Acceptance, E, NR by  at 6/10/2023 1614    Acceptance, E, VU,NR by  at 6/8/2023 1121                         Point: Precautions (In Progress)       Learning Progress Summary             Patient Acceptance, E, NR by  at 6/10/2023 1614    Acceptance, E, VU,NR by  at 6/8/2023 1121                                         User Key       Initials Effective Dates Name Provider Type Discipline     09/21/21 -  Millicent Dan, BOGDAN Physical Therapist PT                  PT Recommendation and Plan  Planned Therapy Interventions (PT): balance training, bed mobility training, gait training, home exercise program, neuromuscular re-education, patient/family education, postural re-education, stair  training, strengthening, transfer training  Plan of Care Reviewed With: patient, family  Progress: no change  Outcome Evaluation: Con to require increased assistance for functional mobility compared to baseline level of function.  STS x 4 reps with Wei and FWW.  Ambulated 10+10ft with Wei and FWW.  Exhibited 1-2 imbalances/LOB that pt was primarily able to self-correct.  Limited by fatigue, weakness, and reported COHN/SOA and dizziness.  VSS throughout.  Con to demonstrate gait instability, decreased functional endurance, decreased balance, weakness, and decreased activity tolerance.  Con to progress pt as able per PT POC.  Rec IPR upon d/c for best fxl outcome.     Time Calculation:    PT Charges       Row Name 06/10/23 1616             Time Calculation    Start Time 1456  -BA      PT Received On 06/10/23  -BA         Time Calculation- PT    Total Timed Code Minutes- PT 41 minute(s)  -BA         Timed Charges    86683 - PT Therapeutic Exercise Minutes 13  -BA      85107 - Gait Training Minutes  11  -BA      34380 - PT Therapeutic Activity Minutes 17  -BA         Total Minutes    Timed Charges Total Minutes 41  -BA       Total Minutes 41  -BA                User Key  (r) = Recorded By, (t) = Taken By, (c) = Cosigned By      Initials Name Provider Type    Millicent Zarco, PT Physical Therapist                  Therapy Charges for Today       Code Description Service Date Service Provider Modifiers Qty    49516037411 HC PT THER PROC EA 15 MIN 6/10/2023 Millicent Dan, PT GP 1    94082532761 HC GAIT TRAINING EA 15 MIN 6/10/2023 Millicent Dan, PT GP 1    54208733771 HC PT THERAPEUTIC ACT EA 15 MIN 6/10/2023 Millicent Dan, PT GP 1            PT G-Codes  Outcome Measure Options: AM-PAC 6 Clicks Basic Mobility (PT)  AM-PAC 6 Clicks Score (PT): 17  PT Discharge Summary  Anticipated Discharge Disposition (PT): inpatient rehabilitation facility    Millicent Dan PT  6/10/2023

## 2023-06-10 NOTE — PLAN OF CARE
Goal Outcome Evaluation:  Plan of Care Reviewed With: patient, family        Progress: no change  Outcome Evaluation: Con to require increased assistance for functional mobility compared to baseline level of function.  STS x 4 reps with Wei and FWW.  Ambulated 10+10ft with Wei and FWW.  Exhibited 1-2 imbalances/LOB that pt was primarily able to self-correct.  Limited by fatigue, weakness, and reported COHN/SOA and dizziness.  VSS throughout.  Con to demonstrate gait instability, decreased functional endurance, decreased balance, weakness, and decreased activity tolerance.  Con to progress pt as able per PT POC.  Rec IPR upon d/c for best fxl outcome.      PT Evaluation Complexity  History, PT Evaluation Complexity: 3 or more personal factors and/or comorbidities  Examination of Body Systems (PT Eval Complexity): total of 3 or more elements  Clinical Presentation (PT Evaluation Complexity): evolving  Clinical Decision Making (PT Evaluation Complexity): moderate complexity  Overall Complexity (PT Evaluation Complexity): moderate complexity   Valtrex Counseling: I discussed with the patient the risks of valacyclovir including but not limited to kidney damage, nausea, vomiting and severe allergy.  The patient understands that if the infection seems to be worsening or is not improving, they are to call.

## 2023-06-10 NOTE — PROGRESS NOTES
"[facility] at [facility]  IP Progress Note    PROBLEM LIST:  1.  Third-degree AV block status post pacemaker  Coronary artery disease s/p PTCA stent  Hypertension  Hyperlipidemia  GERD  Hypothyroidism      HOSPITAL COURSE:  Patient has successful placement of leadless pacemaker.  She is still feeling some shortness of breath and pain in the leg      CHIEF COMPLAINTS:  Third-degree AV block      Subjective   Patient sitting in chair some shortness of breath      Objective     Blood pressure 152/74, pulse 77, temperature 97.7 °F (36.5 °C), temperature source Oral, resp. rate 16, height 162.6 cm (64.02\"), weight 88.3 kg (194 lb 10.7 oz), SpO2 96 %, not currently breastfeeding.     Intake/Output Summary (Last 24 hours) at 6/10/2023 1059  Last data filed at 6/10/2023 0830  Gross per 24 hour   Intake 956 ml   Output 2200 ml   Net -1244 ml       PHYSICAL EXAM:  Constitutional:       General: Healthy     Appearance: In no distress    Neck:     JVP: Not elevated     Carotid artery: No carotid bruit    Pulmonary:      Effort: Pulmonary effort is normal.      Breath sounds: Good    Cardiovascular:      Normal rate. Regular rhythm. Normal S1. Normal S2.      Murmurs: There is no murmur.      No gallop. No click. No rub.     Abdominal:      General: Bowel sounds are normal.      Palpations: Abdomen is soft.      Tenderness: There is no abdominal tenderness.    Extremities:     Pulses: Good pulses     Edema: No edema    RESULT REVIEW:    I reviewed the patient's new clinical results.      MEDICATIONS:    aspirin, 81 mg, Oral, Daily  clopidogrel, 75 mg, Oral, Daily  insulin lispro, 2-9 Units, Subcutaneous, 4x Daily AC & at Bedtime  levothyroxine, 50 mcg, Oral, Q AM  metoprolol succinate XL, 25 mg, Oral, Q24H  pantoprazole, 40 mg, Oral, QAM  pharmacy consult - [facility], , Does not apply, Daily  senna-docusate sodium, 2 tablet, Oral, BID  sodium chloride, 3 mL, Intravenous, Q12H  venlafaxine XR, 150 mg, Oral, " Daily With Breakfast          Results from last 7 days   Lab Units 06/09/23  0418   WBC 10*3/mm3 6.88   HEMOGLOBIN g/dL 9.7*   HEMATOCRIT % 29.6*   PLATELETS 10*3/mm3 147     Results from last 7 days   Lab Units 06/08/23  0459 06/07/23  1415 06/07/23  0449   SODIUM mmol/L 131*   < > 132*   POTASSIUM mmol/L 4.1   < > 4.3   CHLORIDE mmol/L 95*   < > 98   CO2 mmol/L 26.0   < > 27.0   BUN mg/dL 23   < > 18   CREATININE mg/dL 0.80   < > 0.75   CALCIUM mg/dL 8.3*   < > 8.1*   BILIRUBIN mg/dL  --   --  0.4   ALK PHOS U/L  --   --  55   ALT (SGPT) U/L  --   --  45*   AST (SGOT) U/L  --   --  34*   GLUCOSE mg/dL 100*   < > 115*    < > = values in this interval not displayed.     Results from last 7 days   Lab Units 06/05/23  1757   INR  1.07     Lab Results   Component Value Date    TROPONINT 30 (H) 06/05/2023     Results from last 7 days   Lab Units 06/05/23  1757   TSH uIU/mL 11.530*   FREE T4 ng/dL 1.00     Results from last 7 days   Lab Units 06/06/23  0457   CHOLESTEROL mg/dL 150   TRIGLYCERIDES mg/dL 94   HDL CHOL mg/dL 48   LDL CHOL mg/dL 84         No results found for: IRON, FERRITIN, LABIRON, TIBC   Hemoglobin A1C   Date Value Ref Range Status   06/06/2023 6.00 (H) 4.80 - 5.60 % Final     Magnesium   Date Value Ref Range Status   06/07/2023 1.8 1.6 - 2.4 mg/dL Final        Tele: Paced      ASSESSMENT:     Third-degree AV block s/p leadless pacemaker placement  Coronary artery disease status post PTCA stent of RCA          PLAN:     We will get her chest x-ray with 2 views.  We will start her on diuresis a little  We will check her proBNP.

## 2023-06-10 NOTE — PROGRESS NOTES
[facility] Medicine Services  PROGRESS NOTE    Patient Name: Janna Willis  : 1935  MRN: [id number]    Date of Admission: 2023  Primary Care Physician: Hever Ibrahim APRN    Subjective   Subjective     CC: Dyspnea    HPI:   Pt sitting up in the chair, denies chest pain but reports SOA with exertion.       Objective   Objective     Vital Signs:   Temp:  [97.5 °F (36.4 °C)-98.5 °F (36.9 °C)] 98 °F (36.7 °C)  Heart Rate:  [64-85] 64  Resp:  [14-16] 14  BP: (110-153)/(60-84) 110/60  Flow (L/min):  [2] 2     Physical Exam:  Constitutional: Awake, alert, NAD  HENT: NCAT, mucous membranes moist  Respiratory: Clear to auscultation bilaterally, nonlabored respirations   Cardiovascular: RRR, no murmurs, rubs, or gallop  Gastrointestinal: Positive bowel sounds, soft, nontender, nondistended  Musculoskeletal: No bilateral ankle edema  Psychiatric: Appropriate affect, cooperative  Neurologic: Oriented x 3, non focal, speech clear  Skin: No rashes    Results Reviewed:  LAB RESULTS:      Lab 23  0418 23  0459 23  1415 23  0457 23  1757   WBC 6.88 6.35 8.25 9.77 7.47   HEMOGLOBIN 9.7* 9.9* 10.0* 11.3* 12.2   HEMATOCRIT 29.6* 30.9* 31.0* 35.4 37.8   PLATELETS 147 137* 126* 151 142   NEUTROS ABS 4.36  --  6.29 7.15* 5.48   IMMATURE GRANS (ABS) 0.02  --  0.05 0.03 0.04   LYMPHS ABS 1.61  --  1.07 1.62 1.31   MONOS ABS 0.59  --  0.62 0.88 0.51   EOS ABS 0.26  --  0.20 0.05 0.10   MCV 88.4 90.9 91.2 90.3 90.9   PROTIME  --   --   --   --  14.0   APTT  --   --   --   --  24.2*   HEPARIN ANTI-XA  --   --   --   --  0.10*   D DIMER QUANT  --   --   --   --  0.44           Lab 23  0459 23  1415 23  04423  2907   SODIUM 131* 128* 132* 135* 132*   POTASSIUM 4.1 4.1 4.3 3.8 4.7   CHLORIDE 95* 96* 98 100 97*   CO2 26.0 23.0 27.0 22.0 22.0   ANION GAP 10.0 9.0 7.0 13.0 13.0   BUN 23 19 18 18 23   CREATININE 0.80 0.96 0.75  0.83 1.25*   EGFR 71.4 57.4* 77.2 68.3 41.8*   GLUCOSE 100* 153* 115* 119* 175*   CALCIUM 8.3* 8.2* 8.1* 8.6 8.5*   MAGNESIUM  --   --  1.8  --  1.9   PHOSPHORUS  --   --  2.7  --   --    HEMOGLOBIN A1C  --   --   --  6.00*  --    TSH  --   --   --   --  11.530*           Lab 06/07/23  0449 06/05/23  1757   TOTAL PROTEIN 6.1 6.4   ALBUMIN 3.3* 3.3*   GLOBULIN 2.8 3.1   ALT (SGPT) 45* 70*   AST (SGOT) 34* 78*   BILIRUBIN 0.4 0.6   ALK PHOS 55 60           Lab 06/08/23  0459 06/05/23  1757   PROBNP 2,299.0* 6,329.0*   HSTROP T  --  30*   PROTIME  --  14.0   INR  --  1.07           Lab 06/06/23  0457   CHOLESTEROL 150   LDL CHOL 84   HDL CHOL 48   TRIGLYCERIDES 94               Brief Urine Lab Results       None            Microbiology Results Abnormal       None            CT Angiogram Chest    Result Date: 6/9/2023  CT ANGIOGRAM CHEST Date of Exam: 6/9/2023 8:05 PM EDT Indication: Aortic aneurysm, known or suspected. Comparison: No recent comparison studies. Previous 8/18/2012 angiographic chest CT scan Technique: CTA of the chest was performed after the uneventful intravenous administration of 80 mL Isovue 370. Reconstructed coronal and sagittal images were also obtained. In addition, a 3-D volume rendered image was created for interpretation. Automated exposure control and iterative reconstruction methods were used. Findings: There is moderate aortic valve calcification, extensive coronary artery calcification and extensive mitral annular calcification. Streak artifact is seen for the patient's intracardiac pacemaker, at the right ventricular apex. There is no evidence of thoracic aortic aneurysm, with maximal transverse luminal diameter of the ascending aorta approximately 3.0 cm. Unenhanced images show no evidence of intramural hematoma. Postcontrast images show no evidence of dissection. Main pulmonary artery is larger than the aorta and transverse diameter at approximately 2.9 cm. No pulmonary embolic disease  is appreciated. There appears to be a fairly large hiatal hernia. No mediastinal adenopathy or significant pericardial effusion is seen. There is minimal free-flowing right and left pleural effusion. Images of the lungs show normally patent airways. There are borderline  changes of cylindrical bronchiectasis of the lower lobes. There is only trace atelectasis in the lower lungs. No pneumonia or edema is seen. Included images of the upper abdomen show diffuse fatty liver change and grossly normal liver morphology. Included upper left renal pole, spleen, pancreatic tail, and adrenal glands appear grossly normal. Bony structures appear to be intact.     Impression: Impression: 1. No evidence of thoracic aortic aneurysm or dissection. 2. Prominent main pulmonary artery. No evidence of pulmonary embolic disease. 3. Large hiatal hernia. 4. Very small pleural effusions. Electronically Signed: Chace Ngo  6/9/2023 8:28 PM EDT  Workstation ID: [id number]     Results for orders placed during the hospital encounter of 06/05/23    Adult Transthoracic Echo Complete w/ Color, Spectral and Contrast if Necessary Per Protocol    Interpretation Summary    Left ventricular systolic function is normal. Left ventricular ejection fraction appears to be 61 - 65%.    Left ventricular wall thickness is consistent with borderline concentric hypertrophy.    Left ventricular diastolic function is consistent with (grade II w/high LAP) pseudonormalization.    Left atrial volume is mildly increased.    There is moderate calcification of the aortic valve.    Moderate mitral annular calcification is present.    Mild mitral valve stenosis is present.    Mild mitral valve regurgitation is present.    Mild pulmonic valve regurgitation is present.    Moderate tricuspid valve regurgitation is present.    Estimated right ventricular systolic pressure from tricuspid regurgitation is mildly elevated (35-45 mmHg).      Current medications:  Scheduled  Meds:aspirin, 81 mg, Oral, Daily  clopidogrel, 75 mg, Oral, Daily  empagliflozin, 10 mg, Oral, Daily  furosemide, 40 mg, Intravenous, Once  insulin lispro, 2-9 Units, Subcutaneous, 4x Daily AC & at Bedtime  levothyroxine, 50 mcg, Oral, Q AM  metoprolol succinate XL, 25 mg, Oral, Q24H  pantoprazole, 40 mg, Oral, QAM  pharmacy consult - [facility], , Does not apply, Daily  senna-docusate sodium, 2 tablet, Oral, BID  sodium chloride, 3 mL, Intravenous, Q12H  venlafaxine XR, 150 mg, Oral, Daily With Breakfast      Continuous Infusions:   PRN Meds:.  acetaminophen    senna-docusate sodium **AND** polyethylene glycol **AND** bisacodyl **AND** bisacodyl    dextrose    dextrose    hydrALAZINE    ipratropium-albuterol    Morphine    nitroglycerin    ondansetron    sodium chloride    sodium chloride    Assessment & Plan   Assessment & Plan     Active Hospital Problems    Diagnosis  POA    CAD (coronary artery disease) [I25.10]  Yes    Primary hypertension [I10]  Yes    Mixed hyperlipidemia [E78.2]  Yes    GERD (gastroesophageal reflux disease) [K21.9]  Yes    Hypothyroid [E03.9]  Yes    Class 1 obesity in adult [E66.9]  Yes    Heart block AV third degree [I44.2]  Yes      Resolved Hospital Problems   No resolved problems to display.        Brief Hospital Course to date:  Patient Alba is an88 y.o. female with PMHx HTN, T2DM, and dyslipidemia who presented to [facility] ED on 6/5/23 for evaluationacute onset chest pressure/heaviness. Per report, the patient called her sister who arrived at the patient's home to find her unresponsive and pale. EMS was called and patient was noted to be hypertensive with bradycardia and complete heart block. Upon arrival to [facility], EKG showed inferior ST depression and complete heart block and emergent venous tremporary pacemaker wire placement and coronary angiography with possible PCI was recommended. Dr. Miner with Cardiology took patient to cath lab and placed a HANK to the RCA and a temporary pacemaker.  She was admitted to the ICU post-procedure for further monitoring and medical management.     Third degree heart block s/p temporary pacer 6/5  CAD s/p HANK to RCA  -PCI with HANK to RCA  -s/p ECHO with diastolic dysfunction  -s/p PPM    HTN  HL  GERD  -PPI  -statin intolerant    Hypothyroid  -synthroid    DM  -SSI    Expected Discharge Location and Transportation: [facility]  Expected Discharge   Expected Discharge Date: 6/11/2023; Expected Discharge Time:      DVT prophylaxis:  No DVT prophylaxis order currently exists.     AM-PAC 6 Clicks Score (PT): 18 (06/10/23 0800)    CODE STATUS:   Code Status and Medical Interventions:   Ordered at: 06/05/23 1950     Level Of Support Discussed With:    Patient     Code Status (Patient has no pulse and is not breathing):    CPR (Attempt to Resuscitate)     Medical Interventions (Patient has pulse or is breathing):    Full       Naz Morris, MARIELA  06/10/23

## 2023-06-11 ENCOUNTER — APPOINTMENT (OUTPATIENT)
Dept: GENERAL RADIOLOGY | Facility: HOSPITAL | Age: 88
End: 2023-06-11
Payer: MEDICARE

## 2023-06-11 LAB
BASOPHILS # BLD AUTO: 0.04 10*3/MM3 (ref 0–0.2)
BASOPHILS NFR BLD AUTO: 0.7 % (ref 0–1.5)
DEPRECATED RDW RBC AUTO: 39.9 FL (ref 37–54)
EOSINOPHIL # BLD AUTO: 0.25 10*3/MM3 (ref 0–0.4)
EOSINOPHIL NFR BLD AUTO: 4.4 % (ref 0.3–6.2)
ERYTHROCYTE [DISTWIDTH] IN BLOOD BY AUTOMATED COUNT: 12.8 % (ref 12.3–15.4)
GLUCOSE BLDC GLUCOMTR-MCNC: 118 MG/DL (ref 70–130)
GLUCOSE BLDC GLUCOMTR-MCNC: 123 MG/DL (ref 70–130)
GLUCOSE BLDC GLUCOMTR-MCNC: 133 MG/DL (ref 70–130)
GLUCOSE BLDC GLUCOMTR-MCNC: 99 MG/DL (ref 70–130)
HCT VFR BLD AUTO: 33.9 % (ref 34–46.6)
HGB BLD-MCNC: 11.4 G/DL (ref 12–15.9)
IMM GRANULOCYTES # BLD AUTO: 0.04 10*3/MM3 (ref 0–0.05)
IMM GRANULOCYTES NFR BLD AUTO: 0.7 % (ref 0–0.5)
LYMPHOCYTES # BLD AUTO: 1.09 10*3/MM3 (ref 0.7–3.1)
LYMPHOCYTES NFR BLD AUTO: 19.4 % (ref 19.6–45.3)
MCH RBC QN AUTO: 29.2 PG (ref 26.6–33)
MCHC RBC AUTO-ENTMCNC: 33.6 G/DL (ref 31.5–35.7)
MCV RBC AUTO: 86.7 FL (ref 79–97)
MONOCYTES # BLD AUTO: 0.33 10*3/MM3 (ref 0.1–0.9)
MONOCYTES NFR BLD AUTO: 5.9 % (ref 5–12)
NEUTROPHILS NFR BLD AUTO: 3.87 10*3/MM3 (ref 1.7–7)
NEUTROPHILS NFR BLD AUTO: 68.9 % (ref 42.7–76)
NRBC BLD AUTO-RTO: 0 /100 WBC (ref 0–0.2)
PLATELET # BLD AUTO: 232 10*3/MM3 (ref 140–450)
PMV BLD AUTO: 9.6 FL (ref 6–12)
RBC # BLD AUTO: 3.91 10*6/MM3 (ref 3.77–5.28)
WBC NRBC COR # BLD: 5.62 10*3/MM3 (ref 3.4–10.8)

## 2023-06-11 PROCEDURE — 97165 OT EVAL LOW COMPLEX 30 MIN: CPT

## 2023-06-11 PROCEDURE — 97110 THERAPEUTIC EXERCISES: CPT

## 2023-06-11 PROCEDURE — 93005 ELECTROCARDIOGRAM TRACING: CPT | Performed by: INTERNAL MEDICINE

## 2023-06-11 PROCEDURE — 97116 GAIT TRAINING THERAPY: CPT

## 2023-06-11 PROCEDURE — 71045 X-RAY EXAM CHEST 1 VIEW: CPT

## 2023-06-11 PROCEDURE — 85025 COMPLETE CBC W/AUTO DIFF WBC: CPT | Performed by: NURSE PRACTITIONER

## 2023-06-11 PROCEDURE — 82948 REAGENT STRIP/BLOOD GLUCOSE: CPT

## 2023-06-11 RX ORDER — BUMETANIDE 1 MG/1
2 TABLET ORAL DAILY
Status: DISCONTINUED | OUTPATIENT
Start: 2023-06-11 | End: 2023-06-12

## 2023-06-11 RX ORDER — SPIRONOLACTONE 25 MG/1
12.5 TABLET ORAL DAILY
Status: DISCONTINUED | OUTPATIENT
Start: 2023-06-11 | End: 2023-06-13

## 2023-06-11 RX ORDER — SIMETHICONE 80 MG
80 TABLET,CHEWABLE ORAL 4 TIMES DAILY PRN
Status: DISCONTINUED | OUTPATIENT
Start: 2023-06-11 | End: 2023-06-15 | Stop reason: HOSPADM

## 2023-06-11 RX ADMIN — CLOPIDOGREL BISULFATE 75 MG: 75 TABLET ORAL at 08:51

## 2023-06-11 RX ADMIN — VENLAFAXINE HYDROCHLORIDE 150 MG: 75 CAPSULE, EXTENDED RELEASE ORAL at 08:52

## 2023-06-11 RX ADMIN — Medication 3 ML: at 21:02

## 2023-06-11 RX ADMIN — SENNOSIDES AND DOCUSATE SODIUM 2 TABLET: 50; 8.6 TABLET ORAL at 08:51

## 2023-06-11 RX ADMIN — SPIRONOLACTONE 12.5 MG: 25 TABLET ORAL at 17:37

## 2023-06-11 RX ADMIN — SIMETHICONE 80 MG: 80 TABLET, CHEWABLE ORAL at 14:16

## 2023-06-11 RX ADMIN — METOPROLOL SUCCINATE 25 MG: 25 TABLET, EXTENDED RELEASE ORAL at 08:51

## 2023-06-11 RX ADMIN — EMPAGLIFLOZIN 10 MG: 10 TABLET, FILM COATED ORAL at 08:51

## 2023-06-11 RX ADMIN — SENNOSIDES AND DOCUSATE SODIUM 2 TABLET: 50; 8.6 TABLET ORAL at 21:02

## 2023-06-11 RX ADMIN — ASPIRIN 81 MG: 81 TABLET, COATED ORAL at 08:51

## 2023-06-11 RX ADMIN — Medication 3 ML: at 08:52

## 2023-06-11 RX ADMIN — LEVOTHYROXINE SODIUM 50 MCG: 0.05 TABLET ORAL at 06:24

## 2023-06-11 RX ADMIN — Medication 10 ML: at 08:52

## 2023-06-11 RX ADMIN — PANTOPRAZOLE SODIUM 40 MG: 40 TABLET, DELAYED RELEASE ORAL at 06:24

## 2023-06-11 RX ADMIN — BUMETANIDE 2 MG: 1 TABLET ORAL at 17:36

## 2023-06-11 NOTE — THERAPY EVALUATION
Patient Name: Janna Willis  : 1935    MRN: [id number]                              Today's Date: 2023       Admit Date: 2023    Visit Dx:     ICD-10-CM ICD-9-CM   1. Heart block AV third degree  I44.2 426.0   2. Generalized weakness  R53.1 780.79   3. Near syncope  R55 780.2   4. Third degree heart block  I44.2 426.0     Patient Active Problem List   Diagnosis    Heart block AV third degree    CAD (coronary artery disease)    Primary hypertension    Mixed hyperlipidemia    GERD (gastroesophageal reflux disease)    Hypothyroid    Class 1 obesity in adult     Past Medical History:   Diagnosis Date    Diabetes      Past Surgical History:   Procedure Laterality Date    CARDIAC CATHETERIZATION N/A 2023    Procedure: Left Heart Cath;  Surgeon: Kuldeep Miner MD;  Location:  [facility];  Service: Cardiology;  Laterality: N/A;    CARDIAC ELECTROPHYSIOLOGY PROCEDURE N/A 2023    Procedure: Temporary Pacemaker;  Surgeon: Kuldeep Miner MD;  Location:  [facility];  Service: Cardiology;  Laterality: N/A;    CARDIAC ELECTROPHYSIOLOGY PROCEDURE N/A 2023    Procedure: Pacemaker DC new;  Surgeon: Dudley Rodriguez DO;  Location:  [facility];  Service: Cardiology;  Laterality: N/A;    OVARY SURGERY        General Information       Row Name 23 1549          OT Time and Intention    Document Type evaluation  attempted to see pt. and got some of home information 13:10 to 13:15, but with chest pain, test done and cleared for treatment and returned 14:35  -GAMALIEL     Mode of Treatment occupational therapy  -GAMALIEL       Row Name 23 1549          General Information    Patient Profile Reviewed yes  -GAMALIEL     Prior Level of Function independent:;all household mobility;community mobility;gait;transfer;bed mobility;feeding;grooming;dressing;bathing;home management;cooking;cleaning;driving;using stairs;shopping  per pt. most recently with progressive difficulty  performing each task needing extra time and effort  -     Existing Precautions/Restrictions cardiac;fall;oxygen therapy device and L/min  leadless PM placement 6/6 with some groin tenderness  -     Barriers to Rehab medically complex  -GAMALIEL       Row Name 06/11/23 1549          Occupational Profile    Environmental Supports and Barriers (Occupational Profile) wx in shower with seat and grab bar, grab bar by toilet  -       Row Name 06/11/23 1549          Living Environment    People in Home alone;other (see comments)  per report sister checks on patient  -GAMALIEL       Row Name 06/11/23 1549          Home Main Entrance    Number of Stairs, Main Entrance one  -GAMALIEL     Stair Railings, Main Entrance railing on left side (ascending)  -       Row Name 06/11/23 1549          Stairs Within Home, Primary    Number of Stairs, Within Home, Primary none  -       Row Name 06/11/23 1549          Cognition    Orientation Status (Cognition) oriented x 3  -       Row Name 06/11/23 1549          Safety Issues, Functional Mobility    Safety Issues Affecting Function (Mobility) insight into deficits/self-awareness;problem-solving;safety precaution awareness;safety precautions follow-through/compliance;judgment;sequencing abilities;positioning of assistive device;friction/shear risk  -     Impairments Affecting Function (Mobility) balance;coordination;endurance/activity tolerance;postural/trunk control;shortness of breath;strength;range of motion (ROM)  -               User Key  (r) = Recorded By, (t) = Taken By, (c) = Cosigned By      Initials Name Provider Type    Mouna Wilson OT Occupational Therapist                     Mobility/ADL's       Row Name 06/11/23 2003          Bed Mobility    Comment, (Bed Mobility) pt. UIC on arrival and returned to recliner post evaluation  -       Row Name 06/11/23 8357          Transfers    Transfers sit-stand transfer;stand-sit transfer  -     Comment, (Transfers) pt. needed cues  for hand placement, pt. stood 3 times from recliner varying from CGA to min A  -GAMALIEL       Row Name 06/11/23 1554          Sit-Stand Transfer    Sit-Stand [place] (Transfers) minimum assist (75% patient effort);verbal cues;nonverbal cues (demo/gesture)  -     Assistive Device (Sit-Stand Transfers) walker, front-wheeled  -GAMALIEL       Row Name 06/11/23 1554          Stand-Sit Transfer    Stand-Sit [place] (Transfers) minimum assist (75% patient effort);verbal cues  -     Assistive Device (Stand-Sit Transfers) walker, front-wheeled  -GAMALIEL       Row Name 06/11/23 1554          Functional Mobility    Functional Mobility- Ind. Level minimum assist (75% patient effort)  -     Functional Mobility- Device walker, front-wheeled  -     Functional Mobility-Distance (Feet) --  household distance with one sitting rest period  -     Functional Mobility- Safety Issues step length decreased;supplemental O2  -     Functional Mobility- Comment slowed pace, without wx use to sink pt. needed min A with UE support without wx use  -       Row Name 06/11/23 9000          Activities of Daily Living    BADL Assessment/Intervention lower body dressing;grooming;upper body dressing  -       Row Name 06/11/23 3364          Lower Body Dressing Assessment/Training    [place] Level (Lower Body Dressing) doff;don;socks;maximum assist (25% patient effort)  -GAMALIEL     Position (Lower Body Dressing) unsupported sitting  -GAMALIEL     Comment, (Lower Body Dressing) pt. could reach top of socks to pull up further, but she could not doff socks or start donning socks  -GAMALIEL       Row Name 06/11/23 5826          Grooming Assessment/Training    [place] Level (Grooming) oral care regimen;wash face, hands;standby assist  -GAMALIEL     Position (Grooming) sink side;unsupported standing  -GAMALIEL     Comment, (Grooming) pt. with rest period needed in recliner post completion  -       Row Name 06/11/23 7791          Upper Body Dressing  Assessment/Training    [place] Level (Upper Body Dressing) don;pajama/robe;maximum assist (25% patient effort)  -     Position (Upper Body Dressing) unsupported sitting  -               User Key  (r) = Recorded By, (t) = Taken By, (c) = Cosigned By      Initials Name Provider Type    GAMALIEL Mouna Clark, OT Occupational Therapist                   Obj/Interventions       Row Name 06/11/23 1559          Sensory Assessment (Somatosensory)    Sensory Assessment (Somatosensory) UE sensation intact  -[facility] Name 06/11/23 1559          Vision Assessment/Intervention    Visual Impairment/Limitations corrective lenses full-time  -       Row Name 06/11/23 1559          Range of Motion Comprehensive    General Range of Motion bilateral upper extremity ROM WNL  -[facility] Name 06/11/23 1559          Strength Comprehensive (MMT)    General Manual Muscle Testing (MMT) Assessment upper extremity strength deficits identified  -     Comment, General Manual Muscle Testing (MMT) Assessment BUE grossly 4 to 4+/5  -[facility] Name 06/11/23 1559          Shoulder (Therapeutic Exercise)    Shoulder (Therapeutic Exercise) AROM (active range of motion)  -     Shoulder AROM (Therapeutic Exercise) bilateral;flexion;extension;aBduction;aDduction;horizontal aBduction/aDduction;5 repetitions;sitting  cues for PLB  -       Row Name 06/11/23 1559          Elbow/Forearm (Therapeutic Exercise)    Elbow/Forearm (Therapeutic Exercise) strengthening exercise  -     Elbow/Forearm Strengthening (Therapeutic Exercise) bilateral;flexion;extension;5 repetitions;sitting  mild manual resistance given  -       Row Name 06/11/23 1559          Motor Skills    Motor Skills functional endurance  -     Functional Endurance 02 sats low to mid 90's  on 02 NC, frequent sitting rest periods needed throughout session  -     Therapeutic Exercise shoulder;elbow/forearm  -       Row Name 06/11/23 1559          Balance    Static Sitting  Balance standby assist  -GAMALIEL     Dynamic Sitting Balance standby assist  -GAMALIEL     Position, Sitting Balance unsupported;sitting in chair  -GAMALIEL     Static Standing Balance contact guard  -GAMALIEL     Dynamic Standing Balance minimal assist  SBA grooming sinkside, varied during session  -GAMALIEL     Position/Device Used, Standing Balance walker, rolling  -GAMALIEL     Balance Interventions sit to stand;occupation based/functional task  -GAMALIEL     Comment, Balance LBD, grooming, UBD  -GAMALIEL               User Key  (r) = Recorded By, (t) = Taken By, (c) = Cosigned By      Initials Name Provider Type    GAMALIEL Mouna Clark, OT Occupational Therapist                   Goals/Plan       Row Name 06/11/23 160          Transfer Goal 1 (OT)    Activity/Assistive Device (Transfer Goal 1, OT) commode;walker, rolling  -GAMALIEL     [place] Level/Cues Needed (Transfer Goal 1, OT) standby assist  -GAMALIEL     Time Frame (Transfer Goal 1, OT) long term goal (LTG);10 days  -GAMALIEL     Strategies/Barriers (Transfers Goal 1, OT) good safety demonstrated  -GAMALIEL     Progress/Outcome (Transfer Goal 1, OT) new goal  -       Row Name 06/11/23 1605          Dressing Goal 1 (OT)    Activity/Device (Dressing Goal 1, OT) lower body dressing  -GAMALIEL     [place]/Cues Needed (Dressing Goal 1, OT) minimum assist (75% or more patient effort);set-up required;tactile cues required;verbal cues required  -GAMALIEL     Time Frame (Dressing Goal 1, OT) long term goal (LTG);10 days  -GAMALIEL     Strategies/Barriers (Dressing Goal 1, OT) AE use prn  -GAMALIEL     Progress/Outcome (Dressing Goal 1, OT) new goal  -GAMALIEL       Row Name 06/11/23 1607          Toileting Goal 1 (OT)    Activity/Device (Toileting Goal 1, OT) toileting skills, all;commode;grab bar/safety frame  -GAMALIEL     [place] Level/Cues Needed (Toileting Goal 1, OT) standby assist  -GAMALIEL     Time Frame (Toileting Goal 1, OT) long term goal (LTG);10 days  -GAMALIEL     Progress/Outcome (Toileting Goal 1, OT) new goal  -       Row Name 06/11/23 4418           Therapy Assessment/Plan (OT)    Planned Therapy Interventions (OT) activity tolerance training;BADL retraining;occupation/activity based interventions;ROM/therapeutic exercise;patient/caregiver education/training;strengthening exercise;transfer/mobility retraining;functional balance retraining  -               User Key  (r) = Recorded By, (t) = Taken By, (c) = Cosigned By      Initials Name Provider Type    GAMALIEL Mouna Clark, OT Occupational Therapist                   Clinical Impression       Row Name 06/11/23 1600          Pain Assessment    Pretreatment Pain Rating 0/10 - no pain  -GAMALIEL     Posttreatment Pain Rating 0/10 - no pain  -GAMALIEL       Row Name 06/11/23 160          Plan of Care Review    Plan of Care Reviewed With patient  -GAMALIEL     Progress no change  -     Outcome Evaluation Patient present with impaired strength, balance, endurance, ROM and coordination impacting PLOF.  Skilled OT services warranted to address deficit areas and promote return to prior higher level of independence.  Recommend IRF at discharge.  -       Row Name 06/11/23 1602          Therapy Assessment/Plan (OT)    Patient/Family Therapy Goal Statement (OT) return to ability to return home and care for herself  -GAMALIEL     Rehab Potential (OT) good, to achieve stated therapy goals  -     Criteria for Skilled Therapeutic Interventions Met (OT) yes;meets criteria;skilled treatment is necessary  -     Therapy Frequency (OT) daily  -       Row Name 06/11/23 1621          Therapy Plan Review/Discharge Plan (OT)    Equipment Needs Upon Discharge (OT) walker, rolling  vs rollator for EC  -GAMALIEL     Anticipated Discharge Disposition (OT) inpatient rehabilitation facility  -       Row Name 06/11/23 1603          Vital Signs    Pre Systolic BP Rehab 119  -GAMALIEL     Pre Treatment Diastolic BP 61  -GAMALIEL     Pretreatment Heart Rate (beats/min) 60  -GAMALIEL     Posttreatment Heart Rate (beats/min) 67  -GAMALIEL     Pre SpO2 (%) 98  -GAMALIEL     O2 Delivery Pre  Treatment nasal cannula  -GAMALIEL     O2 Delivery Intra Treatment nasal cannula  -GAMALIEL     Post SpO2 (%) 97  -GAMALIEL     O2 Delivery Post Treatment nasal cannula  -GAMALIEL     Pre Patient Position Sitting  -GAMALIEL     Intra Patient Position Standing  -GAMALIEL     Post Patient Position Sitting  -GAMALIEL       Row Name 06/11/23 1605          Positioning and Restraints    Pre-Treatment Position sitting in chair/recliner  -GAMALIEL     Post Treatment Position chair  -GAMALIEL     In Chair sitting;with PT;with family/caregiver  -GAMALIEL               User Key  (r) = Recorded By, (t) = Taken By, (c) = Cosigned By      Initials Name Provider Type    Mouna Wilson, OT Occupational Therapist                   Outcome Measures       Row Name 06/11/23 1611          How much help from another is currently needed...    Putting on and taking off regular lower body clothing? 2  -GAMALIEL     Bathing (including washing, rinsing, and drying) 2  -GAMALIEL     Toileting (which includes using toilet bed pan or urinal) 3  -GAMALIEL     Putting on and taking off regular upper body clothing 2  -GAMALIEL     Taking care of personal grooming (such as brushing teeth) 3  -GAMALIEL     Eating meals 4  -GAMALIEL     AM-PAC 6 Clicks Score (OT) 16  -GAMALIEL       Row Name 06/11/23 1549 06/11/23 0800       How much help from another person do you currently need...    Turning from your back to your side while in flat bed without using bedrails? 3  -BA 3  -AM    Moving from lying on back to sitting on the side of a flat bed without bedrails? 3  -BA 3  -AM    Moving to and from a bed to a chair (including a wheelchair)? 3  -BA 3  -AM    Standing up from a chair using your arms (e.g., wheelchair, bedside chair)? 3  -BA 3  -AM    Climbing 3-5 steps with a railing? 2  -BA 2  -AM    To walk in hospital room? 3  -BA 3  -AM    AM-PAC 6 Clicks Score (PT) 17  -BA 17  -AM    Highest level of mobility 5 --> Static standing  -BA 5 --> Static standing  -AM      Row Name 06/11/23 1611 06/11/23 1549       Functional Assessment    Outcome Measure  Options AM-PAC 6 Clicks Daily Activity (OT)  -GAMALIEL AM-PAC 6 Clicks Basic Mobility (PT)  -EBER              User Key  (r) = Recorded By, (t) = Taken By, (c) = Cosigned By      Initials Name Provider Type    Mouna Wilson, OT Occupational Therapist    Regla Hernandez, RN Registered Nurse    Millicent Zarco, PT Physical Therapist                    Occupational Therapy Education       Title: PT OT SLP Therapies (In Progress)       Topic: Occupational Therapy (In Progress)       Point: ADL training (Done)       Description:   Instruct learner(s) on proper safety adaptation and remediation techniques during self care or transfers.   Instruct in proper use of assistive devices.                  Learning Progress Summary             Patient Acceptance, E, VU,NR by GAMALIEL at 6/11/2023 1612    Comment: reason for consult, noted deficits, transfer safety, AE available for LBD   Family Acceptance, E, VU,NR by GAMALIEL at 6/11/2023 1612    Comment: reason for consult, noted deficits, transfer safety, AE available for LBD                         Point: Home exercise program (Not Started)       Description:   Instruct learner(s) on appropriate technique for monitoring, assisting and/or progressing therapeutic exercises/activities.                  Learner Progress:  Not documented in this visit.              Point: Precautions (Done)       Description:   Instruct learner(s) on prescribed precautions during self-care and functional transfers.                  Learning Progress Summary             Patient Acceptance, E, VU,NR by GAMALIEL at 6/11/2023 1612    Comment: reason for consult, noted deficits, transfer safety, AE available for LBD   Family Acceptance, E, VU,NR by GAMALIEL at 6/11/2023 1612    Comment: reason for consult, noted deficits, transfer safety, AE available for LBD                         Point: Body mechanics (Not Started)       Description:   Instruct learner(s) on proper positioning and spine alignment during  self-care, functional mobility activities and/or exercises.                  Learner Progress:  Not documented in this visit.                              User Key       Initials Effective Dates Name Provider Type Discipline     05/31/23 -  Mouna Clark OT Occupational Therapist OT                  OT Recommendation and Plan  Planned Therapy Interventions (OT): activity tolerance training, BADL retraining, occupation/activity based interventions, ROM/therapeutic exercise, patient/caregiver education/training, strengthening exercise, transfer/mobility retraining, functional balance retraining  Therapy Frequency (OT): daily  Plan of Care Review  Plan of Care Reviewed With: patient  Progress: no change  Outcome Evaluation: Patient present with impaired strength, balance, endurance, ROM and coordination impacting PLOF.  Skilled OT services warranted to address deficit areas and promote return to prior higher level of independence.  Recommend IRF at discharge.     Time Calculation:    Time Calculation- OT       Row Name 06/11/23 1612 06/11/23 1549          Time Calculation- OT    OT Start Time 1435  -GAMALIEL --     OT Received On 06/11/23  -GAMALIEL --     OT Goal Re-Cert Due Date 06/21/23  -GAMALIEL --        Timed Charges    27159 - OT Therapeutic Exercise Minutes 6  -GAMALIEL --     44212 - Gait Training Minutes  -- 14  -BA     58064 - OT Self Care/Mgmt Minutes 6  -GAMALIEL --        Untimed Charges    OT Eval/Re-eval Minutes 43  -GAMALIEL --        Total Minutes    Timed Charges Total Minutes 12  -GAMALIEL 14  -BA     Untimed Charges Total Minutes 43  -GAMALIEL --      Total Minutes 55  -GAMALIEL 14  -BA               User Key  (r) = Recorded By, (t) = Taken By, (c) = Cosigned By      Initials Name Provider Type    Mouna Wilson OT Occupational Therapist    BA Millicent Dan PT Physical Therapist                  Therapy Charges for Today       Code Description Service Date Service Provider Modifiers Qty    14350010945 HC OT THER PROC EA 15 MIN  6/11/2023 Mouna Clark, OT GO 1    [id number]  OT EVAL LOW COMPLEXITY 3 6/11/2023 Mouna Clark, OT GO 1                 Mouna Clark, OT  6/11/2023

## 2023-06-11 NOTE — PLAN OF CARE
Goal Outcome Evaluation:  Plan of Care Reviewed With: patient, family        Progress: improving  Outcome Evaluation: Improving performance and toleration to activity today noted by improving level of assist and increased distance with ambulation.  Ambulated 40+40ft with FWW and CGA progressing to bouts of minAx1+1, with chair follow for safety.  Con to demonstrate decreased functional endurance, increased O2 requirements, gait instability, weakness, decreased balance, and decreased activity tolerance compared to baseline level of function.  Con to progress pt as able per PT POC.  Rec IPR upon d/c.      PT Evaluation Complexity  History, PT Evaluation Complexity: 3 or more personal factors and/or comorbidities  Examination of Body Systems (PT Eval Complexity): total of 3 or more elements  Clinical Presentation (PT Evaluation Complexity): evolving  Clinical Decision Making (PT Evaluation Complexity): moderate complexity  Overall Complexity (PT Evaluation Complexity): moderate complexity

## 2023-06-11 NOTE — THERAPY TREATMENT NOTE
Patient Name: Janna Willis  : 1935    MRN: [id number]                              Today's Date: 2023       Admit Date: 2023    Visit Dx:     ICD-10-CM ICD-9-CM   1. Heart block AV third degree  I44.2 426.0   2. Generalized weakness  R53.1 780.79   3. Near syncope  R55 780.2   4. Third degree heart block  I44.2 426.0     Patient Active Problem List   Diagnosis    Heart block AV third degree    CAD (coronary artery disease)    Primary hypertension    Mixed hyperlipidemia    GERD (gastroesophageal reflux disease)    Hypothyroid    Class 1 obesity in adult     Past Medical History:   Diagnosis Date    Diabetes      Past Surgical History:   Procedure Laterality Date    CARDIAC CATHETERIZATION N/A 2023    Procedure: Left Heart Cath;  Surgeon: Kuldeep Minre MD;  Location:  [facility];  Service: Cardiology;  Laterality: N/A;    CARDIAC ELECTROPHYSIOLOGY PROCEDURE N/A 2023    Procedure: Temporary Pacemaker;  Surgeon: Kuldeep Miner MD;  Location:  [facility];  Service: Cardiology;  Laterality: N/A;    CARDIAC ELECTROPHYSIOLOGY PROCEDURE N/A 2023    Procedure: Pacemaker DC new;  Surgeon: Dudley Rodriguez DO;  Location:  [facility] EP INVASIVE LOCATION;  Service: Cardiology;  Laterality: N/A;    OVARY SURGERY        General Information       Row Name 23 153          Physical Therapy Time and Intention    Document Type therapy note (daily note)  -BA     Mode of Treatment physical therapy  -BA       Row Name 23 153          General Information    Patient Profile Reviewed yes  -BA     Existing Precautions/Restrictions cardiac;fall;oxygen therapy device and L/min;other (see comments)  s/p leadless pacemaker placement ; R groin hematoma; possible aortic aneurysm; BUE tremors; monitor O2 sats and HR  -BA     Barriers to Rehab medically complex  -BA       Row Name 23 7752          Cognition    Orientation Status (Cognition) oriented x 3  -BA        Row Name 06/11/23 1532          Safety Issues, Functional Mobility    Safety Issues Affecting Function (Mobility) awareness of need for assistance;insight into deficits/self-awareness;judgment;positioning of assistive device;problem-solving;safety precaution awareness;safety precautions follow-through/compliance;sequencing abilities  -BA     Impairments Affecting Function (Mobility) balance;coordination;endurance/activity tolerance;postural/trunk control;shortness of breath;strength  -     Comment, Safety Issues/Impairments (Mobility) Pt with episode of reported chest pain prior; RN notified.  MDs evaluated pt and gave clearance for continued participation in therapy today.  Increasead lethargy near end of session; began falling asleep.  -               User Key  (r) = Recorded By, (t) = Taken By, (c) = Cosigned By      Initials Name Provider Type    Millicent Zarco, PT Physical Therapist                   Mobility       Row Name 06/11/23 1537          Bed Mobility    Comment, (Bed Mobility) Received [facility] upon arrival and returned to chair.  -       Row Name 06/11/23 1537          Sit-Stand Transfer    Sit-Stand [place] (Transfers) minimum assist (75% patient effort);verbal cues;nonverbal cues (demo/gesture)  -     Assistive Device (Sit-Stand Transfers) walker, front-wheeled  -BA     Comment, (Sit-Stand Transfer) STS x 2 reps from chair.  VCs/TCs for sequencing and safe hand placement.  Reported no dizziness upon standing.  -BA       Row Name 06/11/23 1537          Gait/Stairs (Locomotion)    [place] Level (Gait) contact guard;minimum assist (75% patient effort);1 person assist;1 person to manage equipment;verbal cues;other (see comments)  chair follow  -     Assistive Device (Gait) walker, front-wheeled  -BA     Distance in Feet (Gait) 40+40  -BA     Deviations/Abnormal Patterns (Gait) bilateral deviations;gilberto decreased;gait speed decreased;stride length decreased  -BA     Bilateral  Gait Deviations forward flexed posture;heel strike decreased  -     Comment, (Gait/Stairs) Chair follow for safety.  CGA progressing to bouts of Wei when becoming fatigued.  Demonstrated step through gait pattern with con decreased gilberto and step length.  Relies heavily on use of BUE for added support through FWW.  Required 1 seated rest break d/t fatigue and reported COHN/SOA.  O2 sats stable throughout on 1.5L.  VCs/TCs for optimal walker positioning with staying in middle-posterior portion of FWW and not too far anteriorly, improved stride length, upright posture, and PLB.  Gait distance limited by fatigue and COHN/SOA.  -               User Key  (r) = Recorded By, (t) = Taken By, (c) = Cosigned By      Initials Name Provider Type     Millicent Dan, PT Physical Therapist                   Obj/Interventions       Row Name 06/11/23 1541          Motor Skills    Therapeutic Exercise hip;knee;ankle  -BA       Row Name 06/11/23 1541          Hip (Therapeutic Exercise)    Hip Strengthening (Therapeutic Exercise) bilateral;aBduction;aDduction;10 repetitions;marching while seated;2 sets;5 repetitions;sitting  -[facility] Name 06/11/23 1541          Knee (Therapeutic Exercise)    Knee (Therapeutic Exercise) strengthening exercise  -     Knee Strengthening (Therapeutic Exercise) bilateral;LAQ (long arc quad);sitting;10 repetitions  -BA       Row Name 06/11/23 1541          Ankle (Therapeutic Exercise)    Ankle (Therapeutic Exercise) AROM (active range of motion)  -     Ankle AROM (Therapeutic Exercise) bilateral;dorsiflexion;plantarflexion;sitting;10 repetitions  -[facility] Name 06/11/23 1541          Balance    Balance Assessment sitting static balance;sitting dynamic balance;sit to stand dynamic balance;standing static balance;standing dynamic balance  -     Static Sitting Balance standby assist  -     Dynamic Sitting Balance contact guard  -     Position, Sitting Balance unsupported;sitting in  chair  -BA     Sit to Stand Dynamic Balance minimal assist;verbal cues;non-verbal cues (demo/gesture)  -     Static Standing Balance contact guard  -     Dynamic Standing Balance contact guard;minimal assist;verbal cues  -     Position/Device Used, Standing Balance supported;walker, front-wheeled  -     Comment, Balance Mild instability with ambulation activity with FWW; no overt LOB noted.  -               User Key  (r) = Recorded By, (t) = Taken By, (c) = Cosigned By      Initials Name Provider Type    Millicent Zarco, PT Physical Therapist                   Goals/Plan    No documentation.                  Clinical Impression       Row Name 06/11/23 1543          Pain    Pretreatment Pain Rating 0/10 - no pain  -     Posttreatment Pain Rating 0/10 - no pain  -       Row Name 06/11/23 1543          Plan of Care Review    Plan of Care Reviewed With patient;family  -     Progress improving  -     Outcome Evaluation Improving performance and toleration to activity today noted by improving level of assist and increased distance with ambulation.  Ambulated 40+40ft with FWW and CGA progressing to bouts of minAx1+1, with chair follow for safety.  Con to demonstrate decreased functional endurance, increased O2 requirements, gait instability, weakness, decreased balance, and decreased activity tolerance compared to baseline level of function.  Con to progress pt as able per PT POC.  Rec IPR upon d/c.  -       Row Name 06/11/23 1543          Vital Signs    Pre Systolic BP Rehab --  VSS; RN cleared for activity.  -BA     Pretreatment Heart Rate (beats/min) 65  -BA     Posttreatment Heart Rate (beats/min) 59  -BA     Pre SpO2 (%) 98  -BA     O2 Delivery Pre Treatment nasal cannula  1.5L  -BA     Intra SpO2 (%) 97  -BA     O2 Delivery Intra Treatment nasal cannula  1.5L  -BA     Post SpO2 (%) 98  -BA     O2 Delivery Post Treatment nasal cannula  1.5L  -BA     Pre Patient Position Sitting  -     Intra  Patient Position Standing  -BA     Post Patient Position Sitting  -BA       Row Name 06/11/23 1543          Positioning and Restraints    Pre-Treatment Position sitting in chair/recliner  -BA     Post Treatment Position chair  -BA     In Chair notified nsg;reclined;sitting;call light within reach;encouraged to call for assist;exit alarm on;with family/caregiver;waffle cushion;legs elevated  -               User Key  (r) = Recorded By, (t) = Taken By, (c) = Cosigned By      Initials Name Provider Type    Millicent Zarco, PT Physical Therapist                   Outcome Measures       Row Name 06/11/23 1549 06/11/23 0800       How much help from another person do you currently need...    Turning from your back to your side while in flat bed without using bedrails? 3  -BA 3  -AM    Moving from lying on back to sitting on the side of a flat bed without bedrails? 3  -BA 3  -AM    Moving to and from a bed to a chair (including a wheelchair)? 3  -BA 3  -AM    Standing up from a chair using your arms (e.g., wheelchair, bedside chair)? 3  -BA 3  -AM    Climbing 3-5 steps with a railing? 2  -BA 2  -AM    To walk in hospital room? 3  -BA 3  -AM    AM-PAC 6 Clicks Score (PT) 17  -BA 17  -AM    Highest level of mobility 5 --> Static standing  -BA 5 --> Static standing  -AM      Row Name 06/11/23 1549          Functional Assessment    Outcome Measure Options AM-PAC 6 Clicks Basic Mobility (PT)  -               User Key  (r) = Recorded By, (t) = Taken By, (c) = Cosigned By      Initials Name Provider Type    Regla Hernandez, RN Registered Nurse    Millicent Zraco, PT Physical Therapist                                 Physical Therapy Education       Title: PT OT SLP Therapies (In Progress)       Topic: Physical Therapy (In Progress)       Point: Mobility training (In Progress)       Learning Progress Summary             Patient Acceptance, E, NR by EBER at 6/11/2023 1549    Acceptance, E, NR by EBER at 6/10/2023  1614    Acceptance, E, VU,NR by  at 6/8/2023 1121                         Point: Home exercise program (In Progress)       Learning Progress Summary             Patient Acceptance, E, NR by  at 6/11/2023 1549    Acceptance, E, NR by  at 6/10/2023 1614                         Point: Body mechanics (In Progress)       Learning Progress Summary             Patient Acceptance, E, NR by  at 6/11/2023 1549    Acceptance, E, NR by  at 6/10/2023 1614    Acceptance, E, VU,NR by  at 6/8/2023 1121                         Point: Precautions (In Progress)       Learning Progress Summary             Patient Acceptance, E, NR by  at 6/11/2023 1549    Acceptance, E, NR by  at 6/10/2023 1614    Acceptance, E, VU,NR by  at 6/8/2023 1121                                         User Key       Initials Effective Dates Name Provider Type Discipline     09/21/21 -  Millicent Dan, PT Physical Therapist PT                  PT Recommendation and Plan  Planned Therapy Interventions (PT): balance training, bed mobility training, gait training, home exercise program, neuromuscular re-education, patient/family education, postural re-education, stair training, strengthening, transfer training  Plan of Care Reviewed With: patient, family  Progress: improving  Outcome Evaluation: Improving performance and toleration to activity today noted by improving level of assist and increased distance with ambulation.  Ambulated 40+40ft with FWW and CGA progressing to bouts of minAx1+1, with chair follow for safety.  Con to demonstrate decreased functional endurance, increased O2 requirements, gait instability, weakness, decreased balance, and decreased activity tolerance compared to baseline level of function.  Con to progress pt as able per PT POC.  Rec IPR upon d/c.     Time Calculation:    PT Charges       Row Name 06/11/23 1549             Time Calculation    Start Time 1444  -BA      PT Received On 06/11/23  -         Time  Calculation- PT    Total Timed Code Minutes- PT 26 minute(s)  -BA         Timed Charges    91165 - PT Therapeutic Exercise Minutes 12  -BA      11199 - Gait Training Minutes  14  -BA         Total Minutes    Timed Charges Total Minutes 26  -BA       Total Minutes 26  -BA                User Key  (r) = Recorded By, (t) = Taken By, (c) = Cosigned By      Initials Name Provider Type    Millicent Zarco, PT Physical Therapist                  Therapy Charges for Today       Code Description Service Date Service Provider Modifiers Qty    03876243145 HC PT THER PROC EA 15 MIN 6/10/2023 Millicent Dan, PT GP 1    03784362159 HC GAIT TRAINING EA 15 MIN 6/10/2023 Millicent Dan, PT GP 1    82451985169 HC PT THERAPEUTIC ACT EA 15 MIN 6/10/2023 Millicent Dan, PT GP 1    59640431611 HC PT THER PROC EA 15 MIN 6/11/2023 Millicent Dan, PT GP 1    40274600924 HC GAIT TRAINING EA 15 MIN 6/11/2023 Millicent Dan, PT GP 1            PT G-Codes  Outcome Measure Options: AM-PAC 6 Clicks Basic Mobility (PT)  AM-PAC 6 Clicks Score (PT): 17  PT Discharge Summary  Anticipated Discharge Disposition (PT): inpatient rehabilitation facility    Millicent Dan PT  6/11/2023

## 2023-06-11 NOTE — PROGRESS NOTES
"[facility] at [facility]  IP Progress Note    PROBLEM LIST:  1.Third-degree AV block status post pacemaker  2. Coronary artery disease s/p PTCA stent of RCA  3. Hypertension  4. Hyperlipidemia  5. GERD  6. Hypothyroidism      HOSPITAL COURSE:  Patient was admitted with third-degree AV block and underwent left heart catheterization revealing blockage of the right coronary artery that was successfully stented and had a leadless pacemaker placed      CHIEF COMPLAINTS:  Chest pain and shortness of breath with third-degree AV block       Subjective   patient complaining of abdominal discomfort this morning.  It hurts when she takes a deep breath        Objective     Blood pressure 119/61, pulse 59, temperature 98.3 °F (36.8 °C), temperature source Oral, resp. rate 16, height 162.6 cm (64.02\"), weight 88.3 kg (194 lb 10.7 oz), SpO2 98 %, not currently breastfeeding.     Intake/Output Summary (Last 24 hours) at 6/11/2023 1428  Last data filed at 6/11/2023 1108  Gross per 24 hour   Intake 360 ml   Output 1200 ml   Net -840 ml       PHYSICAL EXAM:  Constitutional:       General: Healthy     Appearance: A little concerning that she is not getting better    Neck:     JVP: No JVP     Carotid artery: No carotid bruit    Pulmonary:      Effort: Pulmonary effort is normal.      Breath sounds: Good breath sounds    Cardiovascular:      Normal rate. Regular rhythm. Normal S1. Normal S2.      Murmurs: There is no murmur.      No gallop. No click. No rub.     Abdominal:      General: Bowel sounds are normal.      Palpations: Abdomen is soft.      Tenderness: There is no abdominal tenderness.    Extremities:     Pulses: Good pulses with bruise of the right femoral area     Edema: No edema    RESULT REVIEW:    I reviewed the patient's new clinical results.      MEDICATIONS:    aspirin, 81 mg, Oral, Daily  clopidogrel, 75 mg, Oral, Daily  empagliflozin, 10 mg, Oral, Daily  insulin lispro, 2-9 Units, Subcutaneous, " 4x Daily AC & at Bedtime  levothyroxine, 50 mcg, Oral, Q AM  metoprolol succinate XL, 25 mg, Oral, Q24H  pantoprazole, 40 mg, Oral, QAM  pharmacy consult - [facility], , Does not apply, Daily  senna-docusate sodium, 2 tablet, Oral, BID  sodium chloride, 3 mL, Intravenous, Q12H  venlafaxine XR, 150 mg, Oral, Daily With Breakfast          Results from last 7 days   Lab Units 06/11/23  0900   WBC 10*3/mm3 5.62   HEMOGLOBIN g/dL 11.4*   HEMATOCRIT % 33.9*   PLATELETS 10*3/mm3 232     Results from last 7 days   Lab Units 06/08/23  0459 06/07/23  1415 06/07/23  0449   SODIUM mmol/L 131*   < > 132*   POTASSIUM mmol/L 4.1   < > 4.3   CHLORIDE mmol/L 95*   < > 98   CO2 mmol/L 26.0   < > 27.0   BUN mg/dL 23   < > 18   CREATININE mg/dL 0.80   < > 0.75   CALCIUM mg/dL 8.3*   < > 8.1*   BILIRUBIN mg/dL  --   --  0.4   ALK PHOS U/L  --   --  55   ALT (SGPT) U/L  --   --  45*   AST (SGOT) U/L  --   --  34*   GLUCOSE mg/dL 100*   < > 115*    < > = values in this interval not displayed.     Results from last 7 days   Lab Units 06/05/23  1757   INR  1.07     Lab Results   Component Value Date    TROPONINT 30 (H) 06/05/2023     Results from last 7 days   Lab Units 06/05/23  1757   TSH uIU/mL 11.530*   FREE T4 ng/dL 1.00     Results from last 7 days   Lab Units 06/06/23  0457   CHOLESTEROL mg/dL 150   TRIGLYCERIDES mg/dL 94   HDL CHOL mg/dL 48   LDL CHOL mg/dL 84         No results found for: IRON, FERRITIN, LABIRON, TIBC   Hemoglobin A1C   Date Value Ref Range Status   06/06/2023 6.00 (H) 4.80 - 5.60 % Final     Magnesium   Date Value Ref Range Status   06/07/2023 1.8 1.6 - 2.4 mg/dL Final        Tele: AV Paced      ASSESSMENT:     Heart failure with preserved ejection fraction  Coronary artery disease s/p coronary artery revascularization  Third-degree AV block status post leadless pacemaker placement          PLAN:     Her proBNP has been around 3000.  I have given her some diuresis yesterday I will start her on regular today.  I have  started her on SGLT2's.  I will add low-dose spironolactone.  We will need better control of her thyroid.                                                  Pt. deferred secondary to just receiving news of Right-sided sacral, S3 and pubic rami fractures and wants to speak with Orthopedic team prior to movement. RN Samara bedside aware and acknowledged. To be assessed at later date/time if and when safe and appropriate, and pt. agreeable.

## 2023-06-11 NOTE — PLAN OF CARE
Goal Outcome Evaluation:  Plan of Care Reviewed With: patient        Progress: no change  Outcome Evaluation: Patient present with impaired strength, balance, endurance, ROM and coordination impacting PLOF.  Skilled OT services warranted to address deficit areas and promote return to prior higher level of independence.  Recommend IRF at discharge.

## 2023-06-11 NOTE — PROGRESS NOTES
[facility] Medicine Services  PROGRESS NOTE    Patient Name: Janna Willis  : 1935  MRN: [id number]    Date of Admission: 2023  Primary Care Physician: Hever Ibrahim APRN    Subjective   Subjective     CC: Dyspnea    HPI:   Pt sitting up in bed stating she doesn't feel well today but can't pinpoint exactly the issues. She has ecchymosis to the left groin. Denies shortness of air, chest pain, heart palpitations, abd pain, nausea, vomiting.       Objective   Objective     Vital Signs:   Temp:  [98 °F (36.7 °C)-98.9 °F (37.2 °C)] 98.3 °F (36.8 °C)  Heart Rate:  [59-81] 59  Resp:  [16-17] 16  BP: (140-160)/(58-83) 141/58  Flow (L/min):  [2] 2     Physical Exam:  Constitutional: Awake, alert, NAD  HENT: NCAT, mucous membranes moist  Respiratory: Clear to auscultation bilaterally, nonlabored respirations   Cardiovascular: RRR, no murmurs, rubs, or gallops  Gastrointestinal: Positive bowel sounds, soft, nontender, nondistended  Musculoskeletal: No bilateral ankle edema  Psychiatric: Appropriate affect, cooperative  Neurologic: Oriented x 3, non focal, speech clear  Skin: Ecchymosis to the right groin      Results Reviewed:  LAB RESULTS:      Lab 23  0900 23  0418 23  0459 23  1415 23  0457 23  1757   WBC 5.62 6.88 6.35 8.25 9.77 7.47   HEMOGLOBIN 11.4* 9.7* 9.9* 10.0* 11.3* 12.2   HEMATOCRIT 33.9* 29.6* 30.9* 31.0* 35.4 37.8   PLATELETS 232 147 137* 126* 151 142   NEUTROS ABS 3.87 4.36  --  6.29 7.15* 5.48   IMMATURE GRANS (ABS) 0.04 0.02  --  0.05 0.03 0.04   LYMPHS ABS 1.09 1.61  --  1.07 1.62 1.31   MONOS ABS 0.33 0.59  --  0.62 0.88 0.51   EOS ABS 0.25 0.26  --  0.20 0.05 0.10   MCV 86.7 88.4 90.9 91.2 90.3 90.9   PROTIME  --   --   --   --   --  14.0   APTT  --   --   --   --   --  24.2*   HEPARIN ANTI-XA  --   --   --   --   --  0.10*   D DIMER QUANT  --   --   --   --   --  0.44         Lab 23  0459 23  6330  06/07/23  0449 06/06/23 0457 06/05/23  1757   SODIUM 131* 128* 132* 135* 132*   POTASSIUM 4.1 4.1 4.3 3.8 4.7   CHLORIDE 95* 96* 98 100 97*   CO2 26.0 23.0 27.0 22.0 22.0   ANION GAP 10.0 9.0 7.0 13.0 13.0   BUN 23 19 18 18 23   CREATININE 0.80 0.96 0.75 0.83 1.25*   EGFR 71.4 57.4* 77.2 68.3 41.8*   GLUCOSE 100* 153* 115* 119* 175*   CALCIUM 8.3* 8.2* 8.1* 8.6 8.5*   MAGNESIUM  --   --  1.8  --  1.9   PHOSPHORUS  --   --  2.7  --   --    HEMOGLOBIN A1C  --   --   --  6.00*  --    TSH  --   --   --   --  11.530*         Lab 06/07/23 0449 06/05/23  1757   TOTAL PROTEIN 6.1 6.4   ALBUMIN 3.3* 3.3*   GLOBULIN 2.8 3.1   ALT (SGPT) 45* 70*   AST (SGOT) 34* 78*   BILIRUBIN 0.4 0.6   ALK PHOS 55 60         Lab 06/10/23  1601 06/08/23 0459 06/05/23  1757   PROBNP 3,061.0* 2,299.0* 6,329.0*   HSTROP T  --   --  30*   PROTIME  --   --  14.0   INR  --   --  1.07         Lab 06/06/23 0457   CHOLESTEROL 150   LDL CHOL 84   HDL CHOL 48   TRIGLYCERIDES 94             Brief Urine Lab Results     None          Microbiology Results Abnormal     None          XR Chest 1 View    Result Date: 6/10/2023  XR CHEST 1 VW Date of Exam: 6/10/2023 11:20 AM EDT Indication: CHF/Pleural effusion Comparison: 6/7/2023 Findings: Heart size and pulmonary vasculature are stable. Lungs remain grossly clear. Persistent mild blunting of both costophrenic angles     Impression: Impression: Stable trace pleural effusions. No acute pulmonary abnormality Electronically Signed: Petey Anthony  6/10/2023 12:47 PM EDT  Workstation ID: [id number]    CT Angiogram Chest    Result Date: 6/9/2023  CT ANGIOGRAM CHEST Date of Exam: 6/9/2023 8:05 PM EDT Indication: Aortic aneurysm, known or suspected. Comparison: No recent comparison studies. Previous 8/18/2012 angiographic chest CT scan Technique: CTA of the chest was performed after the uneventful intravenous administration of 80 mL Isovue 370. Reconstructed coronal and sagittal images were also obtained. In  addition, a 3-D volume rendered image was created for interpretation. Automated exposure control and iterative reconstruction methods were used. Findings: There is moderate aortic valve calcification, extensive coronary artery calcification and extensive mitral annular calcification. Streak artifact is seen for the patient's intracardiac pacemaker, at the right ventricular apex. There is no evidence of thoracic aortic aneurysm, with maximal transverse luminal diameter of the ascending aorta approximately 3.0 cm. Unenhanced images show no evidence of intramural hematoma. Postcontrast images show no evidence of dissection. Main pulmonary artery is larger than the aorta and transverse diameter at approximately 2.9 cm. No pulmonary embolic disease is appreciated. There appears to be a fairly large hiatal hernia. No mediastinal adenopathy or significant pericardial effusion is seen. There is minimal free-flowing right and left pleural effusion. Images of the lungs show normally patent airways. There are borderline  changes of cylindrical bronchiectasis of the lower lobes. There is only trace atelectasis in the lower lungs. No pneumonia or edema is seen. Included images of the upper abdomen show diffuse fatty liver change and grossly normal liver morphology. Included upper left renal pole, spleen, pancreatic tail, and adrenal glands appear grossly normal. Bony structures appear to be intact.     Impression: Impression: 1. No evidence of thoracic aortic aneurysm or dissection. 2. Prominent main pulmonary artery. No evidence of pulmonary embolic disease. 3. Large hiatal hernia. 4. Very small pleural effusions. Electronically Signed: Chace Ngo  6/9/2023 8:28 PM EDT  Workstation ID: [id number]    Results for orders placed during the hospital encounter of 06/05/23    Adult Transthoracic Echo Complete w/ Color, Spectral and Contrast if Necessary Per Protocol    Interpretation Summary  •  Left ventricular systolic function is  normal. Left ventricular ejection fraction appears to be 61 - 65%.  •  Left ventricular wall thickness is consistent with borderline concentric hypertrophy.  •  Left ventricular diastolic function is consistent with (grade II w/high LAP) pseudonormalization.  •  Left atrial volume is mildly increased.  •  There is moderate calcification of the aortic valve.  •  Moderate mitral annular calcification is present.  •  Mild mitral valve stenosis is present.  •  Mild mitral valve regurgitation is present.  •  Mild pulmonic valve regurgitation is present.  •  Moderate tricuspid valve regurgitation is present.  •  Estimated right ventricular systolic pressure from tricuspid regurgitation is mildly elevated (35-45 mmHg).      Current medications:  Scheduled Meds:aspirin, 81 mg, Oral, Daily  clopidogrel, 75 mg, Oral, Daily  empagliflozin, 10 mg, Oral, Daily  insulin lispro, 2-9 Units, Subcutaneous, 4x Daily AC & at Bedtime  levothyroxine, 50 mcg, Oral, Q AM  metoprolol succinate XL, 25 mg, Oral, Q24H  pantoprazole, 40 mg, Oral, QAM  pharmacy consult - [facility], , Does not apply, Daily  senna-docusate sodium, 2 tablet, Oral, BID  sodium chloride, 3 mL, Intravenous, Q12H  venlafaxine XR, 150 mg, Oral, Daily With Breakfast      Continuous Infusions:   PRN Meds:.•  acetaminophen  •  senna-docusate sodium **AND** polyethylene glycol **AND** bisacodyl **AND** bisacodyl  •  dextrose  •  dextrose  •  hydrALAZINE  •  ipratropium-albuterol  •  Morphine  •  nitroglycerin  •  ondansetron  •  sodium chloride  •  sodium chloride    Assessment & Plan   Assessment & Plan     Active Hospital Problems    Diagnosis  POA   • CAD (coronary artery disease) [I25.10]  Yes   • Primary hypertension [I10]  Yes   • Mixed hyperlipidemia [E78.2]  Yes   • GERD (gastroesophageal reflux disease) [K21.9]  Yes   • Hypothyroid [E03.9]  Yes   • Class 1 obesity in adult [E66.9]  Yes   • Heart block AV third degree [I44.2]  Yes      Resolved Hospital Problems   No  resolved problems to display.        Brief Hospital Course to date:  Patient Alba is an88 y.o. female with PMHx HTN, T2DM, and dyslipidemia who presented to [facility] ED on 6/5/23 for evaluationacute onset chest pressure/heaviness. Per report, the patient called her sister who arrived at the patient's home to find her unresponsive and pale. EMS was called and patient was noted to be hypertensive with bradycardia and complete heart block. Upon arrival to [facility], EKG showed inferior ST depression and complete heart block and emergent venous tremporary pacemaker wire placement and coronary angiography with possible PCI was recommended. Dr. Miner with Cardiology took patient to cath lab and placed a HANK to the RCA and a temporary pacemaker. She was admitted to the ICU post-procedure for further monitoring and medical management.     Third degree heart block s/p temporary pacer 6/5  CAD s/p HANK to RCA  -PCI with HANK to RCA  -s/p ECHO with diastolic dysfunction  -s/p PPM    HTN  HL  GERD  -PPI  -statin intolerant    Hypothyroid  -synthroid    DM  -SSI    Expected Discharge Location and Transportation: Home  Expected Discharge 06/12/2023      DVT prophylaxis:  No DVT prophylaxis order currently exists.     AM-PAC 6 Clicks Score (PT): 17 (06/11/23 0800)    CODE STATUS:   Code Status and Medical Interventions:   Ordered at: 06/05/23 1950     Level Of Support Discussed With:    Patient     Code Status (Patient has no pulse and is not breathing):    CPR (Attempt to Resuscitate)     Medical Interventions (Patient has pulse or is breathing):    Full       Naz Morris, MARIELA  06/11/23

## 2023-06-12 ENCOUNTER — APPOINTMENT (OUTPATIENT)
Dept: GENERAL RADIOLOGY | Facility: HOSPITAL | Age: 88
End: 2023-06-12
Payer: MEDICARE

## 2023-06-12 LAB
ANION GAP SERPL CALCULATED.3IONS-SCNC: 11 MMOL/L (ref 5–15)
BASOPHILS # BLD AUTO: 0.03 10*3/MM3 (ref 0–0.2)
BASOPHILS NFR BLD AUTO: 0.5 % (ref 0–1.5)
BUN SERPL-MCNC: 16 MG/DL (ref 8–23)
BUN/CREAT SERPL: 20 (ref 7–25)
CALCIUM SPEC-SCNC: 9.5 MG/DL (ref 8.6–10.5)
CHLORIDE SERPL-SCNC: 87 MMOL/L (ref 98–107)
CO2 SERPL-SCNC: 31 MMOL/L (ref 22–29)
CREAT SERPL-MCNC: 0.8 MG/DL (ref 0.57–1)
DEPRECATED RDW RBC AUTO: 39.1 FL (ref 37–54)
EGFRCR SERPLBLD CKD-EPI 2021: 71.4 ML/MIN/1.73
EOSINOPHIL # BLD AUTO: 0.24 10*3/MM3 (ref 0–0.4)
EOSINOPHIL NFR BLD AUTO: 4.1 % (ref 0.3–6.2)
ERYTHROCYTE [DISTWIDTH] IN BLOOD BY AUTOMATED COUNT: 12.6 % (ref 12.3–15.4)
GLUCOSE BLDC GLUCOMTR-MCNC: 104 MG/DL (ref 70–130)
GLUCOSE BLDC GLUCOMTR-MCNC: 123 MG/DL (ref 70–130)
GLUCOSE BLDC GLUCOMTR-MCNC: 130 MG/DL (ref 70–130)
GLUCOSE BLDC GLUCOMTR-MCNC: 133 MG/DL (ref 70–130)
GLUCOSE SERPL-MCNC: 107 MG/DL (ref 65–99)
HCT VFR BLD AUTO: 34 % (ref 34–46.6)
HGB BLD-MCNC: 11.6 G/DL (ref 12–15.9)
IMM GRANULOCYTES # BLD AUTO: 0.03 10*3/MM3 (ref 0–0.05)
IMM GRANULOCYTES NFR BLD AUTO: 0.5 % (ref 0–0.5)
LYMPHOCYTES # BLD AUTO: 1.35 10*3/MM3 (ref 0.7–3.1)
LYMPHOCYTES NFR BLD AUTO: 23.3 % (ref 19.6–45.3)
MCH RBC QN AUTO: 29.1 PG (ref 26.6–33)
MCHC RBC AUTO-ENTMCNC: 34.1 G/DL (ref 31.5–35.7)
MCV RBC AUTO: 85.4 FL (ref 79–97)
MONOCYTES # BLD AUTO: 0.63 10*3/MM3 (ref 0.1–0.9)
MONOCYTES NFR BLD AUTO: 10.9 % (ref 5–12)
NEUTROPHILS NFR BLD AUTO: 3.51 10*3/MM3 (ref 1.7–7)
NEUTROPHILS NFR BLD AUTO: 60.7 % (ref 42.7–76)
NRBC BLD AUTO-RTO: 0 /100 WBC (ref 0–0.2)
NT-PROBNP SERPL-MCNC: 1098 PG/ML (ref 0–1800)
PLATELET # BLD AUTO: 221 10*3/MM3 (ref 140–450)
PMV BLD AUTO: 9.6 FL (ref 6–12)
POTASSIUM SERPL-SCNC: 4.6 MMOL/L (ref 3.5–5.2)
QT INTERVAL: 268 MS
QTC INTERVAL: 320 MS
RBC # BLD AUTO: 3.98 10*6/MM3 (ref 3.77–5.28)
SODIUM SERPL-SCNC: 129 MMOL/L (ref 136–145)
WBC NRBC COR # BLD: 5.79 10*3/MM3 (ref 3.4–10.8)

## 2023-06-12 PROCEDURE — 82948 REAGENT STRIP/BLOOD GLUCOSE: CPT

## 2023-06-12 PROCEDURE — 85025 COMPLETE CBC W/AUTO DIFF WBC: CPT | Performed by: INTERNAL MEDICINE

## 2023-06-12 PROCEDURE — 74018 RADEX ABDOMEN 1 VIEW: CPT

## 2023-06-12 PROCEDURE — 80048 BASIC METABOLIC PNL TOTAL CA: CPT | Performed by: INTERNAL MEDICINE

## 2023-06-12 PROCEDURE — 83880 ASSAY OF NATRIURETIC PEPTIDE: CPT | Performed by: INTERNAL MEDICINE

## 2023-06-12 RX ORDER — BUMETANIDE 1 MG/1
1 TABLET ORAL DAILY
Status: DISCONTINUED | OUTPATIENT
Start: 2023-06-12 | End: 2023-06-13

## 2023-06-12 RX ORDER — CARVEDILOL 6.25 MG/1
6.25 TABLET ORAL 2 TIMES DAILY WITH MEALS
Status: DISCONTINUED | OUTPATIENT
Start: 2023-06-12 | End: 2023-06-15 | Stop reason: HOSPADM

## 2023-06-12 RX ADMIN — Medication 3 ML: at 21:04

## 2023-06-12 RX ADMIN — SPIRONOLACTONE 12.5 MG: 25 TABLET ORAL at 08:38

## 2023-06-12 RX ADMIN — PANTOPRAZOLE SODIUM 40 MG: 40 TABLET, DELAYED RELEASE ORAL at 06:04

## 2023-06-12 RX ADMIN — BUMETANIDE 1 MG: 1 TABLET ORAL at 08:38

## 2023-06-12 RX ADMIN — SENNOSIDES AND DOCUSATE SODIUM 2 TABLET: 50; 8.6 TABLET ORAL at 21:03

## 2023-06-12 RX ADMIN — CARVEDILOL 6.25 MG: 6.25 TABLET, FILM COATED ORAL at 17:37

## 2023-06-12 RX ADMIN — Medication 3 ML: at 08:39

## 2023-06-12 RX ADMIN — Medication 10 ML: at 08:38

## 2023-06-12 RX ADMIN — EMPAGLIFLOZIN 10 MG: 10 TABLET, FILM COATED ORAL at 08:38

## 2023-06-12 RX ADMIN — LEVOTHYROXINE SODIUM 50 MCG: 0.05 TABLET ORAL at 06:04

## 2023-06-12 RX ADMIN — CARVEDILOL 6.25 MG: 6.25 TABLET, FILM COATED ORAL at 08:38

## 2023-06-12 RX ADMIN — VENLAFAXINE HYDROCHLORIDE 150 MG: 75 CAPSULE, EXTENDED RELEASE ORAL at 08:38

## 2023-06-12 RX ADMIN — ASPIRIN 81 MG: 81 TABLET, COATED ORAL at 08:38

## 2023-06-12 RX ADMIN — CLOPIDOGREL BISULFATE 75 MG: 75 TABLET ORAL at 08:38

## 2023-06-12 NOTE — CASE MANAGEMENT/SOCIAL WORK
Discharge Planning Assessment  [facility]     Patient Name: Janna Willis  MRN: [id number]  Today's Date: 6/12/2023    Admit Date: 6/5/2023    Plan: SNF   Discharge Needs Assessment    No documentation.                  Discharge Plan       Row Name 06/12/23 1434       Plan    Plan SNF    Patient/Family in Agreement with Plan yes    Plan Comments Met with pt and family at bedside to f/u DCP.  Discussed rehab options.  They now wish to proceed with subacute referral to [facility] (updated Hilary) and have also requested a referral to [facility] (faxed).  Will require insurance precert.  CM will cont to follow.    Final Discharge Disposition Code 03 - skilled nursing facility (SNF)                  Continued Care and Services - Admitted Since 6/5/2023       Destination       Service Provider Request Status Selected Services Address Phone Fax Patient Preferred    [facility] Pending - Request Sent N/A [address] [address] [phone] --    [facility] SUBACUTE Pending - No Request Sent N/A [address] [facility] [id number] [phone] --                  Expected Discharge Date and Time       Expected Discharge Date Expected Discharge Time    Jun 11, 2023            Demographic Summary    No documentation.                  Functional Status    No documentation.                  Psychosocial    No documentation.                  Abuse/Neglect    No documentation.                  Legal    No documentation.                  Substance Abuse    No documentation.                  Patient Forms    No documentation.                     Zonia Orozco RN

## 2023-06-12 NOTE — PROGRESS NOTES
[facility] Cardiology    Inpatient Progress Note      Chief Complaint/Reason for service:    Follow up complete heart block, CAD         Subjective:       Patient resting in bed.   Denies chest pain, shortness of breath at rest.      Past medical, surgical, social and family history reviewed in the patient's electronic medical record.    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            Objective:      Infusions:        Medications:    Current Facility-Administered Medications:     acetaminophen (TYLENOL) tablet 650 mg, 650 mg, Oral, Q4H PRN, Kuldeep Miner MD, 650 mg at 06/09/23 1040    aspirin EC tablet 81 mg, 81 mg, Oral, Daily, Kuldeep Miner MD, 81 mg at 06/11/23 0851    sennosides-docusate (PERICOLACE) 8.6-50 MG per tablet 2 tablet, 2 tablet, Oral, BID, 2 tablet at 06/11/23 2102 **AND** polyethylene glycol (MIRALAX) packet 17 g, 17 g, Oral, Daily PRN **AND** bisacodyl (DULCOLAX) EC tablet 5 mg, 5 mg, Oral, Daily PRN **AND** bisacodyl (DULCOLAX) suppository 10 mg, 10 mg, Rectal, Daily PRN, Filiberto Hsu MD    bumetanide (BUMEX) tablet 1 mg, 1 mg, Oral, Daily, Kuldeep Miner MD    carvedilol (COREG) tablet 6.25 mg, 6.25 mg, Oral, BID With Meals, Kuldeep Miner MD    clopidogrel (PLAVIX) tablet 75 mg, 75 mg, Oral, Daily, Kuldeep Miner MD, 75 mg at 06/11/23 0851    dextrose (D50W) (25 g/50 mL) IV injection 25 g, 25 g, Intravenous, Q15 Min PRN, Parth Saldana MD    dextrose (GLUTOSE) oral gel 15 g, 15 g, Oral, Q15 Min PRN, Parth Saldana MD    empagliflozin (JARDIANCE) tablet 10 mg, 10 mg, Oral, Daily, Svetlana Hernandez MD, 10  mg at 06/11/23 0851    hydrALAZINE (APRESOLINE) tablet 25 mg, 25 mg, Oral, Q6H PRN, Kuldeep Miner MD    Insulin Lispro (humaLOG) injection 2-9 Units, 2-9 Units, Subcutaneous, 4x Daily AC & at Bedtime, Parth Saldana MD, 2 Units at 06/10/23 1802    ipratropium-albuterol (DUO-NEB) nebulizer solution 3 mL, 3 mL, Nebulization, Q4H PRN, Mary Ang APRN    levothyroxine (SYNTHROID, LEVOTHROID) tablet 50 mcg, 50 mcg, Oral, Q AM, Kuldeep Miner MD, 50 mcg at 06/12/23 0604    morphine injection 1 mg, 1 mg, Intravenous, Q4H PRN, Mary Ang APRN, 1 mg at 06/06/23 0548    nitroglycerin (NITROSTAT) SL tablet 0.4 mg, 0.4 mg, Sublingual, Q5 Min PRN, Kuldeep Miner MD    ondansetron (ZOFRAN) injection 4 mg, 4 mg, Intravenous, Q6H PRN, Mary Ang APRN, 4 mg at 06/06/23 0431    pantoprazole (PROTONIX) EC tablet 40 mg, 40 mg, Oral, QAM, Kuldeep Miner MD, 40 mg at 06/12/23 0604    Pharmacy Consult - [facility], , Does not apply, Daily, Petey Machuca, PharmD    simethicone (MYLICON) chewable tablet 80 mg, 80 mg, Oral, 4x Daily PRN, Naz Morris APRN, 80 mg at 06/11/23 1416    sodium chloride 0.9 % flush 10 mL, 10 mL, Intravenous, PRN, John Sahni PA, 10 mL at 06/11/23 0852    sodium chloride 0.9 % flush 3 mL, 3 mL, Intravenous, Q12H, John Sahni PA, 3 mL at 06/11/23 2102    sodium chloride 0.9 % infusion 40 mL, 40 mL, Intravenous, PRN, John Sahni, PA    spironolactone (ALDACTONE) tablet 12.5 mg, 12.5 mg, Oral, Daily, Svetlana Hernandez MD, 12.5 mg at 06/11/23 1737    venlafaxine XR (EFFEXOR-XR) 24 hr capsule 150 mg, 150 mg, Oral, Daily With Breakfast, Kuldeep Miner MD, 150 mg at 06/11/23 0852    Vital Sign Min/Max for last 24 hours  Temp  Min: 98.2 °F (36.8 °C)  Max: 98.9 °F (37.2 °C)   BP  Min: 114/69  Max: 179/74   Pulse  Min: 55  Max: 80   Resp  Min: 16  Max: 16   SpO2  Min: 87 %  Max: 99 %   No data recorded      Intake/Output Summary (Last 24 hours) at 6/12/2023 4983  Last data filed at  6/12/2023 0200  Gross per 24 hour   Intake 240 ml   Output 3100 ml   Net -2860 ml             CONSTITUTIONAL: No acute distress    Labs/studies:  Available lab and imaging results were reviewed by myself today    Results for orders placed during the hospital encounter of 06/05/23    Adult Transthoracic Echo Complete w/ Color, Spectral and Contrast if Necessary Per Protocol    Interpretation Summary    Left ventricular systolic function is normal. Left ventricular ejection fraction appears to be 61 - 65%.    Left ventricular wall thickness is consistent with borderline concentric hypertrophy.    Left ventricular diastolic function is consistent with (grade II w/high LAP) pseudonormalization.    Left atrial volume is mildly increased.    There is moderate calcification of the aortic valve.    Moderate mitral annular calcification is present.    Mild mitral valve stenosis is present.    Mild mitral valve regurgitation is present.    Mild pulmonic valve regurgitation is present.    Moderate tricuspid valve regurgitation is present.    Estimated right ventricular systolic pressure from tricuspid regurgitation is mildly elevated (35-45 mmHg).      Tele:  Paced          Assessment/Plan:       ASSESSMENT:  Complete heart block with very slow ventricular response with a ventricular rate of less than 20 bpm  Status post [employer] leadless pacemaker placement  Second-degree AV block, 2-1 AV conduction  CAD   [facility] 6/5, no clear obstructive disease to explain the patient's complete heart block, but did have an 80% proximal RCA stenosis and a small sized vessel.  In the lieu of ongoing chest discomfort, decision was made to proceed with HANK x1   CAROL  Mixed hyperlipidemia  Statin intolerant  History of hypertension prior to admission  Possible aortic aneurysm  Hyponatremia    PLAN:  Continue aspirin, clopidogrel.   Switched metoprolol back to home dose of carvedilol  Decreased to Bumex, added fluid restriction  Monitor renal  function and electrolytes in the morning.   Historically intolerant to statins.  Resume Zetia upon discharge.  Consider PCSK-9 inhibitor at follow up.     Medically ready for discharge to rehab, trying to determine placement versus home health  Case management, PT/OT following; recommendations appreciated    Kuldeep Miner MD, MSc, FACC, [facility]  Interventional Cardiology  [facility]

## 2023-06-12 NOTE — DISCHARGE PLACEMENT REQUEST
"ABRAM FULLER, RN    P:  [phone]  F:  [phone]                    Bre Rosenthal (88 y.o. Female)       Date of Birth   1935    Social Security Number       Address   [address] [place] [address]    Home Phone   [phone]    MRN   [id number]       [employer]    Marital Status   Single                            Admission Date   6/5/23    Admission Type   Emergency    Admitting Provider   Kuldeep Miner MD    Attending Provider   Kuldeep Miner MD    Department, Room/Bed   [address], S456/1       Discharge Date       Discharge Disposition       Discharge Destination                                 Attending Provider: Kuldeep Miner MD    Allergies: Avelox [Moxifloxacin], 2,4-d Dimethylamine, Azithromycin, Lisinopril, Metoclopramide, Statins    Isolation: None   Infection: None   Code Status: CPR    Ht: 162.6 cm (64.02\")   Wt: 88.3 kg (194 lb 10.7 oz)    Admission Cmt: None   Principal Problem: None                  Active Insurance as of 6/5/2023       Primary Coverage       Payor Plan Insurance Group Employer/Plan Group    HUMANA MEDICARE REPLACEMENT HUMANA MEDICARE REPLACEMENT [id number]       Payor Plan Address Payor Plan Phone Number Payor Plan Fax Number Effective Dates    [address] [address] [phone]  1/1/2018 - None Entered    [facility], located within [facility] [address]         Subscriber Name Subscriber Birth Date Member ID       BRE ROSENTHAL 1935 [id number]                     Emergency Contacts        (Rel.) Home Phone Work Phone Mobile Phone    Carlee Kohli (Sister) -- -- [phone]    BRIDGER BO (Relative) [phone] -- [phone]              Insurance Information                  HUMANA MEDICARE REPLACEMENT/HUMANA MEDICARE REPLACEMENT Phone: [phone]    Subscriber: Bre Rosenthal Subscriber#: [id number]    Group#: [id number] Precert#: [id number]             History & Physical        Kuldeep Miner MD at 06/05/23 1826  "            [facility]   [address], [address]  [place], [place], [address]    [phone]  WWW.[facility]           INPATIENT HISTORY AND PHYSICAL NOTE    Patient Care Team:  Patient Care Team:  Hever Ibrahim APRN as PCP - General (Nurse Practitioner)      Chief complaint:   Chief Complaint   Patient presents with    Slow Heart Rate            Subjective:     Cardiac focused problem list:  Chest pain  Complete heart block  Hypertension  Mixed hyperlipidemia  Hypothyroidism  GERD    HPI:      Janna Willis is a 88 y.o. female.  Patient presents with acute onset chest pressure earlier this afternoon.  Called her sister, who upon arrival noticed the patient become unresponsive, have a blank stare, become pale.  Had a brief tremor of the left hand.  Once the patient regained consciousness, she complained of chest pressure/heaviness, centrally located.  EMS called and upon arrival noted to be hypertensive and developed eventually bradycardia and heart block.    No prior cardiac diagnosis    Review of Systems:  As noted in the HPI    PFSH:  There is no problem list on file for this patient.      No current facility-administered medications on file prior to encounter.     Current Outpatient Medications on File Prior to Encounter   Medication Sig Dispense Refill    amLODIPine (NORVASC) 5 MG tablet TAKE ONE TABLET EVERY DAY FOR high blood PRESSURE  3    carvedilol (COREG) 6.25 MG tablet Take 6.25 mg by mouth 2 (Two) Times a Day.  3    escitalopram (LEXAPRO) 20 MG tablet Take 20 mg by mouth Daily.  3    ezetimibe (ZETIA) 10 MG tablet Take  by mouth Daily.      fluorometholone (FML) 0.1 % ophthalmic suspension Instill 1 drop into both eyes three times a day  0    gabapentin (NEURONTIN) 100 MG capsule Take 300 mg by mouth every night at bedtime.  1    levothyroxine (SYNTHROID, LEVOTHROID) 50 MCG tablet TAKE ONE TABLET EVERY DAY FOR FOR THYROID  3     losartan-hydrochlorothiazide (HYZAAR) 100-25 MG per tablet take ONE-HALF TABLET ONCE DAILY  3    metFORMIN (GLUCOPHAGE) 500 MG tablet Take  by mouth 3 (Three) Times a Day.      omeprazole (priLOSEC) 20 MG capsule take 1 capsule each morning for gerd.      omeprazole (priLOSEC) 40 MG capsule TAKE ONE CAPSULE EVERY MORNING FOR gerd  3    venlafaxine XR (EFFEXOR-XR) 150 MG 24 hr capsule elva 1 capsule with food Once a day for 90 days         Social History     Socioeconomic History    Marital status: Single   Tobacco Use    Smoking status: Never    Smokeless tobacco: Never   Substance and Sexual Activity    Alcohol use: No    Drug use: No    Sexual activity: Defer            Objective:     Vital Sign Min/Max for last 24 hours  No data recorded   BP  Min: 133/121  Max: 133/121   Pulse  Min: 0  Max: 0   Resp  Min: 24  Max: 24   SpO2  Min: 91 %  Max: 91 %   No data recorded    No intake or output data in the 24 hours ending 06/05/23 1826        Vitals:    06/05/23 1800   BP:    Pulse:    Resp: 24   SpO2:      CONSTITUTIONAL: Sedated, in mild distress  RESPIRATORY: Normal effort. Clear to auscultation bilaterally without rhonchi anteriorly  CARDIOVASCULAR: Regular rate and rhythm with normal S1 and S2. Without murmur.  PERIPHERAL VASCULAR: Normal radial pulse. There is no lower extremity edema bilaterally.  Normal right femoral pulse.  Nonpalpable pedal pulses.    Labs and radiologic results:  Today's results were reviewed by myself.    Cardiac Data:    EKG: Complete heart block, very slow ventricular escape        Tele: V paced         Assessment and Plan:     Problem list:    * No active hospital problems. *      ASSESSMENT:  Chest pain  Inferior ST depression on EMS twelve-lead  Complete heart block  Very slow ventricular escape rhythm  Respiratory distress  History of hypertension  Dyslipidemia    PLAN:  Recommend emergent venous temporary pacemaker wire placement, coronary angiogram, possible PCI.  Discussed with  patient as well as her sister, CARMELA Tolbert.  Both are agreeable to proceed in an urgent fashion  BiPap  Labs, chest x-ray pending  Aspirin suppository, heparin bolus  ICU admission post procedurally  Further recommendations to follow    Kuldeep Miner MD, MSc, FACC, [facility]  Interventional Cardiology  [facility]              Electronically signed by Kuldeep Miner MD at 23 1840          Physician Progress Notes (most recent note)        Naz Morris APRN at 23 1406              [facility] Medicine Services  PROGRESS NOTE    Patient Name: Janna Willis  : 1935  MRN: [id number]    Date of Admission: 2023  Primary Care Physician: Hever Ibrahim APRN    Subjective   Subjective     CC: Dyspnea    HPI:   Pt sitting up in bed experiencing epigastric abd pain. She took gas-ex with some relief. KUB showing nonobstructive bowel gas pattern. Encouraged mobilization.        Objective   Objective     Vital Signs:   Temp:  [98.1 °F (36.7 °C)-98.9 °F (37.2 °C)] 98.1 °F (36.7 °C)  Heart Rate:  [55-80] 67  Resp:  [16-18] 18  BP: (114-179)/(64-75) 127/67  Flow (L/min):  [2] 2     Physical Exam:  Constitutional: Awake, alert, NAD  HENT: NCAT, mucous membranes moist  Respiratory: Clear to auscultation bilaterally, nonlabored respirations   Cardiovascular: RRR, no murmurs, rubs, or gallops  Gastrointestinal: Positive bowel sounds, soft, nontender, nondistended  Musculoskeletal: No bilateral ankle edema  Psychiatric: Appropriate affect, cooperative  Neurologic: Oriented x 3, nonfocal, speech clear  Skin: Ecchymosis to the right groin extending to the pubis. Ecchymosis to the chest wall.     Results Reviewed:  LAB RESULTS:      Lab 23  0349 23  0900 23  0418 23  0459 23  1415 23  0457 23  1757   WBC 5.79 5.62 6.88 6.35 8.25 9.77 7.47   HEMOGLOBIN 11.6* 11.4* 9.7* 9.9* 10.0* 11.3* 12.2   HEMATOCRIT 34.0 33.9* 29.6*  30.9* 31.0* 35.4 37.8   PLATELETS 221 232 147 137* 126* 151 142   NEUTROS ABS 3.51 3.87 4.36  --  6.29 7.15* 5.48   IMMATURE GRANS (ABS) 0.03 0.04 0.02  --  0.05 0.03 0.04   LYMPHS ABS 1.35 1.09 1.61  --  1.07 1.62 1.31   MONOS ABS 0.63 0.33 0.59  --  0.62 0.88 0.51   EOS ABS 0.24 0.25 0.26  --  0.20 0.05 0.10   MCV 85.4 86.7 88.4 90.9 91.2 90.3 90.9   PROTIME  --   --   --   --   --   --  14.0   APTT  --   --   --   --   --   --  24.2*   HEPARIN ANTI-XA  --   --   --   --   --   --  0.10*   D DIMER QUANT  --   --   --   --   --   --  0.44           Lab 06/12/23  0349 06/08/23  0459 06/07/23  1415 06/07/23  0449 06/06/23  0457 06/05/23  1757   SODIUM 129* 131* 128* 132* 135* 132*   POTASSIUM 4.6 4.1 4.1 4.3 3.8 4.7   CHLORIDE 87* 95* 96* 98 100 97*   CO2 31.0* 26.0 23.0 27.0 22.0 22.0   ANION GAP 11.0 10.0 9.0 7.0 13.0 13.0   BUN 16 23 19 18 18 23   CREATININE 0.80 0.80 0.96 0.75 0.83 1.25*   EGFR 71.4 71.4 57.4* 77.2 68.3 41.8*   GLUCOSE 107* 100* 153* 115* 119* 175*   CALCIUM 9.5 8.3* 8.2* 8.1* 8.6 8.5*   MAGNESIUM  --   --   --  1.8  --  1.9   PHOSPHORUS  --   --   --  2.7  --   --    HEMOGLOBIN A1C  --   --   --   --  6.00*  --    TSH  --   --   --   --   --  11.530*           Lab 06/07/23  0449 06/05/23  1757   TOTAL PROTEIN 6.1 6.4   ALBUMIN 3.3* 3.3*   GLOBULIN 2.8 3.1   ALT (SGPT) 45* 70*   AST (SGOT) 34* 78*   BILIRUBIN 0.4 0.6   ALK PHOS 55 60           Lab 06/12/23  0349 06/10/23  1601 06/08/23  0459 06/05/23  1757   PROBNP 1,098.0 3,061.0* 2,299.0* 6,329.0*   HSTROP T  --   --   --  30*   PROTIME  --   --   --  14.0   INR  --   --   --  1.07           Lab 06/06/23  0457   CHOLESTEROL 150   LDL CHOL 84   HDL CHOL 48   TRIGLYCERIDES 94               Brief Urine Lab Results       None            Microbiology Results Abnormal       None            XR Chest 1 View    Result Date: 6/11/2023  XR CHEST 1 VW Date of Exam: 6/11/2023 1:50 PM EDT Indication: Short of air Comparison: Jennifer 10, 2023 Findings: The lungs  are clear. The heart and mediastinal contours appear normal. There is no pleural effusion. The pulmonary vasculature appears normal. The osseous structures appear intact.     Impression: Impression: No acute cardiopulmonary process. Electronically Signed: Rene Quinn  6/11/2023 2:27 PM EDT  Workstation ID: [id number]    XR Abdomen KUB    Result Date: 6/12/2023  XR ABDOMEN KUB Date of Exam: 6/12/2023 9:30 AM EDT Indication: abdominal pain Comparison: CT chest 6/9/2023. No previous abdominal imaging for comparison. Findings: No abnormal bowel distention is seen in a pattern to suggest obstruction. There are degenerative changes in the lumbar spine with mild leftward curvature noted.     Impression: Impression: Nonobstructive bowel gas pattern. Electronically Signed: Ольга Beauchamp  6/12/2023 9:59 AM EDT  Workstation ID: [id number]     Results for orders placed during the hospital encounter of 06/05/23    Adult Transthoracic Echo Complete w/ Color, Spectral and Contrast if Necessary Per Protocol    Interpretation Summary    Left ventricular systolic function is normal. Left ventricular ejection fraction appears to be 61 - 65%.    Left ventricular wall thickness is consistent with borderline concentric hypertrophy.    Left ventricular diastolic function is consistent with (grade II w/high LAP) pseudonormalization.    Left atrial volume is mildly increased.    There is moderate calcification of the aortic valve.    Moderate mitral annular calcification is present.    Mild mitral valve stenosis is present.    Mild mitral valve regurgitation is present.    Mild pulmonic valve regurgitation is present.    Moderate tricuspid valve regurgitation is present.    Estimated right ventricular systolic pressure from tricuspid regurgitation is mildly elevated (35-45 mmHg).      Current medications:  Scheduled Meds:aspirin, 81 mg, Oral, Daily  bumetanide, 1 mg, Oral, Daily  carvedilol, 6.25 mg, Oral, BID With Meals  clopidogrel, 75  mg, Oral, Daily  empagliflozin, 10 mg, Oral, Daily  insulin lispro, 2-9 Units, Subcutaneous, 4x Daily AC & at Bedtime  levothyroxine, 50 mcg, Oral, Q AM  pantoprazole, 40 mg, Oral, QAM  pharmacy consult - [facility], , Does not apply, Daily  senna-docusate sodium, 2 tablet, Oral, BID  sodium chloride, 3 mL, Intravenous, Q12H  spironolactone, 12.5 mg, Oral, Daily  venlafaxine XR, 150 mg, Oral, Daily With Breakfast      Continuous Infusions:   PRN Meds:.  acetaminophen    senna-docusate sodium **AND** polyethylene glycol **AND** bisacodyl **AND** bisacodyl    dextrose    dextrose    hydrALAZINE    ipratropium-albuterol    Morphine    nitroglycerin    ondansetron    simethicone    sodium chloride    sodium chloride    Assessment & Plan   Assessment & Plan     Active Hospital Problems    Diagnosis  POA    CAD (coronary artery disease) [I25.10]  Yes    Primary hypertension [I10]  Yes    Mixed hyperlipidemia [E78.2]  Yes    GERD (gastroesophageal reflux disease) [K21.9]  Yes    Hypothyroid [E03.9]  Yes    Class 1 obesity in adult [E66.9]  Yes    Heart block AV third degree [I44.2]  Yes      Resolved Hospital Problems   No resolved problems to display.        Brief Hospital Course to date:  Patient Alba is an88 y.o. female with PMHx HTN, T2DM, and dyslipidemia who presented to [facility] ED on 6/5/23 for evaluationacute onset chest pressure/heaviness. Per report, the patient called her sister who arrived at the patient's home to find her unresponsive and pale. EMS was called and patient was noted to be hypertensive with bradycardia and complete heart block. Upon arrival to [facility], EKG showed inferior ST depression and complete heart block and emergent venous tremporary pacemaker wire placement and coronary angiography with possible PCI was recommended. Dr. Miner with Cardiology took patient to cath lab and placed a HANK to the RCA and a temporary pacemaker. She was admitted to the ICU post-procedure for further monitoring and medical  management.     Third degree heart block s/p temporary pacer   CAD s/p HANK to RCA  -PCI with HANK to RCA  -s/p ECHO with diastolic dysfunction  -s/p PPM    HTN  HL  GERD  -PPI  -statin intolerant    Hypothyroid  -synthroid    DM  -SSI    Epigastric abd pain  -gas-X with improvement  -encourage ambulation  -KUB non-obstructing gas patter    Expected Discharge Location and Transportation: Home  Expected Discharge 2023      DVT prophylaxis:  No DVT prophylaxis order currently exists.     AM-PAC 6 Clicks Score (PT): 17 (23 0800)    CODE STATUS:   Code Status and Medical Interventions:   Ordered at: 23 1950     Level Of Support Discussed With:    Patient     Code Status (Patient has no pulse and is not breathing):    CPR (Attempt to Resuscitate)     Medical Interventions (Patient has pulse or is breathing):    Full       MARIELA Guzman  23        Electronically signed by Naz Morris APRN at 23 1413          Physical Therapy Notes (most recent note)        Millicent Dan, PT at 23 1444  Version 1 of 1         Patient Name: Janna Willis  : 1935    MRN: [id number]                              Today's Date: 2023       Admit Date: 2023    Visit Dx:     ICD-10-CM ICD-9-CM   1. Heart block AV third degree  I44.2 426.0   2. Generalized weakness  R53.1 780.79   3. Near syncope  R55 780.2   4. Third degree heart block  I44.2 426.0     Patient Active Problem List   Diagnosis    Heart block AV third degree    CAD (coronary artery disease)    Primary hypertension    Mixed hyperlipidemia    GERD (gastroesophageal reflux disease)    Hypothyroid    Class 1 obesity in adult     Past Medical History:   Diagnosis Date    Diabetes      Past Surgical History:   Procedure Laterality Date    CARDIAC CATHETERIZATION N/A 2023    Procedure: Left Heart Cath;  Surgeon: Kuldeep Miner MD;  [facility] CATH INVASIVE LOC[facility];  Service: Cardiology;  Laterality: N/A;     CARDIAC ELECTROPHYSIOLOGY PROCEDURE N/A 6/5/2023    Procedure: Temporary Pacemaker;  Surgeon: Kuldeep Miner MD;  Location:  [facility] CATH INVASIVE LOCATION;  Service: Cardiology;  Laterality: N/A;    CARDIAC ELECTROPHYSIOLOGY PROCEDURE N/A 6/6/2023    Procedure: Pacemaker DC new;  Surgeon: Dudley Rodriguez DO;  Location:  [facility] EP INVASIVE LOCATION;  Service: Cardiology;  Laterality: N/A;    OVARY SURGERY        General Information       Row Name 06/11/23 1532          Physical Therapy Time and Intention    Document Type therapy note (daily note)  -BA     Mode of Treatment physical therapy  -BA       Row Name 06/11/23 1532          General Information    Patient Profile Reviewed yes  -BA     Existing Precautions/Restrictions cardiac;fall;oxygen therapy device and L/min;other (see comments)  s/p leadless pacemaker placement 6/6; R groin hematoma; possible aortic aneurysm; BUE tremors; monitor O2 sats and HR  -BA     Barriers to Rehab medically complex  -BA       Row Name 06/11/23 1532          Cognition    Orientation Status (Cognition) oriented x 3  -BA       Row Name 06/11/23 1532          Safety Issues, Functional Mobility    Safety Issues Affecting Function (Mobility) awareness of need for assistance;insight into deficits/self-awareness;judgment;positioning of assistive device;problem-solving;safety precaution awareness;safety precautions follow-through/compliance;sequencing abilities  -BA     Impairments Affecting Function (Mobility) balance;coordination;endurance/activity tolerance;postural/trunk control;shortness of breath;strength  -     Comment, Safety Issues/Impairments (Mobility) Pt with episode of reported chest pain prior; RN notified.  MDs evaluated pt and gave clearance for continued participation in therapy today.  Increasead lethargy near end of session; began falling asleep.  -BA               User Key  (r) = Recorded By, (t) = Taken By, (c) = Cosigned By      Initials Name Provider Type    BA Al  Millicent Aleman, BOGDAN Physical Therapist                   Mobility       Row Name 06/11/23 1537          Bed Mobility    Comment, (Bed Mobility) Received [facility] upon arrival and returned to chair.  -       Row Name 06/11/23 1537          Sit-Stand Transfer    Sit-Stand Whitman (Transfers) minimum assist (75% patient effort);verbal cues;nonverbal cues (demo/gesture)  -     Assistive Device (Sit-Stand Transfers) walker, front-wheeled  -     Comment, (Sit-Stand Transfer) STS x 2 reps from chair.  VCs/TCs for sequencing and safe hand placement.  Reported no dizziness upon standing.  -BA       Row Name 06/11/23 1537          Gait/Stairs (Locomotion)    Whitman Level (Gait) contact guard;minimum assist (75% patient effort);1 person assist;1 person to manage equipment;verbal cues;other (see comments)  chair follow  -     Assistive Device (Gait) walker, front-wheeled  -BA     Distance in Feet (Gait) 40+40  -     Deviations/Abnormal Patterns (Gait) bilateral deviations;gilberto decreased;gait speed decreased;stride length decreased  -     Bilateral Gait Deviations forward flexed posture;heel strike decreased  -     Comment, (Gait/Stairs) Chair follow for safety.  CGA progressing to bouts of Wei when becoming fatigued.  Demonstrated step through gait pattern with con decreased gilberto and step length.  Relies heavily on use of BUE for added support through FWW.  Required 1 seated rest break d/t fatigue and reported COHN/SOA.  O2 sats stable throughout on 1.5L.  VCs/TCs for optimal walker positioning with staying in middle-posterior portion of FWW and not too far anteriorly, improved stride length, upright posture, and PLB.  Gait distance limited by fatigue and COHN/SOA.  -               User Key  (r) = Recorded By, (t) = Taken By, (c) = Cosigned By      Initials Name Provider Type    Millicent Zarco, BOGDAN Physical Therapist                   Obj/Interventions       Row Name 06/11/23 7999          Motor  Skills    Therapeutic Exercise hip;knee;ankle  -[facility] Name 06/11/23 1541          Hip (Therapeutic Exercise)    Hip Strengthening (Therapeutic Exercise) bilateral;aBduction;aDduction;10 repetitions;marching while seated;2 sets;5 repetitions;sitting  -[facility] Name 06/11/23 1541          Knee (Therapeutic Exercise)    Knee (Therapeutic Exercise) strengthening exercise  -     Knee Strengthening (Therapeutic Exercise) bilateral;LAQ (long arc quad);sitting;10 repetitions  -[facility] Name 06/11/23 1541          Ankle (Therapeutic Exercise)    Ankle (Therapeutic Exercise) AROM (active range of motion)  -     Ankle AROM (Therapeutic Exercise) bilateral;dorsiflexion;plantarflexion;sitting;10 repetitions  -[facility] Name 06/11/23 1541          Balance    Balance Assessment sitting static balance;sitting dynamic balance;sit to stand dynamic balance;standing static balance;standing dynamic balance  -     Static Sitting Balance standby assist  -     Dynamic Sitting Balance contact guard  -     Position, Sitting Balance unsupported;sitting in chair  -     Sit to Stand Dynamic Balance minimal assist;verbal cues;non-verbal cues (demo/gesture)  -     Static Standing Balance contact guard  -     Dynamic Standing Balance contact guard;minimal assist;verbal cues  -     Position/Device Used, Standing Balance supported;walker, front-wheeled  -     Comment, Balance Mild instability with ambulation activity with FWW; no overt LOB noted.  -               User Key  (r) = Recorded By, (t) = Taken By, (c) = Cosigned By      Initials Name Provider Type     Millicent Dan, PT Physical Therapist                   Goals/Plan    No documentation.                  Clinical Impression       [facility] Name 06/11/23 1543          Pain    Pretreatment Pain Rating 0/10 - no pain  -     Posttreatment Pain Rating 0/10 - no pain  -[facility] Name 06/11/23 1543          Plan of Care Review    Plan of Care Reviewed  With patient;family  -BA     Progress improving  -     Outcome Evaluation Improving performance and toleration to activity today noted by improving level of assist and increased distance with ambulation.  Ambulated 40+40ft with FWW and CGA progressing to bouts of minAx1+1, with chair follow for safety.  Con to demonstrate decreased functional endurance, increased O2 requirements, gait instability, weakness, decreased balance, and decreased activity tolerance compared to baseline level of function.  Con to progress pt as able per PT POC.  Rec IPR upon d/c.  -       Row Name 06/11/23 1543          Vital Signs    Pre Systolic BP Rehab --  VSS; RN cleared for activity.  -BA     Pretreatment Heart Rate (beats/min) 65  -BA     Posttreatment Heart Rate (beats/min) 59  -BA     Pre SpO2 (%) 98  -BA     O2 Delivery Pre Treatment nasal cannula  1.5L  -BA     Intra SpO2 (%) 97  -BA     O2 Delivery Intra Treatment nasal cannula  1.5L  -BA     Post SpO2 (%) 98  -BA     O2 Delivery Post Treatment nasal cannula  1.5L  -BA     Pre Patient Position Sitting  -BA     Intra Patient Position Standing  -     Post Patient Position Sitting  -       Row Name 06/11/23 1543          Positioning and Restraints    Pre-Treatment Position sitting in chair/recliner  -BA     Post Treatment Position chair  -BA     In Chair notified nsg;reclined;sitting;call light within reach;encouraged to call for assist;exit alarm on;with family/caregiver;waffle cushion;legs elevated  -               User Key  (r) = Recorded By, (t) = Taken By, (c) = Cosigned By      Initials Name Provider Type    BA Millicent Dan, PT Physical Therapist                   Outcome Measures       Row Name 06/11/23 1549 06/11/23 0800       How much help from another person do you currently need...    Turning from your back to your side while in flat bed without using bedrails? 3  -BA 3  -AM    Moving from lying on back to sitting on the side of a flat bed without  bedrails? 3  -BA 3  -AM    Moving to and from a bed to a chair (including a wheelchair)? 3  -BA 3  -AM    Standing up from a chair using your arms (e.g., wheelchair, bedside chair)? 3  -BA 3  -AM    Climbing 3-5 steps with a railing? 2  -BA 2  -AM    To walk in hospital room? 3  -BA 3  -AM    AM-PAC 6 Clicks Score (PT) 17  -BA 17  -AM    Highest level of mobility 5 --> Static standing  -BA 5 --> Static standing  -AM      Row Name 06/11/23 1549          Functional Assessment    Outcome Measure Options AM-PAC 6 Clicks Basic Mobility (PT)  -BA               User Key  (r) = Recorded By, (t) = Taken By, (c) = Cosigned By      Initials Name Provider Type    AM Regla Wilcox, RN Registered Nurse    Millicent Zarco, PT Physical Therapist                                 Physical Therapy Education       Title: PT OT SLP Therapies (In Progress)       Topic: Physical Therapy (In Progress)       Point: Mobility training (In Progress)       Learning Progress Summary             Patient Acceptance, E, NR by BA at 6/11/2023 1549    Acceptance, E, NR by EBER at 6/10/2023 1614    Acceptance, E, VU,NR by EBER at 6/8/2023 1121                         Point: Home exercise program (In Progress)       Learning Progress Summary             Patient Acceptance, E, NR by EBER at 6/11/2023 1549    Acceptance, E, NR by EBER at 6/10/2023 1614                         Point: Body mechanics (In Progress)       Learning Progress Summary             Patient Acceptance, E, NR by BA at 6/11/2023 1549    Acceptance, E, NR by BA at 6/10/2023 1614    Acceptance, E, VU,NR by EBER at 6/8/2023 1121                         Point: Precautions (In Progress)       Learning Progress Summary             Patient Acceptance, E, NR by BA at 6/11/2023 1549    Acceptance, E, NR by BA at 6/10/2023 1614    Acceptance, E, VU,NR by EBRE at 6/8/2023 1121                                         User Key       Initials Effective Dates Name Provider Type Discipline    BA  09/21/21 -  Millicent Dan PT Physical Therapist PT                  PT Recommendation and Plan  Planned Therapy Interventions (PT): balance training, bed mobility training, gait training, home exercise program, neuromuscular re-education, patient/family education, postural re-education, stair training, strengthening, transfer training  Plan of Care Reviewed With: patient, family  Progress: improving  Outcome Evaluation: Improving performance and toleration to activity today noted by improving level of assist and increased distance with ambulation.  Ambulated 40+40ft with FWW and CGA progressing to bouts of minAx1+1, with chair follow for safety.  Con to demonstrate decreased functional endurance, increased O2 requirements, gait instability, weakness, decreased balance, and decreased activity tolerance compared to baseline level of function.  Con to progress pt as able per PT POC.  Rec IPR upon d/c.     Time Calculation:    PT Charges       Row Name 06/11/23 1549             Time Calculation    Start Time 1444  -BA      PT Received On 06/11/23  -BA         Time Calculation- PT    Total Timed Code Minutes- PT 26 minute(s)  -BA         Timed Charges    74185 - PT Therapeutic Exercise Minutes 12  -BA      16436 - Gait Training Minutes  14  -BA         Total Minutes    Timed Charges Total Minutes 26  -BA       Total Minutes 26  -BA                User Key  (r) = Recorded By, (t) = Taken By, (c) = Cosigned By      Initials Name Provider Type     Millicent Dan, BOGDAN Physical Therapist                  Therapy Charges for Today       Code Description Service Date Service Provider Modifiers Qty    62770616984 HC PT THER PROC EA 15 MIN 6/10/2023 Millicent Dan, PT GP 1    43325808850 HC GAIT TRAINING EA 15 MIN 6/10/2023 Millicent Dan, PT GP 1    89689258773 HC PT THERAPEUTIC ACT EA 15 MIN 6/10/2023 Millicent Dan, PT GP 1    50511112631 HC PT THER PROC EA 15 MIN 6/11/2023 Millicent Dan, PT GP 1     [id number]  GAIT TRAINING EA 15 MIN 2023 Millicent Dan, PT GP 1            PT G-Codes  Outcome Measure Options: AM-PAC 6 Clicks Basic Mobility (PT)  AM-PAC 6 Clicks Score (PT): 17  PT Discharge Summary  Anticipated Discharge Disposition (PT): inpatient rehabilitation facility    Millicent Dan, PT  2023      Electronically signed by Millicent aDn, PT at 23 1551          Occupational Therapy Notes (most recent note)        Mouna Clark, OT at 23 1435          Patient Name: Janna Willis  : 1935    MRN: [id number]                              Today's Date: 2023       Admit Date: 2023    Visit Dx:     ICD-10-CM ICD-9-CM   1. Heart block AV third degree  I44.2 426.0   2. Generalized weakness  R53.1 780.79   3. Near syncope  R55 780.2   4. Third degree heart block  I44.2 426.0     Patient Active Problem List   Diagnosis    Heart block AV third degree    CAD (coronary artery disease)    Primary hypertension    Mixed hyperlipidemia    GERD (gastroesophageal reflux disease)    Hypothyroid    Class 1 obesity in adult     Past Medical History:   Diagnosis Date    Diabetes      Past Surgical History:   Procedure Laterality Date    CARDIAC CATHETERIZATION N/A 2023    Procedure: Left Heart Cath;  Surgeon: Kuldeep Miner MD;  Location:  [facility] CATH INVASIVE LOCATION;  Service: Cardiology;  Laterality: N/A;    CARDIAC ELECTROPHYSIOLOGY PROCEDURE N/A 2023    Procedure: Temporary Pacemaker;  Surgeon: Kuldeep Miner MD;  Location:  [facility] CATH INVASIVE LOCATION;  Service: Cardiology;  Laterality: N/A;    CARDIAC ELECTROPHYSIOLOGY PROCEDURE N/A 2023    Procedure: Pacemaker DC new;  Surgeon: Dudley Rodriguez DO;  Location:  [facility] EP INVASIVE LOCATION;  Service: Cardiology;  Laterality: N/A;    OVARY SURGERY        General Information       Row Name 23 1549          OT Time and Intention    Document Type evaluation  attempted to see pt. and got some of home  information 13:10 to 13:15, but with chest pain, test done and cleared for treatment and returned 14:35  -GAMALIEL     Mode of Treatment occupational therapy  -GAMALIEL       Row Name 06/11/23 1549          General Information    Patient Profile Reviewed yes  -GAMALIEL     Prior Level of Function independent:;all household mobility;community mobility;gait;transfer;bed mobility;feeding;grooming;dressing;bathing;home management;cooking;cleaning;driving;using stairs;shopping  per pt. most recently with progressive difficulty performing each task needing extra time and effort  -GAMALIEL     Existing Precautions/Restrictions cardiac;fall;oxygen therapy device and L/min  leadless PM placement 6/6 with some groin tenderness  -GAMALIEL     Barriers to Rehab medically complex  -GAMALIEL       Row Name 06/11/23 1549          Occupational Profile    Environmental Supports and Barriers (Occupational Profile) wx in shower with seat and grab bar, grab bar by toilet  -GAMALIEL       Row Name 06/11/23 1549          Living Environment    People in Home alone;other (see comments)  per report sister checks on patient  -GAMALIEL       Row Name 06/11/23 1549          Home Main Entrance    Number of Stairs, Main Entrance one  -GAMALIEL     Stair Railings, Main Entrance railing on left side (ascending)  -GAMALIEL       Row Name 06/11/23 1549          Stairs Within Home, Primary    Number of Stairs, Within Home, Primary none  -GAMALIEL       Row Name 06/11/23 1549          Cognition    Orientation Status (Cognition) oriented x 3  -GAMALIEL       Row Name 06/11/23 1549          Safety Issues, Functional Mobility    Safety Issues Affecting Function (Mobility) insight into deficits/self-awareness;problem-solving;safety precaution awareness;safety precautions follow-through/compliance;judgment;sequencing abilities;positioning of assistive device;friction/shear risk  -GAMALIEL     Impairments Affecting Function (Mobility) balance;coordination;endurance/activity tolerance;postural/trunk control;shortness of  breath;strength;range of motion (ROM)  -               User Key  (r) = Recorded By, (t) = Taken By, (c) = Cosigned By      Initials Name Provider Type    Mouna Wilson, OT Occupational Therapist                     Mobility/ADL's       Row Name 06/11/23 1554          Bed Mobility    Comment, (Bed Mobility) pt. UI on arrival and returned to recliner post evaluation  -       Row Name 06/11/23 1554          Transfers    Transfers sit-stand transfer;stand-sit transfer  -     Comment, (Transfers) pt. needed cues for hand placement, pt. stood 3 times from recliner varying from CGA to min A  -       Row Name 06/11/23 1554          Sit-Stand Transfer    Sit-Stand [place] (Transfers) minimum assist (75% patient effort);verbal cues;nonverbal cues (demo/gesture)  -     Assistive Device (Sit-Stand Transfers) walker, front-wheeled  -       Row Name 06/11/23 1554          Stand-Sit Transfer    Stand-Sit [place] (Transfers) minimum assist (75% patient effort);verbal cues  -     Assistive Device (Stand-Sit Transfers) walker, front-wheeled  -[facility] Name 06/11/23 1554          Functional Mobility    Functional Mobility- Ind. Level minimum assist (75% patient effort)  -     Functional Mobility- Device walker, front-wheeled  -     Functional Mobility-Distance (Feet) --  household distance with one sitting rest period  -     Functional Mobility- Safety Issues step length decreased;supplemental O2  -     Functional Mobility- Comment slowed pace, without wx use to sink pt. needed min A with UE support without wx use  -       Row Name 06/11/23 1554          Activities of Daily Living    BADL Assessment/Intervention lower body dressing;grooming;upper body dressing  -       Row Name 06/11/23 1554          Lower Body Dressing Assessment/Training    [place] Level (Lower Body Dressing) doff;don;socks;maximum assist (25% patient effort)  -     Position (Lower Body Dressing) unsupported  sitting  -GAMALIEL     Comment, (Lower Body Dressing) pt. could reach top of socks to pull up further, but she could not doff socks or start donning socks  -       Row Name 06/11/23 155          Grooming Assessment/Training    [place] Level (Grooming) oral care regimen;wash face, hands;standby assist  -GAMALIEL     Position (Grooming) sink side;unsupported standing  -GAMALIEL     Comment, (Grooming) pt. with rest period needed in recliner post completion  -       Row Name 06/11/23 3783          Upper Body Dressing Assessment/Training    [place] Level (Upper Body Dressing) don;pajama/robe;maximum assist (25% patient effort)  -GAMALIEL     Position (Upper Body Dressing) unsupported sitting  -GAMALIEL               User Key  (r) = Recorded By, (t) = Taken By, (c) = Cosigned By      Initials Name Provider Type    Mouna Wilson, OT Occupational Therapist                   Obj/Interventions       Row Name 06/11/23 1559          Sensory Assessment (Somatosensory)    Sensory Assessment (Somatosensory) UE sensation intact  -[facility] Name 06/11/23 0849          Vision Assessment/Intervention    Visual Impairment/Limitations corrective lenses full-time  -       Row Name 06/11/23 1559          Range of Motion Comprehensive    General Range of Motion bilateral upper extremity ROM WNL  -       Row Name 06/11/23 1551          Strength Comprehensive (MMT)    General Manual Muscle Testing (MMT) Assessment upper extremity strength deficits identified  -     Comment, General Manual Muscle Testing (MMT) Assessment BUE grossly 4 to 4+/5  -       Row Name 06/11/23 9919          Shoulder (Therapeutic Exercise)    Shoulder (Therapeutic Exercise) AROM (active range of motion)  -     Shoulder AROM (Therapeutic Exercise) bilateral;flexion;extension;aBduction;aDduction;horizontal aBduction/aDduction;5 repetitions;sitting  cues for PLB  -       Row Name 06/11/23 0429          Elbow/Forearm (Therapeutic Exercise)    Elbow/Forearm  (Therapeutic Exercise) strengthening exercise  -GAMALIEL     Elbow/Forearm Strengthening (Therapeutic Exercise) bilateral;flexion;extension;5 repetitions;sitting  mild manual resistance given  -       Row Name 06/11/23 1559          Motor Skills    Motor Skills functional endurance  -GAMALIEL     Functional Endurance 02 sats low to mid 90's  on 02 NC, frequent sitting rest periods needed throughout session  -GAMALIEL     Therapeutic Exercise shoulder;elbow/forearm  -GAMALIEL       Row Name 06/11/23 1559          Balance    Static Sitting Balance standby assist  -GAMALIEL     Dynamic Sitting Balance standby assist  -GAMALIEL     Position, Sitting Balance unsupported;sitting in chair  -GAMALIEL     Static Standing Balance contact guard  -GAMALIEL     Dynamic Standing Balance minimal assist  SBA grooming sinkside, varied during session  -GAMALIEL     Position/Device Used, Standing Balance walker, rolling  -GAMALIEL     Balance Interventions sit to stand;occupation based/functional task  -GAMALIEL     Comment, Balance LBD, grooming, UBD  -GAMALIEL               User Key  (r) = Recorded By, (t) = Taken By, (c) = Cosigned By      Initials Name Provider Type    GAMALIEL Mouna Clark, OT Occupational Therapist                   Goals/Plan       Row Name 06/11/23 1600          Transfer Goal 1 (OT)    Activity/Assistive Device (Transfer Goal 1, OT) commode;walker, rolling  -GAMALIEL     [place] Level/Cues Needed (Transfer Goal 1, OT) standby assist  -GAMALIEL     Time Frame (Transfer Goal 1, OT) long term goal (LTG);10 days  -GAMALIEL     Strategies/Barriers (Transfers Goal 1, OT) good safety demonstrated  -GAMALIEL     Progress/Outcome (Transfer Goal 1, OT) new goal  -       Row Name 06/11/23 1601          Dressing Goal 1 (OT)    Activity/Device (Dressing Goal 1, OT) lower body dressing  -GAMALIEL     [place]/Cues Needed (Dressing Goal 1, OT) minimum assist (75% or more patient effort);set-up required;tactile cues required;verbal cues required  -GAMALIEL     Time Frame (Dressing Goal 1, OT) long term goal (LTG);10 days   -GAMALIEL     Strategies/Barriers (Dressing Goal 1, OT) AE use prn  -GAMALIEL     Progress/Outcome (Dressing Goal 1, OT) new goal  -GAMALIEL       Row Name 06/11/23 1602          Toileting Goal 1 (OT)    Activity/Device (Toileting Goal 1, OT) toileting skills, all;commode;grab bar/safety frame  -GAMALIEL     [place] Level/Cues Needed (Toileting Goal 1, OT) standby assist  -GAMALIEL     Time Frame (Toileting Goal 1, OT) long term goal (LTG);10 days  -GAMALIEL     Progress/Outcome (Toileting Goal 1, OT) new goal  -GAMALIEL       Row Name 06/11/23 1603          Therapy Assessment/Plan (OT)    Planned Therapy Interventions (OT) activity tolerance training;BADL retraining;occupation/activity based interventions;ROM/therapeutic exercise;patient/caregiver education/training;strengthening exercise;transfer/mobility retraining;functional balance retraining  -GAMALIEL               User Key  (r) = Recorded By, (t) = Taken By, (c) = Cosigned By      Initials Name Provider Type    Mouna Wilson, OT Occupational Therapist                   Clinical Impression       Row Name 06/11/23 160          Pain Assessment    Pretreatment Pain Rating 0/10 - no pain  -GAMALIEL     Posttreatment Pain Rating 0/10 - no pain  -GAMALIEL       Row Name 06/11/23 1600          Plan of Care Review    Plan of Care Reviewed With patient  -GAMALIEL     Progress no change  -GAMALIEL     Outcome Evaluation Patient present with impaired strength, balance, endurance, ROM and coordination impacting PLOF.  Skilled OT services warranted to address deficit areas and promote return to prior higher level of independence.  Recommend IRF at discharge.  -GAMALIEL       Row Name 06/11/23 1602          Therapy Assessment/Plan (OT)    Patient/Family Therapy Goal Statement (OT) return to ability to return home and care for herself  -GAMALIEL     Rehab Potential (OT) good, to achieve stated therapy goals  -GAMALIEL     Criteria for Skilled Therapeutic Interventions Met (OT) yes;meets criteria;skilled treatment is necessary  -GAMALIEL     Therapy  Frequency (OT) daily  -GAMALIEL       Row Name 06/11/23 1605          Therapy Plan Review/Discharge Plan (OT)    Equipment Needs Upon Discharge (OT) walker, rolling  vs rollator for EC  -GAMALIEL     Anticipated Discharge Disposition (OT) inpatient rehabilitation facility  -GAMALIEL       Row Name 06/11/23 1605          Vital Signs    Pre Systolic BP Rehab 119  -GAMALIEL     Pre Treatment Diastolic BP 61  -GAMALIEL     Pretreatment Heart Rate (beats/min) 60  -GAMALIEL     Posttreatment Heart Rate (beats/min) 67  -GAMALIEL     Pre SpO2 (%) 98  -GAMALIEL     O2 Delivery Pre Treatment nasal cannula  -GAMALIEL     O2 Delivery Intra Treatment nasal cannula  -GAMALIEL     Post SpO2 (%) 97  -GAMALIEL     O2 Delivery Post Treatment nasal cannula  -GAMALIEL     Pre Patient Position Sitting  -GAMALIEL     Intra Patient Position Standing  -GAMALIEL     Post Patient Position Sitting  -GAMALIEL       Row Name 06/11/23 1605          Positioning and Restraints    Pre-Treatment Position sitting in chair/recliner  -GAMALIEL     Post Treatment Position chair  -GAMALIEL     In Chair sitting;with PT;with family/caregiver  -GAMALIEL               User Key  (r) = Recorded By, (t) = Taken By, (c) = Cosigned By      Initials Name Provider Type    Mouna Wilson, OT Occupational Therapist                   Outcome Measures       Row Name 06/11/23 1611          How much help from another is currently needed...    Putting on and taking off regular lower body clothing? 2  -GAMALIEL     Bathing (including washing, rinsing, and drying) 2  -GAMALIEL     Toileting (which includes using toilet bed pan or urinal) 3  -GAMALIEL     Putting on and taking off regular upper body clothing 2  -GAMALIEL     Taking care of personal grooming (such as brushing teeth) 3  -GAMALIEL     Eating meals 4  -GAMALIEL     AM-PAC 6 Clicks Score (OT) 16  -GAMALIEL       Row Name 06/11/23 1549 06/11/23 0800       How much help from another person do you currently need...    Turning from your back to your side while in flat bed without using bedrails? 3  -BA 3  -AM    Moving from lying on back to sitting on the side  of a flat bed without bedrails? 3  -BA 3  -AM    Moving to and from a bed to a chair (including a wheelchair)? 3  -BA 3  -AM    Standing up from a chair using your arms (e.g., wheelchair, bedside chair)? 3  -BA 3  -AM    Climbing 3-5 steps with a railing? 2  -BA 2  -AM    To walk in hospital room? 3  -BA 3  -AM    AM-PAC 6 Clicks Score (PT) 17  -BA 17  -AM    Highest level of mobility 5 --> Static standing  -BA 5 --> Static standing  -AM      Row Name 06/11/23 1611 06/11/23 1549       Functional Assessment    Outcome Measure Options AM-PAC 6 Clicks Daily Activity (OT)  -GAMALIEL AM-PAC 6 Clicks Basic Mobility (PT)  -BA              User Key  (r) = Recorded By, (t) = Taken By, (c) = Cosigned By      Initials Name Provider Type    Mouna Wilson, OT Occupational Therapist    Regla Hernandez, RN Registered Nurse    Millicent Zarco, PT Physical Therapist                    Occupational Therapy Education       Title: PT OT SLP Therapies (In Progress)       Topic: Occupational Therapy (In Progress)       Point: ADL training (Done)       Description:   Instruct learner(s) on proper safety adaptation and remediation techniques during self care or transfers.   Instruct in proper use of assistive devices.                  Learning Progress Summary             Patient Acceptance, E, VU,NR by GAMALIEL at 6/11/2023 1612    Comment: reason for consult, noted deficits, transfer safety, AE available for LBD   Family Acceptance, E, VU,NR by  at 6/11/2023 1612    Comment: reason for consult, noted deficits, transfer safety, AE available for LBD                         Point: Home exercise program (Not Started)       Description:   Instruct learner(s) on appropriate technique for monitoring, assisting and/or progressing therapeutic exercises/activities.                  Learner Progress:  Not documented in this visit.              Point: Precautions (Done)       Description:   Instruct learner(s) on prescribed precautions  during self-care and functional transfers.                  Learning Progress Summary             Patient Acceptance, E, VU,NR by GAMALIEL at 6/11/2023 1612    Comment: reason for consult, noted deficits, transfer safety, AE available for LBD   Family Acceptance, E, VU,NR by GAMALIEL at 6/11/2023 1612    Comment: reason for consult, noted deficits, transfer safety, AE available for LBD                         Point: Body mechanics (Not Started)       Description:   Instruct learner(s) on proper positioning and spine alignment during self-care, functional mobility activities and/or exercises.                  Learner Progress:  Not documented in this visit.                              User Key       Initials Effective Dates Name Provider Type Discipline     05/31/23 -  Mouna Clark, OT Occupational Therapist OT                  OT Recommendation and Plan  Planned Therapy Interventions (OT): activity tolerance training, BADL retraining, occupation/activity based interventions, ROM/therapeutic exercise, patient/caregiver education/training, strengthening exercise, transfer/mobility retraining, functional balance retraining  Therapy Frequency (OT): daily  Plan of Care Review  Plan of Care Reviewed With: patient  Progress: no change  Outcome Evaluation: Patient present with impaired strength, balance, endurance, ROM and coordination impacting PLOF.  Skilled OT services warranted to address deficit areas and promote return to prior higher level of independence.  Recommend IRF at discharge.     Time Calculation:    Time Calculation- OT       Row Name 06/11/23 1612 06/11/23 1549          Time Calculation- OT    OT Start Time 1435  -GAMALIEL --     OT Received On 06/11/23  -GAMALIEL --     OT Goal Re-Cert Due Date 06/21/23  -GAMALIEL --        Timed Charges    71404 - OT Therapeutic Exercise Minutes 6 -JB --     36113 - Gait Training Minutes  -- 14 -BA     60090 - OT Self Care/Mgmt Minutes 6 -JB --        Untimed Charges    OT Eval/Re-eval  Minutes 43  -GAMALIEL --        Total Minutes    Timed Charges Total Minutes 12  -GAMALIEL 14  -BA     Untimed Charges Total Minutes 43  -GAMALIEL --      Total Minutes 55  -GAMALIEL 14  -BA               User Key  (r) = Recorded By, (t) = Taken By, (c) = Cosigned By      Initials Name Provider Type    Mouna Wilson, OT Occupational Therapist    BA Millicent Dan, PT Physical Therapist                  Therapy Charges for Today       Code Description Service Date Service Provider Modifiers Qty    85374193879  OT THER PROC EA 15 MIN 6/11/2023 Mouna Clark OT GO 1    [id number]  OT EVAL LOW COMPLEXITY 3 6/11/2023 Mouna Clark OT GO 1                 Mouna Clark OT  6/11/2023    Electronically signed by Mouna Clark OT at 06/11/23 7898

## 2023-06-12 NOTE — PROGRESS NOTES
[facility]  PROGRESS NOTE    Patient Name: Janna Willis  : 1935  MRN: [id number]    Date of Admission: 2023  Primary Care Physician: Hever Ibrahim APRN    Subjective   Subjective     CC: Dyspnea    HPI:   Pt sitting up in bed experiencing epigastric abd pain. She took gas-ex with some relief. KUB showing nonobstructive bowel gas pattern. Encouraged mobilization.        Objective   Objective     Vital Signs:   Temp:  [98.1 °F (36.7 °C)-98.9 °F (37.2 °C)] 98.1 °F (36.7 °C)  Heart Rate:  [55-80] 67  Resp:  [16-18] 18  BP: (114-179)/(64-75) 127/67  Flow (L/min):  [2] 2     Physical Exam:  Constitutional: Awake, alert, NAD  HENT: NCAT, mucous membranes moist  Respiratory: Clear to auscultation bilaterally, nonlabored respirations   Cardiovascular: RRR, no murmurs, rubs, or gallops  Gastrointestinal: Positive bowel sounds, soft, nontender, nondistended  Musculoskeletal: No bilateral ankle edema  Psychiatric: Appropriate affect, cooperative  Neurologic: Oriented x 3, nonfocal, speech clear  Skin: Ecchymosis to the right groin extending to the pubis. Ecchymosis to the chest wall.     Results Reviewed:  LAB RESULTS:      Lab 23  0349 23  0900 23  0418 23  0459 23  1415 23  0457 23  1757   WBC 5.79 5.62 6.88 6.35 8.25 9.77 7.47   HEMOGLOBIN 11.6* 11.4* 9.7* 9.9* 10.0* 11.3* 12.2   HEMATOCRIT 34.0 33.9* 29.6* 30.9* 31.0* 35.4 37.8   PLATELETS 221 232 147 137* 126* 151 142   NEUTROS ABS 3.51 3.87 4.36  --  6.29 7.15* 5.48   IMMATURE GRANS (ABS) 0.03 0.04 0.02  --  0.05 0.03 0.04   LYMPHS ABS 1.35 1.09 1.61  --  1.07 1.62 1.31   MONOS ABS 0.63 0.33 0.59  --  0.62 0.88 0.51   EOS ABS 0.24 0.25 0.26  --  0.20 0.05 0.10   MCV 85.4 86.7 88.4 90.9 91.2 90.3 90.9   PROTIME  --   --   --   --   --   --  14.0   APTT  --   --   --   --   --   --  24.2*   HEPARIN ANTI-XA  --   --   --   --   --   --  0.10*   D DIMER QUANT  --    --   --   --   --   --  0.44           Lab 06/12/23  0349 06/08/23  0459 06/07/23  1415 06/07/23 0449 06/06/23 0457 06/05/23  1757   SODIUM 129* 131* 128* 132* 135* 132*   POTASSIUM 4.6 4.1 4.1 4.3 3.8 4.7   CHLORIDE 87* 95* 96* 98 100 97*   CO2 31.0* 26.0 23.0 27.0 22.0 22.0   ANION GAP 11.0 10.0 9.0 7.0 13.0 13.0   BUN 16 23 19 18 18 23   CREATININE 0.80 0.80 0.96 0.75 0.83 1.25*   EGFR 71.4 71.4 57.4* 77.2 68.3 41.8*   GLUCOSE 107* 100* 153* 115* 119* 175*   CALCIUM 9.5 8.3* 8.2* 8.1* 8.6 8.5*   MAGNESIUM  --   --   --  1.8  --  1.9   PHOSPHORUS  --   --   --  2.7  --   --    HEMOGLOBIN A1C  --   --   --   --  6.00*  --    TSH  --   --   --   --   --  11.530*           Lab 06/07/23 0449 06/05/23  1757   TOTAL PROTEIN 6.1 6.4   ALBUMIN 3.3* 3.3*   GLOBULIN 2.8 3.1   ALT (SGPT) 45* 70*   AST (SGOT) 34* 78*   BILIRUBIN 0.4 0.6   ALK PHOS 55 60           Lab 06/12/23  0349 06/10/23  1601 06/08/23 0459 06/05/23  1757   PROBNP 1,098.0 3,061.0* 2,299.0* 6,329.0*   HSTROP T  --   --   --  30*   PROTIME  --   --   --  14.0   INR  --   --   --  1.07           Lab 06/06/23 0457   CHOLESTEROL 150   LDL CHOL 84   HDL CHOL 48   TRIGLYCERIDES 94               Brief Urine Lab Results       None            Microbiology Results Abnormal       None            XR Chest 1 View    Result Date: 6/11/2023  XR CHEST 1 VW Date of Exam: 6/11/2023 1:50 PM EDT Indication: Short of air Comparison: Jennifer 10, 2023 Findings: The lungs are clear. The heart and mediastinal contours appear normal. There is no pleural effusion. The pulmonary vasculature appears normal. The osseous structures appear intact.     Impression: Impression: No acute cardiopulmonary process. Electronically Signed: Rene Quinn  6/11/2023 2:27 PM EDT  Workstation ID: [id number]    XR Abdomen KUB    Result Date: 6/12/2023  XR ABDOMEN KUB Date of Exam: 6/12/2023 9:30 AM EDT Indication: abdominal pain Comparison: CT chest 6/9/2023. No previous abdominal imaging for  comparison. Findings: No abnormal bowel distention is seen in a pattern to suggest obstruction. There are degenerative changes in the lumbar spine with mild leftward curvature noted.     Impression: Impression: Nonobstructive bowel gas pattern. Electronically Signed: Ольга Beauchamp  6/12/2023 9:59 AM EDT  Workstation ID: [id number]     Results for orders placed during the hospital encounter of 06/05/23    Adult Transthoracic Echo Complete w/ Color, Spectral and Contrast if Necessary Per Protocol    Interpretation Summary    Left ventricular systolic function is normal. Left ventricular ejection fraction appears to be 61 - 65%.    Left ventricular wall thickness is consistent with borderline concentric hypertrophy.    Left ventricular diastolic function is consistent with (grade II w/high LAP) pseudonormalization.    Left atrial volume is mildly increased.    There is moderate calcification of the aortic valve.    Moderate mitral annular calcification is present.    Mild mitral valve stenosis is present.    Mild mitral valve regurgitation is present.    Mild pulmonic valve regurgitation is present.    Moderate tricuspid valve regurgitation is present.    Estimated right ventricular systolic pressure from tricuspid regurgitation is mildly elevated (35-45 mmHg).      Current medications:  Scheduled Meds:aspirin, 81 mg, Oral, Daily  bumetanide, 1 mg, Oral, Daily  carvedilol, 6.25 mg, Oral, BID With Meals  clopidogrel, 75 mg, Oral, Daily  empagliflozin, 10 mg, Oral, Daily  insulin lispro, 2-9 Units, Subcutaneous, 4x Daily AC & at Bedtime  levothyroxine, 50 mcg, Oral, Q AM  pantoprazole, 40 mg, Oral, QAM  pharmacy consult - [facility], , Does not apply, Daily  senna-docusate sodium, 2 tablet, Oral, BID  sodium chloride, 3 mL, Intravenous, Q12H  spironolactone, 12.5 mg, Oral, Daily  venlafaxine XR, 150 mg, Oral, Daily With Breakfast      Continuous Infusions:   PRN Meds:.  acetaminophen    senna-docusate sodium **AND** polyethylene  glycol **AND** bisacodyl **AND** bisacodyl    dextrose    dextrose    hydrALAZINE    ipratropium-albuterol    Morphine    nitroglycerin    ondansetron    simethicone    sodium chloride    sodium chloride    Assessment & Plan   Assessment & Plan     Active Hospital Problems    Diagnosis  POA    CAD (coronary artery disease) [I25.10]  Yes    Primary hypertension [I10]  Yes    Mixed hyperlipidemia [E78.2]  Yes    GERD (gastroesophageal reflux disease) [K21.9]  Yes    Hypothyroid [E03.9]  Yes    Class 1 obesity in adult [E66.9]  Yes    Heart block AV third degree [I44.2]  Yes      Resolved Hospital Problems   No resolved problems to display.        Brief Hospital Course to date:  Patient Alba is an88 y.o. female with PMHx HTN, T2DM, and dyslipidemia who presented to [facility] ED on 6/5/23 for evaluationacute onset chest pressure/heaviness. Per report, the patient called her sister who arrived at the patient's home to find her unresponsive and pale. EMS was called and patient was noted to be hypertensive with bradycardia and complete heart block. Upon arrival to [facility], EKG showed inferior ST depression and complete heart block and emergent venous tremporary pacemaker wire placement and coronary angiography with possible PCI was recommended. Dr. Miner with Cardiology took patient to cath lab and placed a HANK to the RCA and a temporary pacemaker. She was admitted to the ICU post-procedure for further monitoring and medical management.     Third degree heart block s/p temporary pacer 6/5  CAD s/p HANK to RCA  -PCI with HANK to RCA  -s/p ECHO with diastolic dysfunction  -s/p PPM    HTN  HL  GERD  -PPI  -statin intolerant    Hypothyroid  -synthroid    DM  -SSI    Epigastric abd pain  -gas-X with improvement  -encourage ambulation  -KUB non-obstructing gas patter    Expected Discharge Location and Transportation: Home  Expected Discharge 06/13/2023      DVT prophylaxis:  No DVT prophylaxis order currently exists.     AM-PAC 6 Clicks  Score (PT): 17 (06/12/23 0800)    CODE STATUS:   Code Status and Medical Interventions:   Ordered at: 06/05/23 1950     Level Of Support Discussed With:    Patient     Code Status (Patient has no pulse and is not breathing):    CPR (Attempt to Resuscitate)     Medical Interventions (Patient has pulse or is breathing):    Full       MARIELA Guzman  06/12/23

## 2023-06-13 LAB
ANION GAP SERPL CALCULATED.3IONS-SCNC: 9 MMOL/L (ref 5–15)
BILIRUB UR QL STRIP: NEGATIVE
BUN SERPL-MCNC: 20 MG/DL (ref 8–23)
BUN/CREAT SERPL: 26 (ref 7–25)
CALCIUM SPEC-SCNC: 9 MG/DL (ref 8.6–10.5)
CHLORIDE SERPL-SCNC: 85 MMOL/L (ref 98–107)
CK SERPL-CCNC: 82 U/L (ref 20–180)
CLARITY UR: CLEAR
CO2 SERPL-SCNC: 28 MMOL/L (ref 22–29)
COLOR UR: YELLOW
CORTIS AM PEAK SERPL-MCNC: 14.17 MCG/DL
CREAT SERPL-MCNC: 0.77 MG/DL (ref 0.57–1)
EGFRCR SERPLBLD CKD-EPI 2021: 74.8 ML/MIN/1.73
EOSINOPHIL SPEC QL MICRO: 0 % EOS/100 CELLS (ref 0–0)
GLUCOSE BLDC GLUCOMTR-MCNC: 107 MG/DL (ref 70–130)
GLUCOSE BLDC GLUCOMTR-MCNC: 108 MG/DL (ref 70–130)
GLUCOSE BLDC GLUCOMTR-MCNC: 118 MG/DL (ref 70–130)
GLUCOSE BLDC GLUCOMTR-MCNC: 125 MG/DL (ref 70–130)
GLUCOSE SERPL-MCNC: 102 MG/DL (ref 65–99)
GLUCOSE UR STRIP-MCNC: ABNORMAL MG/DL
HGB UR QL STRIP.AUTO: NEGATIVE
KETONES UR QL STRIP: NEGATIVE
LEUKOCYTE ESTERASE UR QL STRIP.AUTO: NEGATIVE
NITRITE UR QL STRIP: NEGATIVE
OSMOLALITY UR: 325 MOSM/KG (ref 300–1100)
PH UR STRIP.AUTO: 6 [PH] (ref 5–8)
POTASSIUM SERPL-SCNC: 3.7 MMOL/L (ref 3.5–5.2)
PROT UR QL STRIP: NEGATIVE
QT INTERVAL: 530 MS
QTC INTERVAL: 537 MS
SODIUM SERPL-SCNC: 122 MMOL/L (ref 136–145)
SODIUM SERPL-SCNC: 126 MMOL/L (ref 136–145)
SODIUM SERPL-SCNC: 126 MMOL/L (ref 136–145)
SODIUM UR-SCNC: 27 MMOL/L
SP GR UR STRIP: 1.01 (ref 1–1.03)
TSH SERPL DL<=0.05 MIU/L-ACNC: 9.9 UIU/ML (ref 0.27–4.2)
URATE SERPL-MCNC: 4.5 MG/DL (ref 2.4–5.7)
UROBILINOGEN UR QL STRIP: ABNORMAL

## 2023-06-13 PROCEDURE — 82550 ASSAY OF CK (CPK): CPT | Performed by: INTERNAL MEDICINE

## 2023-06-13 PROCEDURE — 82570 ASSAY OF URINE CREATININE: CPT | Performed by: INTERNAL MEDICINE

## 2023-06-13 PROCEDURE — 83935 ASSAY OF URINE OSMOLALITY: CPT | Performed by: INTERNAL MEDICINE

## 2023-06-13 PROCEDURE — 84295 ASSAY OF SERUM SODIUM: CPT | Performed by: INTERNAL MEDICINE

## 2023-06-13 PROCEDURE — 82948 REAGENT STRIP/BLOOD GLUCOSE: CPT

## 2023-06-13 PROCEDURE — 82533 TOTAL CORTISOL: CPT | Performed by: INTERNAL MEDICINE

## 2023-06-13 PROCEDURE — 87205 SMEAR GRAM STAIN: CPT | Performed by: INTERNAL MEDICINE

## 2023-06-13 PROCEDURE — 80048 BASIC METABOLIC PNL TOTAL CA: CPT | Performed by: INTERNAL MEDICINE

## 2023-06-13 PROCEDURE — 81003 URINALYSIS AUTO W/O SCOPE: CPT | Performed by: INTERNAL MEDICINE

## 2023-06-13 PROCEDURE — 84550 ASSAY OF BLOOD/URIC ACID: CPT | Performed by: INTERNAL MEDICINE

## 2023-06-13 PROCEDURE — 84300 ASSAY OF URINE SODIUM: CPT | Performed by: INTERNAL MEDICINE

## 2023-06-13 PROCEDURE — 84443 ASSAY THYROID STIM HORMONE: CPT | Performed by: INTERNAL MEDICINE

## 2023-06-13 RX ORDER — SODIUM CHLORIDE 1 G/1
1 TABLET ORAL ONCE
Status: COMPLETED | OUTPATIENT
Start: 2023-06-13 | End: 2023-06-13

## 2023-06-13 RX ADMIN — Medication 3 ML: at 21:29

## 2023-06-13 RX ADMIN — SODIUM CHLORIDE 1 G: 1 TABLET ORAL at 17:39

## 2023-06-13 RX ADMIN — SENNOSIDES AND DOCUSATE SODIUM 2 TABLET: 50; 8.6 TABLET ORAL at 08:53

## 2023-06-13 RX ADMIN — Medication 3 ML: at 08:54

## 2023-06-13 RX ADMIN — CARVEDILOL 6.25 MG: 6.25 TABLET, FILM COATED ORAL at 17:39

## 2023-06-13 RX ADMIN — EMPAGLIFLOZIN 10 MG: 10 TABLET, FILM COATED ORAL at 08:53

## 2023-06-13 RX ADMIN — VENLAFAXINE HYDROCHLORIDE 150 MG: 75 CAPSULE, EXTENDED RELEASE ORAL at 08:53

## 2023-06-13 RX ADMIN — CARVEDILOL 6.25 MG: 6.25 TABLET, FILM COATED ORAL at 08:53

## 2023-06-13 RX ADMIN — CLOPIDOGREL BISULFATE 75 MG: 75 TABLET ORAL at 08:53

## 2023-06-13 RX ADMIN — Medication 10 ML: at 08:54

## 2023-06-13 RX ADMIN — LEVOTHYROXINE SODIUM 50 MCG: 0.05 TABLET ORAL at 06:36

## 2023-06-13 RX ADMIN — PANTOPRAZOLE SODIUM 40 MG: 40 TABLET, DELAYED RELEASE ORAL at 06:36

## 2023-06-13 RX ADMIN — ASPIRIN 81 MG: 81 TABLET, COATED ORAL at 08:53

## 2023-06-13 NOTE — CONSULTS
Referring Provider: Kuldeep Miner MD   Reason for Consultation: Hyponatremia     Subjective     Chief complaint Bradycardia     History of present illness:  This is 88 year old female with past medical hx of HTN, CAD,DM  type II and dyslipidemia who presented to  ED on 6/5/23 for evaluation of acute onset chest pressure. She was found to be hypertensive and bradycardic. After temporary packemaker placement, she underwent LHC with stent RCA. Sodium at presentation was 132 which has been stable still last check on 6/8/23. Yesterday was found to be 129 and today 122. Nephrology service has been consulted for Hyponatremia management.   History  Past Medical History:   Diagnosis Date    Diabetes    ,   Past Surgical History:   Procedure Laterality Date    CARDIAC CATHETERIZATION N/A 6/5/2023    Procedure: Left Heart Cath;  Surgeon: Kuldeep Miner MD;  Location:  [facility];  Service: Cardiology;  Laterality: N/A;    CARDIAC ELECTROPHYSIOLOGY PROCEDURE N/A 6/5/2023    Procedure: Temporary Pacemaker;  Surgeon: Kuldeep Miner MD;  Location:  [facility];  Service: Cardiology;  Laterality: N/A;    CARDIAC ELECTROPHYSIOLOGY PROCEDURE N/A 6/6/2023    Procedure: Pacemaker DC new;  Surgeon: Dudley Rodriguez DO;  Location:  [facility];  Service: Cardiology;  Laterality: N/A;    OVARY SURGERY     ,   Family History   Problem Relation Age of Onset    Stroke Mother     Heart attack Mother    ,   Social History     Socioeconomic History    Marital status: Single   Tobacco Use    Smoking status: Never    Smokeless tobacco: Never   Vaping Use    Vaping Use: Never used   Substance and Sexual Activity    Alcohol use: No    Drug use: No    Sexual activity: Defer     E-cigarette/Vaping    E-cigarette/Vaping Use Never User     Passive Exposure No     Counseling Given No      E-cigarette/Vaping Substances    Nicotine No     THC No     CBD No     Flavoring No      E-cigarette/Vaping Devices     Disposable No     Pre-filled or Refillable Cartridge No     Refillable Tank No     Pre-filled Pod No          ,   Medications Prior to Admission   Medication Sig Dispense Refill Last Dose    aspirin 81 MG EC tablet Take 1 tablet by mouth Daily.       carvedilol (COREG) 6.25 MG tablet Take 6.25 mg by mouth 2 (Two) Times a Day.  3     Cholecalciferol 25 MCG (1000 UT) tablet Take 2 tablets by mouth Daily.       ezetimibe (ZETIA) 10 MG tablet Take  by mouth Daily.       levothyroxine (SYNTHROID, LEVOTHROID) 50 MCG tablet TAKE ONE TABLET EVERY DAY FOR FOR THYROID  3     losartan-hydrochlorothiazide (HYZAAR) 100-25 MG per tablet take ONE-HALF TABLET ONCE DAILY  3     meclizine (ANTIVERT) 25 MG tablet Take 1 tablet by mouth 3 (Three) Times a Day As Needed for Dizziness.       omeprazole (priLOSEC) 40 MG capsule TAKE ONE CAPSULE EVERY MORNING FOR gerd  3     venlafaxine XR (EFFEXOR-XR) 150 MG 24 hr capsule elva 1 capsule with food Once a day for 90 days       vitamin B-12 (CYANOCOBALAMIN) 1000 MCG tablet Take 1 tablet by mouth 1 (One) Time Per Week.      , Scheduled Meds:  aspirin, 81 mg, Oral, Daily  carvedilol, 6.25 mg, Oral, BID With Meals  clopidogrel, 75 mg, Oral, Daily  empagliflozin, 10 mg, Oral, Daily  insulin lispro, 2-9 Units, Subcutaneous, 4x Daily AC & at Bedtime  levothyroxine, 50 mcg, Oral, Q AM  pantoprazole, 40 mg, Oral, QAM  pharmacy consult - [facility], , Does not apply, Daily  senna-docusate sodium, 2 tablet, Oral, BID  sodium chloride, 3 mL, Intravenous, Q12H  venlafaxine XR, 150 mg, Oral, Daily With Breakfast   , Continuous Infusions:   , PRN Meds:    acetaminophen    senna-docusate sodium **AND** polyethylene glycol **AND** bisacodyl **AND** bisacodyl    dextrose    dextrose    hydrALAZINE    ipratropium-albuterol    nitroglycerin    ondansetron    simethicone    sodium chloride    sodium chloride, and Allergies:  Avelox [moxifloxacin]; 2,4-d dimethylamine; Azithromycin; Lisinopril; Metoclopramide; and  Statins    Review of Systems  Pertinent items are noted in HPI    Objective     Vital Signs  Temp:  [98.2 °F (36.8 °C)-98.3 °F (36.8 °C)] 98.2 °F (36.8 °C)  Heart Rate:  [63-69] 66  Resp:  [18] 18  BP: (103-135)/(54-73) 114/54    I/O this shift:  In: -   Out: 500 [Urine:500]  I/O last 3 completed shifts:  In: 840 [P.O.:840]  Out: 2650 [Urine:2650]    Physical Exam:  General Appearance:    Alert, cooperative, in no acute distress   Head:    Normocephalic, without obvious abnormality, atraumatic   Eyes:            Conjunctivae and sclerae normal, no   icterus, no pallor, corneas clear, PERRLA           Neck:   Supple, trachea midline, no thyromegaly,  no JVD       Lungs:     Clear to auscultation,respirations regular, even and               unlabored    Heart:    RRR, normal S1 and S2, no       murmur, no gallop, no rub, no click       Abdomen:     Normal bowel sounds,soft, non-tender, non-distended, no guarding, no rebound tenderness       Extremities:   Moves all extremities well, no edema, no cyanosis, no         redness   Pulses:   Pulses palpable and equal bilaterally           Neurologic:   Cranial nerves 2 - 12 grossly intact, no focal deficit           Results Review:   I reviewed the patient's new clinical results.    WBC WBC   Date Value Ref Range Status   06/12/2023 5.79 3.40 - 10.80 10*3/mm3 Final   06/11/2023 5.62 3.40 - 10.80 10*3/mm3 Final      HGB Hemoglobin   Date Value Ref Range Status   06/12/2023 11.6 (L) 12.0 - 15.9 g/dL Final   06/11/2023 11.4 (L) 12.0 - 15.9 g/dL Final      HCT Hematocrit   Date Value Ref Range Status   06/12/2023 34.0 34.0 - 46.6 % Final   06/11/2023 33.9 (L) 34.0 - 46.6 % Final      Platlets No results found for: LABPLAT   MCV MCV   Date Value Ref Range Status   06/12/2023 85.4 79.0 - 97.0 fL Final   06/11/2023 86.7 79.0 - 97.0 fL Final          Sodium Sodium   Date Value Ref Range Status   06/13/2023 122 (L) 136 - 145 mmol/L Final   06/12/2023 129 (L) 136 - 145 mmol/L Final       Potassium Potassium   Date Value Ref Range Status   06/13/2023 3.7 3.5 - 5.2 mmol/L Final   06/12/2023 4.6 3.5 - 5.2 mmol/L Final      Chloride Chloride   Date Value Ref Range Status   06/13/2023 85 (L) 98 - 107 mmol/L Final   06/12/2023 87 (L) 98 - 107 mmol/L Final      CO2 CO2   Date Value Ref Range Status   06/13/2023 28.0 22.0 - 29.0 mmol/L Final   06/12/2023 31.0 (H) 22.0 - 29.0 mmol/L Final      BUN BUN   Date Value Ref Range Status   06/13/2023 20 8 - 23 mg/dL Final   06/12/2023 16 8 - 23 mg/dL Final      Creatinine Creatinine   Date Value Ref Range Status   06/13/2023 0.77 0.57 - 1.00 mg/dL Final   06/12/2023 0.80 0.57 - 1.00 mg/dL Final      Calcium Calcium   Date Value Ref Range Status   06/13/2023 9.0 8.6 - 10.5 mg/dL Final   06/12/2023 9.5 8.6 - 10.5 mg/dL Final      PO4 No results found for: CAPO4   Albumin No results found for: ALBUMIN   Magnesium No results found for: MG   Uric Acid No results found for: URICACID         aspirin, 81 mg, Oral, Daily  carvedilol, 6.25 mg, Oral, BID With Meals  clopidogrel, 75 mg, Oral, Daily  empagliflozin, 10 mg, Oral, Daily  insulin lispro, 2-9 Units, Subcutaneous, 4x Daily AC & at Bedtime  levothyroxine, 50 mcg, Oral, Q AM  pantoprazole, 40 mg, Oral, [facility]  pharmacy consult - [facility], , Does not apply, Daily  senna-docusate sodium, 2 tablet, Oral, BID  sodium chloride, 3 mL, Intravenous, Q12H  venlafaxine XR, 150 mg, Oral, Daily With Breakfast           Assessment & Plan       Heart block AV third degree    CAD (coronary artery disease)    Primary hypertension    Mixed hyperlipidemia    GERD (gastroesophageal reflux disease)    Hypothyroid    Class 1 obesity in adult      1- Hyponatremia - Asymptomatic - euvolemic on exam. Hx of hypothyroidism on Synthroid, HTN on HCTZ and on Effexor at home. At presentation Sodium  122. No recent NSAID, Antibiotics or changes to meds. Denies any N/V, or changes to appetite.     Plan:  - Urine sodium and urine osmolality  - Serum  osmolality   -  Uric acid level   - Serial sodium monitoring.   - Fluid restriction 1lit/day   - One dose of salt tablet 1 gm X1   - TSH and cortisol level   - May need 3% saline infusion if further drop in sodium level.       I discussed the patients findings and my recommendations with patient and nursing staff    Svetlana Orr MD  06/13/23

## 2023-06-13 NOTE — PROGRESS NOTES
[facility] Medicine Services  PROGRESS NOTE    Patient Name: Janna Willis  : 1935  MRN: [id number]    Date of Admission: 2023  Primary Care Physician: Hever Ibrahim APRN    Subjective   Subjective     CC:  F/u dyspnea    HPI:  Sitting up in chair.  Says she is cold.  Has some twitching.  Denies headache.  Discussed low sodium.  She says she does not think she was on Effexor at home, but it looks like it was recently started .    ROS:  Gen- No fevers, chills  CV- No chest pain, palpitations  Resp- No cough, dyspnea  GI- No N/V/D, abd pain       Objective   Objective     Vital Signs:   Temp:  [97.9 °F (36.6 °C)-98.3 °F (36.8 °C)] 97.9 °F (36.6 °C)  Heart Rate:  [60-69] 62  Resp:  [18] 18  BP: (103-135)/(54-73) 124/64     Physical Exam:  Constitutional: No acute distress, awake, alert  HENT: NCAT, mucous membranes moist  Respiratory: Clear to auscultation bilaterally, respiratory effort normal, room air  Cardiovascular: RRR, no murmurs, rubs, or gallops  Gastrointestinal: Positive bowel sounds, soft, nontender, nondistended  Musculoskeletal: No bilateral ankle edema  Psychiatric: Appropriate affect, cooperative  Neurologic: Oriented x 3, moves all extremities, Cranial Nerves grossly intact to confrontation, speech clear  Skin: No rashes      Results Reviewed:  LAB RESULTS:      Lab 23  0349 23  0900 23  0418 23  0459 23  1415   WBC 5.79 5.62 6.88 6.35 8.25   HEMOGLOBIN 11.6* 11.4* 9.7* 9.9* 10.0*   HEMATOCRIT 34.0 33.9* 29.6* 30.9* 31.0*   PLATELETS 221 232 147 137* 126*   NEUTROS ABS 3.51 3.87 4.36  --  6.29   IMMATURE GRANS (ABS) 0.03 0.04 0.02  --  0.05   LYMPHS ABS 1.35 1.09 1.61  --  1.07   MONOS ABS 0.63 0.33 0.59  --  0.62   EOS ABS 0.24 0.25 0.26  --  0.20   MCV 85.4 86.7 88.4 90.9 91.2         Lab 23  0357 23  0349 23  0459 23  1415 23  0449   SODIUM 122* 129* 131* 128* 132*   POTASSIUM 3.7 4.6  4.1 4.1 4.3   CHLORIDE 85* 87* 95* 96* 98   CO2 28.0 31.0* 26.0 23.0 27.0   ANION GAP 9.0 11.0 10.0 9.0 7.0   BUN 20 16 23 19 18   CREATININE 0.77 0.80 0.80 0.96 0.75   EGFR 74.8 71.4 71.4 57.4* 77.2   GLUCOSE 102* 107* 100* 153* 115*   CALCIUM 9.0 9.5 8.3* 8.2* 8.1*   MAGNESIUM  --   --   --   --  1.8   PHOSPHORUS  --   --   --   --  2.7         Lab 06/07/23  0449   TOTAL PROTEIN 6.1   ALBUMIN 3.3*   GLOBULIN 2.8   ALT (SGPT) 45*   AST (SGOT) 34*   BILIRUBIN 0.4   ALK PHOS 55         Lab 06/12/23  0349 06/10/23  1601 06/08/23  0459   PROBNP 1,098.0 3,061.0* 2,299.0*                 Brief Urine Lab Results       None            Microbiology Results Abnormal       None            XR Abdomen KUB    Result Date: 6/12/2023  XR ABDOMEN KUB Date of Exam: 6/12/2023 9:30 AM EDT Indication: abdominal pain Comparison: CT chest 6/9/2023. No previous abdominal imaging for comparison. Findings: No abnormal bowel distention is seen in a pattern to suggest obstruction. There are degenerative changes in the lumbar spine with mild leftward curvature noted.     Impression: Impression: Nonobstructive bowel gas pattern. Electronically Signed: Ольга Beauchamp  6/12/2023 9:59 AM EDT  Workstation ID: [id number]     Results for orders placed during the hospital encounter of 06/05/23    Adult Transthoracic Echo Complete w/ Color, Spectral and Contrast if Necessary Per Protocol    Interpretation Summary    Left ventricular systolic function is normal. Left ventricular ejection fraction appears to be 61 - 65%.    Left ventricular wall thickness is consistent with borderline concentric hypertrophy.    Left ventricular diastolic function is consistent with (grade II w/high LAP) pseudonormalization.    Left atrial volume is mildly increased.    There is moderate calcification of the aortic valve.    Moderate mitral annular calcification is present.    Mild mitral valve stenosis is present.    Mild mitral valve regurgitation is present.    Mild  pulmonic valve regurgitation is present.    Moderate tricuspid valve regurgitation is present.    Estimated right ventricular systolic pressure from tricuspid regurgitation is mildly elevated (35-45 mmHg).      Current medications:  Scheduled Meds:aspirin, 81 mg, Oral, Daily  carvedilol, 6.25 mg, Oral, BID With Meals  clopidogrel, 75 mg, Oral, Daily  empagliflozin, 10 mg, Oral, Daily  insulin lispro, 2-9 Units, Subcutaneous, 4x Daily AC & at Bedtime  levothyroxine, 50 mcg, Oral, Q AM  pantoprazole, 40 mg, Oral, QAM  pharmacy consult - [facility], , Does not apply, Daily  senna-docusate sodium, 2 tablet, Oral, BID  sodium chloride, 3 mL, Intravenous, Q12H  sodium chloride, 1 g, Oral, Once      Continuous Infusions:   PRN Meds:.  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        Brief Hospital Course to date:  Janna Willis is a 88 y.o. female with PMHx HTN, T2DM, and dyslipidemia who presented to [facility] ED on 6/5/23 for evaluation of acute onset chest pressure/heaviness. Per report, the patient called her sister who arrived at the patient's home to find her unresponsive and pale and called EMS. Patient was noted to be hypertensive with bradycardia and complete heart block. Upon arrival to [facility], EKG showed inferior ST depression and complete heart block. Emergent venous temporary  pacemaker wire placement and coronary angiography with possible PCI was recommended. Dr. Miner with Cardiology took patient to cath lab and placed a HANK to the RCA and a temporary pacemaker. She was admitted to the ICU post-procedure for further monitoring and medical management. Patient transferred to telemetry floor on 6/7.     This patient's problems and plans were partially entered by my partner and updated as appropriate by me 06/13/23.      Third degree heart block s/p temporary pacer 6/5  CAD s/p HANK to RCA  -LHC 6/5, PCI with HANK to RCA   -s/p ECHO with diastolic dysfunction  -s/p leadless PPM placement 6/6 with Dr. Rodriguez, f/u with Dr. Rodriguez 3 months  - ASA, Plavix, Coreg, Jardiance    Hyponatremia  -Na 122, down from 129 yesterday  -Nephrology consulted  -urine sodium, urine osmoles pending; checking uric acid, serum osmoles  -fluid restriction 1L daily  -salt tab 1 mg x 1  -checking TSH and cortisol  -check sodium q4 hours  -daily BMP  -hold Effexor, new med per patient report     HTN  HL  GERD  -home regimen: losartan-hctz, coreg  -cards continuing Coreg  -statin intolerant, on Zetia at home  -PPI     Hypothyroid  -synthroid  -TSH pending     ?DM  -A1c 6% on 6/6/23  -started on Jardiance this admission  -SSI  -BG controlled, has not required any insulin, will consider stopping fingersticks in am if BG still controlled, pt reports she is not diabetic     Epigastric abd pain  -gas-X with improvement  -encourage ambulation  -KUB non-obstructing gas pattern      Expected Discharge Location and Transportation: [facility] subacute, [facility] [place]  Expected Discharge pending improvement in sodium, stable labs, per Cardiology  Expected Discharge Date: 6/15/2023; Expected Discharge Time:      DVT prophylaxis:  No DVT prophylaxis order currently exists.     AM-PAC 6 Clicks Score (PT): 16 (06/13/23 0800)    CODE STATUS:   Code Status and Medical Interventions:   Ordered at: 06/05/23 1950     Level Of Support  Discussed With:    Patient     Code Status (Patient has no pulse and is not breathing):    CPR (Attempt to Resuscitate)     Medical Interventions (Patient has pulse or is breathing):    Full       Briseyda Box, APRN  06/13/23

## 2023-06-13 NOTE — PROGRESS NOTES
[facility] Cardiology    Inpatient Progress Note      Chief Complaint/Reason for service:    Follow up complete heart block, CAD         Subjective:       Patient resting in chair.   Denies chest pain, shortness of breath at rest.      Past medical, surgical, social and family history reviewed in the patient's electronic medical record.    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            Objective:      Infusions:        Medications:    Current Facility-Administered Medications:     acetaminophen (TYLENOL) tablet 650 mg, 650 mg, Oral, Q4H PRN, Kuldeep Miner MD, 650 mg at 06/09/23 1040    aspirin EC tablet 81 mg, 81 mg, Oral, Daily, Kuldeep Miner MD, 81 mg at 06/12/23 0838    sennosides-docusate (PERICOLACE) 8.6-50 MG per tablet 2 tablet, 2 tablet, Oral, BID, 2 tablet at 06/12/23 2103 **AND** polyethylene glycol (MIRALAX) packet 17 g, 17 g, Oral, Daily PRN **AND** bisacodyl (DULCOLAX) EC tablet 5 mg, 5 mg, Oral, Daily PRN **AND** bisacodyl (DULCOLAX) suppository 10 mg, 10 mg, Rectal, Daily PRN, Filiberto Hsu MD    bumetanide (BUMEX) tablet 1 mg, 1 mg, Oral, Daily, Kuldeep Miner MD, 1 mg at 06/12/23 0838    carvedilol (COREG) tablet 6.25 mg, 6.25 mg, Oral, BID With Meals, Kuldeep Miner MD, 6.25 mg at 06/12/23 1737    clopidogrel (PLAVIX) tablet 75 mg, 75 mg, Oral, Daily, Kuldeep Miner MD, 75 mg at 06/12/23 0838    dextrose (D50W) (25 g/50 mL) IV injection 25 g, 25 g, Intravenous, Q15 Min PRN, Parth Saldana MD    dextrose (GLUTOSE) oral gel 15 g, 15 g, Oral, Q15 Min PRN, Parth Saldana MD    empagliflozin (JARDIANCE) tablet  10 mg, 10 mg, Oral, Daily, Svetlana Hernandez MD, 10 mg at 06/12/23 0838    hydrALAZINE (APRESOLINE) tablet 25 mg, 25 mg, Oral, Q6H PRN, Kuldeep Miner MD    Insulin Lispro (humaLOG) injection 2-9 Units, 2-9 Units, Subcutaneous, 4x Daily AC & at Bedtime, Parth Saldana MD, 2 Units at 06/10/23 1802    ipratropium-albuterol (DUO-NEB) nebulizer solution 3 mL, 3 mL, Nebulization, Q4H PRN, Mary Ang APRN    levothyroxine (SYNTHROID, LEVOTHROID) tablet 50 mcg, 50 mcg, Oral, Q AM, Kuldeep Miner MD, 50 mcg at 06/13/23 0636    nitroglycerin (NITROSTAT) SL tablet 0.4 mg, 0.4 mg, Sublingual, Q5 Min PRN, Kuldeep Miner MD    ondansetron (ZOFRAN) injection 4 mg, 4 mg, Intravenous, Q6H PRN, Mary Ang APRN, 4 mg at 06/06/23 0431    pantoprazole (PROTONIX) EC tablet 40 mg, 40 mg, Oral, QAMKristofer Michael, MD, 40 mg at 06/13/23 0636    Pharmacy Consult - [facility], , Does not apply, Daily, Petey Machuca, PharmD    simethicone (MYLICON) chewable tablet 80 mg, 80 mg, Oral, 4x Daily PRN, Naz Morris APRN, 80 mg at 06/11/23 1416    sodium chloride 0.9 % flush 10 mL, 10 mL, Intravenous, PRN, John Sahni, PA, 10 mL at 06/12/23 0838    sodium chloride 0.9 % flush 3 mL, 3 mL, Intravenous, Q12H, John Sahni PA, 3 mL at 06/12/23 2104    sodium chloride 0.9 % infusion 40 mL, 40 mL, Intravenous, PRN, John Sahni, PA    spironolactone (ALDACTONE) tablet 12.5 mg, 12.5 mg, Oral, Daily, Svetlana Hernandez MD, 12.5 mg at 06/12/23 0838    venlafaxine XR (EFFEXOR-XR) 24 hr capsule 150 mg, 150 mg, Oral, Daily With Breakfast, Kuldeep Miner MD, 150 mg at 06/12/23 0838    Vital Sign Min/Max for last 24 hours  Temp  Min: 98.1 °F (36.7 °C)  Max: 98.3 °F (36.8 °C)   BP  Min: 108/57  Max: 135/71   Pulse  Min: 64  Max: 78   Resp  Min: 18  Max: 18   SpO2  Min: 89 %  Max: 96 %   No data recorded      Intake/Output Summary (Last 24 hours) at 6/13/2023 0722  Last data filed at 6/12/2023 1428  Gross per 24 hour   Intake 600 ml    Output 400 ml   Net 200 ml             CONSTITUTIONAL: No acute distress    Labs/studies:  Available lab and imaging results were reviewed by myself today    Results for orders placed during the hospital encounter of 06/05/23    Adult Transthoracic Echo Complete w/ Color, Spectral and Contrast if Necessary Per Protocol    Interpretation Summary    Left ventricular systolic function is normal. Left ventricular ejection fraction appears to be 61 - 65%.    Left ventricular wall thickness is consistent with borderline concentric hypertrophy.    Left ventricular diastolic function is consistent with (grade II w/high LAP) pseudonormalization.    Left atrial volume is mildly increased.    There is moderate calcification of the aortic valve.    Moderate mitral annular calcification is present.    Mild mitral valve stenosis is present.    Mild mitral valve regurgitation is present.    Mild pulmonic valve regurgitation is present.    Moderate tricuspid valve regurgitation is present.    Estimated right ventricular systolic pressure from tricuspid regurgitation is mildly elevated (35-45 mmHg).      Tele:  Paced          Assessment/Plan:       ASSESSMENT:  Complete heart block with very slow ventricular response with a ventricular rate of less than 20 bpm  Status post [employer] leadless pacemaker placement  Second-degree AV block, 2-1 AV conduction  CAD   [facility] 6/5, no clear obstructive disease to explain the patient's complete heart block, but did have an 80% proximal RCA stenosis and a small sized vessel.  In the lieu of ongoing chest discomfort, decision was made to proceed with HANK x1   CAROL  Mixed hyperlipidemia  Statin intolerant  History of hypertension prior to admission  Possible aortic aneurysm  Hyponatremia    PLAN:  Continue aspirin, carvedilol, clopidogrel.   Nephrology consult for hyponatremia  Holding diuretics, increased fluid restriction.   Daily BMP  Historically intolerant to statins.  Resume Zetia upon  discharge.  Consider PCSK-9 inhibitor at follow up.     Hopefully medically ready for discharge once hyponatremia improves  Case management, PT/OT following; recommendations appreciated. Plan for sub-acute rehab when medically ready    Kuldeep Miner MD, MSc, FACC, [facility]  Interventional Cardiology  [facility]

## 2023-06-14 LAB
ANION GAP SERPL CALCULATED.3IONS-SCNC: 9 MMOL/L (ref 5–15)
BUN SERPL-MCNC: 15 MG/DL (ref 8–23)
BUN/CREAT SERPL: 20.3 (ref 7–25)
CALCIUM SPEC-SCNC: 8.9 MG/DL (ref 8.6–10.5)
CHLORIDE SERPL-SCNC: 92 MMOL/L (ref 98–107)
CO2 SERPL-SCNC: 27 MMOL/L (ref 22–29)
CREAT SERPL-MCNC: 0.74 MG/DL (ref 0.57–1)
CREAT UR-MCNC: 42.7 MG/DL
EGFRCR SERPLBLD CKD-EPI 2021: 78.4 ML/MIN/1.73
GLUCOSE BLDC GLUCOMTR-MCNC: 108 MG/DL (ref 70–130)
GLUCOSE BLDC GLUCOMTR-MCNC: 118 MG/DL (ref 70–130)
GLUCOSE SERPL-MCNC: 98 MG/DL (ref 65–99)
POTASSIUM SERPL-SCNC: 4.2 MMOL/L (ref 3.5–5.2)
SODIUM SERPL-SCNC: 127 MMOL/L (ref 136–145)
SODIUM SERPL-SCNC: 128 MMOL/L (ref 136–145)

## 2023-06-14 PROCEDURE — 82948 REAGENT STRIP/BLOOD GLUCOSE: CPT

## 2023-06-14 PROCEDURE — 97116 GAIT TRAINING THERAPY: CPT

## 2023-06-14 PROCEDURE — 84295 ASSAY OF SERUM SODIUM: CPT | Performed by: INTERNAL MEDICINE

## 2023-06-14 PROCEDURE — 80048 BASIC METABOLIC PNL TOTAL CA: CPT | Performed by: INTERNAL MEDICINE

## 2023-06-14 PROCEDURE — 97110 THERAPEUTIC EXERCISES: CPT

## 2023-06-14 RX ORDER — SODIUM CHLORIDE 1 G/1
1 TABLET ORAL ONCE
Status: COMPLETED | OUTPATIENT
Start: 2023-06-14 | End: 2023-06-14

## 2023-06-14 RX ADMIN — SODIUM CHLORIDE 1 G: 1 TABLET ORAL at 14:44

## 2023-06-14 RX ADMIN — PANTOPRAZOLE SODIUM 40 MG: 40 TABLET, DELAYED RELEASE ORAL at 06:04

## 2023-06-14 RX ADMIN — CLOPIDOGREL BISULFATE 75 MG: 75 TABLET ORAL at 08:24

## 2023-06-14 RX ADMIN — EMPAGLIFLOZIN 10 MG: 10 TABLET, FILM COATED ORAL at 08:24

## 2023-06-14 RX ADMIN — Medication 3 ML: at 08:24

## 2023-06-14 RX ADMIN — Medication 3 ML: at 20:28

## 2023-06-14 RX ADMIN — LEVOTHYROXINE SODIUM 50 MCG: 0.05 TABLET ORAL at 05:55

## 2023-06-14 RX ADMIN — ASPIRIN 81 MG: 81 TABLET, COATED ORAL at 08:24

## 2023-06-14 RX ADMIN — SENNOSIDES AND DOCUSATE SODIUM 2 TABLET: 50; 8.6 TABLET ORAL at 08:24

## 2023-06-14 RX ADMIN — CARVEDILOL 6.25 MG: 6.25 TABLET, FILM COATED ORAL at 17:20

## 2023-06-14 RX ADMIN — SENNOSIDES AND DOCUSATE SODIUM 2 TABLET: 50; 8.6 TABLET ORAL at 20:25

## 2023-06-14 RX ADMIN — CARVEDILOL 6.25 MG: 6.25 TABLET, FILM COATED ORAL at 08:24

## 2023-06-14 NOTE — PROGRESS NOTES
[facility] Medicine Services  PROGRESS NOTE    Patient Name: Janna Willis  : 1935  MRN: [id number]    Date of Admission: 2023  Primary Care Physician: Hever Ibrahim APRN    Subjective   Subjective     CC:  F/u dyspnea    HPI:   Patient sitting up in chair.  Family at bedside.  Patient just worked with OT.  Says she feels tired.  Feels more bloated today.     ROS:   Gen- No fevers, chills  CV- No chest pain, palpitations  Resp- No cough, dyspnea  GI- No N/V/D, abd pain       Objective   Objective     Vital Signs:   Temp:  [97.9 °F (36.6 °C)-98.6 °F (37 °C)] 98 °F (36.7 °C)  Heart Rate:  [60-71] 63  Resp:  [16-20] 18  BP: (114-137)/(54-74) 137/62     Physical Exam:   Constitutional: No acute distress, awake, alert  HENT: NCAT, mucous membranes moist  Respiratory: Clear to auscultation bilaterally, respiratory effort normal, room air  Cardiovascular: RRR, no murmurs, rubs, or gallops  Gastrointestinal: Positive bowel sounds, soft, epigastric tenderness, nondistended  Musculoskeletal: No bilateral ankle edema  Psychiatric: Appropriate affect, cooperative  Neurologic: Oriented x 3, moves all extremities, Cranial Nerves grossly intact to confrontation, speech clear  Skin: No rashes      Results Reviewed:  LAB RESULTS:      Lab 23  0349 23  0900 23  0418 23  0459 23  1415   WBC 5.79 5.62 6.88 6.35 8.25   HEMOGLOBIN 11.6* 11.4* 9.7* 9.9* 10.0*   HEMATOCRIT 34.0 33.9* 29.6* 30.9* 31.0*   PLATELETS 221 232 147 137* 126*   NEUTROS ABS 3.51 3.87 4.36  --  6.29   IMMATURE GRANS (ABS) 0.03 0.04 0.02  --  0.05   LYMPHS ABS 1.35 1.09 1.61  --  1.07   MONOS ABS 0.63 0.33 0.59  --  0.62   EOS ABS 0.24 0.25 0.26  --  0.20   MCV 85.4 86.7 88.4 90.9 91.2         Lab 23  0407 23  0005 23  2025 23  1543 23  0357 23  0349 23  0459 23  1415   SODIUM 128*  128* 127* 126* 126* 122* 129* 131* 128*   POTASSIUM 4.2   --   --   --  3.7 4.6 4.1 4.1   CHLORIDE 92*  --   --   --  85* 87* 95* 96*   CO2 27.0  --   --   --  28.0 31.0* 26.0 23.0   ANION GAP 9.0  --   --   --  9.0 11.0 10.0 9.0   BUN 15  --   --   --  20 16 23 19   CREATININE 0.74  --   --   --  0.77 0.80 0.80 0.96   EGFR 78.4  --   --   --  74.8 71.4 71.4 57.4*   GLUCOSE 98  --   --   --  102* 107* 100* 153*   CALCIUM 8.9  --   --   --  9.0 9.5 8.3* 8.2*   TSH  --   --   --  9.900*  --   --   --   --                Lab 06/12/23  0349 06/10/23  1601 06/08/23  0459   PROBNP 1,098.0 3,061.0* 2,299.0*                 Brief Urine Lab Results  (Last result in the past 365 days)        Color   Clarity   Blood   Leuk Est   Nitrite   Protein   CREAT   Urine HCG        06/13/23 1710             42.7                 Microbiology Results Abnormal       Procedure Component Value - Date/Time    Eosinophil Smear - Urine, Urine, Clean Catch [[id number]]  (Normal) Collected: 06/13/23 1709    Lab Status: Final result Specimen: Urine, Clean Catch Updated: 06/13/23 1823     Eosinophil Smear 0 % EOS/100 Cells     Narrative:      No eosinophil seen            XR Abdomen KUB    Result Date: 6/12/2023  XR ABDOMEN KUB Date of Exam: 6/12/2023 9:30 AM EDT Indication: abdominal pain Comparison: CT chest 6/9/2023. No previous abdominal imaging for comparison. Findings: No abnormal bowel distention is seen in a pattern to suggest obstruction. There are degenerative changes in the lumbar spine with mild leftward curvature noted.     Impression: Impression: Nonobstructive bowel gas pattern. Electronically Signed: Ольга Beauchamp  6/12/2023 9:59 AM EDT  Workstation ID: [id number]     Results for orders placed during the hospital encounter of 06/05/23    Adult Transthoracic Echo Complete w/ Color, Spectral and Contrast if Necessary Per Protocol    Interpretation Summary    Left ventricular systolic function is normal. Left ventricular ejection fraction appears to be 61 - 65%.    Left ventricular wall  thickness is consistent with borderline concentric hypertrophy.    Left ventricular diastolic function is consistent with (grade II w/high LAP) pseudonormalization.    Left atrial volume is mildly increased.    There is moderate calcification of the aortic valve.    Moderate mitral annular calcification is present.    Mild mitral valve stenosis is present.    Mild mitral valve regurgitation is present.    Mild pulmonic valve regurgitation is present.    Moderate tricuspid valve regurgitation is present.    Estimated right ventricular systolic pressure from tricuspid regurgitation is mildly elevated (35-45 mmHg).      Current medications:  Scheduled Meds:aspirin, 81 mg, Oral, Daily  carvedilol, 6.25 mg, Oral, BID With Meals  clopidogrel, 75 mg, Oral, Daily  empagliflozin, 10 mg, Oral, Daily  insulin lispro, 2-9 Units, Subcutaneous, 4x Daily AC & at Bedtime  levothyroxine, 50 mcg, Oral, Q AM  pantoprazole, 40 mg, Oral, QAM  pharmacy consult - [facility], , Does not apply, Daily  senna-docusate sodium, 2 tablet, Oral, BID  sodium chloride, 3 mL, Intravenous, Q12H      Continuous Infusions:   PRN Meds:.  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        Brief Hospital Course to date:  Janna Willis is a 88 y.o. female with PMHx HTN, T2DM, and dyslipidemia who presented to [facility] ED on  6/5/23 for evaluation of acute onset chest pressure/heaviness. Per report, the patient called her sister who arrived at the patient's home to find her unresponsive and pale and called EMS. Patient was noted to be hypertensive with bradycardia and complete heart block. Upon arrival to [facility], EKG showed inferior ST depression and complete heart block. Emergent venous temporary pacemaker wire placement and coronary angiography with possible PCI was recommended. Dr. Miner with Cardiology took patient to cath lab and placed a HANK to the RCA and a temporary pacemaker. She was admitted to the ICU post-procedure for further monitoring and medical management. Patient transferred to telemetry floor on 6/7.     This patient's problems and plans were partially entered by my partner and updated as appropriate by me 06/14/23.      Third degree heart block s/p temporary pacer 6/5  CAD s/p HANK to RCA  -LHC 6/5, PCI with HANK to RCA   -s/p ECHO with diastolic dysfunction  -s/p leadless PPM placement 6/6 with Dr. Rodriguez, f/u with Dr. Rodriguez 3 months  - ASA, Plavix, Coreg, Jardiance    Hyponatremia  -Na 122, down from 129 yesterday  -Nephrology consulted  -urine sodium, urine osmoles, uric acid wnl, serum osmoles pending  -fluid restriction 1L daily  -salt tab 1 mg x 1  -TSH 9.9, cortisol wnl  -serial sodium lab  -daily BMP, sodium improved to 128 this morning  -hold Effexor, new med per patient report     HTN  HL  GERD  -home regimen: losartan-hctz, coreg  -cards continuing Coreg  -statin intolerant, on Zetia at home  -PPI     Hypothyroid  -synthroid  -TSH pending     ?DM  -A1c 6% on 6/6/23  -started on Jardiance this admission  -Stop ACHS, dc SSI  -BG controlled, has not required any insulin, pt reports she is not diabetic     Epigastric abd pain  -gas-X with improvement  -encourage ambulation  -KUB 6/12 with non-obstructing gas pattern  -last BM 6/13      Expected Discharge Location and Transportation: [facility], [facility]  [place]  Expected Discharge pending improvement in sodium, stable labs, per Cardiology  Expected Discharge Date: 6/15/2023; Expected Discharge Time:      DVT prophylaxis:  No DVT prophylaxis order currently exists.     AM-PAC 6 Clicks Score (PT): 16 (06/13/23 2000)    CODE STATUS:   Code Status and Medical Interventions:   Ordered at: 06/05/23 1950     Level Of Support Discussed With:    Patient     Code Status (Patient has no pulse and is not breathing):    CPR (Attempt to Resuscitate)     Medical Interventions (Patient has pulse or is breathing):    Full       Briseyda Box, APRN  06/14/23

## 2023-06-14 NOTE — PROGRESS NOTES
.[facility] Cardiology at [facility]  IP Progress Note      Janna Willis  [id number]  1935   LOS: 9 days   Patient Care Team:  Hever Ibrahim APRN as PCP - General (Nurse Practitioner)    Chief Complaint:  complete heart block    Subjective     HPI:   Janna Willis is a 88 y.o. femalewith PMHx HTN, T2DM, and dyslipidemia who presented to [facility] ED on 6/5/23 for evaluation of acute onset chest pressure/heaviness.  Upon arrival to [facility] on 6/5/23, EKG showed inferior ST depression and complete heart block. Emergent venous temporary pacemaker wire placement and coronary angiography with possible PCI was recommended. Dr. Miner with Cardiology took patient to cath lab and placed a HANK to the RCA and a temporary pacemaker.   She is s/p leadless PPM placement 6/6 with Dr. Rodriguez.      Objective     Vital Sign Min/Max for last 24 hours  Temp  Min: 98 °F (36.7 °C)  Max: 98.6 °F (37 °C)   BP  Min: 126/64  Max: 153/66   Pulse  Min: 60  Max: 71   Resp  Min: 16  Max: 20   SpO2  Min: 92 %  Max: 97 %   No data recorded   No data recorded         06/06/23  1111 06/07/23  0600   Weight: 90.7 kg (199 lb 15.3 oz) 88.3 kg (194 lb 10.7 oz)         Intake/Output Summary (Last 24 hours) at 6/14/2023 1230  Last data filed at 6/14/2023 1150  Gross per 24 hour   Intake 720 ml   Output 2050 ml   Net -1330 ml       Physical Exam:    Vitals reviewed.   Constitutional:       Appearance: Frail.   Eyes:      Pupils: Pupils are equal, round, and reactive to light.   Pulmonary:      Effort: Pulmonary effort is normal.      Breath sounds: Normal breath sounds.   Chest:      Comments: Ecchymosis present on the chest wall  Cardiovascular:      Normal rate. Regular rhythm.   Edema:     Peripheral edema absent.   Musculoskeletal: Normal range of motion. Skin:     General: Skin is warm and dry.   Neurological:      Mental Status: Alert and oriented to person, place and time.          Results Review:   Results from last 7 days    Lab Units 06/14/23  0755 06/14/23  0407 06/14/23  0005 06/13/23  1543 06/13/23  0357 06/12/23  0349   SODIUM mmol/L 128* 128*  128* 127*   < > 122* 129*   POTASSIUM mmol/L  --  4.2  --   --  3.7 4.6   CHLORIDE mmol/L  --  92*  --   --  85* 87*   CO2 mmol/L  --  27.0  --   --  28.0 31.0*   BUN mg/dL  --  15  --   --  20 16   CREATININE mg/dL  --  0.74  --   --  0.77 0.80   GLUCOSE mg/dL  --  98  --   --  102* 107*   CALCIUM mg/dL  --  8.9  --   --  9.0 9.5    < > = values in this interval not displayed.     Results from last 7 days   Lab Units 06/12/23  0349 06/11/23  0900 06/09/23  0418   WBC 10*3/mm3 5.79 5.62 6.88   HEMOGLOBIN g/dL 11.6* 11.4* 9.7*   HEMATOCRIT % 34.0 33.9* 29.6*   PLATELETS 10*3/mm3 221 232 147             Results from last 7 days   Lab Units 06/13/23  1543   CK TOTAL U/L 82       Medication Review:    aspirin, 81 mg, Oral, Daily  carvedilol, 6.25 mg, Oral, BID With Meals  clopidogrel, 75 mg, Oral, Daily  empagliflozin, 10 mg, Oral, Daily  insulin lispro, 2-9 Units, Subcutaneous, 4x Daily AC & at Bedtime  levothyroxine, 50 mcg, Oral, Q AM  pantoprazole, 40 mg, Oral, QAM  pharmacy consult - [facility], , Does not apply, Daily  senna-docusate sodium, 2 tablet, Oral, BID  sodium chloride, 3 mL, Intravenous, Q12H        EKG/TELE: paced    Assessment & Plan       ASSESSMENT:  Complete heart block with very slow ventricular response with a ventricular rate of less than 20 bpm  Status post [employer] leadless pacemaker placement  Second-degree AV block, 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  In the lieu of ongoing chest discomfort, decision was made to proceed with HANK x1   CAROL  Mixed hyperlipidemia  Statin intolerant  History of hypertension prior to admission  Possible aortic aneurysm  Hyponatremia     PLAN:  Continue aspirin, carvedilol, clopidogrel.   Nephrology  consult for hyponatremia. Improving.  Sodium is 128 this am.  Holding diuretics, increased fluid restriction.   Daily BMP  Historically intolerant to statins.  Resume Zetia upon discharge.  Consider PCSK-9 inhibitor at follow up.      Hopefully medically ready for discharge once hyponatremia improves  Case management, PT/OT following; recommendations appreciated. Plan for sub-acute rehab when medically ready      Electronically signed by Yudelka Horowitz PA-C, 06/14/23, 7:40 AM EDT.      Please note that portions of this note were dictated utilizing Dragon dictation.

## 2023-06-14 NOTE — PLAN OF CARE
Goal Outcome Evaluation:  Plan of Care Reviewed With: patient        Progress: improving  Outcome Evaluation: Patient able to progress ambulation distance to 120' CGA with FWW and a standing rest break. She was fatigued with this distance, however gave good effort with sitting therex. She continues to mobilize below her baselie. Continue to recommend IPR at D/C.

## 2023-06-14 NOTE — PROGRESS NOTES
Nutrition Services    Patient Name:  Janna Willis  YOB: 1935  MRN: [id number]  Admit Date:  6/5/2023    Pt identified 2/2 LOS. No nutrition risk noted at this time, pt eating 60% x 12 meals. Please consult for intake avg <50% or significant weight change.        Electronically signed by:  Jennifer Gleason RD  06/14/23 07:15 EDT

## 2023-06-14 NOTE — THERAPY TREATMENT NOTE
Patient Name: Janna Willis  : 1935    MRN: [id number]                              Today's Date: 2023       Admit Date: 2023    Visit Dx:     ICD-10-CM ICD-9-CM   1. Heart block AV third degree  I44.2 426.0   2. Generalized weakness  R53.1 780.79   3. Near syncope  R55 780.2   4. Third degree heart block  I44.2 426.0     Patient Active Problem List   Diagnosis    Heart block AV third degree    CAD (coronary artery disease)    Primary hypertension    Mixed hyperlipidemia    GERD (gastroesophageal reflux disease)    Hypothyroid    Class 1 obesity in adult     Past Medical History:   Diagnosis Date    Diabetes      Past Surgical History:   Procedure Laterality Date    CARDIAC CATHETERIZATION N/A 2023    Procedure: Left Heart Cath;  Surgeon: Kuldeep Miner MD;  Location:  [facility] C[facility]ASIVE [facility];  Service: Cardiology;  Laterality: N/A;    CARDIAC ELECTROPHYSIOLOGY PROCEDURE N/A 2023    Procedure: Temporary Pacemaker;  Surgeon: Kuldeep Miner MD;  Location:  [facility] C[facility]VASIVE LOC[facility];  Service: Cardiology;  Laterality: N/A;    CARDIAC ELECTROPHYSIOLOGY PROCEDURE N/A 2023    Procedure: Pacemaker DC new;  Surgeon: Dudley Rodriguez DO;  Location:  [facility] EP INVASIVE LOCATION;  Service: Cardiology;  Laterality: N/A;    OVARY SURGERY        General Information       Row Name 23 143          Physical Therapy Time and Intention    Document Type therapy note (daily note)  -CM     Mode of Treatment physical therapy;individual therapy  -CM       Row Name 23 143          General Information    Patient Profile Reviewed yes  -CM     Existing Precautions/Restrictions cardiac;fall;oxygen therapy device and L/min;other (see comments)  s/p leadless pacemaker , R groin hematoma, BUE tremors  -CM     Barriers to Rehab medically complex  -CM       Row Name 23          Cognition    Orientation Status (Cognition) oriented x 3  -CM       Row Name 23 143          Safety  Issues, Functional Mobility    Safety Issues Affecting Function (Mobility) awareness of need for assistance;insight into deficits/self-awareness;problem-solving;safety precaution awareness;safety precautions follow-through/compliance;sequencing abilities  -CM     Impairments Affecting Function (Mobility) balance;coordination;endurance/activity tolerance;postural/trunk control;shortness of breath;strength;range of motion (ROM)  -CM               User Key  (r) = Recorded By, (t) = Taken By, (c) = Cosigned By      Initials Name Provider Type    CM Audrey mSith, PT Physical Therapist                   Mobility       Row Name 06/14/23 1437          Bed Mobility    Comment, (Bed Mobility) [facility] pre/post treatment  -       Row Name 06/14/23 1437          Sit-Stand Transfer    Sit-Stand [place] (Transfers) minimum assist (75% patient effort);verbal cues;nonverbal cues (demo/gesture);1 person assist  -CM     Assistive Device (Sit-Stand Transfers) walker, front-wheeled  -CM     Comment, (Sit-Stand Transfer) Cues for safe hand placement, Wei to clear hips from chair, no dizziness upon standing  -       Row Name 06/14/23 1437          Gait/Stairs (Locomotion)    [place] Level (Gait) contact guard;1 person assist;verbal cues  -CM     Assistive Device (Gait) walker, front-wheeled  -CM     Distance in Feet (Gait) 120  -CM     Deviations/Abnormal Patterns (Gait) bilateral deviations;gilberto decreased;gait speed decreased;stride length decreased  -CM     Bilateral Gait Deviations forward flexed posture;heel strike decreased  -CM     Comment, (Gait/Stairs) Patient ambulated in morales on RA with a step through gait pattern. Cues provided for upright posture with forward gaze. Patient reports slight lightheadedness in morales and takes one standing rest break as a result, but does not require sitting. Upon return to room her BP is 124/63. She reports no lightheadedness upon sitting in chair. Her RN was notified.  -CM                User Key  (r) = Recorded By, (t) = Taken By, (c) = Cosigned By      Initials Name Provider Type    Audrey Martin PT Physical Therapist                   Obj/Interventions       Row Name 06/14/23 1440          Motor Skills    Therapeutic Exercise hip;knee;ankle  -CM       Row Name 06/14/23 1440          Hip (Therapeutic Exercise)    Hip (Therapeutic Exercise) strengthening exercise  -CM     Hip Strengthening (Therapeutic Exercise) bilateral;aBduction;aDduction;10 repetitions  -CM       Row Name 06/14/23 1440          Knee (Therapeutic Exercise)    Knee (Therapeutic Exercise) strengthening exercise  -CM     Knee Strengthening (Therapeutic Exercise) bilateral;LAQ (long arc quad);left;SLR (straight leg raise);right;supine;heel slides;10 repetitions  -CM       Row Name 06/14/23 1440          Ankle (Therapeutic Exercise)    Ankle (Therapeutic Exercise) AROM (active range of motion)  -CM     Ankle AROM (Therapeutic Exercise) bilateral;dorsiflexion;10 repetitions  -CM       Row Name 06/14/23 1440          Balance    Balance Assessment sitting static balance;standing static balance;standing dynamic balance  -CM     Static Sitting Balance standby assist  -CM     Position, Sitting Balance unsupported;sitting in chair  -CM     Static Standing Balance contact guard  -CM     Dynamic Standing Balance contact guard  -CM     Position/Device Used, Standing Balance walker, front-wheeled  -CM               User Key  (r) = Recorded By, (t) = Taken By, (c) = Cosigned By      Initials Name Provider Type    Audrey Martin PT Physical Therapist                   Goals/Plan    No documentation.                  Clinical Impression       Row Name 06/14/23 1441          Pain    Pretreatment Pain Rating 0/10 - no pain  -CM     Posttreatment Pain Rating 0/10 - no pain  -CM       Row Name 06/14/23 1441          Plan of Care Review    Plan of Care Reviewed With patient  -CM     Progress improving  -CM     Outcome  Evaluation Patient able to progress ambulation distance to 120' CGA with FWW and a standing rest break. She was fatigued with this distance, however gave good effort with sitting therex. She continues to mobilize below her baselie. Continue to recommend IPR at D/C.  -CM       Row Name 06/14/23 1441          Vital Signs    Pre Systolic BP Rehab 153  -CM     Pre Treatment Diastolic BP 66  -CM     Intra Systolic BP Rehab 124  -CM     Intra Treatment Diastolic BP 63  -CM     Pretreatment Heart Rate (beats/min) 64  -CM     Intratreatment Heart Rate (beats/min) 71  -CM     Posttreatment Heart Rate (beats/min) 69  -CM     Pre SpO2 (%) 93  -CM     O2 Delivery Pre Treatment room air  -CM     O2 Delivery Intra Treatment room air  -CM     Post SpO2 (%) 94  -CM     O2 Delivery Post Treatment room air  -CM     Pre Patient Position Sitting  -CM     Intra Patient Position Standing  -CM     Post Patient Position Sitting  -CM       Row Name 06/14/23 1441          Positioning and Restraints    Pre-Treatment Position sitting in chair/recliner  -CM     Post Treatment Position chair  -CM     In Chair reclined;call light within reach;encouraged to call for assist;exit alarm on;with family/caregiver;waffle cushion;notified [facility]  -               User Key  (r) = Recorded By, (t) = Taken By, (c) = Cosigned By      Initials Name Provider Type    Audrey Martin, PT Physical Therapist                   Outcome Measures       Row Name 06/14/23 1443          How much help from another person do you currently need...    Turning from your back to your side while in flat bed without using bedrails? 3  -CM     Moving from lying on back to sitting on the side of a flat bed without bedrails? 3  -CM     Moving to and from a bed to a chair (including a wheelchair)? 3  -CM     Standing up from a chair using your arms (e.g., wheelchair, bedside chair)? 3  -CM     Climbing 3-5 steps with a railing? 3  -CM     To walk in hospital room? 3  -CM      [facility] 6 Clicks Score (PT) 18  -CM     Highest level of mobility 6 --> Walked 10 steps or more  -CM       Row Name 06/14/23 1443          Functional Assessment    Outcome Measure Options [facility] 6 Clicks Basic Mobility (PT)  -CM               User Key  (r) = Recorded By, (t) = Taken By, (c) = Cosigned By      Initials Name Provider Type    CM Audrey Smith, PT Physical Therapist                                 Physical Therapy Education       Title: PT OT SLP Therapies (In Progress)       Topic: Physical Therapy (Done)       Point: Mobility training (Done)       Learning Progress Summary             Patient Acceptance, E, VU by CM at 6/14/2023 1444    Acceptance, E, NR by  at 6/11/2023 1549    Acceptance, E, NR by  at 6/10/2023 1614    Acceptance, E, VU,NR by  at 6/8/2023 1121   Family Acceptance, E, VU by CM at 6/14/2023 1444                         Point: Home exercise program (Done)       Learning Progress Summary             Patient Acceptance, E, VU by CM at 6/14/2023 1444    Acceptance, E, NR by  at 6/11/2023 1549    Acceptance, E, NR by  at 6/10/2023 1614   Family Acceptance, E, VU by CM at 6/14/2023 1444                         Point: Body mechanics (Done)       Learning Progress Summary             Patient Acceptance, E, VU by CM at 6/14/2023 1444    Acceptance, E, NR by  at 6/11/2023 1549    Acceptance, E, NR by BA at 6/10/2023 1614    Acceptance, E, VU,NR by  at 6/8/2023 1121   Family Acceptance, E, VU by CM at 6/14/2023 1444                         Point: Precautions (Done)       Learning Progress Summary             Patient Acceptance, E, VU by CM at 6/14/2023 1444    Acceptance, E, NR by  at 6/11/2023 1549    Acceptance, E, NR by  at 6/10/2023 1614    Acceptance, E, VU,NR by  at 6/8/2023 1121   Family Acceptance, E, VU by CM at 6/14/2023 1444                                         User Key       Initials Effective Dates Name Provider Type Discipline     09/21/21 -  Al  Millicent Aleman, PT Physical Therapist PT    CM 09/22/22 -  Audrey Smith PT Physical Therapist PT                  PT Recommendation and Plan     Plan of Care Reviewed With: patient  Progress: improving  Outcome Evaluation: Patient able to progress ambulation distance to 120' CGA with FWW and a standing rest break. She was fatigued with this distance, however gave good effort with sitting therex. She continues to mobilize below her baselie. Continue to recommend IPR at D/C.     Time Calculation:    PT Charges       Row Name 06/14/23 1444             Time Calculation    Start Time 1403  -CM      PT Received On 06/14/23  -CM      PT Goal Re-Cert Due Date 06/18/23  -CM         Timed Charges    16574 - PT Therapeutic Exercise Minutes 10  -CM      06506 - Gait Training Minutes  14  -CM         Total Minutes    Timed Charges Total Minutes 24  -CM       Total Minutes 24  -CM                User Key  (r) = Recorded By, (t) = Taken By, (c) = Cosigned By      Initials Name Provider Type     Audrey Smith, PT Physical Therapist                  Therapy Charges for Today       Code Description Service Date Service Provider Modifiers Qty    83354903673 HC PT THER PROC EA 15 MIN 6/14/2023 Audrey Smith, PT GP 1    19999578113 HC GAIT TRAINING EA 15 MIN 6/14/2023 Audrey Smith, PT GP 1            PT G-Codes  Outcome Measure Options: AM-PAC 6 Clicks Basic Mobility (PT)  AM-PAC 6 Clicks Score (PT): 18  AM-PAC 6 Clicks Score (OT): 16       Audrey Smith PT  6/14/2023

## 2023-06-14 NOTE — PROGRESS NOTES
"   LOS: 9 days    Patient Care Team:  Hever Ibrahim APRN as PCP - General (Nurse Practitioner)    Chief Complaint:  weakness    Subjective     Interval History:     No acute events overnight. No new complaints       Review of Systems:   No CP or SOA , fever, chills, rigors, rash, N/V, Constipation.       Objective     Vital Sign Min/Max for last 24 hours  Temp  Min: 97.9 °F (36.6 °C)  Max: 98.6 °F (37 °C)   BP  Min: 124/64  Max: 137/62   Pulse  Min: 60  Max: 71   Resp  Min: 16  Max: 20   SpO2  Min: 89 %  Max: 97 %   No data recorded   No data recorded     Flowsheet Rows      Flowsheet Row First Filed Value   Admission Height 162.6 cm (64\") Documented at 06/05/2023 1759   Admission Weight 90.7 kg (200 lb) Documented at 06/05/2023 1759            I/O this shift:  In: 240 [P.O.:240]  Out: -   I/O last 3 completed shifts:  In: 840 [P.O.:840]  Out: 1700 [Urine:1700]    Physical Exam:    Gen: Alert, NAD   HENT: NC, AT, EOMI   NECK: Supple, no JVD, Trachea midline   LUNGS: CTA bilaterally, non labored respirtation   CVS: S1/S2 audible, RRR, no murmur   Abd: Soft, NT, ND, BS+   Ext: No pedal edema, no cyanosis   CNS: Alert, No focal deficit noted grossly  Psy: Cooperative  Skin: Warm, dry and intact      WBC WBC   Date Value Ref Range Status   06/12/2023 5.79 3.40 - 10.80 10*3/mm3 Final   06/11/2023 5.62 3.40 - 10.80 10*3/mm3 Final      HGB Hemoglobin   Date Value Ref Range Status   06/12/2023 11.6 (L) 12.0 - 15.9 g/dL Final   06/11/2023 11.4 (L) 12.0 - 15.9 g/dL Final      HCT Hematocrit   Date Value Ref Range Status   06/12/2023 34.0 34.0 - 46.6 % Final   06/11/2023 33.9 (L) 34.0 - 46.6 % Final      Platlets No results found for: LABPLAT   MCV MCV   Date Value Ref Range Status   06/12/2023 85.4 79.0 - 97.0 fL Final   06/11/2023 86.7 79.0 - 97.0 fL Final          Sodium Sodium   Date Value Ref Range Status   06/14/2023 128 (L) 136 - 145 mmol/L Final   06/14/2023 128 (L) 136 - 145 mmol/L Final   06/14/2023 127 " (L) 136 - 145 mmol/L Final   06/13/2023 126 (L) 136 - 145 mmol/L Final   06/13/2023 126 (L) 136 - 145 mmol/L Final   06/13/2023 122 (L) 136 - 145 mmol/L Final   06/12/2023 129 (L) 136 - 145 mmol/L Final      Potassium Potassium   Date Value Ref Range Status   06/14/2023 4.2 3.5 - 5.2 mmol/L Final   06/13/2023 3.7 3.5 - 5.2 mmol/L Final   06/12/2023 4.6 3.5 - 5.2 mmol/L Final      Chloride Chloride   Date Value Ref Range Status   06/14/2023 92 (L) 98 - 107 mmol/L Final   06/13/2023 85 (L) 98 - 107 mmol/L Final   06/12/2023 87 (L) 98 - 107 mmol/L Final      CO2 CO2   Date Value Ref Range Status   06/14/2023 27.0 22.0 - 29.0 mmol/L Final   06/13/2023 28.0 22.0 - 29.0 mmol/L Final   06/12/2023 31.0 (H) 22.0 - 29.0 mmol/L Final      BUN BUN   Date Value Ref Range Status   06/14/2023 15 8 - 23 mg/dL Final   06/13/2023 20 8 - 23 mg/dL Final   06/12/2023 16 8 - 23 mg/dL Final      Creatinine Creatinine   Date Value Ref Range Status   06/14/2023 0.74 0.57 - 1.00 mg/dL Final   06/13/2023 0.77 0.57 - 1.00 mg/dL Final   06/12/2023 0.80 0.57 - 1.00 mg/dL Final      Calcium Calcium   Date Value Ref Range Status   06/14/2023 8.9 8.6 - 10.5 mg/dL Final   06/13/2023 9.0 8.6 - 10.5 mg/dL Final   06/12/2023 9.5 8.6 - 10.5 mg/dL Final      PO4 No results found for: CAPO4   Albumin No results found for: ALBUMIN   Magnesium No results found for: MG   Uric Acid Uric Acid   Date Value Ref Range Status   06/13/2023 4.5 2.4 - 5.7 mg/dL Final     Comment:     Falsely depressed results may occur on samples drawn from patients receiving N-Acetylcysteine (NAC) or Metamizole.           Results Review:     I reviewed the patient's new clinical results.    aspirin, 81 mg, Oral, Daily  carvedilol, 6.25 mg, Oral, BID With Meals  clopidogrel, 75 mg, Oral, Daily  empagliflozin, 10 mg, Oral, Daily  insulin lispro, 2-9 Units, Subcutaneous, 4x Daily AC & at Bedtime  levothyroxine, 50 mcg, Oral, Q AM  pantoprazole, 40 mg, Oral, QAM  pharmacy consult - [facility],  , Does not apply, Daily  senna-docusate sodium, 2 tablet, Oral, BID  sodium chloride, 3 mL, Intravenous, Q12H           Medication Review: yes    Results from last 7 days   Lab Units 06/14/23  0407 06/14/23  0005 06/13/23 2025 06/13/23  1543 06/13/23  0357 06/12/23  0349 06/11/23  0900 06/09/23  0418 06/08/23  0459   SODIUM mmol/L 128*  128* 127* 126* 126* 122* 129*  --   --  131*   POTASSIUM mmol/L 4.2  --   --   --  3.7 4.6  --   --  4.1   CHLORIDE mmol/L 92*  --   --   --  85* 87*  --   --  95*   CO2 mmol/L 27.0  --   --   --  28.0 31.0*  --   --  26.0   BUN mg/dL 15  --   --   --  20 16  --   --  23   CREATININE mg/dL 0.74  --   --   --  0.77 0.80  --   --  0.80   CALCIUM mg/dL 8.9  --   --   --  9.0 9.5  --   --  8.3*   WBC 10*3/mm3  --   --   --   --   --  5.79 5.62 6.88 6.35   HEMOGLOBIN g/dL  --   --   --   --   --  11.6* 11.4* 9.7* 9.9*   PLATELETS 10*3/mm3  --   --   --   --   --  221 232 147 137*           Assessment & Plan       Heart block AV third degree    CAD (coronary artery disease)    Primary hypertension    Mixed hyperlipidemia    GERD (gastroesophageal reflux disease)    Hypothyroid    Class 1 obesity in adult    1- Hyponatremia - Asymptomatic - euvolemic on exam. Hx of hypothyroidism on Synthroid, HTN on HCTZ and on Effexor at home. At presentation Sodium  132. No recent NSAID, Antibiotics or changes to meds. Denies any N/V, or changes to appetite. Urine sodium 27, Urine osmolality 325 and uric acid 4.5. TSH 9.9 T4 1.0 and AM cortisol 14.7. Intravascular volume depletion/ poor oral intake vs SIADH      Plan:  - Serum osmolality - pending.   - Serial sodium monitoring.   - Fluid restriction 1lit/day   - One dose of salt tablet 1 gm X1     Svetlana Orr MD  06/14/23  08:54 EDT

## 2023-06-14 NOTE — CASE MANAGEMENT/SOCIAL WORK
Case Management Discharge Note      Final Note: Spoke with Hilary at [facility].  They have offered a bed on SRU and insurance precert has been approved.  Bed available on Thurs, 6/15, if medically ready.  RN to call report to [phone] and fax DC summary to [phone].  [facility] to transport.  Pt to be at the Maternity Entrance of the 1700 bldg on 6/15 at 1115.  Covid test pending.         Selected Continued Care - Admitted Since 6/5/2023       Destination Coordination complete.      Service Provider Selected Services Address Phone Fax Patient Preferred    [facility] [address] [address] [phone] --              Durable Medical Equipment    No services have been selected for the patient.                Dialysis/Infusion    No services have been selected for the patient.                Home Medical Care    No services have been selected for the patient.                Therapy    No services have been selected for the patient.                Community Resources    No services have been selected for the patient.                Community & DME    No services have been selected for the patient.                    Transportation Services  W/C Van:  ([facility])    Final Discharge Disposition Code: 03 - skilled nursing facility (SNF)

## 2023-06-15 ENCOUNTER — APPOINTMENT (OUTPATIENT)
Dept: GENERAL RADIOLOGY | Facility: HOSPITAL | Age: 88
End: 2023-06-15
Payer: MEDICARE

## 2023-06-15 VITALS
OXYGEN SATURATION: 94 % | TEMPERATURE: 97.9 F | WEIGHT: 194.67 LBS | BODY MASS INDEX: 33.23 KG/M2 | HEART RATE: 63 BPM | SYSTOLIC BLOOD PRESSURE: 122 MMHG | DIASTOLIC BLOOD PRESSURE: 60 MMHG | HEIGHT: 64 IN | RESPIRATION RATE: 17 BRPM

## 2023-06-15 LAB
ANION GAP SERPL CALCULATED.3IONS-SCNC: 10 MMOL/L (ref 5–15)
BASOPHILS # BLD AUTO: 0.04 10*3/MM3 (ref 0–0.2)
BASOPHILS NFR BLD AUTO: 0.6 % (ref 0–1.5)
BUN SERPL-MCNC: 15 MG/DL (ref 8–23)
BUN/CREAT SERPL: 16.7 (ref 7–25)
CALCIUM SPEC-SCNC: 8.8 MG/DL (ref 8.6–10.5)
CHLORIDE SERPL-SCNC: 97 MMOL/L (ref 98–107)
CO2 SERPL-SCNC: 24 MMOL/L (ref 22–29)
CREAT SERPL-MCNC: 0.9 MG/DL (ref 0.57–1)
DEPRECATED RDW RBC AUTO: 42.4 FL (ref 37–54)
EGFRCR SERPLBLD CKD-EPI 2021: 62 ML/MIN/1.73
EOSINOPHIL # BLD AUTO: 0.29 10*3/MM3 (ref 0–0.4)
EOSINOPHIL NFR BLD AUTO: 4.7 % (ref 0.3–6.2)
ERYTHROCYTE [DISTWIDTH] IN BLOOD BY AUTOMATED COUNT: 13.2 % (ref 12.3–15.4)
GLUCOSE SERPL-MCNC: 132 MG/DL (ref 65–99)
HCT VFR BLD AUTO: 33.6 % (ref 34–46.6)
HGB BLD-MCNC: 10.8 G/DL (ref 12–15.9)
IMM GRANULOCYTES # BLD AUTO: 0.02 10*3/MM3 (ref 0–0.05)
IMM GRANULOCYTES NFR BLD AUTO: 0.3 % (ref 0–0.5)
LYMPHOCYTES # BLD AUTO: 1.71 10*3/MM3 (ref 0.7–3.1)
LYMPHOCYTES NFR BLD AUTO: 27.7 % (ref 19.6–45.3)
MCH RBC QN AUTO: 28.6 PG (ref 26.6–33)
MCHC RBC AUTO-ENTMCNC: 32.1 G/DL (ref 31.5–35.7)
MCV RBC AUTO: 89.1 FL (ref 79–97)
MONOCYTES # BLD AUTO: 0.64 10*3/MM3 (ref 0.1–0.9)
MONOCYTES NFR BLD AUTO: 10.4 % (ref 5–12)
NEUTROPHILS NFR BLD AUTO: 3.48 10*3/MM3 (ref 1.7–7)
NEUTROPHILS NFR BLD AUTO: 56.3 % (ref 42.7–76)
NRBC BLD AUTO-RTO: 0 /100 WBC (ref 0–0.2)
PLATELET # BLD AUTO: 240 10*3/MM3 (ref 140–450)
PMV BLD AUTO: 9.3 FL (ref 6–12)
POTASSIUM SERPL-SCNC: 4.1 MMOL/L (ref 3.5–5.2)
RBC # BLD AUTO: 3.77 10*6/MM3 (ref 3.77–5.28)
SODIUM SERPL-SCNC: 131 MMOL/L (ref 136–145)
WBC NRBC COR # BLD: 6.18 10*3/MM3 (ref 3.4–10.8)

## 2023-06-15 PROCEDURE — 85025 COMPLETE CBC W/AUTO DIFF WBC: CPT | Performed by: INTERNAL MEDICINE

## 2023-06-15 PROCEDURE — 71045 X-RAY EXAM CHEST 1 VIEW: CPT

## 2023-06-15 PROCEDURE — 80048 BASIC METABOLIC PNL TOTAL CA: CPT | Performed by: PHYSICIAN ASSISTANT

## 2023-06-15 RX ORDER — PANTOPRAZOLE SODIUM 40 MG/1
40 TABLET, DELAYED RELEASE ORAL EVERY MORNING
Qty: 90 TABLET | Refills: 2 | Status: SHIPPED | OUTPATIENT
Start: 2023-06-16

## 2023-06-15 RX ORDER — ACETAMINOPHEN 325 MG/1
650 TABLET ORAL EVERY 6 HOURS PRN
Start: 2023-06-15

## 2023-06-15 RX ORDER — ACETAMINOPHEN 325 MG/1
650 TABLET ORAL EVERY 4 HOURS PRN
Qty: 90 TABLET | Refills: 0 | Status: SHIPPED | OUTPATIENT
Start: 2023-06-15

## 2023-06-15 RX ORDER — CLOPIDOGREL BISULFATE 75 MG/1
75 TABLET ORAL DAILY
Qty: 90 TABLET | Refills: 2 | Status: SHIPPED | OUTPATIENT
Start: 2023-06-16

## 2023-06-15 RX ORDER — ASPIRIN 81 MG/1
81 TABLET ORAL DAILY
Qty: 90 TABLET | Refills: 2 | Status: SHIPPED | OUTPATIENT
Start: 2023-06-16

## 2023-06-15 RX ORDER — COSYNTROPIN 0.25 MG/ML
0.25 INJECTION, POWDER, FOR SOLUTION INTRAMUSCULAR; INTRAVENOUS ONCE
Status: DISCONTINUED | OUTPATIENT
Start: 2023-06-16 | End: 2023-06-15

## 2023-06-15 RX ORDER — NITROGLYCERIN 0.4 MG/1
0.4 TABLET SUBLINGUAL
Qty: 30 TABLET | Refills: 0 | Status: SHIPPED | OUTPATIENT
Start: 2023-06-15

## 2023-06-15 RX ORDER — CARVEDILOL 6.25 MG/1
6.25 TABLET ORAL 2 TIMES DAILY WITH MEALS
Qty: 90 TABLET | Refills: 2 | Status: SHIPPED | OUTPATIENT
Start: 2023-06-15

## 2023-06-15 RX ORDER — IPRATROPIUM BROMIDE AND ALBUTEROL SULFATE 2.5; .5 MG/3ML; MG/3ML
3 SOLUTION RESPIRATORY (INHALATION) EVERY 4 HOURS PRN
Qty: 360 ML | Refills: 0 | OUTPATIENT
Start: 2023-06-15

## 2023-06-15 RX ADMIN — EMPAGLIFLOZIN 10 MG: 10 TABLET, FILM COATED ORAL at 08:45

## 2023-06-15 RX ADMIN — CARVEDILOL 6.25 MG: 6.25 TABLET, FILM COATED ORAL at 08:45

## 2023-06-15 RX ADMIN — Medication 3 ML: at 08:46

## 2023-06-15 RX ADMIN — ACETAMINOPHEN 650 MG: 325 TABLET ORAL at 11:15

## 2023-06-15 RX ADMIN — CLOPIDOGREL BISULFATE 75 MG: 75 TABLET ORAL at 08:45

## 2023-06-15 RX ADMIN — PANTOPRAZOLE SODIUM 40 MG: 40 TABLET, DELAYED RELEASE ORAL at 06:05

## 2023-06-15 RX ADMIN — SENNOSIDES AND DOCUSATE SODIUM 2 TABLET: 50; 8.6 TABLET ORAL at 08:45

## 2023-06-15 RX ADMIN — ASPIRIN 81 MG: 81 TABLET, COATED ORAL at 09:00

## 2023-06-15 RX ADMIN — LEVOTHYROXINE SODIUM 50 MCG: 0.05 TABLET ORAL at 05:16

## 2023-06-15 NOTE — PROGRESS NOTES
[facility] Medicine Services  PROGRESS NOTE    Patient Name: Janna Willis  : 1935  MRN: [id number]    Date of Admission: 2023  Primary Care Physician: Hever Ibrahim APRN    Subjective   Subjective     CC:  F/u dyspnea    HPI:   Sitting up in chair.  Says she still feels tired.  Discussed possibly adding antidepressant in 1 week if sodium is improving.  Plan is rehab later today.  Patient still notes some bloating.    ROS:   Gen- No fevers, chills  CV- No chest pain, palpitations  Resp- No cough, dyspnea  GI- No N/V/D, abd pain       Objective   Objective     Vital Signs:   Temp:  [97.9 °F (36.6 °C)-99.2 °F (37.3 °C)] 97.9 °F (36.6 °C)  Heart Rate:  [65-71] 68  Resp:  [16-20] 17  BP: (107-153)/(60-95) 122/60     Physical Exam:   Constitutional: No acute distress, awake, alert  HENT: NCAT, mucous membranes moist  Respiratory: Clear to auscultation bilaterally, respiratory effort normal, room air  Cardiovascular: RRR, no murmurs, rubs, or gallops  Gastrointestinal: Positive bowel sounds, soft, epigastric tenderness, nondistended  Musculoskeletal: No bilateral ankle edema  Psychiatric: Appropriate affect, cooperative  Neurologic: Oriented x 3, moves all extremities, Cranial Nerves grossly intact to confrontation, speech clear  Skin: No rashes      Results Reviewed:  LAB RESULTS:      Lab 06/15/23  0548 23  0349 23  0900 23  0418   WBC 6.18 5.79 5.62 6.88   HEMOGLOBIN 10.8* 11.6* 11.4* 9.7*   HEMATOCRIT 33.6* 34.0 33.9* 29.6*   PLATELETS 240 221 232 147   NEUTROS ABS 3.48 3.51 3.87 4.36   IMMATURE GRANS (ABS) 0.02 0.03 0.04 0.02   LYMPHS ABS 1.71 1.35 1.09 1.61   MONOS ABS 0.64 0.63 0.33 0.59   EOS ABS 0.29 0.24 0.25 0.26   MCV 89.1 85.4 86.7 88.4         Lab 23  1324 23  0755 23  0407 23  0005 23  2025 23  1543 23  0357 23  0349   SODIUM 128* 128* 128*  128* 127* 126* 126* 122* 129*   POTASSIUM  --    --  4.2  --   --   --  3.7 4.6   CHLORIDE  --   --  92*  --   --   --  85* 87*   CO2  --   --  27.0  --   --   --  28.0 31.0*   ANION GAP  --   --  9.0  --   --   --  9.0 11.0   BUN  --   --  15  --   --   --  20 16   CREATININE  --   --  0.74  --   --   --  0.77 0.80   EGFR  --   --  78.4  --   --   --  74.8 71.4   GLUCOSE  --   --  98  --   --   --  102* 107*   CALCIUM  --   --  8.9  --   --   --  9.0 9.5   TSH  --   --   --   --   --  9.900*  --   --                Lab 06/12/23  0349 06/10/23  1601   PROBNP 1,098.0 3,061.0*                 Brief Urine Lab Results  (Last result in the past 365 days)        Color   Clarity   Blood   Leuk Est   Nitrite   Protein   CREAT   Urine HCG        06/13/23 1710             42.7                 Microbiology Results Abnormal       Procedure Component Value - Date/Time    Eosinophil Smear - Urine, Urine, Clean Catch [[id number]]  (Normal) Collected: 06/13/23 1709    Lab Status: Final result Specimen: Urine, Clean Catch Updated: 06/13/23 1823     Eosinophil Smear 0 % EOS/100 Cells     Narrative:      No eosinophil seen            No radiology results from the last 24 hrs    Results for orders placed during the hospital encounter of 06/05/23    Adult Transthoracic Echo Complete w/ Color, Spectral and Contrast if Necessary Per Protocol    Interpretation Summary    Left ventricular systolic function is normal. Left ventricular ejection fraction appears to be 61 - 65%.    Left ventricular wall thickness is consistent with borderline concentric hypertrophy.    Left ventricular diastolic function is consistent with (grade II w/high LAP) pseudonormalization.    Left atrial volume is mildly increased.    There is moderate calcification of the aortic valve.    Moderate mitral annular calcification is present.    Mild mitral valve stenosis is present.    Mild mitral valve regurgitation is present.    Mild pulmonic valve regurgitation is present.    Moderate tricuspid valve regurgitation is  present.    Estimated right ventricular systolic pressure from tricuspid regurgitation is mildly elevated (35-45 mmHg).      Current medications:  Scheduled Meds:aspirin, 81 mg, Oral, Daily  carvedilol, 6.25 mg, Oral, BID With Meals  clopidogrel, 75 mg, Oral, Daily  empagliflozin, 10 mg, Oral, Daily  levothyroxine, 50 mcg, Oral, Q AM  pantoprazole, 40 mg, Oral, QAM  pharmacy consult - [facility], , Does not apply, Daily  senna-docusate sodium, 2 tablet, Oral, BID  sodium chloride, 3 mL, Intravenous, Q12H      Continuous Infusions:   PRN Meds:.  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        Brief Hospital Course to date:  Janna Willis is a 88 y.o. female with PMHx HTN, T2DM, and dyslipidemia who presented to [facility] ED on 6/5/23 for evaluation of acute onset chest pressure/heaviness. Per report, the patient called her sister who arrived at the patient's home to find her unresponsive and pale and called EMS. Patient was noted to be hypertensive with bradycardia and complete heart block. Upon arrival to [facility], EKG showed inferior ST depression and complete heart block. Emergent venous temporary pacemaker wire placement and coronary angiography with possible PCI was recommended. Dr. Miner with Cardiology took patient to cath lab and placed a HANK to the RCA and a temporary pacemaker. She  was admitted to the ICU post-procedure for further monitoring and medical management. Patient transferred to telemetry floor on 6/7.     This patient's problems and plans were partially entered by my partner and updated as appropriate by me 06/15/23.      Third degree heart block s/p temporary pacer 6/5  CAD s/p HANK to RCA  -LHC 6/5, PCI with HANK to RCA   -s/p ECHO with diastolic dysfunction  -s/p leadless PPM placement 6/6 with Dr. Rodriguez, f/u with Dr. Rodriguez 3 months  - ASA, Plavix, Coreg, Jardiance    Hyponatremia  -Nephrology consulted  -urine sodium, urine osmoles, uric acid wnl, serum osmoles pending  -fluid restriction 1L daily  -salt tab 1 mg x 1  -TSH 9.9, cortisol wnl  -serial sodium lab  -daily BMP, sodium improved to 131 this morning  -hold Effexor, new med per patient report, last dispensed June 2022 per Epic history  -Consider adding antidepressant in 1 week or at PCP follow-up after rehab     HTN  HL  GERD  -home regimen: losartan-hctz, coreg  -cards continuing Coreg  -statin intolerant, on Zetia at home  -PPI     Hypothyroid  -synthroid 50 mcg daily  -TSH 9.9 on 6/13, improving  -recheck TSH 8 weeks     ?DM  -A1c 6% on 6/6/23   -started on Jardiance this admission  -Stop ACHS, dc SSI  -BG controlled, has not required any insulin, pt reports she is not diabetic     Epigastric abd pain  -gas-X with improvement  -encourage ambulation  -KUB 6/12 with non-obstructing gas pattern  -last BM 6/13      Expected Discharge Location and Transportation: [facility] subacute, medical van  Expected Discharge pending improvement in sodium, stable labs, per Cardiology  Expected Discharge Date: 6/15/2023; Expected Discharge Time:      DVT prophylaxis:  No DVT prophylaxis order currently exists.     AM-PAC 6 Clicks Score (PT): 18 (06/14/23 2000)    CODE STATUS:   Code Status and Medical Interventions:   Ordered at: 06/05/23 1950     Level Of Support Discussed With:    Patient     Code Status (Patient has no pulse and  is not breathing):    CPR (Attempt to Resuscitate)     Medical Interventions (Patient has pulse or is breathing):    Full       Briseyda Box, APRN  06/15/23

## 2023-06-15 NOTE — DISCHARGE SUMMARY
[facility]  Discharge summary    Date of Discharge:  6/15/2023    Patient Care Team:  Hever Ibrahim APRN as PCP - General (Nurse Practitioner)    Discharge Diagnosis: complete heart block, hyponatremia-improving, CAD,HFpEF,  CAROL - resolved, hyperlidemia, and hypertension.    Presenting Problem  Heart block AV third degree [I44.2]    Allergies   Allergen Reactions    Avelox [Moxifloxacin] Rash    2,4-D Dimethylamine Hives    Azithromycin Hives    Lisinopril Hives    Metoclopramide Hives    Statins Hives       Discharge Medications     Discharge Medications        New Medications        Instructions Start Date   acetaminophen 325 MG tablet  Commonly known as: TYLENOL   650 mg, Oral, Every 4 Hours PRN      clopidogrel 75 MG tablet  Commonly known as: PLAVIX   75 mg, Oral, Daily   Start Date: June 16, 2023     empagliflozin 10 MG tablet tablet  Commonly known as: JARDIANCE   10 mg, Oral, Daily   Start Date: June 16, 2023     nitroglycerin 0.4 MG SL tablet  Commonly known as: NITROSTAT   0.4 mg, Sublingual, Every 5 Minutes PRN, Take no more than 3 doses in 15 minutes.      pantoprazole 40 MG EC tablet  Commonly known as: PROTONIX  Replaces: omeprazole 40 MG capsule   40 mg, Oral, Every Morning   Start Date: June 16, 2023            Changes to Medications        Instructions Start Date   carvedilol 6.25 MG tablet  Commonly known as: COREG  What changed: when to take this   6.25 mg, Oral, 2 Times Daily With Meals             Continue These Medications        Instructions Start Date   aspirin 81 MG EC tablet   81 mg, Oral, Daily   Start Date: June 16, 2023     ezetimibe 10 MG tablet  Commonly known as: ZETIA   Oral, Daily             Stop These Medications      losartan-hydrochlorothiazide 100-25 MG per tablet  Commonly known as: HYZAAR     omeprazole 40 MG capsule  Commonly known as: priLOSEC  Replaced by: pantoprazole 40 MG EC tablet            ASK your doctor about these medications         Instructions Start Date   cholecalciferol 25 MCG (1000 UT) tablet  Commonly known as: VITAMIN D3   2,000 Units, Oral, Daily      levothyroxine 50 MCG tablet  Commonly known as: SYNTHROID, LEVOTHROID   TAKE ONE TABLET EVERY DAY FOR FOR THYROID      meclizine 25 MG tablet  Commonly known as: ANTIVERT   25 mg, Oral, 3 Times Daily PRN      venlafaxine  MG 24 hr capsule  Commonly known as: EFFEXOR-XR   elva 1 capsule with food Once a day for 90 days      vitamin B-12 1000 MCG tablet  Commonly known as: CYANOCOBALAMIN   1,000 mcg, Oral, Weekly               Procedures Performed  Procedure(s):  Pacemaker DC new       History of Present Illness    Cardiovascular Disease Risk Factors  hyptertension, hyperlipidemia, increased age    Hospital Course  Patient is a 88 y.o. female presented with PMHx HTN, T2DM, and dyslipidemia who presented to [facility] ED on 6/5/23 for evaluation of acute onset chest pressure/heaviness.  Upon arrival to [facility] on 6/5/23, EKG showed inferior ST depression and complete heart block. Emergent venous temporary pacemaker wire placement and coronary angiography with possible PCI was recommended. Dr. Miner with Cardiology took patient to cath lab and placed a HANK to the RCA and a temporary pacemaker.   She is s/p leadless PPM placement 6/6 with Dr. Rodriguez.   She is sitting in a bedside chair and states that she just feels bad today.  She denies any chest pain.  She states she feels slight shortness of air no worsening than her usual.  She had some heart failure that we were treating with diuretics but having to do slowly secondary to acute kidney injury.  Then on 613 she developed some hyponatremia and nephrology was consulted for this.  Today her sodium was 131.  Chest x-ray was normal.  Patient is stable to be sent to a rehab facility.   .      Labs  Results from last 7 days   Lab Units 06/15/23  0951   SODIUM mmol/L 131*   POTASSIUM mmol/L 4.1   CHLORIDE mmol/L 97*   CO2 mmol/L 24.0   BUN mg/dL 15    CREATININE mg/dL 0.90   GLUCOSE mg/dL 132*   CALCIUM mg/dL 8.8     Results from last 7 days   Lab Units 06/15/23  0548   WBC 10*3/mm3 6.18   HEMOGLOBIN g/dL 10.8*   HEMATOCRIT % 33.6*   PLATELETS 10*3/mm3 240             Results from last 7 days   Lab Units 06/13/23  1543   CK TOTAL U/L 82      I reviewed the patient's new clinical results.    Hospital Course     Vital Signs  Temp:  [97.9 °F (36.6 °C)-99.2 °F (37.3 °C)] 97.9 °F (36.6 °C)  Heart Rate:  [65-73] 73  Resp:  [16-20] 17  BP: (107-132)/(60-95) 122/60  Temp  Min: 97.9 °F (36.6 °C)  Max: 99.2 °F (37.3 °C)   BP  Min: 107/74  Max: 132/61   Pulse  Min: 65  Max: 73   Resp  Min: 16  Max: 20   SpO2  Min: 90 %  Max: 95 %   No data recorded   No data recorded     Vitals reviewed.   Constitutional:       Appearance: Frail.   Pulmonary:      Effort: Pulmonary effort is normal.      Breath sounds: Normal breath sounds.   Cardiovascular:      Normal rate. Regular rhythm.   Edema:     Peripheral edema absent.   Musculoskeletal: Normal range of motion. Skin:     General: Skin is warm and dry.   Neurological:      Mental Status: Alert and oriented to person, place and time.       Discharge Disposition stable      Discharge Diet: Cardiac    Activity at Discharge: As tolerated    Follow-up Appointments  Heart and valve 1 week  Dr Rodriguez 1 month  Dr Miner 3 months        Test Results Pending at Discharge - none             Electronically signed by Yudelka Horowitz PA-C, 06/15/23, 1:44 PM EDT.

## 2023-06-15 NOTE — PROGRESS NOTES
[facility]  IP Progress Note      Janna Willis  [id number]  1935   LOS: 10 days   Patient Care Team:  Hever Ibrahim APRN as PCP - General (Nurse Practitioner)    Chief Complaint:  heart block    Subjective     HPI:   Janna Willis is a 88 y.o. female with PMHx HTN, T2DM, and dyslipidemia who presented to [facility] ED on 6/5/23 for evaluation of acute onset chest pressure/heaviness.  Upon arrival to [facility] on 6/5/23, EKG showed inferior ST depression and complete heart block. Emergent venous temporary pacemaker wire placement and coronary angiography with possible PCI was recommended. Dr. Miner with Cardiology took patient to cath lab and placed a HANK to the RCA and a temporary pacemaker.   She is s/p leadless PPM placement 6/6 with Dr. Rodriguez.   She is sitting in a bedside chair and states that she just feels bad today.  She denies any chest pain.  She states she feels slight shortness of air no worsening than her usual.    Objective     Vital Sign Min/Max for last 24 hours  Temp  Min: 97.9 °F (36.6 °C)  Max: 99.2 °F (37.3 °C)   BP  Min: 107/74  Max: 132/61   Pulse  Min: 65  Max: 73   Resp  Min: 16  Max: 20   SpO2  Min: 90 %  Max: 95 %   No data recorded   No data recorded         06/06/23  1111 06/07/23  0600   Weight: 90.7 kg (199 lb 15.3 oz) 88.3 kg (194 lb 10.7 oz)         Intake/Output Summary (Last 24 hours) at 6/15/2023 1342  Last data filed at 6/15/2023 0900  Gross per 24 hour   Intake 1090 ml   Output 1150 ml   Net -60 ml       Physical Exam:    Vitals reviewed.   Constitutional:       Appearance: Not in distress.   Pulmonary:      Effort: Pulmonary effort is normal.      Breath sounds: Normal breath sounds.   Cardiovascular:      Normal rate. Regular rhythm.   Edema:     Peripheral edema absent.   Musculoskeletal: Normal range of motion. Skin:     General: Skin is warm and dry.   Neurological:      Mental Status: Alert and oriented to person, place and time.         Results Review:   Results from last 7 days   Lab Units 06/15/23  0951 06/14/23  1324 06/14/23  0755 06/14/23  0407 06/13/23  1543 06/13/23  0357   SODIUM mmol/L 131* 128* 128* 128*  128*   < > 122*   POTASSIUM mmol/L 4.1  --   --  4.2  --  3.7   CHLORIDE mmol/L 97*  --   --  92*  --  85*   CO2 mmol/L 24.0  --   --  27.0  --  28.0   BUN mg/dL 15  --   --  15  --  20   CREATININE mg/dL 0.90  --   --  0.74  --  0.77   GLUCOSE mg/dL 132*  --   --  98  --  102*   CALCIUM mg/dL 8.8  --   --  8.9  --  9.0    < > = values in this interval not displayed.     Results from last 7 days   Lab Units 06/15/23  0548 06/12/23  0349 06/11/23  0900   WBC 10*3/mm3 6.18 5.79 5.62   HEMOGLOBIN g/dL 10.8* 11.6* 11.4*   HEMATOCRIT % 33.6* 34.0 33.9*   PLATELETS 10*3/mm3 240 221 232             Results from last 7 days   Lab Units 06/13/23  1543   CK TOTAL U/L 82       Medication Review:    aspirin, 81 mg, Oral, Daily  carvedilol, 6.25 mg, Oral, BID With Meals  clopidogrel, 75 mg, Oral, Daily  empagliflozin, 10 mg, Oral, Daily  levothyroxine, 50 mcg, Oral, Q AM  pantoprazole, 40 mg, Oral, QAM  pharmacy consult - [place], , Does not apply, Daily  senna-docusate sodium, 2 tablet, Oral, BID  sodium chloride, 3 mL, Intravenous, Q12H    Chest :  Impression:  No acute cardiopulmonary process.    EKG/TELE: paced          Heart block AV third degree    CAD (coronary artery disease)    Primary hypertension    Mixed hyperlipidemia    GERD (gastroesophageal reflux disease)    Hypothyroid    Class 1 obesity in adult    Assessment & Plan     Assessment:    Complete heart block with very slow ventricular response with a ventricular rate of less than 20 bpm  Status post [employer] leadless pacemaker placement  Second-degree AV block, 2-1 AV conduction  CAD   [facility] 6/5, no clear obstructive disease to explain the patient's complete heart block, but did have an 80% proximal RCA stenosis and a small sized vessel.  In the lieu of ongoing chest discomfort,  decision was made to proceed with HANK x1   CAROL  Mixed hyperlipidemia  Statin intolerant  History of hypertension prior to admission  Possible aortic aneurysm  Hyponatremia     PLAN:  Continue aspirin, carvedilol, clopidogrel.   Nephrology consult for hyponatremia. Improving.  Sodium is 131 this am.  Holding diuretics, increased fluid restriction.   Daily BMP  Historically intolerant to statins.  Resume Zetia upon discharge.  Consider PCSK-9 inhibitor at follow up.      Ready for discharge - waiting on nephrology to concur  Case management, PT/OT following; recommendations appreciated. Plan for sub-acute rehab when medically ready       Electronically signed by Yudelka Horowitz PA-C, 06/15/23, 8:46 AM EDT.      Please note that portions of this note were dictated utilizing Dragon dictation.

## 2023-06-15 NOTE — CASE MANAGEMENT/SOCIAL WORK
Continued Stay Note  [facility]     Patient Name: Janna Willis  MRN: [id number]  Today's Date: 6/15/2023    Admit Date: 6/5/2023    Plan: SNF   Discharge Plan       Row Name 06/15/23 1058       Plan    Plan Comments Transportation rescheduled for today at 1415 via [facility] van.                   Discharge Codes    No documentation.                 Expected Discharge Date and Time       Expected Discharge Date Expected Discharge Time    Mars 15, 2023               Zonia Orozco RN

## 2023-06-16 NOTE — PROGRESS NOTES
"Enter Query Response Below      Query Response: Acute HFpEF due to complete heart block             If applicable, please update the problem list.   Patient: Janna Willis        : 1935  Account: [id number]           Admit Date: 2023        How to Respond to this query:       a. Click New Note     b. Answer query within the yellow box.                c. Update the Problem List, if applicable.      If you have any questions about this query contact me at: [email or web address]  [phone]     Dr. Miner:     87 yo F history of HTN was admitted () with \"complete heart block\" s/p LHC HANK RCA and leadless pacemaker insertion. Diagnosis of HFpEF documented on  PN and discharge summary. Also noted, \"s/p ECHO with a diastolic dysfunction.\" proBNP 6329.0 on admission. Received IV Lasix and po Bumex. proBNP 1098.0 at discharge.     Based on the above, can the patient's CHF be further specified as:    Acute HFpEF due to complete heart block  Acute HFpEF due to HTN  Both - Acute HFpEF due to complete heart block and HTN  Other, please specify: _____________  Unable to determine       By submitting this query, we are merely seeking further clarification of documentation to accurately reflect all conditions that you are monitoring, evaluating, treating or that extend the hospitalization or utilize additional resources of care. Please utilize your independent clinical judgment when addressing the question(s) above.     This query and your response, once completed, will be entered into the legal medical record.    Sincerely,    JORGE Cano, RN, CCDS   Clinical Documentation Integrity Program     "

## 2023-06-16 NOTE — PROGRESS NOTES
"Enter Query Response Below     Query Response: Other: Patient notes no dx of DM. A1c appropriate for patient age. Did not meet BG levels for SSI during admission. Jariance likely started for cardioprotective benefits.             If applicable, please update the problem list.   Patient: Janna Willis        : 1935  Account: [id number]           Admit Date: 2023        How to Respond to this query:       a. Click New Note     b. Answer query within the yellow box.                c. Update the Problem List, if applicable.      If you have any questions about this query contact me at: [email or web address]  [phone]     Briseyda Box, APRN:    87 yo F with documentation of \" ?DM. A1c 6% on 23. started on Jardiance this admission. Stop ACHS, dc SSI. BG controlled, has not required any insulin, pt reports she is not diabetic.\" ED noted, a PMH of \"DM.\" PO Jardiance continued at discharge. No documentation of \"DM\" on the discharge summary.     After study, please clarify the documentation for \"?DM\" as:    DM ruled in (please specify type I or II)  DM ruled out  Other, please specify: ________________  Unable to determine     By submitting this query, we are merely seeking further clarification of documentation to accurately reflect all conditions that you are monitoring, evaluating, treating or that extend the hospitalization or utilize additional resources of care. Please utilize your independent clinical judgment when addressing the question(s) above.     This query and your response, once completed, will be entered into the legal medical record.    Sincerely,    Susan Coffey, OLUN, RN, CCDS  Clinical Documentation Integrity Program     "

## 2023-06-18 LAB
QT INTERVAL: 508 MS
QTC INTERVAL: 567 MS

## 2023-07-22 PROCEDURE — 93294 REM INTERROG EVL PM/LDLS PM: CPT | Performed by: INTERNAL MEDICINE

## 2023-07-22 PROCEDURE — 93296 REM INTERROG EVL PM/IDS: CPT | Performed by: INTERNAL MEDICINE

## 2023-08-09 ENCOUNTER — OFFICE VISIT (OUTPATIENT)
Dept: CARDIOLOGY | Facility: CLINIC | Age: 88
End: 2023-08-09
Payer: MEDICARE

## 2023-08-09 VITALS
WEIGHT: 174.4 LBS | BODY MASS INDEX: 28.03 KG/M2 | HEIGHT: 66 IN | DIASTOLIC BLOOD PRESSURE: 60 MMHG | OXYGEN SATURATION: 96 % | SYSTOLIC BLOOD PRESSURE: 136 MMHG | HEART RATE: 68 BPM

## 2023-08-09 DIAGNOSIS — I10 PRIMARY HYPERTENSION: ICD-10-CM

## 2023-08-09 DIAGNOSIS — I44.2 HEART BLOCK AV THIRD DEGREE: Primary | ICD-10-CM

## 2023-08-09 DIAGNOSIS — E78.2 MIXED HYPERLIPIDEMIA: ICD-10-CM

## 2023-08-09 RX ORDER — METOPROLOL SUCCINATE 25 MG/1
25 TABLET, EXTENDED RELEASE ORAL DAILY
Qty: 30 TABLET | Refills: 11 | Status: SHIPPED | OUTPATIENT
Start: 2023-08-09

## 2023-08-09 NOTE — PROGRESS NOTES
[facility] at [facility]   OFFICE NOTE      Janna Willis  1935  PCP: Hever Ibrahim APRN    SUBJECTIVE:   Janna Willis is a 88 y.o. female seen for a follow up visit regarding the following:     CC: Complete heart block    HPI:   This is a very pleasant 88-year-old female presents today for follow-up regarding coronary disease, third-degree AV block, hypertension, and Medtronic leadless pacemaker.  She is admitted to [facility] with complaint of chest pain EKG revealing severe complete heart block.  Her chest pain did suggest angina and she had elevated troponin levels therefore she had a left heart catheterization and  catheter-based invention with a stent placement in her RCA with Dr. Miner.  She tolerated procedure well.  Following that she had a leadless pacemaker placed for her complete heart block.  Since her hospitalization she is doing quite well.  She lives by herself she does her own cooking cleaning laundry and basic house chores.  She has not had any further chest pain suggesting angina.  She has had no dizziness near syncope or syncope.  She sometimes forgets to take her nighttime dose of her Coreg.  Otherwise has been compliant with her medications.    Cardiac PMH: (Old records have been reviewed and summarized below)  Patient Active Problem List    Diagnosis Date Noted    CAD (coronary artery disease) 06/06/2023     Note Last Updated: 6/6/2023     [facility] 6/5/23, no clear obstructive disease to explain the patient's complete heart block, but did have an 80% proximal RCA stenosis and a small sized vessel.  In the lieu of ongoing chest discomfort, decision was made to proceed with HANK x1       Primary hypertension 06/06/2023    Mixed hyperlipidemia 06/06/2023    GERD (gastroesophageal reflux disease) 06/06/2023    Hypothyroid 06/06/2023    Class 1 obesity in adult 06/06/2023    Heart block AV third degree 06/05/2023     Note Last Updated: 6/7/2023      Implantation of Medtronic Micra AV model leadless cardiac pacemaker ( LCP ), with serial number [id number].              Past Medical History, Past Surgical History, Family history, Social History, and Medications were all reviewed with the patient today and updated as necessary.       Current Outpatient Medications:     acetaminophen (TYLENOL) 325 MG tablet, Take 2 tablets by mouth Every 4 (Four) Hours As Needed for Mild Pain or Fever (temperature greater than 101F)., Disp: 90 tablet, Rfl: 0    aspirin 81 MG EC tablet, Take 1 tablet by mouth Daily., Disp: 90 tablet, Rfl: 2    carvedilol (COREG) 6.25 MG tablet, Take 1 tablet by mouth 2 (Two) Times a Day With Meals., Disp: 90 tablet, Rfl: 2    Cholecalciferol 25 MCG (1000 UT) tablet, Take 2 tablets by mouth Daily., Disp: , Rfl:     clopidogrel (PLAVIX) 75 MG tablet, Take 1 tablet by mouth Daily., Disp: 90 tablet, Rfl: 2    empagliflozin (JARDIANCE) 10 MG tablet tablet, Take 1 tablet by mouth Daily., Disp: 90 tablet, Rfl: 2    ezetimibe (ZETIA) 10 MG tablet, Take  by mouth Daily., Disp: , Rfl:     levothyroxine (SYNTHROID, LEVOTHROID) 50 MCG tablet, TAKE ONE TABLET EVERY DAY FOR FOR THYROID, Disp: , Rfl: 3    meclizine (ANTIVERT) 25 MG tablet, Take 1 tablet by mouth 3 (Three) Times a Day As Needed for Dizziness., Disp: , Rfl:     nitroglycerin (NITROSTAT) 0.4 MG SL tablet, Place 1 tablet under the tongue Every 5 (Five) Minutes As Needed for Chest Pain (Systolic BP Greater Than 100). Take no more than 3 doses in 15 minutes., Disp: 30 tablet, Rfl: 0    pantoprazole (PROTONIX) 40 MG EC tablet, Take 1 tablet by mouth Every Morning., Disp: 90 tablet, Rfl: 2    venlafaxine XR (EFFEXOR-XR) 150 MG 24 hr capsule, elva 1 capsule with food Once a day for 90 days, Disp: , Rfl:     vitamin B-12 (CYANOCOBALAMIN) 1000 MCG tablet, Take 1 tablet by mouth 1 (One) Time Per Week., Disp: , Rfl:       Allergies   Allergen Reactions    Avelox [Moxifloxacin] Rash    2,4-D Dimethylamine  "Hives    Azithromycin Hives    Lisinopril Hives    Metoclopramide Hives    Statins Hives         PHYSICAL EXAM:    /60 (BP Location: Right arm, Patient Position: Sitting)   Pulse 68   Ht 167.6 cm (66\")   Wt 79.1 kg (174 lb 6.4 oz)   SpO2 96%   BMI 28.15 kg/mý        Wt Readings from Last 5 Encounters:   08/09/23 79.1 kg (174 lb 6.4 oz)   06/07/23 88.3 kg (194 lb 10.7 oz)   03/02/20 88.4 kg (194 lb 14.2 oz)   08/28/19 87.3 kg (192 lb 7.4 oz)   12/19/18 88.9 kg (195 lb 15.8 oz)       BP Readings from Last 5 Encounters:   08/09/23 136/60   06/15/23 122/60   04/25/18 161/75   04/01/13 134/76       General appearance - Alert, well appearing, and in no distress   Mental status - Affect appropriate to mood.  Eyes - Sclerae anicteric,  ENMT - Hearing grossly normal bilaterally, Dental hygiene good.  Neck - Carotids upstroke normal bilaterally, no bruits, no JVD.  Resp - Clear to auscultation, no wheezes, rales or rhonchi, symmetric air entry.  Heart - Normal rate, regular rhythm, normal S1, S2, no murmurs, rubs, clicks or gallops.  GI - Soft, nontender, nondistended, no masses or organomegaly.  Neurological - Grossly intact - normal speech, no focal findings  Musculoskeletal - No joint tenderness, deformity or swelling, no muscular tenderness noted.  Extremities - Peripheral pulses normal, no pedal edema, no clubbing or cyanosis.  Skin - Normal coloration and turgor.  Psych -  oriented to person, place, and time.    Medical problems and test results were reviewed with the patient today.     No results found for this or any previous visit (from the past 672 hour(s)).        Device Interrogation:  Please see device interrogation form which has been signed and updated for full details.  Medtronic leadless pacemaker.  V DDD rate 50 V paced 95%.  R wave 16.0 mV.  Normal threshold impedances.  Battery voltage 8 years remaining.    ASSESSMENT   1. CAD: Left heart catheterization revealing 80% RCA, Xience drug-eluting " stent placement by Dr. Miner 6/5/2023    2. CHB: Mirca AV .  Device interrogation today stable.  Normal thresholds.  Battery life 8 years remaining.    3.  Hypertension: Controlled will change carvedilol to Toprol-XL 25 mg daily.  She will continue monitor blood pressure closely.  She is having difficult time taking the twice daily dosing of the Coreg.    PLAN  Return for follow-up as scheduled  Change carvedilol to Toprol-XL 25 m daily, monitor blood pressure closely.  Electronically signed by ANJUM Joseph, 08/09/23, 3:30 PM EDT.             8/9/2023  14:20 EDT  Will Sully ROJAS

## 2023-10-02 ENCOUNTER — OFFICE VISIT (OUTPATIENT)
Dept: CARDIOLOGY | Facility: CLINIC | Age: 88
End: 2023-10-02
Payer: MEDICARE

## 2023-10-02 VITALS
HEART RATE: 80 BPM | WEIGHT: 173.4 LBS | BODY MASS INDEX: 28.89 KG/M2 | HEIGHT: 65 IN | DIASTOLIC BLOOD PRESSURE: 72 MMHG | OXYGEN SATURATION: 93 % | SYSTOLIC BLOOD PRESSURE: 146 MMHG

## 2023-10-02 DIAGNOSIS — E78.2 MIXED HYPERLIPIDEMIA: ICD-10-CM

## 2023-10-02 DIAGNOSIS — I44.2 HEART BLOCK AV THIRD DEGREE: Primary | ICD-10-CM

## 2023-10-02 DIAGNOSIS — I25.118 CORONARY ARTERY DISEASE OF NATIVE ARTERY OF NATIVE HEART WITH STABLE ANGINA PECTORIS: ICD-10-CM

## 2023-10-02 PROCEDURE — 99214 OFFICE O/P EST MOD 30 MIN: CPT | Performed by: INTERNAL MEDICINE

## 2023-10-02 NOTE — PROGRESS NOTES
[facility]   [address], [address]  [place], [place], [address] [phone]  [email or web address]           OUTPATIENT CLINIC FOLLOW UP NOTE    Patient Care Team:  Patient Care Team:  Hever Ibrahim APRN as PCP - General (Nurse Practitioner)    Subjective:      Chief Complaint   Patient presents with    Follow-up         Janna Willis is a 88 y.o. female.  Cardiac focused, problem list:  Complete heart block with very slow ventricular response with a ventricular rate of less than 20 bpm  Status post Medtronic leadless pacemaker placement 6/2023  CAD   [facility] 6/5, no clear obstructive disease to explain the patient's complete heart block, but did have an 80% proximal RCA stenosis and a small sized vessel.  In the lieu of ongoing chest discomfort, decision was made to proceed with HANK x1   Mixed hyperlipidemia  Statin intolerant  History of hypertension prior to admission  Possible aortic aneurysm  Hyponatremia    HPI:    Today the patient is for follow-up    Fleeting chest pains.  Has not needed nitroglycerin.  Chronic stable fatigue.    Review of Systems:  As noted above in the HPI     PFSH:  Patient Active Problem List   Diagnosis    Heart block AV third degree    CAD (coronary artery disease)    Primary hypertension    Mixed hyperlipidemia    GERD (gastroesophageal reflux disease)    Hypothyroid    Class 1 obesity in adult         Current Outpatient Medications:     acetaminophen (TYLENOL) 325 MG tablet, Take 2 tablets by mouth Every 4 (Four) Hours As Needed for Mild Pain or Fever (temperature greater than 101F)., Disp: 90 tablet, Rfl: 0    aspirin 81 MG EC tablet, Take 1 tablet by mouth Daily., Disp: 90 tablet, Rfl: 2    cholecalciferol (VITAMIN D3) 25 MCG (1000 UT) tablet, Take 2 tablets by mouth Daily., Disp: , Rfl:     clopidogrel (PLAVIX) 75 MG tablet, Take 1 tablet by mouth Daily., Disp: 90 tablet, Rfl: 2    empagliflozin (JARDIANCE) 10 MG tablet  "tablet, Take 1 tablet by mouth Daily., Disp: 90 tablet, Rfl: 2    ezetimibe (ZETIA) 10 MG tablet, Take  by mouth Daily., Disp: , Rfl:     levothyroxine (SYNTHROID, LEVOTHROID) 50 MCG tablet, TAKE ONE TABLET EVERY DAY FOR FOR THYROID, Disp: , Rfl: 3    metoprolol succinate XL (TOPROL-XL) 25 MG 24 hr tablet, Take 1 tablet by mouth Daily., Disp: 30 tablet, Rfl: 11    nitroglycerin (NITROSTAT) 0.4 MG SL tablet, Place 1 tablet under the tongue Every 5 (Five) Minutes As Needed for Chest Pain (Systolic BP Greater Than 100). Take no more than 3 doses in 15 minutes., Disp: 30 tablet, Rfl: 0    pantoprazole (PROTONIX) 40 MG EC tablet, Take 1 tablet by mouth Every Morning., Disp: 90 tablet, Rfl: 2    venlafaxine XR (EFFEXOR-XR) 150 MG 24 hr capsule, elva 1 capsule with food Once a day for 90 days, Disp: , Rfl:     vitamin B-12 (CYANOCOBALAMIN) 1000 MCG tablet, Take 1 tablet by mouth 1 (One) Time Per Week., Disp: , Rfl:      reports that she has never smoked. She has never used smokeless tobacco.      Objective:   Physical exam:  /72 (BP Location: Left arm, Patient Position: Sitting)   Pulse 80   Ht 165.1 cm (65\")   Wt 78.7 kg (173 lb 6.4 oz)   SpO2 93%   BMI 28.86 kg/m²   CONSTITUTIONAL: No acute distress  CARDIOVASCULAR: Carotids with normal upstrokes without bruits.  Regular rate and rhythm with normal S1 and S2. Without murmur. Normal radial pulse.     Labs:    Lab Results   Component Value Date    LDL 84 06/06/2023     No components found for: LDLDIRECTC    Diagnostic Data:    Procedures    Results for orders placed during the hospital encounter of 06/05/23    Adult Transthoracic Echo Complete w/ Color, Spectral and Contrast if Necessary Per Protocol    Interpretation Summary    Left ventricular systolic function is normal. Left ventricular ejection fraction appears to be 61 - 65%.    Left ventricular wall thickness is consistent with borderline concentric hypertrophy.    Left ventricular diastolic function is " consistent with (grade II w/high LAP) pseudonormalization.    Left atrial volume is mildly increased.    There is moderate calcification of the aortic valve.    Moderate mitral annular calcification is present.    Mild mitral valve stenosis is present.    Mild mitral valve regurgitation is present.    Mild pulmonic valve regurgitation is present.    Moderate tricuspid valve regurgitation is present.    Estimated right ventricular systolic pressure from tricuspid regurgitation is mildly elevated (35-45 mmHg).      Assessment and Plan:     Heart block AV third degree  -Followed by EP    Coronary artery disease  Mixed hyperlipidemia   -Continue current medication.  Discontinue clopidogrel at next visit  -Statin intolerant  -Considered risk versus benefit of a PCSK9 inhibitor, but will defer due to patient preference and advanced age      - Return in about 6 months (around 4/2/2024) for Next scheduled follow-up with an ECG.      Kuldeep Miner MD, MSc, FACC, [facility]  Interventional Cardiology  [facility]

## 2023-10-11 ENCOUNTER — TELEPHONE (OUTPATIENT)
Dept: CARDIOLOGY | Facility: CLINIC | Age: 88
End: 2023-10-11
Payer: MEDICARE

## 2023-10-11 NOTE — TELEPHONE ENCOUNTER
Spoke with patient's sister. Reminded her that her sister's pacemaker reading is due today. She thanked me and said they were going to send in the reading soon.

## 2023-12-27 NOTE — PROGRESS NOTES
Procedure   Large Joint Arthrocentesis: R knee  Date/Time: 7/14/2021 1:53 PM  Consent given by: patient  Site marked: site marked  Timeout: Immediately prior to procedure a time out was called to verify the correct patient, procedure, equipment, support staff and site/side marked as required   Supporting Documentation  Indications: pain   Procedure Details  Location: knee - R knee  Preparation: Patient was prepped and draped in the usual sterile fashion  Needle size: 23 G  Approach: anterolateral  Medications administered: 30 mg Hyaluronan 30 MG/2ML  Patient tolerance: patient tolerated the procedure well with no immediate complications    Large Joint Arthrocentesis: L knee  Date/Time: 7/14/2021 1:53 PM  Consent given by: patient  Site marked: site marked  Timeout: Immediately prior to procedure a time out was called to verify the correct patient, procedure, equipment, support staff and site/side marked as required   Supporting Documentation  Indications: pain   Procedure Details  Location: knee - L knee  Preparation: Patient was prepped and draped in the usual sterile fashion  Needle size: 23 G  Approach: anterolateral  Medications administered: 30 mg Hyaluronan 30 MG/2ML  Patient tolerance: patient tolerated the procedure well with no immediate complications            home

## 2024-01-10 ENCOUNTER — TELEPHONE (OUTPATIENT)
Dept: CARDIOLOGY | Facility: CLINIC | Age: 89
End: 2024-01-10
Payer: MEDICARE

## 2024-01-25 ENCOUNTER — APPOINTMENT (OUTPATIENT)
Dept: GENERAL RADIOLOGY | Facility: HOSPITAL | Age: 89
DRG: 558 | End: 2024-01-25
Payer: MEDICARE

## 2024-01-25 ENCOUNTER — HOSPITAL ENCOUNTER (INPATIENT)
Facility: HOSPITAL | Age: 89
LOS: 8 days | Discharge: NURSING, REHAB OR LONG-TERM CARE | DRG: 558 | End: 2024-02-05
Attending: EMERGENCY MEDICINE | Admitting: STUDENT IN AN ORGANIZED HEALTH CARE EDUCATION/TRAINING PROGRAM
Payer: MEDICARE

## 2024-01-25 ENCOUNTER — APPOINTMENT (OUTPATIENT)
Dept: CT IMAGING | Facility: HOSPITAL | Age: 89
DRG: 558 | End: 2024-01-25
Payer: MEDICARE

## 2024-01-25 DIAGNOSIS — S01.81XA FACIAL LACERATION, INITIAL ENCOUNTER: ICD-10-CM
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
## 2024-01-25 DIAGNOSIS — S09.90XA CLOSED HEAD INJURY, INITIAL ENCOUNTER: ICD-10-CM

## 2024-01-25 DIAGNOSIS — Z79.01 ANTICOAGULATED: ICD-10-CM

## 2024-01-25 DIAGNOSIS — S05.11XA TRAUMATIC HEMATOMA OF RIGHT ORBIT, INITIAL ENCOUNTER: ICD-10-CM

## 2024-01-25 PROBLEM — S05.10XA TRAUMATIC ORBITAL HEMATOMA: Status: ACTIVE | Noted: 2024-01-25

## 2024-01-25 PROBLEM — E11.9 DM2 (DIABETES MELLITUS, TYPE 2): Status: ACTIVE | Noted: 2024-01-25

## 2024-01-25 PROBLEM — R55 SYNCOPE: Status: ACTIVE | Noted: 2024-01-25

## 2024-01-25 PROBLEM — M62.82 RHABDOMYOLYSIS: Status: ACTIVE | Noted: 2024-01-25

## 2024-01-25 LAB
ALBUMIN SERPL-MCNC: 4.1 G/DL (ref 3.5–5.2)
ALBUMIN/GLOB SERPL: 1.4 G/DL
ALP SERPL-CCNC: 74 U/L (ref 39–117)
ALT SERPL W P-5'-P-CCNC: 12 U/L (ref 1–33)
ANION GAP SERPL CALCULATED.3IONS-SCNC: 13 MMOL/L (ref 5–15)
AST SERPL-CCNC: 26 U/L (ref 1–32)
BASOPHILS # BLD AUTO: 0.03 10*3/MM3 (ref 0–0.2)
BASOPHILS NFR BLD AUTO: 0.4 % (ref 0–1.5)
BILIRUB SERPL-MCNC: 0.7 MG/DL (ref 0–1.2)
BUN SERPL-MCNC: 16 MG/DL (ref 8–23)
BUN/CREAT SERPL: 25.8 (ref 7–25)
CALCIUM SPEC-SCNC: 9.1 MG/DL (ref 8.6–10.5)
CHLORIDE SERPL-SCNC: 97 MMOL/L (ref 98–107)
CK SERPL-CCNC: 550 U/L (ref 20–180)
CO2 SERPL-SCNC: 24 MMOL/L (ref 22–29)
CREAT SERPL-MCNC: 0.62 MG/DL (ref 0.57–1)
DEPRECATED RDW RBC AUTO: 40.7 FL (ref 37–54)
EGFRCR SERPLBLD CKD-EPI 2021: 86.3 ML/MIN/1.73
EOSINOPHIL # BLD AUTO: 0.03 10*3/MM3 (ref 0–0.4)
EOSINOPHIL NFR BLD AUTO: 0.4 % (ref 0.3–6.2)
ERYTHROCYTE [DISTWIDTH] IN BLOOD BY AUTOMATED COUNT: 12.7 % (ref 12.3–15.4)
GLOBULIN UR ELPH-MCNC: 3 GM/DL
GLUCOSE BLDC GLUCOMTR-MCNC: 98 MG/DL (ref 70–130)
GLUCOSE SERPL-MCNC: 121 MG/DL (ref 65–99)
HBA1C MFR BLD: 5.6 % (ref 4.8–5.6)
HCT VFR BLD AUTO: 42.7 % (ref 34–46.6)
HGB BLD-MCNC: 14 G/DL (ref 12–15.9)
IMM GRANULOCYTES # BLD AUTO: 0.01 10*3/MM3 (ref 0–0.05)
IMM GRANULOCYTES NFR BLD AUTO: 0.1 % (ref 0–0.5)
LYMPHOCYTES # BLD AUTO: 0.92 10*3/MM3 (ref 0.7–3.1)
LYMPHOCYTES NFR BLD AUTO: 13.8 % (ref 19.6–45.3)
MCH RBC QN AUTO: 28.6 PG (ref 26.6–33)
MCHC RBC AUTO-ENTMCNC: 32.8 G/DL (ref 31.5–35.7)
MCV RBC AUTO: 87.3 FL (ref 79–97)
MONOCYTES # BLD AUTO: 0.54 10*3/MM3 (ref 0.1–0.9)
MONOCYTES NFR BLD AUTO: 8.1 % (ref 5–12)
NEUTROPHILS NFR BLD AUTO: 5.14 10*3/MM3 (ref 1.7–7)
NEUTROPHILS NFR BLD AUTO: 77.2 % (ref 42.7–76)
NRBC BLD AUTO-RTO: 0 /100 WBC (ref 0–0.2)
PLATELET # BLD AUTO: 149 10*3/MM3 (ref 140–450)
PMV BLD AUTO: 9.9 FL (ref 6–12)
POTASSIUM SERPL-SCNC: 4.1 MMOL/L (ref 3.5–5.2)
PROT SERPL-MCNC: 7.1 G/DL (ref 6–8.5)
QT INTERVAL: 410 MS
QTC INTERVAL: 470 MS
RBC # BLD AUTO: 4.89 10*6/MM3 (ref 3.77–5.28)
SODIUM SERPL-SCNC: 134 MMOL/L (ref 136–145)
TROPONIN T SERPL HS-MCNC: 10 NG/L
WBC NRBC COR # BLD AUTO: 6.67 10*3/MM3 (ref 3.4–10.8)

## 2024-01-25 PROCEDURE — 0HQ1XZZ REPAIR FACE SKIN, EXTERNAL APPROACH: ICD-10-PCS | Performed by: EMERGENCY MEDICINE

## 2024-01-25 PROCEDURE — 99223 1ST HOSP IP/OBS HIGH 75: CPT | Performed by: PHYSICIAN ASSISTANT

## 2024-01-25 PROCEDURE — 73030 X-RAY EXAM OF SHOULDER: CPT

## 2024-01-25 PROCEDURE — 70450 CT HEAD/BRAIN W/O DYE: CPT

## 2024-01-25 PROCEDURE — 85025 COMPLETE CBC W/AUTO DIFF WBC: CPT | Performed by: EMERGENCY MEDICINE

## 2024-01-25 PROCEDURE — 84484 ASSAY OF TROPONIN QUANT: CPT | Performed by: EMERGENCY MEDICINE

## 2024-01-25 PROCEDURE — 82550 ASSAY OF CK (CPK): CPT | Performed by: EMERGENCY MEDICINE

## 2024-01-25 PROCEDURE — 83036 HEMOGLOBIN GLYCOSYLATED A1C: CPT | Performed by: PHYSICIAN ASSISTANT

## 2024-01-25 PROCEDURE — G0378 HOSPITAL OBSERVATION PER HR: HCPCS

## 2024-01-25 PROCEDURE — 82948 REAGENT STRIP/BLOOD GLUCOSE: CPT

## 2024-01-25 PROCEDURE — 80053 COMPREHEN METABOLIC PANEL: CPT | Performed by: EMERGENCY MEDICINE

## 2024-01-25 PROCEDURE — 72125 CT NECK SPINE W/O DYE: CPT

## 2024-01-25 PROCEDURE — 25810000003 SODIUM CHLORIDE 0.9 % SOLUTION: Performed by: EMERGENCY MEDICINE

## 2024-01-25 PROCEDURE — 36415 COLL VENOUS BLD VENIPUNCTURE: CPT

## 2024-01-25 PROCEDURE — 25810000003 LACTATED RINGERS PER 1000 ML: Performed by: PHYSICIAN ASSISTANT

## 2024-01-25 PROCEDURE — 99285 EMERGENCY DEPT VISIT HI MDM: CPT

## 2024-01-25 PROCEDURE — 93005 ELECTROCARDIOGRAM TRACING: CPT | Performed by: EMERGENCY MEDICINE

## 2024-01-25 PROCEDURE — 70486 CT MAXILLOFACIAL W/O DYE: CPT

## 2024-01-25 RX ORDER — PANTOPRAZOLE SODIUM 40 MG/1
40 TABLET, DELAYED RELEASE ORAL EVERY MORNING
Status: DISCONTINUED | OUTPATIENT
Start: 2024-01-26 | End: 2024-02-05 | Stop reason: HOSPADM

## 2024-01-25 RX ORDER — LEVOTHYROXINE SODIUM 0.05 MG/1
50 TABLET ORAL
Status: DISCONTINUED | OUTPATIENT
Start: 2024-01-26 | End: 2024-02-05 | Stop reason: HOSPADM

## 2024-01-25 RX ORDER — INSULIN LISPRO 100 [IU]/ML
2-7 INJECTION, SOLUTION INTRAVENOUS; SUBCUTANEOUS
Status: DISCONTINUED | OUTPATIENT
Start: 2024-01-25 | End: 2024-02-05 | Stop reason: HOSPADM

## 2024-01-25 RX ORDER — ONDANSETRON 2 MG/ML
4 INJECTION INTRAMUSCULAR; INTRAVENOUS EVERY 6 HOURS PRN
Status: DISCONTINUED | OUTPATIENT
Start: 2024-01-25 | End: 2024-02-05 | Stop reason: HOSPADM

## 2024-01-25 RX ORDER — LIDOCAINE HYDROCHLORIDE 20 MG/ML
INJECTION, SOLUTION INFILTRATION; PERINEURAL
Status: COMPLETED
Start: 2024-01-25 | End: 2024-01-25

## 2024-01-25 RX ORDER — SODIUM CHLORIDE 0.9 % (FLUSH) 0.9 %
10 SYRINGE (ML) INJECTION AS NEEDED
Status: DISCONTINUED | OUTPATIENT
Start: 2024-01-25 | End: 2024-02-05 | Stop reason: HOSPADM

## 2024-01-25 RX ORDER — BISACODYL 5 MG/1
5 TABLET, DELAYED RELEASE ORAL DAILY PRN
Status: DISCONTINUED | OUTPATIENT
Start: 2024-01-25 | End: 2024-02-05 | Stop reason: HOSPADM

## 2024-01-25 RX ORDER — ACETAMINOPHEN 325 MG/1
650 TABLET ORAL EVERY 4 HOURS PRN
Status: DISCONTINUED | OUTPATIENT
Start: 2024-01-25 | End: 2024-02-05 | Stop reason: HOSPADM

## 2024-01-25 RX ORDER — DEXTROSE MONOHYDRATE 25 G/50ML
25 INJECTION, SOLUTION INTRAVENOUS
Status: DISCONTINUED | OUTPATIENT
Start: 2024-01-25 | End: 2024-02-05 | Stop reason: HOSPADM

## 2024-01-25 RX ORDER — SODIUM CHLORIDE 9 MG/ML
40 INJECTION, SOLUTION INTRAVENOUS AS NEEDED
Status: DISCONTINUED | OUTPATIENT
Start: 2024-01-25 | End: 2024-02-05 | Stop reason: HOSPADM

## 2024-01-25 RX ORDER — ACETAMINOPHEN 500 MG
1000 TABLET ORAL ONCE
Status: COMPLETED | OUTPATIENT
Start: 2024-01-25 | End: 2024-01-25

## 2024-01-25 RX ORDER — POLYETHYLENE GLYCOL 3350 17 G/17G
17 POWDER, FOR SOLUTION ORAL DAILY PRN
Status: DISCONTINUED | OUTPATIENT
Start: 2024-01-25 | End: 2024-02-05 | Stop reason: HOSPADM

## 2024-01-25 RX ORDER — CHOLECALCIFEROL (VITAMIN D3) 125 MCG
5 CAPSULE ORAL NIGHTLY PRN
Status: DISCONTINUED | OUTPATIENT
Start: 2024-01-25 | End: 2024-02-05 | Stop reason: HOSPADM

## 2024-01-25 RX ORDER — AMOXICILLIN 250 MG
2 CAPSULE ORAL 2 TIMES DAILY
Status: DISCONTINUED | OUTPATIENT
Start: 2024-01-25 | End: 2024-02-05 | Stop reason: HOSPADM

## 2024-01-25 RX ORDER — VENLAFAXINE HYDROCHLORIDE 75 MG/1
150 CAPSULE, EXTENDED RELEASE ORAL
Status: DISCONTINUED | OUTPATIENT
Start: 2024-01-26 | End: 2024-02-05 | Stop reason: HOSPADM

## 2024-01-25 RX ORDER — SODIUM CHLORIDE, SODIUM LACTATE, POTASSIUM CHLORIDE, CALCIUM CHLORIDE 600; 310; 30; 20 MG/100ML; MG/100ML; MG/100ML; MG/100ML
75 INJECTION, SOLUTION INTRAVENOUS CONTINUOUS
Status: ACTIVE | OUTPATIENT
Start: 2024-01-25 | End: 2024-01-26

## 2024-01-25 RX ORDER — IBUPROFEN 600 MG/1
1 TABLET ORAL
Status: DISCONTINUED | OUTPATIENT
Start: 2024-01-25 | End: 2024-02-05 | Stop reason: HOSPADM

## 2024-01-25 RX ORDER — NICOTINE POLACRILEX 4 MG
15 LOZENGE BUCCAL
Status: DISCONTINUED | OUTPATIENT
Start: 2024-01-25 | End: 2024-02-05 | Stop reason: HOSPADM

## 2024-01-25 RX ORDER — BISACODYL 10 MG
10 SUPPOSITORY, RECTAL RECTAL DAILY PRN
Status: DISCONTINUED | OUTPATIENT
Start: 2024-01-25 | End: 2024-02-05 | Stop reason: HOSPADM

## 2024-01-25 RX ORDER — SODIUM CHLORIDE 0.9 % (FLUSH) 0.9 %
10 SYRINGE (ML) INJECTION EVERY 12 HOURS SCHEDULED
Status: DISCONTINUED | OUTPATIENT
Start: 2024-01-25 | End: 2024-02-05 | Stop reason: HOSPADM

## 2024-01-25 RX ADMIN — SENNOSIDES AND DOCUSATE SODIUM 2 TABLET: 8.6; 5 TABLET ORAL at 20:53

## 2024-01-25 RX ADMIN — LIDOCAINE HYDROCHLORIDE 5 ML: 20 INJECTION, SOLUTION INFILTRATION; PERINEURAL at 17:22

## 2024-01-25 RX ADMIN — SODIUM CHLORIDE 1000 ML: 9 INJECTION, SOLUTION INTRAVENOUS at 16:11

## 2024-01-25 RX ADMIN — SODIUM CHLORIDE, POTASSIUM CHLORIDE, SODIUM LACTATE AND CALCIUM CHLORIDE 75 ML/HR: 600; 310; 30; 20 INJECTION, SOLUTION INTRAVENOUS at 20:51

## 2024-01-25 RX ADMIN — Medication 10 ML: at 20:51

## 2024-01-25 RX ADMIN — ACETAMINOPHEN 1000 MG: 500 TABLET ORAL at 17:31

## 2024-01-25 NOTE — LETTER
EMS Transport Request  For use at [facility], [facility], [place], [place], and [place] only   Patient Name: Janna Willis : 1935   Weight:75.6 kg (166 lb 11.2 oz) Pick-up [facility]: [facility] BLS/ALS: BLS/ALS: BLS   Insurance: AN[employer] MEDICARE REPLACEMENT Auth End Date:    Pre-Cert #: D/C Summary complete:    Destination: Other [place]   Contact Precautions: None   Equipment (O2, Fluids, etc.): Urinary Catheter   Arrive By Date/Time: 24  Stretcher/WC: Wheelchair   CM Requesting: Nae Reyes RN Ext: [phone]   Notes/Medical Necessity: Impaired functional mobility, balance, gait and endurance.   *sister is concerned if insurance will pay for ambulance or not.*     ______________________________________________________________________    *Only 2 patient bags OR 1 carry-on size bag are permitted.  Wheelchairs and walkers CANNOT transported with the patient. Acknowledge: Yes

## 2024-01-25 NOTE — ED NOTES
Janna Willis    Nursing Report ED to Floor:  Mental status: A/Ox4  Ambulatory status: Assist X1   Oxygen Therapy:  RA  Cardiac Rhythm: NSR  Admitted from: ED - Home   Safety Concerns:  Fall risk    Social Issues: NA  ED Room #:  31    ED Nurse Phone Extension - [phone] or may call [phone].      HPI:   Chief Complaint   Patient presents with    Fall       Past Medical History:  Past Medical History:   Diagnosis Date    Diabetes         Past Surgical History:  Past Surgical History:   Procedure Laterality Date    CARDIAC CATHETERIZATION N/A 6/5/2023    Procedure: Left Heart Cath;  Surgeon: Kuldeep Miner MD;  Location:  [facility];  Service: Cardiology;  Laterality: N/A;    CARDIAC ELECTROPHYSIOLOGY PROCEDURE N/A 6/5/2023    Procedure: Temporary Pacemaker;  Surgeon: Kuldeep Miner MD;  Location:  [facility];  Service: Cardiology;  Laterality: N/A;    CARDIAC ELECTROPHYSIOLOGY PROCEDURE N/A 6/6/2023    Procedure: Pacemaker DC new;  Surgeon: Dudley Rodriguez DO;  Location:  [facility] EP INVASIVE LOCATION;  Service: Cardiology;  Laterality: N/A;    OVARY SURGERY          Admitting Doctor:   Ailyn Shi DO    Consulting Provider(s):  Consults       No orders found from 12/27/2023 to 1/26/2024.             Admitting Diagnosis:   The primary encounter diagnosis was Fall, initial encounter. Diagnoses of Facial laceration, initial encounter, Traumatic hematoma of right orbit, initial encounter, Closed head injury, initial encounter, and Anticoagulated were also pertinent to this visit.    Most Recent Vitals:   Vitals:    01/25/24 1702 01/25/24 1732 01/25/24 1759 01/25/24 1832   BP: 174/82 166/78 155/69 161/87   BP Location:       Pulse: 77 82 74 74   Resp:       Temp:       TempSrc:       SpO2: 98% 97% 95% 95%   Weight:       Height:           Active LDAs/IV Access:   Lines, Drains & Airways       Active LDAs       Name Placement date Placement time Site Days    Peripheral IV 01/25/24  1331 Left Antecubital 01/25/24  1331  Antecubital  less than 1    Peripheral IV 01/25/24 1408 Anterior;Left Forearm 01/25/24  1408  Forearm  less than 1                    Labs (abnormal labs have a star):   Labs Reviewed   CK - Abnormal; Notable for the following components:       Result Value    Creatine Kinase 550 (*)     All other components within normal limits   COMPREHENSIVE METABOLIC PANEL - Abnormal; Notable for the following components:    Glucose 121 (*)     Sodium 134 (*)     Chloride 97 (*)     BUN/Creatinine Ratio 25.8 (*)     All other components within normal limits    Narrative:     GFR Normal >60  Chronic Kidney Disease <60  Kidney Failure <15    The GFR formula is only valid for adults with stable renal function between ages 18 and 70.   CBC WITH AUTO DIFFERENTIAL - Abnormal; Notable for the following components:    Neutrophil % 77.2 (*)     Lymphocyte % 13.8 (*)     All other components within normal limits   SINGLE HSTROPONIN T - Normal    Narrative:     High Sensitive Troponin T Reference Range:  <14.0 ng/L- Negative Female for AMI  <22.0 ng/L- Negative Male for AMI  >=14 - Abnormal Female indicating possible myocardial injury.  >=22 - Abnormal Male indicating possible myocardial injury.   Clinicians would have to utilize clinical acumen, EKG, Troponin, and serial changes to determine if it is an Acute Myocardial Infarction or myocardial injury due to an underlying chronic condition.        CBC AND DIFFERENTIAL    Narrative:     The following orders were created for panel order CBC & Differential.  Procedure                               Abnormality         Status                     ---------                               -----------         ------                     CBC Auto Differential[[id number]]        Abnormal            Final result                 Please view results for these tests on the individual orders.       Meds Given in ED:   Medications   sodium chloride 0.9 % flush 10 mL (has  no administration in time range)   lidocaine (XYLOCAINE) 2% injection  - ADS Override Pull (5 mL Infiltration Given by Other 1/25/24 1722)   sodium chloride 0.9 % bolus 1,000 mL (0 mL Intravenous Stopped 1/25/24 1733)   acetaminophen (TYLENOL) tablet 1,000 mg (1,000 mg Oral Given 1/25/24 1733)

## 2024-01-25 NOTE — ED PROVIDER NOTES
[facility]    EMERGENCY DEPARTMENT ENCOUNTER      Pt Name: Jnana Willis  MRN: [id number]  YOB: 1935  Date of evaluation: 1/25/2024  Provider: Truong Guadarrama DO    CHIEF COMPLAINT       Chief Complaint   Patient presents with    Fall         HISTORY OF PRESENT ILLNESS  (Location/Symptom, Timing/Onset, Context/Setting, Quality, Duration, Modifying Factors, Severity.)   Janna Willis is a 88 y.o. female who presents to the emergency department for evaluation as post a fall which occurred in the early hours of the morning, she believes she tripped over her cane, hitting her right forehead, on the way down, no regarding loss of consciousness.  Patient notes that if she has a fall she is unable to get up on her own accord, she was unable and did not remember to push her life alert button.  She was found by her family this afternoon after laying on the ground for approximately 6 hours.  She notes some mild discomfort on the right shoulder, denies any mid lower back pain, hip pain or lower extremity discomfort.  She notes she has been needs to begin with which is why she cannot get up after falling.  Denies any-year-old weakness, numbness, tingling she has, she does take Plavix without history of cardiac disease.  Denies any recent illness, has no other acute systemic complaints at this time.    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      PAST MEDICAL HISTORY     Past Medical History:   Diagnosis Date    Diabetes          SURGICAL HISTORY       Past Surgical History:   Procedure Laterality Date    CARDIAC CATHETERIZATION N/A 6/5/2023    Procedure: Left Heart Cath;  Surgeon: Kuldeep Miner MD;  Location: [facility] INVASIVE LOCATION;  Service: Cardiology;  Laterality: N/A;    CARDIAC ELECTROPHYSIOLOGY PROCEDURE N/A 6/5/2023     Procedure: Temporary Pacemaker;  Surgeon: Kuldeep Miner MD;  Location:  [facility] CATH INVASIVE LOCATION;  Service: Cardiology;  Laterality: N/A;    CARDIAC ELECTROPHYSIOLOGY PROCEDURE N/A 6/6/2023    Procedure: Pacemaker DC new;  Surgeon: Dudley Rodriguez DO;  Location:  [facility] EP INVASIVE LOCATION;  Service: Cardiology;  Laterality: N/A;    OVARY SURGERY           CURRENT MEDICATIONS       Current Facility-Administered Medications:     acetaminophen (TYLENOL) tablet 1,000 mg, 1,000 mg, Oral, Once, Truong Guadarrama DO    lidocaine (XYLOCAINE) 2% injection  - ADS Override Pull, , , ,     [COMPLETED] Insert Peripheral IV, , , Once **AND** sodium chloride 0.9 % flush 10 mL, 10 mL, Intravenous, PRN, Truong Guadarrama DO    Current Outpatient Medications:     acetaminophen (TYLENOL) 325 MG tablet, Take 2 tablets by mouth Every 4 (Four) Hours As Needed for Mild Pain or Fever (temperature greater than 101F)., Disp: 90 tablet, Rfl: 0    aspirin 81 MG EC tablet, Take 1 tablet by mouth Daily., Disp: 90 tablet, Rfl: 2    cholecalciferol (VITAMIN D3) 25 MCG (1000 UT) tablet, Take 2 tablets by mouth Daily., Disp: , Rfl:     clopidogrel (PLAVIX) 75 MG tablet, Take 1 tablet by mouth Daily., Disp: 90 tablet, Rfl: 2    empagliflozin (JARDIANCE) 10 MG tablet tablet, Take 1 tablet by mouth Daily., Disp: 90 tablet, Rfl: 2    ezetimibe (ZETIA) 10 MG tablet, Take  by mouth Daily., Disp: , Rfl:     levothyroxine (SYNTHROID, LEVOTHROID) 50 MCG tablet, TAKE ONE TABLET EVERY DAY FOR FOR THYROID, Disp: , Rfl: 3    metoprolol succinate XL (TOPROL-XL) 25 MG 24 hr tablet, Take 1 tablet by mouth Daily., Disp: 30 tablet, Rfl: 11    nitroglycerin (NITROSTAT) 0.4 MG SL tablet, Place 1 tablet under the tongue Every 5 (Five) Minutes As Needed for Chest Pain (Systolic BP Greater Than 100). Take no more than 3 doses in 15 minutes., Disp: 30 tablet, Rfl: 0    pantoprazole (PROTONIX) 40 MG EC tablet, Take 1 tablet by mouth Every Morning., Disp: 90  tablet, Rfl: 2    venlafaxine XR (EFFEXOR-XR) 150 MG 24 hr capsule, elva 1 capsule with food Once a day for 90 days, Disp: , Rfl:     vitamin B-12 (CYANOCOBALAMIN) 1000 MCG tablet, Take 1 tablet by mouth 1 (One) Time Per Week., Disp: , Rfl:     ALLERGIES     Avelox [moxifloxacin]; 2,4-d dimethylamine; Azithromycin; Lisinopril; Metoclopramide; and Statins    FAMILY HISTORY       Family History   Problem Relation Age of Onset    Stroke Mother     Heart attack Mother           SOCIAL HISTORY       Social History     Socioeconomic History    Marital status: Single   Tobacco Use    Smoking status: Never    Smokeless tobacco: Never   Vaping Use    Vaping Use: Never used   Substance and Sexual Activity    Alcohol use: No    Drug use: No    Sexual activity: Defer         PHYSICAL EXAM    (up to 7 for level 4, 8 or more for level 5)     Vitals:    01/25/24 1500 01/25/24 1529 01/25/24 1601 01/25/24 1632   BP: 156/80 150/83 142/77 165/84   BP Location:       Pulse: 79 80 80 76   Resp:       Temp:       TempSrc:       SpO2: 96% 97% 96% 96%   Weight:       Height:           Physical Exam  General : Patient is awake, alert, oriented, in no acute distress, nontoxic appearing, GCS 15, patient is answering questions appropriately  HEENT: Pupil has contusion hematoma to the right orbit, eyelid with swelling, unable to assess intraocular involvement upon initial arrival, there is a small laceration along the right eyelid, eyebrow region.  Without further ocular examination the patient's pupil is intact, no hyphema, extraocular motion is intact, she does have some hemorrhagic chemosis of the sclera, vision is intact in the right eye significant amount of clotted dried blood along the forehead, ear, neck region.  Cardiac: Heart regular rate, rhythm, no murmurs, rubs, or gallops  Lungs: Lungs are clear to auscultation, there is no wheezing, rhonchi, or rales. There is no use of accessory muscles  Chest wall: There is no tenderness to  palpation over the chest wall or over ribs  Abdomen: Abdomen is soft, nontender, nondistended. There are no firm or pulsatile masses, no rebound rigidity or guarding  Musculoskeletal: There is mild discomfort along the proximal right shoulder with a mild contusion, range of motion is intact, no obvious deformity.  She able to raise each leg individually off the bed, there is no tenderness with pelvic rocking or hip palpation 5 out of 5 strength in all 4 extremities.  No focal muscle deficits are appreciated  Neuro: Motor intact, sensory intact, level of consciousness is normal,, GCS 15, no focal neurological deficit  Dermatology: Soft tissue swelling, contusion along the right orbit, periorbital region, bleeding noted to the right upper lateral orbit/eyebrow, skin is warm and dry  Psych: Mentation is grossly normal, cognition is grossly normal. Affect is appropriate      DIAGNOSTIC RESULTS     EKG:  All EKGs are interpreted by the Emergency Department Physician who either signs or Co-signs this chart in the absence of a cardiologist.    ECG 12 Lead Syncope   Final Result   Test Reason : FALL   Blood Pressure :   */*   mmHG   Vent. Rate :  79 BPM     Atrial Rate :  79 BPM      P-R Int : 204 ms          QRS Dur :  98 ms       QT Int : 410 ms       P-R-T Axes :  24 -30  22 degrees      QTc Int : 470 ms      Normal sinus rhythm   Possible Left atrial enlargement   Left axis deviation   Left ventricular hypertrophy   Abnormal ECG   When compared with ECG of 11-JUN-2023 13:21,   Sinus rhythm has replaced Electronic ventricular pacemaker   Confirmed by JESSICA QUIROZ MD ([id number]) on 1/25/2024 4:07:04 PM      Referred By: EDMD           Confirmed By: JSESICA QUIROZ MD          RADIOLOGY:     [x] Radiologist's Report Reviewed:  XR Shoulder 2+ View Right   Final Result   Impression:   Mild cephalad humeral head subluxation. This could be chronic. No fracture.         Electronically Signed: Lizzette Amaya MD     1/25/2024 2:46  PM EST     Workstation ID: [id number]      CT Head Without Contrast   Final Result   Impression:   Prominent right frontal and periorbital scalp laceration and hematoma.      No acute intracranial abnormality.            Electronically Signed: Kris Basurto MD     1/25/2024 2:32 PM EST     Workstation ID: [id number]      CT Maxillofacial Without Contrast   Final Result   1.No acute facial fracture is identified.   2.Extensive right facial soft tissue swelling.      Electronically Signed: Crow Calderon MD     1/25/2024 2:40 PM EST     Workstation ID: [id number]      CT Cervical Spine Without Contrast   Final Result   Impression:   Chronic findings as detailed. No acute fracture. Mildly abnormal alignment as noted, may be chronic. If clinically warranted, this could be further evaluated with flexion and extension views.            Electronically Signed: Lizzette Amaya MD     1/25/2024 2:34 PM EST     Workstation ID: [id number]          I ordered and independently reviewed the above noted radiographic studies.      I viewed images of CT head which showed no acute t intracranial abnormality, skull fracture, superficial soft tissue injury and hematoma along the right forehead, orbital region per my independent interpretation.    See radiologist's dictation for official interpretation.      ED BEDSIDE ULTRASOUND:   Performed by ED Physician - none    LABS:    I have reviewed and interpreted all of the currently available lab results from this visit (if applicable):  Results for orders placed or performed during the hospital encounter of 01/25/24   CK    Specimen: Blood   Result Value Ref Range    Creatine Kinase 550 (H) 20 - 180 U/L   Comprehensive Metabolic Panel    Specimen: Blood   Result Value Ref Range    Glucose 121 (H) 65 - 99 mg/dL    BUN 16 8 - 23 mg/dL    Creatinine 0.62 0.57 - 1.00 mg/dL    Sodium 134 (L) 136 - 145 mmol/L    Potassium 4.1 3.5 - 5.2 mmol/L    Chloride 97 (L) 98 - 107 mmol/L    CO2 24.0 22.0 - 29.0  mmol/L    Calcium 9.1 8.6 - 10.5 mg/dL    Total Protein 7.1 6.0 - 8.5 g/dL    Albumin 4.1 3.5 - 5.2 g/dL    ALT (SGPT) 12 1 - 33 U/L    AST (SGOT) 26 1 - 32 U/L    Alkaline Phosphatase 74 39 - 117 U/L    Total Bilirubin 0.7 0.0 - 1.2 mg/dL    Globulin 3.0 gm/dL    A/G Ratio 1.4 g/dL    BUN/Creatinine Ratio 25.8 (H) 7.0 - 25.0    Anion Gap 13.0 5.0 - 15.0 mmol/L    eGFR 86.3 >60.0 mL/min/1.73   Single High Sensitivity Troponin T    Specimen: Blood   Result Value Ref Range    HS Troponin T 10 <14 ng/L   CBC Auto Differential    Specimen: Blood   Result Value Ref Range    WBC 6.67 3.40 - 10.80 10*3/mm3    RBC 4.89 3.77 - 5.28 10*6/mm3    Hemoglobin 14.0 12.0 - 15.9 g/dL    Hematocrit 42.7 34.0 - 46.6 %    MCV 87.3 79.0 - 97.0 fL    MCH 28.6 26.6 - 33.0 pg    MCHC 32.8 31.5 - 35.7 g/dL    RDW 12.7 12.3 - 15.4 %    RDW-SD 40.7 37.0 - 54.0 fl    MPV 9.9 6.0 - 12.0 fL    Platelets 149 140 - 450 10*3/mm3    Neutrophil % 77.2 (H) 42.7 - 76.0 %    Lymphocyte % 13.8 (L) 19.6 - 45.3 %    Monocyte % 8.1 5.0 - 12.0 %    Eosinophil % 0.4 0.3 - 6.2 %    Basophil % 0.4 0.0 - 1.5 %    Immature Grans % 0.1 0.0 - 0.5 %    Neutrophils, Absolute 5.14 1.70 - 7.00 10*3/mm3    Lymphocytes, Absolute 0.92 0.70 - 3.10 10*3/mm3    Monocytes, Absolute 0.54 0.10 - 0.90 10*3/mm3    Eosinophils, Absolute 0.03 0.00 - 0.40 10*3/mm3    Basophils, Absolute 0.03 0.00 - 0.20 10*3/mm3    Immature Grans, Absolute 0.01 0.00 - 0.05 10*3/mm3    nRBC 0.0 0.0 - 0.2 /100 WBC   ECG 12 Lead Syncope   Result Value Ref Range    QT Interval 410 ms    QTC Interval 470 ms        If labs were ordered, I independently reviewed the results and considered them in treating the patient.      EMERGENCY DEPARTMENT COURSE and DIFFERENTIAL DIAGNOSIS/MDM:   Vitals:  AS OF 17:19 EST    BP - 165/84  HR - 76  TEMP - 99.1 °F (37.3 °C) (Oral)  O2 SATS - 96%      Orders placed during this visit:  Orders Placed This Encounter   Procedures    Laceration Repair    CT Head Without Contrast     CT Maxillofacial Without Contrast    CT Cervical Spine Without Contrast    XR Shoulder 2+ View Right    CK    Comprehensive Metabolic Panel    Single High Sensitivity Troponin T    CBC Auto Differential    Wound Care    Apply ice to affected area    ECG 12 Lead Syncope    Insert Peripheral IV    Initiate Observation Status    ED Bed Request    CBC & Differential       All labs have been independently reviewed by me.  All radiology studies have been reviewed by me and the radiologist dictating the report.  All EKG's have been independently viewed and interpreted by me.      Discussion below represents my analysis of pertinent findings related to patient's condition, differential diagnosis, treatment plan and final disposition.    Differential diagnosis:  The differential diagnosis associated with the patient's presentation includes: Syncope, closed head injury, forehead laceration, ocular injury, orbital contusion    Additional sources  Discussed/ obtained information from independent historians:   [] Spouse  [] Parent  [] Family member  [] Friend  [x] EMS   [] Other:    External (non-ED) record review:   [] Inpatient record:   [] Office record:   [] Outpatient record:   [] Prior Outpatient labs:   [] Prior Outpatient radiology:   [] Primary Care record:   [] Outside ED record:   [] Other:     Patient's care impacted by:   [] Diabetes  [] Hypertension  [] CHF  [] Hyperlipidemia  [] Coronary Artery Disease   [] COPD   [] Cancer   [] Tobacco Abuse   [] Substance Abuse    [] Other:     Care significantly affected by Social Determinants of Health (housing and economic circumstances, unemployment)    [] Yes     [x] No   If yes, Patient's care significantly limited by Social Determinants of Health including:   [] Inadequate housing   [] Low income   [] Alcoholism and drug addiction in family   [] Problems related to primary support group   [] Unemployment   [] Problems related to employment   [] Other Social  Determinants of Health:       MEDICATIONS ADMINISTERED IN ED:  Medications   sodium chloride 0.9 % flush 10 mL (has no administration in time range)   lidocaine (XYLOCAINE) 2% injection  - ADS Override Pull (has no administration in time range)   acetaminophen (TYLENOL) tablet 1,000 mg (has no administration in time range)   sodium chloride 0.9 % bolus 1,000 mL (1,000 mL Intravenous New Bag 1/25/24 1611)              This a very pleasant 88-year-old female with a fall earlier this morning was unable to get up and laid on the ground for approximately 6 hours after right forehead orbital injury where she has a laceration, is on Plavix at baseline with a underlying cardiac history, significant swelling, contusion noted to the right orbit and periorbital soft tissue.  She is awake and alert answer questions appropriately, no focal neurological deficit on examination.  CT head, maxillofacial cervical spine obtained, basic labs obtained.  CT head, maxillofacial, cervical spine without any acute abnormality, no skull fracture, no intracranial bleed, orbit and no retrobulbar area is intact.  The laceration along the right lateral upper eyelid was cleansed and closed as described below.  We did discuss cool compresses, ice packs to the right eye and facial region, her vision is intact in the right orbit on assessment in the ED.  Her vital signs are stable, , continue with good fluid hydration, her kidney function liver function electrolytes are normal as well as her white count, H&H.  On reassessment, further discussion with the patient she does live at home by herself, she does not feel comfortable being at home at this time given her head injury, blood thinners, elevated CK, feel that she would be a fall risk, I do feel admission for neurochecks, close head injury precautions, fluids, recheck of her CK and reassessment would be reasonable.  Case discussed with hospitalist team for  admission.        PROCEDURES:  Laceration Repair    Date/Time: 1/25/2024 3:56 PM    Performed by: Truong Guadarrama DO  Authorized by: Truong Guadarrama DO    Consent:     Consent obtained:  Verbal    Consent given by:  Patient    Risks, benefits, and alternatives were discussed: yes      Risks discussed:  Pain, infection, need for additional repair and poor cosmetic result    Alternatives discussed:  No treatment  Universal protocol:     Procedure explained and questions answered to patient or proxy's satisfaction: yes      Patient identity confirmed:  Verbally with patient  Anesthesia:     Anesthesia method:  Local infiltration    Local anesthetic:  Lidocaine 1% w/o epi  Laceration details:     Location:  Face    Face location:  R upper eyelid    Extent:  Superficial    Length (cm):  0.5  Pre-procedure details:     Preparation:  Patient was prepped and draped in usual sterile fashion  Treatment:     Area cleansed with:  Soap and water    Amount of cleaning:  Standard  Skin repair:     Repair method:  Sutures    Suture size:  6-0    Suture material:  Nylon    Suture technique:  Simple interrupted    Number of sutures:  3  Approximation:     Approximation:  Close  Repair type:     Repair type:  Simple  Post-procedure details:     Dressing:  Non-adherent dressing    Procedure completion:  Tolerated      CRITICAL CARE TIME    Total Critical Care time was 0 minutes, excluding separately reportable procedures.   There was a high probability of clinically significant/life threatening deterioration in the patient's condition which required my urgent intervention.      FINAL IMPRESSION      1. Fall, initial encounter    2. Facial laceration, initial encounter    3. Traumatic hematoma of right orbit, initial encounter    4. Closed head injury, initial encounter    5. Anticoagulated          DISPOSITION/PLAN     ED Disposition       ED Disposition   Decision to Admit    Condition   --    Comment   Level of Care:  Telemetry [5]   Diagnosis: Traumatic orbital hematoma [131686]   Admitting Physician: MONISHA SHIPMAN [[id number]]                 PATIENT REFERRED TO:  No follow-up provider specified.    DISCHARGE MEDICATIONS:     Medication List        ASK your doctor about these medications      acetaminophen 325 MG tablet  Commonly known as: TYLENOL  Take 2 tablets by mouth Every 4 (Four) Hours As Needed for Mild Pain or Fever (temperature greater than 101F).     aspirin 81 MG EC tablet  Take 1 tablet by mouth Daily.     cholecalciferol 25 MCG (1000 UT) tablet  Commonly known as: VITAMIN D3     clopidogrel 75 MG tablet  Commonly known as: PLAVIX  Take 1 tablet by mouth Daily.     empagliflozin 10 MG tablet tablet  Commonly known as: JARDIANCE  Take 1 tablet by mouth Daily.     ezetimibe 10 MG tablet  Commonly known as: ZETIA     levothyroxine 50 MCG tablet  Commonly known as: SYNTHROID, LEVOTHROID     metoprolol succinate XL 25 MG 24 hr tablet  Commonly known as: TOPROL-XL  Take 1 tablet by mouth Daily.     nitroglycerin 0.4 MG SL tablet  Commonly known as: NITROSTAT  Place 1 tablet under the tongue Every 5 (Five) Minutes As Needed for Chest Pain (Systolic BP Greater Than 100). Take no more than 3 doses in 15 minutes.     pantoprazole 40 MG EC tablet  Commonly known as: PROTONIX  Take 1 tablet by mouth Every Morning.     venlafaxine  MG 24 hr capsule  Commonly known as: EFFEXOR-XR     vitamin B-12 1000 MCG tablet  Commonly known as: CYANOCOBALAMIN                  Comment: Please note this report has been produced using speech recognition software.      Truong Guadarrama DO  Attending Emergency Physician         Truong Guadarrama DO  01/25/24 [id number]

## 2024-01-25 NOTE — Clinical Note
Level of Care: Telemetry [5]   Diagnosis: Traumatic orbital hematoma [745509]   Admitting Physician: MONISHA SHIPMAN [[id number]]

## 2024-01-26 LAB
ALBUMIN SERPL-MCNC: 3.7 G/DL (ref 3.5–5.2)
ALBUMIN/GLOB SERPL: 1.4 G/DL
ALP SERPL-CCNC: 64 U/L (ref 39–117)
ALT SERPL W P-5'-P-CCNC: 10 U/L (ref 1–33)
ANION GAP SERPL CALCULATED.3IONS-SCNC: 9 MMOL/L (ref 5–15)
AST SERPL-CCNC: 21 U/L (ref 1–32)
BASOPHILS # BLD AUTO: 0.02 10*3/MM3 (ref 0–0.2)
BASOPHILS NFR BLD AUTO: 0.4 % (ref 0–1.5)
BILIRUB SERPL-MCNC: 0.7 MG/DL (ref 0–1.2)
BUN SERPL-MCNC: 13 MG/DL (ref 8–23)
BUN/CREAT SERPL: 22.4 (ref 7–25)
CALCIUM SPEC-SCNC: 8.7 MG/DL (ref 8.6–10.5)
CHLORIDE SERPL-SCNC: 101 MMOL/L (ref 98–107)
CK SERPL-CCNC: 421 U/L (ref 20–180)
CO2 SERPL-SCNC: 26 MMOL/L (ref 22–29)
CREAT SERPL-MCNC: 0.58 MG/DL (ref 0.57–1)
DEPRECATED RDW RBC AUTO: 41.1 FL (ref 37–54)
EGFRCR SERPLBLD CKD-EPI 2021: 87.7 ML/MIN/1.73
EOSINOPHIL # BLD AUTO: 0.08 10*3/MM3 (ref 0–0.4)
EOSINOPHIL NFR BLD AUTO: 1.6 % (ref 0.3–6.2)
ERYTHROCYTE [DISTWIDTH] IN BLOOD BY AUTOMATED COUNT: 13.1 % (ref 12.3–15.4)
GLOBULIN UR ELPH-MCNC: 2.7 GM/DL
GLUCOSE BLDC GLUCOMTR-MCNC: 114 MG/DL (ref 70–130)
GLUCOSE BLDC GLUCOMTR-MCNC: 124 MG/DL (ref 70–130)
GLUCOSE BLDC GLUCOMTR-MCNC: 129 MG/DL (ref 70–130)
GLUCOSE BLDC GLUCOMTR-MCNC: 88 MG/DL (ref 70–130)
GLUCOSE SERPL-MCNC: 91 MG/DL (ref 65–99)
HBA1C MFR BLD: 5.6 % (ref 4.8–5.6)
HCT VFR BLD AUTO: 37 % (ref 34–46.6)
HGB BLD-MCNC: 12.3 G/DL (ref 12–15.9)
IMM GRANULOCYTES # BLD AUTO: 0.01 10*3/MM3 (ref 0–0.05)
IMM GRANULOCYTES NFR BLD AUTO: 0.2 % (ref 0–0.5)
LYMPHOCYTES # BLD AUTO: 1.12 10*3/MM3 (ref 0.7–3.1)
LYMPHOCYTES NFR BLD AUTO: 22.4 % (ref 19.6–45.3)
MCH RBC QN AUTO: 28.9 PG (ref 26.6–33)
MCHC RBC AUTO-ENTMCNC: 33.2 G/DL (ref 31.5–35.7)
MCV RBC AUTO: 87.1 FL (ref 79–97)
MONOCYTES # BLD AUTO: 0.36 10*3/MM3 (ref 0.1–0.9)
MONOCYTES NFR BLD AUTO: 7.2 % (ref 5–12)
NEUTROPHILS NFR BLD AUTO: 3.42 10*3/MM3 (ref 1.7–7)
NEUTROPHILS NFR BLD AUTO: 68.2 % (ref 42.7–76)
NRBC BLD AUTO-RTO: 0 /100 WBC (ref 0–0.2)
PLATELET # BLD AUTO: 134 10*3/MM3 (ref 140–450)
PMV BLD AUTO: 9.9 FL (ref 6–12)
POTASSIUM SERPL-SCNC: 3.8 MMOL/L (ref 3.5–5.2)
PROT SERPL-MCNC: 6.4 G/DL (ref 6–8.5)
RBC # BLD AUTO: 4.25 10*6/MM3 (ref 3.77–5.28)
SODIUM SERPL-SCNC: 136 MMOL/L (ref 136–145)
WBC NRBC COR # BLD AUTO: 5.01 10*3/MM3 (ref 3.4–10.8)

## 2024-01-26 PROCEDURE — 97166 OT EVAL MOD COMPLEX 45 MIN: CPT

## 2024-01-26 PROCEDURE — G0378 HOSPITAL OBSERVATION PER HR: HCPCS

## 2024-01-26 PROCEDURE — 80053 COMPREHEN METABOLIC PANEL: CPT | Performed by: PHYSICIAN ASSISTANT

## 2024-01-26 PROCEDURE — 82550 ASSAY OF CK (CPK): CPT | Performed by: HOSPITALIST

## 2024-01-26 PROCEDURE — 97530 THERAPEUTIC ACTIVITIES: CPT

## 2024-01-26 PROCEDURE — 82948 REAGENT STRIP/BLOOD GLUCOSE: CPT

## 2024-01-26 PROCEDURE — 83036 HEMOGLOBIN GLYCOSYLATED A1C: CPT | Performed by: PHYSICIAN ASSISTANT

## 2024-01-26 PROCEDURE — 97162 PT EVAL MOD COMPLEX 30 MIN: CPT

## 2024-01-26 PROCEDURE — 85025 COMPLETE CBC W/AUTO DIFF WBC: CPT | Performed by: PHYSICIAN ASSISTANT

## 2024-01-26 PROCEDURE — 99232 SBSQ HOSP IP/OBS MODERATE 35: CPT | Performed by: HOSPITALIST

## 2024-01-26 RX ADMIN — Medication 10 ML: at 20:27

## 2024-01-26 RX ADMIN — LEVOTHYROXINE SODIUM 50 MCG: 0.05 TABLET ORAL at 08:54

## 2024-01-26 RX ADMIN — SENNOSIDES AND DOCUSATE SODIUM 2 TABLET: 8.6; 5 TABLET ORAL at 08:54

## 2024-01-26 RX ADMIN — VENLAFAXINE HYDROCHLORIDE 150 MG: 75 CAPSULE, EXTENDED RELEASE ORAL at 08:54

## 2024-01-26 RX ADMIN — PANTOPRAZOLE SODIUM 40 MG: 40 TABLET, DELAYED RELEASE ORAL at 08:54

## 2024-01-26 RX ADMIN — Medication 10 ML: at 08:53

## 2024-01-26 NOTE — PLAN OF CARE
Goal Outcome Evaluation:  Plan of Care Reviewed With: patient           Outcome Evaluation: Pt presents w/ increased pain, decreased functional endurance, dizziness, and balance deficits limiting her ADL independence. Pt would benefit from continued skilled IPOT services to address current functional deficits. Rec IRF at d/c.      Anticipated Discharge Disposition (OT): inpatient rehabilitation facility

## 2024-01-26 NOTE — THERAPY EVALUATION
Patient Name: Janna Willis  : 1935    MRN: [id number]                              Today's Date: 2024       Admit Date: 2024    Visit Dx:     ICD-10-CM ICD-9-CM   1. Fall, initial encounter  W19.XXXA E888.9   2. Facial laceration, initial encounter  S01.81XA 873.40   3. Traumatic hematoma of right orbit, initial encounter  S05.11XA 921.2   4. Closed head injury, initial encounter  S09.90XA 959.01   5. Anticoagulated  Z79.01 V58.61     Patient Active Problem List   Diagnosis    Heart block AV third degree    CAD (coronary artery disease)    Primary hypertension    Mixed hyperlipidemia    GERD (gastroesophageal reflux disease)    Hypothyroid    Class 1 obesity in adult    Traumatic orbital hematoma    Rhabdomyolysis    Fall    Syncope    DM2 (diabetes mellitus, type 2)     Past Medical History:   Diagnosis Date    Diabetes      Past Surgical History:   Procedure Laterality Date    CARDIAC CATHETERIZATION N/A 2023    Procedure: Left Heart Cath;  Surgeon: Kuldeep Miner MD;  Location:  [facility];  Service: Cardiology;  Laterality: N/A;    CARDIAC ELECTROPHYSIOLOGY PROCEDURE N/A 2023    Procedure: Temporary Pacemaker;  Surgeon: Kuldeep Miner MD;  Location:  [facility];  Service: Cardiology;  Laterality: N/A;    CARDIAC ELECTROPHYSIOLOGY PROCEDURE N/A 2023    Procedure: Pacemaker DC new;  Surgeon: Dudley Rodriguez DO;  Location:  "[facility] INVASIVE LOCATION;  Service: Cardiology;  Laterality: N/A;    OVARY SURGERY        General Information       Row Name 24 1326          Physical Therapy Time and Intention    Document Type evaluation  -SS     Mode of Treatment physical therapy  -SS       Row Name 24 1326          General Information    Patient Profile Reviewed yes  -SS     Prior Level of Function independent:;all household mobility;community mobility;gait;transfer;bed mobility  use of cane for HH distances and RW for community distances,  frequent falls  -     Existing Precautions/Restrictions fall  tremulous  -     Barriers to Rehab medically complex;previous functional deficit  -       Row Name 01/26/24 1326          Living Environment    People in Home alone  -       Row Name 01/26/24 1326          Home Main Entrance    Number of Stairs, Main Entrance one  -SS     Stair Railings, Main Entrance none  -SS       Row Name 01/26/24 1326          Stairs Within Home, Primary    Number of Stairs, Within Home, Primary none  -SS       Row Name 01/26/24 1326          Cognition    Orientation Status (Cognition) oriented x 3;other (see comments)  pt. initially very limited historian, cognition improved throughout session  -       Row Name 01/26/24 1326          Safety Issues, Functional Mobility    Safety Issues Affecting Function (Mobility) awareness of need for assistance;insight into deficits/self-awareness;judgment;positioning of assistive device;problem-solving;safety precaution awareness;safety precautions follow-through/compliance;sequencing abilities  -     Impairments Affecting Function (Mobility) balance;endurance/activity tolerance;pain;postural/trunk control;strength  -               User Key  (r) = Recorded By, (t) = Taken By, (c) = Cosigned By      Initials Name Provider Type    SS Hyacinth Johansen, PT Physical Therapist                   Mobility       Row Name 01/26/24 1329          Bed Mobility    Bed Mobility scooting/bridging;supine-sit  -     Scooting/Bridging [place] (Bed Mobility) moderate assist (50% patient effort);verbal cues  -     Supine-Sit [place] (Bed Mobility) moderate assist (50% patient effort);verbal cues  -     Assistive Device (Bed Mobility) bed rails;head of bed elevated;draw sheet  -     Comment, (Bed Mobility) VC for sequencing; pt. asymptomatic  -       Row Name 01/26/24 1329          Bed-Chair Transfer    Bed-Chair [place] (Transfers) moderate assist (50% patient effort);verbal cues   -SS     Assistive Device (Bed-Chair Transfers) walker, front-wheeled  -SS     Comment, (Bed-Chair Transfer) VC for sequencing, pt. asymptomatic but tremulous and demonstrates instability w/one LOB requiring assist to regain COG  -SS       Row Name 01/26/24 1329          Sit-Stand Transfer    Sit-Stand [place] (Transfers) moderate assist (50% patient effort);verbal cues  -SS     Assistive Device (Sit-Stand Transfers) walker, front-wheeled  -SS     Comment, (Sit-Stand Transfer) VC for hand placement, appropriate alignment, lowering with eccentric control  -SS       Row Name 01/26/24 1329          Gait/Stairs (Locomotion)    [place] Level (Gait) unable to assess  -SS     Comment, (Gait/Stairs) deferred for pt. safety  -SS               User Key  (r) = Recorded By, (t) = Taken By, (c) = Cosigned By      Initials Name Provider Type    SS Hyacinth Johansen, PT Physical Therapist                   Obj/Interventions       Row Name 01/26/24 1331          Range of Motion Comprehensive    General Range of Motion bilateral lower extremity ROM WFL  -SS       Row Name 01/26/24 1331          Strength Comprehensive (MMT)    Comment, General Manual Muscle Testing (MMT) Assessment LLE gross 3/5, RLE gross 3+/5  -SS       Row Name 01/26/24 1331          Balance    Balance Assessment sitting static balance;sitting dynamic balance;sit to stand dynamic balance;standing static balance;standing dynamic balance  -SS     Static Sitting Balance standby assist  -SS     Dynamic Sitting Balance contact guard  -SS     Position, Sitting Balance unsupported;sitting edge of bed  -SS     Sit to Stand Dynamic Balance moderate assist  -SS     Static Standing Balance minimal assist  -SS     Dynamic Standing Balance moderate assist  -SS     Position/Device Used, Standing Balance supported;walker, front-wheeled  -SS     Balance Interventions sitting;standing;sit to stand;supported;static;dynamic  -SS       Row Name 01/26/24 1331          Sensory  Assessment (Somatosensory)    Sensory Assessment (Somatosensory) LE sensation intact  -SS               User Key  (r) = Recorded By, (t) = Taken By, (c) = Cosigned By      Initials Name Provider Type    SS Hyacinth Johansen PT Physical Therapist                   Goals/Plan       Row Name 01/26/24 1335          Bed Mobility Goal 1 (PT)    Activity/Assistive Device (Bed Mobility Goal 1, PT) bed mobility activities, all  -SS     [place] Level/Cues Needed (Bed Mobility Goal 1, PT) modified independence  -SS     Time Frame (Bed Mobility Goal 1, PT) long term goal (LTG);10 days  -SS       Row Name 01/26/24 1335          Transfer Goal 1 (PT)    Activity/Assistive Device (Transfer Goal 1, PT) sit-to-stand/stand-to-sit;bed-to-chair/chair-to-bed  -SS     [place] Level/Cues Needed (Transfer Goal 1, PT) modified independence  -SS     Time Frame (Transfer Goal 1, PT) long term goal (LTG);10 days  -SS       Row Name 01/26/24 1335          Gait Training Goal 1 (PT)    Activity/Assistive Device (Gait Training Goal 1, PT) gait (walking locomotion);assistive device use;walker, rolling  -SS     [place] Level (Gait Training Goal 1, PT) contact guard required  -SS     Distance (Gait Training Goal 1, PT) 150  -SS     Time Frame (Gait Training Goal 1, PT) long term goal (LTG);10 days  -SS       Row Name 01/26/24 1335          Therapy Assessment/Plan (PT)    Planned Therapy Interventions (PT) balance training;bed mobility training;gait training;home exercise program;neuromuscular re-education;patient/family education;postural re-education;ROM (range of motion);strengthening;stretching;transfer training  -SS               User Key  (r) = Recorded By, (t) = Taken By, (c) = Cosigned By      Initials Name Provider Type    Hyacinth Jama PT Physical Therapist                   Clinical Impression       Row Name 01/26/24 1332          Pain    Pretreatment Pain Rating 0/10 - no pain  -SS     Posttreatment Pain Rating 2/10  -SS      Pain Location - Side/Orientation Bilateral  -     Pain Location generalized  -     Pain Location - other (see comments)  thighs  -     Pain Intervention(s) Repositioned;Ambulation/increased activity;Elevated  -     Additional Documentation Pain Scale: Numbers Pre/Post-Treatment (Group)  -       Row Name 01/26/24 4022          Plan of Care Review    Plan of Care Reviewed With patient  -SS     Outcome Evaluation Pt. presents below baseline function w/generalized weakness, balance deficits and decreased functional endurance affecting her ability to safely participate in functional mobility. She performed bed mobility and transfers w/mod assist. Activity limited by instability. Pt. would benefit from IPPT to address stated deficits.  -       Row Name 01/26/24 1332          Therapy Assessment/Plan (PT)    Rehab Potential (PT) good, to achieve stated therapy goals  -     Criteria for Skilled Interventions Met (PT) yes;meets criteria;skilled treatment is necessary  -     Therapy Frequency (PT) daily  -       Row Name 01/26/24 1332          Vital Signs    Pre Systolic BP Rehab 105  -SS     Pre Treatment Diastolic BP 56  -SS     Intra Systolic BP Rehab 106  -SS     Intra Treatment Diastolic BP 61  -SS     Post Systolic BP Rehab 106  -SS     Post Treatment Diastolic BP 56  -SS     Pretreatment Heart Rate (beats/min) 82  -SS     Posttreatment Heart Rate (beats/min) 80  -SS     Pre SpO2 (%) 95  -SS     O2 Delivery Pre Treatment room air  -SS     Post SpO2 (%) 92  -SS     O2 Delivery Post Treatment room air  -SS     Pre Patient Position Supine  -SS     Intra Patient Position Sitting  -SS     Post Patient Position Standing  -       Row Name 01/26/24 1332          Positioning and Restraints    Pre-Treatment Position in bed  -SS     Post Treatment Position chair  -SS     In Chair notified nsg;reclined;call light within reach;encouraged to call for assist;exit alarm on;waffle cushion;legs elevated;with nsg   -               User Key  (r) = Recorded By, (t) = Taken By, (c) = Cosigned By      Initials Name Provider Type     Hyacinth Johansen PT Physical Therapist                   Outcome Measures       Row Name 01/26/24 1335          How much help from another person do you currently need...    Turning from your back to your side while in flat bed without using bedrails? 3  -SS     Moving from lying on back to sitting on the side of a flat bed without bedrails? 2  -SS     Moving to and from a bed to a chair (including a wheelchair)? 2  -SS     Standing up from a chair using your arms (e.g., wheelchair, bedside chair)? 2  -SS     Climbing 3-5 steps with a railing? 2  -SS     To walk in hospital room? 2  -SS     AM-PAC 6 Clicks Score (PT) 13  -SS     Highest Level of Mobility Goal 4 --> Transfer to chair/commode  -       Row Name 01/26/24 1335          Functional Assessment    Outcome Measure Options AM-PAC 6 Clicks Basic Mobility (PT)  -               User Key  (r) = Recorded By, (t) = Taken By, (c) = Cosigned By      Initials Name Provider Type    Hyacinth Jama PT Physical Therapist                                 Physical Therapy Education       Title: PT OT SLP Therapies (In Progress)       Topic: Physical Therapy (In Progress)       Point: Mobility training (Done)       Learning Progress Summary             Patient MEL Alvarez VU, DU,NR by  at 1/26/2024 1336    Comment: Educated pt. safety/technique w/bed mobility, transfers, PT POC                         Point: Home exercise program (Not Started)       Learner Progress:  Not documented in this visit.              Point: Body mechanics (Done)       Learning Progress Summary             Patient MEL Alvarez VU, DU,NR by  at 1/26/2024 1336    Comment: Educated pt. safety/technique w/bed mobility, transfers, PT POC                         Point: Precautions (Done)       Learning Progress Summary             Patient MEL Alvarez VU, DU,NOA by  at 1/26/2024 1336     Comment: Educated pt. safety/technique w/bed mobility, transfers, PT POC                                         User Key       Initials Effective Dates Name Provider Type Discipline     06/01/21 -  Hyacinth Johansen PT Physical Therapist PT                  PT Recommendation and Plan  Planned Therapy Interventions (PT): balance training, bed mobility training, gait training, home exercise program, neuromuscular re-education, patient/family education, postural re-education, ROM (range of motion), strengthening, stretching, transfer training  Plan of Care Reviewed With: patient  Outcome Evaluation: Pt. presents below baseline function w/generalized weakness, balance deficits and decreased functional endurance affecting her ability to safely participate in functional mobility. She performed bed mobility and transfers w/mod assist. Activity limited by instability. Pt. would benefit from IPPT to address stated deficits.     Time Calculation:   PT Evaluation Complexity  History, PT Evaluation Complexity: 3 or more personal factors and/or comorbidities  Examination of Body Systems (PT Eval Complexity): total of 4 or more elements  Clinical Presentation (PT Evaluation Complexity): evolving  Clinical Decision Making (PT Evaluation Complexity): moderate complexity  Overall Complexity (PT Evaluation Complexity): moderate complexity     PT Charges       Row Name 01/26/24 1337             Time Calculation    Start Time 1044  -SS      PT Received On 01/26/24  -SS      PT Goal Re-Cert Due Date 02/05/24  -SS         Timed Charges    14678 - PT Therapeutic Activity Minutes 10  -SS         Untimed Charges    PT Eval/Re-eval Minutes 50  -SS         Total Minutes    Timed Charges Total Minutes 10  -SS      Untimed Charges Total Minutes 50  -SS       Total Minutes 60  -SS                User Key  (r) = Recorded By, (t) = Taken By, (c) = Cosigned By      Initials Name Provider Type     Hyacinth Johansen, BOGDAN Physical Therapist                   Therapy Charges for Today       Code Description Service Date Service Provider Modifiers Qty    02152956203  PT THERAPEUTIC ACT EA 15 MIN 1/26/2024 Hyacinth Johansen, PT GP 1    10802872872 HC PT EVAL MOD COMPLEXITY 4 1/26/2024 Hyacinth Johansen, PT GP 1            PT G-Codes  Outcome Measure Options: AM-PAC 6 Clicks Basic Mobility (PT)  AM-PAC 6 Clicks Score (PT): 13  PT Discharge Summary  Anticipated Discharge Disposition (PT): inpatient rehabilitation facility    Hyacinth Johansen, BOGDAN  1/26/2024

## 2024-01-26 NOTE — PROGRESS NOTES
[facility] Medicine Services  PROGRESS NOTE    Patient Name: Janna Willis  : 1935  MRN: [id number]    Date of Admission: 2024  Primary Care Physician: Hever Ibrahim APRN    Subjective   Subjective     CC: S/P Fall    HPI: Sore. No f/c. Denies dizziness/vertigo. Denies palpitations. No memory of events or fall. Tolerating PO. No dyspnea. NO f/c.       Objective   Objective     Vital Signs:   Temp:  [97.5 °F (36.4 °C)-99.1 °F (37.3 °C)] 97.5 °F (36.4 °C)  Heart Rate:  [72-89] 78  Resp:  [18-20] 18  BP: (117-186)/(55-96) 157/71     Physical Exam:  NAD, alert and oriented  OP clear, dry MM  Neck supple  No LAD  RRR  CTAB  +BS, soft  R periorbital swelling/hematoma  R shoulder ROM intact    Results Reviewed:  LAB RESULTS:      Lab 24  0519 24  1407   WBC 5.01 6.67   HEMOGLOBIN 12.3 14.0   HEMATOCRIT 37.0 42.7   PLATELETS 134* 149   NEUTROS ABS 3.42 5.14   IMMATURE GRANS (ABS) 0.01 0.01   LYMPHS ABS 1.12 0.92   MONOS ABS 0.36 0.54   EOS ABS 0.08 0.03   MCV 87.1 87.3         Lab 24  0519 24  1407   SODIUM 136 134*   POTASSIUM 3.8 4.1   CHLORIDE 101 97*   CO2 26.0 24.0   ANION GAP 9.0 13.0   BUN 13 16   CREATININE 0.58 0.62   EGFR 87.7 86.3   GLUCOSE 91 121*   CALCIUM 8.7 9.1   HEMOGLOBIN A1C 5.60 5.60         Lab 24  0519 24  1407   TOTAL PROTEIN 6.4 7.1   ALBUMIN 3.7 4.1   GLOBULIN 2.7 3.0   ALT (SGPT) 10 12   AST (SGOT) 21 26   BILIRUBIN 0.7 0.7   ALK PHOS 64 74         Lab 24  1407   HSTROP T 10                 Brief Urine Lab Results  (Last result in the past 365 days)        Color   Clarity   Blood   Leuk Est   Nitrite   Protein   CREAT   Urine HCG        23 1710             42.7                 Microbiology Results Abnormal       None            XR Shoulder 2+ View Right    Result Date: 2024  XR SHOULDER 2+ VW RIGHT Date of Exam: 2024 2:22 PM EST Indication: Fall, injury, contusion Comparison: None  available. Findings: 3 views. The humeral head is mildly subluxed cephalad in relation to the glenoid. There is no dislocation. The AC joint appears maintained. There is no fracture.     Impression: Impression: Mild cephalad humeral head subluxation. This could be chronic. No fracture. Electronically Signed: Lizzette Amaya MD  1/25/2024 2:46 PM EST  Workstation ID: [id number]    CT Maxillofacial Without Contrast    Result Date: 1/25/2024  CT MAXILLOFACIAL WO CONT Date of Exam: 1/25/2024 2:20 PM EST Indication: Fall, head injury, on Plavix. Comparison: None available. Technique: Axial CT images were obtained from the inferior aspect of the mandible through the frontal sinuses without contrast administration.  Reconstructed coronal and sagittal images were also obtained. Automated exposure control and iterative construction methods were used. Findings: No acute facial fracture is identified.bilateral lens prostheses are noted. Orbital structures are otherwise unremarkable. Paranasal sinuses and mastoid air cells appear well aerated without air-fluid levels identified. There is rightward bowing of the bony nasal septum. The pterygoid plates and zygomatic arches are intact. Right facial soft tissue swelling is present. Please refer to the accompanying head CT for description of intracranial findings.     Impression: 1.No acute facial fracture is identified. 2.Extensive right facial soft tissue swelling. Electronically Signed: Crow Calderon MD  1/25/2024 2:40 PM EST  Workstation ID: [id number]    CT Cervical Spine Without Contrast    Result Date: 1/25/2024  CT CERVICAL SPINE WO CONTRAST Date of Exam: 1/25/2024 2:20 PM EST Indication: Fall, head injury, on Plavix. Comparison: None available. Technique: Axial CT images were obtained of the cervical spine without contrast administration.  Reconstructed coronal and sagittal images were also obtained. Automated exposure control and iterative construction methods were used.  Findings: There are normal vertebral body heights. There is mild anterolisthesis of C3 on C4 and C4 on C5. There is severe narrowing of the C5-6 and C6/7 disc interspaces with moderate spurring. There are no fractures. There is facet joint hypertrophy with moderate left neural foraminal stenosis at C3-4. There is mild left neural foraminal stenosis at C4-5. There is mild spinal stenosis with severe bilateral neural foraminal stenosis at C5-6, greatest on the left. There is a mild to moderate spinal stenosis with severe bilateral neural foraminal stenosis at C6/7.     Impression: Impression: Chronic findings as detailed. No acute fracture. Mildly abnormal alignment as noted, may be chronic. If clinically warranted, this could be further evaluated with flexion and extension views. Electronically Signed: Lizzette Amaya MD  1/25/2024 2:34 PM EST  Workstation ID: [id number]    CT Head Without Contrast    Result Date: 1/25/2024  CT HEAD WO CONTRAST Date of Exam: 1/25/2024 2:20 PM EST Indication: Fall, head injury, on Plavix. Comparison: None available. Technique: Axial CT images were obtained of the head without contrast administration.  Automated exposure control and iterative construction methods were used. Findings: The large right periorbital and frontal scalp hematoma is present with some soft tissue gas likely also reflecting laceration. Nondedicated evaluation of the orbit demonstrates no evidence of fracture and the calvarium is also intact. Age-related changes  are noted, with some generalized volume loss. There is no evidence of intracranial hemorrhage, mass or mass effect. The ventricles are normal in size and configuration, accounting for surrounding volume loss. The paranasal sinuses are grossly clear.     Impression: Impression: Prominent right frontal and periorbital scalp laceration and hematoma. No acute intracranial abnormality. Electronically Signed: Kris Basurto MD  1/25/2024 2:32 PM EST  Workstation  ID: [id number]     Results for orders placed during the hospital encounter of 06/05/23    Adult Transthoracic Echo Complete w/ Color, Spectral and Contrast if Necessary Per Protocol    Interpretation Summary    Left ventricular systolic function is normal. Left ventricular ejection fraction appears to be 61 - 65%.    Left ventricular wall thickness is consistent with borderline concentric hypertrophy.    Left ventricular diastolic function is consistent with (grade II w/high LAP) pseudonormalization.    Left atrial volume is mildly increased.    There is moderate calcification of the aortic valve.    Moderate mitral annular calcification is present.    Mild mitral valve stenosis is present.    Mild mitral valve regurgitation is present.    Mild pulmonic valve regurgitation is present.    Moderate tricuspid valve regurgitation is present.    Estimated right ventricular systolic pressure from tricuspid regurgitation is mildly elevated (35-45 mmHg).      Current medications:  Scheduled Meds:insulin lispro, 2-7 Units, Subcutaneous, 4x Daily AC & at Bedtime  levothyroxine, 50 mcg, Oral, Q AM  pantoprazole, 40 mg, Oral, QAM  senna-docusate sodium, 2 tablet, Oral, BID  sodium chloride, 10 mL, Intravenous, Q12H  venlafaxine XR, 150 mg, Oral, Daily With Breakfast      Continuous Infusions:   PRN Meds:.  acetaminophen    senna-docusate sodium **AND** polyethylene glycol **AND** bisacodyl **AND** bisacodyl    dextrose    dextrose    glucagon (human recombinant)    melatonin    ondansetron    [COMPLETED] Insert Peripheral IV **AND** sodium chloride    sodium chloride    sodium chloride    Assessment & Plan   Assessment & Plan     Active Hospital Problems    Diagnosis  POA    **Traumatic orbital hematoma [S05.10XA]  Yes    Rhabdomyolysis [M62.82]  Yes    Fall [W19.XXXA]  Yes    Syncope [R55]  Yes    DM2 (diabetes mellitus, type 2) [E11.9]  Yes    Mixed hyperlipidemia [E78.2]  Yes    Hypothyroid [E03.9]  Yes    GERD  (gastroesophageal reflux disease) [K21.9]  Yes    Primary hypertension [I10]  Yes    CAD (coronary artery disease) [I25.10]  Yes      Resolved Hospital Problems   No resolved problems to display.        Brief Hospital Course to date:  88 y.o. female with a past medical history significant for HTN, HLD, CAD s/p stent placement, s/p PPM, DM2, GERD, and hypothyroidism. Here with orbital hematoma and rhabdo after an unwitnessed fall.    Orbital hematoma  Shoulder pain  Rhabdo  -IVF, pain meds, PT/OT  -no fracture/dislocation shoulder    Fall  -unwitnessed  -denies palpitations/vertigo  -unclear if syncope  -check orthostatics, monitor on telemetry  -consider outpatient cardiac monitoring    CAD  HTN  HL  -s/p HANK 6/23, on DAPT, held, resume and observe soon  -BB    DM  -SSI    Hypothyroidism  -synthroid    Anx/Depression  -Effexor    GERD  -PPI    Expected Discharge Location and Transportation: Rehab  Expected Discharge   Expected Discharge Date: 1/31/2024; Expected Discharge Time:      DVT prophylaxis:  Mechanical DVT prophylaxis orders are present.         AM-PAC 6 Clicks Score (PT): 14 (01/25/24 2000)    CODE STATUS:   Code Status and Medical Interventions:   Ordered at: 01/25/24 1951     Level Of Support Discussed With:    Patient     Code Status (Patient has no pulse and is not breathing):    CPR (Attempt to Resuscitate)     Medical Interventions (Patient has pulse or is breathing):    Full Support       Chace Ware MD  01/26/24

## 2024-01-26 NOTE — NURSING NOTE
Patient presents with severe bruising and small facial laceration to the right proximal lateral eye.  Patient fell she was face down for quite some time.    They have consulted us although I think somebody has put a stitch in the meantime I would only recommend 6 maybe not bacitracin possibly Neosporin over going to see if I hospitalist will take my record you mentation and mupirocin is a feeling of had better results on skin conditions then the bacitracin.    Other than that it should probably be kept moist with some kind of ointment even A&E ointment or Vaseline.  For a little while anyway and then leave open to air.    Need to watch anything getting in that is that I looks very inflamed.

## 2024-01-26 NOTE — THERAPY EVALUATION
Patient Name: Janna Willis  : 1935    MRN: [id number]                              Today's Date: 2024       Admit Date: 2024    Visit Dx:     ICD-10-CM ICD-9-CM   1. Fall, initial encounter  W19.XXXA E888.9   2. Facial laceration, initial encounter  S01.81XA 873.40   3. Traumatic hematoma of right orbit, initial encounter  S05.11XA 921.2   4. Closed head injury, initial encounter  S09.90XA 959.01   5. Anticoagulated  Z79.01 V58.61     Patient Active Problem List   Diagnosis    Heart block AV third degree    CAD (coronary artery disease)    Primary hypertension    Mixed hyperlipidemia    GERD (gastroesophageal reflux disease)    Hypothyroid    Class 1 obesity in adult    Traumatic orbital hematoma    Rhabdomyolysis    Fall    Syncope    DM2 (diabetes mellitus, type 2)     Past Medical History:   Diagnosis Date    Diabetes      Past Surgical History:   Procedure Laterality Date    CARDIAC CATHETERIZATION N/A 2023    Procedure: Left Heart Cath;  Surgeon: Kuldeep Miner MD;  Location:  [facility];  Service: Cardiology;  Laterality: N/A;    CARDIAC ELECTROPHYSIOLOGY PROCEDURE N/A 2023    Procedure: Temporary Pacemaker;  Surgeon: Kuldeep Miner MD;  Location:  [facility];  Service: Cardiology;  Laterality: N/A;    CARDIAC ELECTROPHYSIOLOGY PROCEDURE N/A 2023    Procedure: Pacemaker DC new;  Surgeon: Dudley Rodriguez DO;  Location:  [facility];  Service: Cardiology;  Laterality: N/A;    OVARY SURGERY        General Information       Row Name 24 1432          OT Time and Intention    Document Type evaluation  -     Mode of Treatment occupational therapy  -       Row Name 24 1432          General Information    Patient Profile Reviewed yes  -MC     Prior Level of Function independent:;bed mobility;transfer;all household mobility;community mobility;ADL's  Pt uses cane for mobility at baseline, reports she does not have a  walker at home. Pt reports ~15 falls within last 3 months  -     Existing Precautions/Restrictions fall;other (see comments)  tremors  -     Barriers to Rehab medically complex;previous functional deficit  -       Row Name 01/26/24 1432          Living Environment    People in Home alone  -       Row Name 01/26/24 1432          Home Main Entrance    Number of Stairs, Main Entrance one  -     Stair Railings, Main Entrance none  -       Row Name 01/26/24 1432          Stairs Within Home, Primary    Number of Stairs, Within Home, Primary none  -       Row Name 01/26/24 1432          Cognition    Orientation Status (Cognition) oriented x 3  -       Row Name 01/26/24 1432          Safety Issues, Functional Mobility    Safety Issues Affecting Function (Mobility) awareness of need for assistance;insight into deficits/self-awareness;safety precaution awareness;safety precautions follow-through/compliance;sequencing abilities  -     Impairments Affecting Function (Mobility) balance;endurance/activity tolerance;pain;postural/trunk control;strength  -               User Key  (r) = Recorded By, (t) = Taken By, (c) = Cosigned By      Initials Name Provider Type     Taty Armas, OT Occupational Therapist                     Mobility/ADL's       Row Name 01/26/24 1434          Bed Mobility    Comment, (Bed Mobility) Pt received and left sitting [facility]  -       Row Name 01/26/24 1434          Transfers    Transfers sit-stand transfer;stand-sit transfer;other (see comments)  -       Row Name 01/26/24 1434          Sit-Stand Transfer    Sit-Stand [place] (Transfers) moderate assist (50% patient effort);verbal cues  -     Assistive Device (Sit-Stand Transfers) walker, front-wheeled;other (see comments)  -       Row Name 01/26/24 1434          Stand-Sit Transfer    Stand-Sit [place] (Transfers) moderate assist (50% patient effort);2 person assist;verbal cues  -     Assistive Device  (Stand-Sit Transfers) walker, front-wheeled  -       Row Name 01/26/24 1434          Functional Mobility    Functional Mobility- Ind. Level minimum assist (75% patient effort);verbal cues required  -     Functional Mobility- Device walker, front-wheeled  -     Functional Mobility-Distance (Feet) 14  -     Functional Mobility- Safety Issues balance decreased during turns;sequencing ability decreased;step length decreased  -     Functional Mobility- Comment Pt w/ no c/o dizziness initially, after ~14ft w/ RW & min A pt reported severe dizziness and pt immediately seated on BS, then pivoted to BS, blood pressure checked and stable.  -       Row Name 01/26/24 1434          Activities of Daily Living    BADL Assessment/Intervention lower body dressing  -       Row Name 01/26/24 1434          Lower Body Dressing Assessment/Training    [place] Level (Lower Body Dressing) don;socks;dependent (less than 25% patient effort)  -     Position (Lower Body Dressing) supported sitting  -     Comment, (Lower Body Dressing) Pt limited d/t abdominal pain and back pain, rec trial of AE next session for increased safety and independence w/ LB ADLs  -               User Key  (r) = Recorded By, (t) = Taken By, (c) = Cosigned By      Initials Name Provider Type     Taty Armas OT Occupational Therapist                   Obj/Interventions       [facility] Name 01/26/24 1440          Sensory Assessment (Somatosensory)    Sensory Assessment (Somatosensory) UE sensation intact  -[facility] Name 01/26/24 1440          Vision Assessment/Intervention    Visual Impairment/Limitations WFL  -       Row Name 01/26/24 1440          Range of Motion Comprehensive    General Range of Motion bilateral upper extremity ROM WFL  -     Comment, General Range of Motion R shoulder limited d/t pain and R sublux  -[facility] Name 01/26/24 1440          Strength Comprehensive (MMT)    General Manual Muscle Testing (MMT)  Assessment upper extremity strength deficits identified  -     Comment, General Manual Muscle Testing (MMT) Assessment R shoulder deferred d/t pain, RUE grossly 4-/5, LUE grossly 4/5  -       Row Name 01/26/24 1440          Balance    Balance Assessment sitting static balance;sitting dynamic balance;sit to stand dynamic balance;standing static balance;standing dynamic balance  -     Static Sitting Balance standby assist  -     Dynamic Sitting Balance contact guard  -     Position, Sitting Balance unsupported;sitting in chair  -     Sit to Stand Dynamic Balance moderate assist;verbal cues  -     Static Standing Balance minimal assist;verbal cues  -     Dynamic Standing Balance minimal assist;verbal cues  -     Position/Device Used, Standing Balance supported;walker, front-wheeled  -     Balance Interventions sitting;sit to stand;occupation based/functional task  -               User Key  (r) = Recorded By, (t) = Taken By, (c) = Cosigned By      Initials Name Provider Type     Taty Armas OT Occupational Therapist                   Goals/Plan       Row Name 01/26/24 1448          Transfer Goal 1 (OT)    Activity/Assistive Device (Transfer Goal 1, OT) bed-to-chair/chair-to-bed;toilet;walker, rolling  -     [place] Level/Cues Needed (Transfer Goal 1, OT) contact guard required  -     Time Frame (Transfer Goal 1, OT) long term goal (LTG);10 days  -     Progress/Outcome (Transfer Goal 1, OT) goal ongoing  -       Row Name 01/26/24 1443          Dressing Goal 1 (OT)    Activity/Device (Dressing Goal 1, OT) lower body dressing  don/doff socks w/ AAD  -     [place]/Cues Needed (Dressing Goal 1, OT) minimum assist (75% or more patient effort);verbal cues required  -     Time Frame (Dressing Goal 1, OT) long term goal (LTG);10 days  -     Progress/Outcome (Dressing Goal 1, OT) goal ongoing  -       Row Name 01/26/24 1444          Toileting Goal 1 (OT)    Activity/Device  (Toileting Goal 1, OT) adjust/manage clothing;perform perineal hygiene;commode;grab bar/safety frame  -     [place] Level/Cues Needed (Toileting Goal 1, OT) minimum assist (75% or more patient effort);verbal cues required  -     Time Frame (Toileting Goal 1, OT) long term goal (LTG);10 days  -     Progress/Outcome (Toileting Goal 1, OT) goal ongoing  -       Row Name 01/26/24 1447          Therapy Assessment/Plan (OT)    Planned Therapy Interventions (OT) activity tolerance training;adaptive equipment training;BADL retraining;functional balance retraining;IADL retraining;occupation/activity based interventions;patient/caregiver education/training;ROM/therapeutic exercise;strengthening exercise;transfer/mobility retraining  -               User Key  (r) = Recorded By, (t) = Taken By, (c) = Cosigned By      Initials Name Provider Type     Taty Armas, OT Occupational Therapist                   Clinical Impression       Row Name 01/26/24 1441          Pain Assessment    Pain Intervention(s) Repositioned;Ambulation/increased activity  -     Additional Documentation Pain Scale: FACES Pre/Post-Treatment (Group)  -[facility] Name 01/26/24 1442          Pain Scale: FACES Pre/Post-Treatment    Pain: FACES Scale, Pretreatment 2-->hurts little bit  -     Posttreatment Pain Rating 2-->hurts little bit  -     Pain Location - Side/Orientation Right  -     Pain Location generalized  -     Pain Location - shoulder;other (see comments)  abdomen, back  -       Row Name 01/26/24 1445          Plan of Care Review    Plan of Care Reviewed With patient  -     Outcome Evaluation Pt presents w/ increased pain, decreased functional endurance, dizziness, and balance deficits limiting her ADL independence. Pt would benefit from continued skilled IPOT services to address current functional deficits. Rec IRF at d/c.  -       Row Name 01/26/24 1631          Therapy Assessment/Plan (OT)    Rehab  Potential (OT) good, to achieve stated therapy goals  -     Criteria for Skilled Therapeutic Interventions Met (OT) yes;skilled treatment is necessary  -     Therapy Frequency (OT) daily  -       Row Name 01/26/24 1443          Therapy Plan Review/Discharge Plan (OT)    Anticipated Discharge Disposition (OT) inpatient rehabilitation facility  -       Row Name 01/26/24 1443          Vital Signs    Pre Systolic BP Rehab 116  -MC     Pre Treatment Diastolic BP 69  -MC     Intra Systolic BP Rehab 125  -MC     Intra Treatment Diastolic BP 66  -MC     Post Systolic BP Rehab 150  -MC     Post Treatment Diastolic BP 67  -MC     O2 Delivery Pre Treatment room air  -MC     O2 Delivery Intra Treatment room air  -     O2 Delivery Post Treatment room air  -     Pre Patient Position Sitting  -     Intra Patient Position Standing  -     Post Patient Position Sitting  -       Row Name 01/26/24 1443          Positioning and Restraints    Pre-Treatment Position sitting in chair/recliner  -     Post Treatment Position chair  -MC     In Chair notified nsg;reclined;call light within reach;encouraged to call for assist;exit alarm on;waffle cushion;legs elevated  -               User Key  (r) = Recorded By, (t) = Taken By, (c) = Cosigned By      Initials Name Provider Type    Taty Betancourt, OT Occupational Therapist                   Outcome Measures       Row Name 01/26/24 1446          How much help from another is currently needed...    Putting on and taking off regular lower body clothing? 1  -MC     Bathing (including washing, rinsing, and drying) 2  -MC     Toileting (which includes using toilet bed pan or urinal) 2  -MC     Putting on and taking off regular upper body clothing 3  -MC     Taking care of personal grooming (such as brushing teeth) 3  -MC     Eating meals 3  -MC     AM-PAC 6 Clicks Score (OT) 14  -       Row Name 01/26/24 1335          How much help from another person do you currently  need...    Turning from your back to your side while in flat bed without using bedrails? 3  -SS     Moving from lying on back to sitting on the side of a flat bed without bedrails? 2  -SS     Moving to and from a bed to a chair (including a wheelchair)? 2  -SS     Standing up from a chair using your arms (e.g., wheelchair, bedside chair)? 2  -SS     Climbing 3-5 steps with a railing? 2  -SS     To walk in hospital room? 2  -SS     AM-PAC 6 Clicks Score (PT) 13  -SS     Highest Level of Mobility Goal 4 --> Transfer to chair/commode  -       Row Name 01/26/24 1446 01/26/24 1335       Functional Assessment    Outcome Measure Options -PAC 6 Clicks Daily Activity (OT)  -[facility] 6 Clicks Basic Mobility (PT)  -              User Key  (r) = Recorded By, (t) = Taken By, (c) = Cosigned By      Initials Name Provider Type     Taty Armas, OT Occupational Therapist     Hyacinth Johansen, BOGDAN Physical Therapist                    Occupational Therapy Education       Title: PT OT SLP Therapies (In Progress)       Topic: Occupational Therapy (In Progress)       Point: ADL training (In Progress)       Description:   Instruct learner(s) on proper safety adaptation and remediation techniques during self care or transfers.   Instruct in proper use of assistive devices.                  Learning Progress Summary             Patient Acceptance, E, NR by  at 1/26/2024 1446                         Point: Home exercise program (Not Started)       Description:   Instruct learner(s) on appropriate technique for monitoring, assisting and/or progressing therapeutic exercises/activities.                  Learner Progress:  Not documented in this visit.              Point: Precautions (In Progress)       Description:   Instruct learner(s) on prescribed precautions during self-care and functional transfers.                  Learning Progress Summary             Patient Acceptance, E, NR by  at 1/26/2024 1446                          Point: Body mechanics (In Progress)       Description:   Instruct learner(s) on proper positioning and spine alignment during self-care, functional mobility activities and/or exercises.                  Learning Progress Summary             Patient Acceptance, E, NR by  at 1/26/2024 1446                                         User Key       Initials Effective Dates Name Provider Type Discipline     10/14/22 -  Taty Armas OT Occupational Therapist OT                  OT Recommendation and Plan  Planned Therapy Interventions (OT): activity tolerance training, adaptive equipment training, BADL retraining, functional balance retraining, IADL retraining, occupation/activity based interventions, patient/caregiver education/training, ROM/therapeutic exercise, strengthening exercise, transfer/mobility retraining  Therapy Frequency (OT): daily  Plan of Care Review  Plan of Care Reviewed With: patient  Outcome Evaluation: Pt presents w/ increased pain, decreased functional endurance, dizziness, and balance deficits limiting her ADL independence. Pt would benefit from continued skilled IPOT services to address current functional deficits. Rec IRF at d/c.     Time Calculation:   Evaluation Complexity (OT)  Review Occupational Profile/Medical/Therapy History Complexity: expanded/moderate complexity  Assessment, Occupational Performance/Identification of Deficit Complexity: 3-5 performance deficits  Clinical Decision Making Complexity (OT): detailed assessment/moderate complexity  Overall Complexity of Evaluation (OT): moderate complexity     Time Calculation- OT       Row Name 01/26/24 1447             Time Calculation- OT    OT Start Time 1409  -      OT Received On 01/26/24  -      OT Goal Re-Cert Due Date 02/05/24  -         Timed Charges    62425 - OT Therapeutic Activity Minutes 8  -      19618 - OT Self Care/Mgmt Minutes 3  -         Untimed Charges    OT Eval/Re-eval Minutes 32  -          Total Minutes    Timed Charges Total Minutes 11  -MC      Untimed Charges Total Minutes 32  -MC       Total Minutes 43  -MC                User Key  (r) = Recorded By, (t) = Taken By, (c) = Cosigned By      Initials Name Provider Type    Taty Betancourt OT Occupational Therapist                  Therapy Charges for Today       Code Description Service Date Service Provider Modifiers Qty    [id number]  OT THERAPEUTIC ACT EA 15 MIN 1/26/2024 Taty Armas OT GO 1    [id number]  OT EVAL MOD COMPLEXITY 3 1/26/2024 Taty Armas OT GO 1                 Taty Armas OT  1/26/2024

## 2024-01-26 NOTE — H&P
[facility] Medicine Services  HISTORY AND PHYSICAL    Patient Name: Janna Willis  : 1935  MRN: [id number]  Primary Care Physician: Hever Ibrahim, MARIELA  Date of admission: 2024    Subjective   Subjective     Chief Complaint:  fall    HPI:  Janna Willis is an 88 y.o. female with a past medical history significant for HTN, HLD, CAD s/p stent placement, s/p PPM, DM2, GERD, and hypothyroidism. She presents today after a mechanical fall vs syncopal episode at home where she lives alone. Patient states she cannot fully remember the events of the episode but thinks she may have got her cane caught in something in the hallway causing her to trip and fall. Patient does remember hitting her right forehead but is unsure if she lost consciousness. She is not anticoagulated but is on DAPT with ASA and Plavix. States she suspects she was on the floor from 1-2 hours. Was unable to get up on her own and had significant pain and swelling at right forehead and eye. She does have a life alert but apparently unable to reach it. Patient was eventually found by her sister who called EMS.   Patient adds that she has fallen multiple times over the past few weeks. States her knees are giving out and she may now be requiring assistance. Currently denies fever, cough, congestion, SOB, or chest pain. No abdominal pain or N/v/D. No headache or focal weakness/ paresthesias.         Review of Systems   Constitutional:  Negative for chills, fatigue and fever.   HENT:  Negative for congestion and trouble swallowing.    Eyes:  Negative for photophobia and visual disturbance.   Respiratory:  Negative for cough and shortness of breath.    Cardiovascular:  Negative for chest pain and leg swelling.   Gastrointestinal:  Negative for abdominal distention, diarrhea, nausea and vomiting.   Endocrine: Negative for cold intolerance and heat intolerance.   Genitourinary:  Negative for dysuria and  flank pain.   Musculoskeletal:  Positive for arthralgias and gait problem.   Skin:  Positive for color change and wound.   Allergic/Immunologic: Negative for immunocompromised state.   Neurological:  Positive for weakness. Negative for dizziness and headaches.   Hematological:  Negative for adenopathy.   Psychiatric/Behavioral:  Negative for agitation and confusion.             Personal History     Past Medical History:   Diagnosis Date    Diabetes          Past Surgical History:   Procedure Laterality Date    CARDIAC CATHETERIZATION N/A 6/5/2023    Procedure: Left Heart Cath;  Surgeon: Kuldeep Miner MD;  Location:  [facility];  Service: Cardiology;  Laterality: N/A;    CARDIAC ELECTROPHYSIOLOGY PROCEDURE N/A 6/5/2023    Procedure: Temporary Pacemaker;  Surgeon: Kuldeep Miner MD;  Location:  [facility];  Service: Cardiology;  Laterality: N/A;    CARDIAC ELECTROPHYSIOLOGY PROCEDURE N/A 6/6/2023    Procedure: Pacemaker DC new;  Surgeon: Dudley Rodriguez DO;  Location:  [facility];  Service: Cardiology;  Laterality: N/A;    OVARY SURGERY         Family History: family history includes Heart attack in her mother; Stroke in her mother.     Social History:  reports that she has never smoked. She has never used smokeless tobacco. She reports that she does not drink alcohol and does not use drugs.  Social History     Social History Narrative    Not on file       Medications:  acetaminophen, aspirin, cholecalciferol, clopidogrel, empagliflozin, ezetimibe, levothyroxine, metoprolol succinate XL, nitroglycerin, pantoprazole, venlafaxine XR, and vitamin B-12    Allergies   Allergen Reactions    Avelox [Moxifloxacin] Rash    2,4-D Dimethylamine Hives    Azithromycin Hives    Lisinopril Hives    Metoclopramide Hives    Statins Hives       Objective   Objective     Vital Signs:   Temp:  [99.1 °F (37.3 °C)] 99.1 °F (37.3 °C)  Heart Rate:  [74-89] 89  Resp:  [20] 20  BP:  (142-186)/(69-96) 145/75    Physical Exam   Constitutional: Awake, alert  Eyes: PERRLA, sclerae anicteric, no conjunctival injection  HENT: NCAT, mucous membranes moist  Neck: Supple, no thyromegaly, no lymphadenopathy, trachea midline  Respiratory: Clear to auscultation bilaterally, nonlabored respirations   Cardiovascular: RRR, no murmurs, rubs, or gallops, palpable pedal pulses bilaterally  Gastrointestinal: Positive bowel sounds, soft, nontender, nondistended  Musculoskeletal: No bilateral ankle edema, no clubbing or cyanosis to extremities  Psychiatric: Appropriate affect, cooperative  Neurologic: Oriented x 3, strength symmetric in all extremities, Cranial Nerves grossly intact to confrontation, speech clear  Skin: No rashes      Result Review:  I have personally reviewed the results from the time of this admission to 1/25/2024 20:04 EST and agree with these findings:  [x]  Laboratory list / accordion  []  Microbiology  []  Radiology  []  EKG/Telemetry   []  Cardiology/Vascular   []  Pathology  [x]  Old records  []  Other:  Most notable findings include: vitals stable. Labs favorable. CT head negative    LAB RESULTS:      Lab 01/25/24  1407   WBC 6.67   HEMOGLOBIN 14.0   HEMATOCRIT 42.7   PLATELETS 149   NEUTROS ABS 5.14   IMMATURE GRANS (ABS) 0.01   LYMPHS ABS 0.92   MONOS ABS 0.54   EOS ABS 0.03   MCV 87.3   CK TOTAL 550*         Lab 01/25/24  1407   SODIUM 134*   POTASSIUM 4.1   CHLORIDE 97*   CO2 24.0   ANION GAP 13.0   BUN 16   CREATININE 0.62   EGFR 86.3   GLUCOSE 121*   CALCIUM 9.1         Lab 01/25/24  1407   TOTAL PROTEIN 7.1   ALBUMIN 4.1   GLOBULIN 3.0   ALT (SGPT) 12   AST (SGOT) 26   BILIRUBIN 0.7   ALK PHOS 74         Lab 01/25/24  1407   HSTROP T 10                 Brief Urine Lab Results  (Last result in the past 365 days)        Color   Clarity   Blood   Leuk Est   Nitrite   Protein   CREAT   Urine HCG        06/13/23 1710             42.7               Microbiology Results (last 10 days)        ** No results found for the last 240 hours. **            XR Shoulder 2+ View Right    Result Date: 1/25/2024  XR SHOULDER 2+ VW RIGHT Date of Exam: 1/25/2024 2:22 PM EST Indication: Fall, injury, contusion Comparison: None available. Findings: 3 views. The humeral head is mildly subluxed cephalad in relation to the glenoid. There is no dislocation. The AC joint appears maintained. There is no fracture.     Impression: Impression: Mild cephalad humeral head subluxation. This could be chronic. No fracture. Electronically Signed: Lizzette Amaya MD  1/25/2024 2:46 PM EST  Workstation ID: [id number]    CT Maxillofacial Without Contrast    Result Date: 1/25/2024  CT MAXILLOFACIAL WO CONT Date of Exam: 1/25/2024 2:20 PM EST Indication: Fall, head injury, on Plavix. Comparison: None available. Technique: Axial CT images were obtained from the inferior aspect of the mandible through the frontal sinuses without contrast administration.  Reconstructed coronal and sagittal images were also obtained. Automated exposure control and iterative construction methods were used. Findings: No acute facial fracture is identified.bilateral lens prostheses are noted. Orbital structures are otherwise unremarkable. Paranasal sinuses and mastoid air cells appear well aerated without air-fluid levels identified. There is rightward bowing of the bony nasal septum. The pterygoid plates and zygomatic arches are intact. Right facial soft tissue swelling is present. Please refer to the accompanying head CT for description of intracranial findings.     Impression: 1.No acute facial fracture is identified. 2.Extensive right facial soft tissue swelling. Electronically Signed: Crow Calderon MD  1/25/2024 2:40 PM EST  Workstation ID: [id number]    CT Cervical Spine Without Contrast    Result Date: 1/25/2024  CT CERVICAL SPINE WO CONTRAST Date of Exam: 1/25/2024 2:20 PM EST Indication: Fall, head injury, on Plavix. Comparison: None available.  Technique: Axial CT images were obtained of the cervical spine without contrast administration.  Reconstructed coronal and sagittal images were also obtained. Automated exposure control and iterative construction methods were used. Findings: There are normal vertebral body heights. There is mild anterolisthesis of C3 on C4 and C4 on C5. There is severe narrowing of the C5-6 and C6/7 disc interspaces with moderate spurring. There are no fractures. There is facet joint hypertrophy with moderate left neural foraminal stenosis at C3-4. There is mild left neural foraminal stenosis at C4-5. There is mild spinal stenosis with severe bilateral neural foraminal stenosis at C5-6, greatest on the left. There is a mild to moderate spinal stenosis with severe bilateral neural foraminal stenosis at C6/7.     Impression: Impression: Chronic findings as detailed. No acute fracture. Mildly abnormal alignment as noted, may be chronic. If clinically warranted, this could be further evaluated with flexion and extension views. Electronically Signed: Lizzette Amaya MD  1/25/2024 2:34 PM EST  Workstation ID: [id number]    CT Head Without Contrast    Result Date: 1/25/2024  CT HEAD WO CONTRAST Date of Exam: 1/25/2024 2:20 PM EST Indication: Fall, head injury, on Plavix. Comparison: None available. Technique: Axial CT images were obtained of the head without contrast administration.  Automated exposure control and iterative construction methods were used. Findings: The large right periorbital and frontal scalp hematoma is present with some soft tissue gas likely also reflecting laceration. Nondedicated evaluation of the orbit demonstrates no evidence of fracture and the calvarium is also intact. Age-related changes  are noted, with some generalized volume loss. There is no evidence of intracranial hemorrhage, mass or mass effect. The ventricles are normal in size and configuration, accounting for surrounding volume loss. The paranasal  sinuses are grossly clear.     Impression: Impression: Prominent right frontal and periorbital scalp laceration and hematoma. No acute intracranial abnormality. Electronically Signed: Kris Basurto MD  1/25/2024 2:32 PM EST  Workstation ID: [id number]     Results for orders placed during the hospital encounter of 06/05/23    Adult Transthoracic Echo Complete w/ Color, Spectral and Contrast if Necessary Per Protocol    Interpretation Summary    Left ventricular systolic function is normal. Left ventricular ejection fraction appears to be 61 - 65%.    Left ventricular wall thickness is consistent with borderline concentric hypertrophy.    Left ventricular diastolic function is consistent with (grade II w/high LAP) pseudonormalization.    Left atrial volume is mildly increased.    There is moderate calcification of the aortic valve.    Moderate mitral annular calcification is present.    Mild mitral valve stenosis is present.    Mild mitral valve regurgitation is present.    Mild pulmonic valve regurgitation is present.    Moderate tricuspid valve regurgitation is present.    Estimated right ventricular systolic pressure from tricuspid regurgitation is mildly elevated (35-45 mmHg).      Assessment & Plan   Assessment & Plan       Traumatic orbital hematoma    Rhabdomyolysis    Fall    Syncope    CAD (coronary artery disease)    Primary hypertension    DM2 (diabetes mellitus, type 2)    Mixed hyperlipidemia    GERD (gastroesophageal reflux disease)    Hypothyroid    88 y.o. female with a past medical history significant for HTN, HLD, CAD s/p stent placement, s/p PPM, DM2, GERD, and hypothyroidism. Here with orbital hematoma and rhabdo after mechanical fall.    Traumatic Orbital hematoma  Rhabdomyolysis   - 2/2 mechanical fall  - CT head demonstrates maxillofacial/orbits and cervical spine completed without acute abnormality   - does have large hematoma above right eye  - H/H stable  - holding ASA plavix overnight. Trend  H/H  - PT/OT  - wound care  - case management  - gentle IV fluids overnight  - ma labs    CAD  HTN  HLD  - s/p HANK 6/2023 on DAPT with ASA, Plavix. Holding overnight. Resume as tolerated  - continue metoprolol, statin    DM2  - hold oral hypoglycemics  - SSI with scheduled accu checks  - check A1c    Hypothyroidism  - continue levothyroxine    Anxiety/depression  - Effexor    GERD  - PPI    DVT prophylaxis:  mechanical    CODE STATUS:  full code  Level Of Support Discussed With: Patient  Code Status (Patient has no pulse and is not breathing): CPR (Attempt to Resuscitate)  Medical Interventions (Patient has pulse or is breathing): Full Support      Expected Discharge  TBD      This note has been completed as part of a split-shared workflow.     Signature: Electronically signed by Jean-Paul Lara PA-C, 01/25/24, 8:04 PM EST      Total time spent: 90 minutes  Time spent includes time reviewing chart, face-to-face time, counseling patient/family/caregiver, ordering medications/tests/procedures, communicating with other health care professionals, documenting clinical information in the electronic health record, and coordination of care.

## 2024-01-26 NOTE — CASE MANAGEMENT/SOCIAL WORK
Discharge Planning Assessment  [facility]     Patient Name: Janna Willis  MRN: [id number]  Today's Date: 1/26/2024    Admit Date: 1/25/2024    Plan: Home   Discharge Needs Assessment       Row Name 01/26/24 1131       Living Environment    People in Home alone    Primary Care Provided by self    Provides Primary Care For no one, unable/limited ability to care for self    Family Caregiver if Needed sibling(s)    Family Caregiver Names Carlee Kohli  Sister  [phone]    Quality of Family Relationships helpful;involved;supportive    Able to Return to Prior Arrangements yes       Transition Planning    Patient/Family Anticipates Transition to home    Patient/Family Anticipated Services at Transition     Transportation Anticipated family or friend will provide       Discharge Needs Assessment    Equipment Currently Used at Home cane, straight                   Discharge Plan       Row Name 01/26/24 1132       Plan    Plan Home    Patient/Family in Agreement with Plan yes    Plan Comments Spoke with Ms. Willis and her sister Carlee, in her room to intitiate discharge planning. She lives alone in a one level house, in [facility]. She verified she has [employer]. She has prescription coverage and uses the [facility] in [place], to get her scripts filled. Her PCP is Hever Ibrahim. Prior to admission, she was independent with ADL's and still drives. She uses a straight cane to assist with mobility. No other DME. She is not current with home health. Her goal is to return home when she is discharged. Sister will transport. Await therapy recommendations to determine proper discharge placement. CM will continue to follow.    Final Discharge Disposition Code 30 - still a patient                  Continued Care and Services - Admitted Since 1/25/2024    Coordination has not been started for this encounter.       Expected Discharge Date and Time       Expected Discharge Date Expected  Discharge Time    Jan 31, 2024            Demographic Summary       Row Name 01/26/24 1130       General Information    Admission Type observation    Arrived From emergency department    Referral Source admission list    Reason for Consult discharge planning    Preferred Language English       Contact Information    Permission Granted to Share Info With     Contact Information Obtained for                    Functional Status       Row Name 01/26/24 1130       Functional Status    Usual Activity Tolerance moderate    Current Activity Tolerance moderate       Functional Status, IADL    Medications independent    Meal Preparation independent    Housekeeping independent    Laundry independent    Shopping independent                   Psychosocial    No documentation.                  Abuse/Neglect    No documentation.                  Legal    No documentation.                  Substance Abuse    No documentation.                  Patient Forms    No documentation.                     Nae Reyes RN

## 2024-01-26 NOTE — PLAN OF CARE
Goal Outcome Evaluation:  Plan of Care Reviewed With: patient           Outcome Evaluation: Pt. presents below baseline function w/generalized weakness, balance deficits and decreased functional endurance affecting her ability to safely participate in functional mobility. She performed bed mobility and transfers w/mod assist. Activity limited by instability. Pt. would benefit from IPPT to address stated deficits.      Anticipated Discharge Disposition (PT): inpatient rehabilitation facility

## 2024-01-27 LAB
ANION GAP SERPL CALCULATED.3IONS-SCNC: 12 MMOL/L (ref 5–15)
BUN SERPL-MCNC: 16 MG/DL (ref 8–23)
BUN/CREAT SERPL: 22.9 (ref 7–25)
CALCIUM SPEC-SCNC: 8.5 MG/DL (ref 8.6–10.5)
CHLORIDE SERPL-SCNC: 100 MMOL/L (ref 98–107)
CO2 SERPL-SCNC: 24 MMOL/L (ref 22–29)
CORTIS SERPL-MCNC: 5.24 MCG/DL
CREAT SERPL-MCNC: 0.7 MG/DL (ref 0.57–1)
DEPRECATED RDW RBC AUTO: 42.5 FL (ref 37–54)
EGFRCR SERPLBLD CKD-EPI 2021: 83.8 ML/MIN/1.73
ERYTHROCYTE [DISTWIDTH] IN BLOOD BY AUTOMATED COUNT: 12.9 % (ref 12.3–15.4)
GLUCOSE BLDC GLUCOMTR-MCNC: 107 MG/DL (ref 70–130)
GLUCOSE BLDC GLUCOMTR-MCNC: 120 MG/DL (ref 70–130)
GLUCOSE BLDC GLUCOMTR-MCNC: 131 MG/DL (ref 70–130)
GLUCOSE BLDC GLUCOMTR-MCNC: 135 MG/DL (ref 70–130)
GLUCOSE SERPL-MCNC: 103 MG/DL (ref 65–99)
HCT VFR BLD AUTO: 36.4 % (ref 34–46.6)
HGB BLD-MCNC: 11.9 G/DL (ref 12–15.9)
MCH RBC QN AUTO: 29.4 PG (ref 26.6–33)
MCHC RBC AUTO-ENTMCNC: 32.7 G/DL (ref 31.5–35.7)
MCV RBC AUTO: 89.9 FL (ref 79–97)
PLATELET # BLD AUTO: 125 10*3/MM3 (ref 140–450)
PMV BLD AUTO: 10.3 FL (ref 6–12)
POTASSIUM SERPL-SCNC: 4.2 MMOL/L (ref 3.5–5.2)
RBC # BLD AUTO: 4.05 10*6/MM3 (ref 3.77–5.28)
SODIUM SERPL-SCNC: 136 MMOL/L (ref 136–145)
WBC NRBC COR # BLD AUTO: 5.76 10*3/MM3 (ref 3.4–10.8)

## 2024-01-27 PROCEDURE — 25810000003 SODIUM CHLORIDE 0.9 % SOLUTION: Performed by: NURSE PRACTITIONER

## 2024-01-27 PROCEDURE — 82948 REAGENT STRIP/BLOOD GLUCOSE: CPT

## 2024-01-27 PROCEDURE — 85027 COMPLETE CBC AUTOMATED: CPT | Performed by: HOSPITALIST

## 2024-01-27 PROCEDURE — G0378 HOSPITAL OBSERVATION PER HR: HCPCS

## 2024-01-27 PROCEDURE — 99232 SBSQ HOSP IP/OBS MODERATE 35: CPT | Performed by: NURSE PRACTITIONER

## 2024-01-27 PROCEDURE — 80048 BASIC METABOLIC PNL TOTAL CA: CPT | Performed by: HOSPITALIST

## 2024-01-27 PROCEDURE — 82533 TOTAL CORTISOL: CPT | Performed by: HOSPITALIST

## 2024-01-27 RX ORDER — SODIUM CHLORIDE 9 MG/ML
75 INJECTION, SOLUTION INTRAVENOUS CONTINUOUS
Status: DISCONTINUED | OUTPATIENT
Start: 2024-01-27 | End: 2024-01-29

## 2024-01-27 RX ORDER — CLOPIDOGREL BISULFATE 75 MG/1
75 TABLET ORAL DAILY
Status: DISCONTINUED | OUTPATIENT
Start: 2024-01-27 | End: 2024-02-05 | Stop reason: HOSPADM

## 2024-01-27 RX ADMIN — Medication 10 ML: at 08:24

## 2024-01-27 RX ADMIN — LEVOTHYROXINE SODIUM 50 MCG: 0.05 TABLET ORAL at 08:24

## 2024-01-27 RX ADMIN — SENNOSIDES AND DOCUSATE SODIUM 2 TABLET: 8.6; 5 TABLET ORAL at 21:10

## 2024-01-27 RX ADMIN — CLOPIDOGREL BISULFATE 75 MG: 75 TABLET ORAL at 12:45

## 2024-01-27 RX ADMIN — Medication 5 MG: at 21:10

## 2024-01-27 RX ADMIN — SODIUM CHLORIDE 75 ML/HR: 9 INJECTION, SOLUTION INTRAVENOUS at 14:19

## 2024-01-27 RX ADMIN — ACETAMINOPHEN 650 MG: 325 TABLET ORAL at 12:45

## 2024-01-27 RX ADMIN — ACETAMINOPHEN 650 MG: 325 TABLET ORAL at 21:20

## 2024-01-27 RX ADMIN — PANTOPRAZOLE SODIUM 40 MG: 40 TABLET, DELAYED RELEASE ORAL at 08:23

## 2024-01-27 RX ADMIN — VENLAFAXINE HYDROCHLORIDE 150 MG: 75 CAPSULE, EXTENDED RELEASE ORAL at 08:23

## 2024-01-27 NOTE — PROGRESS NOTES
[facility] Medicine Services  PROGRESS NOTE    Patient Name: Janna Willis  : 1935  MRN: [id number]    Date of Admission: 2024  Primary Care Physician: Hever Ibrahim APRN    Subjective   Subjective     CC: S/P Fall    HPI:     Sitting upright in bed getting ready to eat breakfast. Feels a bit rough today.  She got dizzy this morning when she stood up.  No other issues or complaints.       Objective   Objective     Vital Signs:   Temp:  [98 °F (36.7 °C)-98.7 °F (37.1 °C)] 98.3 °F (36.8 °C)  Heart Rate:  [72-82] 73  Resp:  [16-18] 16  BP: (110-175)/(57-78) 122/57     Physical Exam:  Constitutional: No acute distress, awake, alert  HENT: NCAT, mucous membranes moist, right periorbital swelling  Respiratory: Clear to auscultation bilaterally, respiratory effort normal   Cardiovascular: RRR, no murmurs, rubs, or gallops  Gastrointestinal: Positive bowel sounds, soft, nontender, nondistended  Musculoskeletal: No bilateral ankle edema  Psychiatric: Appropriate affect, cooperative  Neurologic: Oriented x 3, strength symmetric in all extremities, Cranial Nerves grossly intact to confrontation, speech clear  Skin: No rashes, echymosis noted to neck and down onto upper chest.      Results Reviewed:  LAB RESULTS:      Lab 24  0546 24  0519 24  1407   WBC 5.76 5.01 6.67   HEMOGLOBIN 11.9* 12.3 14.0   HEMATOCRIT 36.4 37.0 42.7   PLATELETS 125* 134* 149   NEUTROS ABS  --  3.42 5.14   IMMATURE GRANS (ABS)  --  0.01 0.01   LYMPHS ABS  --  1.12 0.92   MONOS ABS  --  0.36 0.54   EOS ABS  --  0.08 0.03   MCV 89.9 87.1 87.3         Lab 24  0546 24  0519 24  1407   SODIUM 136 136 134*   POTASSIUM 4.2 3.8 4.1   CHLORIDE 100 101 97*   CO2 24.0 26.0 24.0   ANION GAP 12.0 9.0 13.0   BUN 16 13 16   CREATININE 0.70 0.58 0.62   EGFR 83.8 87.7 86.3   GLUCOSE 103* 91 121*   CALCIUM 8.5* 8.7 9.1   HEMOGLOBIN A1C  --  5.60 5.60         Lab 24  0519  01/25/24  1407   TOTAL PROTEIN 6.4 7.1   ALBUMIN 3.7 4.1   GLOBULIN 2.7 3.0   ALT (SGPT) 10 12   AST (SGOT) 21 26   BILIRUBIN 0.7 0.7   ALK PHOS 64 74         Lab 01/25/24  1407   HSTROP T 10                 Brief Urine Lab Results  (Last result in the past 365 days)        Color   Clarity   Blood   Leuk Est   Nitrite   Protein   CREAT   Urine HCG        06/13/23 1710             42.7                 Microbiology Results Abnormal       None            XR Shoulder 2+ View Right    Result Date: 1/25/2024  XR SHOULDER 2+ VW RIGHT Date of Exam: 1/25/2024 2:22 PM EST Indication: Fall, injury, contusion Comparison: None available. Findings: 3 views. The humeral head is mildly subluxed cephalad in relation to the glenoid. There is no dislocation. The AC joint appears maintained. There is no fracture.     Impression: Impression: Mild cephalad humeral head subluxation. This could be chronic. No fracture. Electronically Signed: Lizzette Amaya MD  1/25/2024 2:46 PM EST  Workstation ID: [id number]    CT Maxillofacial Without Contrast    Result Date: 1/25/2024  CT MAXILLOFACIAL WO CONT Date of Exam: 1/25/2024 2:20 PM EST Indication: Fall, head injury, on Plavix. Comparison: None available. Technique: Axial CT images were obtained from the inferior aspect of the mandible through the frontal sinuses without contrast administration.  Reconstructed coronal and sagittal images were also obtained. Automated exposure control and iterative construction methods were used. Findings: No acute facial fracture is identified.bilateral lens prostheses are noted. Orbital structures are otherwise unremarkable. Paranasal sinuses and mastoid air cells appear well aerated without air-fluid levels identified. There is rightward bowing of the bony nasal septum. The pterygoid plates and zygomatic arches are intact. Right facial soft tissue swelling is present. Please refer to the accompanying head CT for description of intracranial findings.      Impression: 1.No acute facial fracture is identified. 2.Extensive right facial soft tissue swelling. Electronically Signed: Crow Calderon MD  1/25/2024 2:40 PM EST  Workstation ID: [id number]    CT Cervical Spine Without Contrast    Result Date: 1/25/2024  CT CERVICAL SPINE WO CONTRAST Date of Exam: 1/25/2024 2:20 PM EST Indication: Fall, head injury, on Plavix. Comparison: None available. Technique: Axial CT images were obtained of the cervical spine without contrast administration.  Reconstructed coronal and sagittal images were also obtained. Automated exposure control and iterative construction methods were used. Findings: There are normal vertebral body heights. There is mild anterolisthesis of C3 on C4 and C4 on C5. There is severe narrowing of the C5-6 and C6/7 disc interspaces with moderate spurring. There are no fractures. There is facet joint hypertrophy with moderate left neural foraminal stenosis at C3-4. There is mild left neural foraminal stenosis at C4-5. There is mild spinal stenosis with severe bilateral neural foraminal stenosis at C5-6, greatest on the left. There is a mild to moderate spinal stenosis with severe bilateral neural foraminal stenosis at C6/7.     Impression: Impression: Chronic findings as detailed. No acute fracture. Mildly abnormal alignment as noted, may be chronic. If clinically warranted, this could be further evaluated with flexion and extension views. Electronically Signed: Lizzette Amaya MD  1/25/2024 2:34 PM EST  Workstation ID: [id number]    CT Head Without Contrast    Result Date: 1/25/2024  CT HEAD WO CONTRAST Date of Exam: 1/25/2024 2:20 PM EST Indication: Fall, head injury, on Plavix. Comparison: None available. Technique: Axial CT images were obtained of the head without contrast administration.  Automated exposure control and iterative construction methods were used. Findings: The large right periorbital and frontal scalp hematoma is present with some soft tissue  gas likely also reflecting laceration. Nondedicated evaluation of the orbit demonstrates no evidence of fracture and the calvarium is also intact. Age-related changes  are noted, with some generalized volume loss. There is no evidence of intracranial hemorrhage, mass or mass effect. The ventricles are normal in size and configuration, accounting for surrounding volume loss. The paranasal sinuses are grossly clear.     Impression: Impression: Prominent right frontal and periorbital scalp laceration and hematoma. No acute intracranial abnormality. Electronically Signed: Kris Basurto MD  1/25/2024 2:32 PM EST  Workstation ID: [id number]     Results for orders placed during the hospital encounter of 06/05/23    Adult Transthoracic Echo Complete w/ Color, Spectral and Contrast if Necessary Per Protocol    Interpretation Summary    Left ventricular systolic function is normal. Left ventricular ejection fraction appears to be 61 - 65%.    Left ventricular wall thickness is consistent with borderline concentric hypertrophy.    Left ventricular diastolic function is consistent with (grade II w/high LAP) pseudonormalization.    Left atrial volume is mildly increased.    There is moderate calcification of the aortic valve.    Moderate mitral annular calcification is present.    Mild mitral valve stenosis is present.    Mild mitral valve regurgitation is present.    Mild pulmonic valve regurgitation is present.    Moderate tricuspid valve regurgitation is present.    Estimated right ventricular systolic pressure from tricuspid regurgitation is mildly elevated (35-45 mmHg).      Current medications:  Scheduled Meds:clopidogrel, 75 mg, Oral, Daily  insulin lispro, 2-7 Units, Subcutaneous, 4x Daily AC & at Bedtime  levothyroxine, 50 mcg, Oral, Q AM  pantoprazole, 40 mg, Oral, QAM  senna-docusate sodium, 2 tablet, Oral, BID  sodium chloride, 10 mL, Intravenous, Q12H  venlafaxine XR, 150 mg, Oral, Daily With  Breakfast      Continuous Infusions:sodium chloride, 75 mL/hr      PRN Meds:.  acetaminophen    senna-docusate sodium **AND** polyethylene glycol **AND** bisacodyl **AND** bisacodyl    dextrose    dextrose    glucagon (human recombinant)    melatonin    ondansetron    [COMPLETED] Insert Peripheral IV **AND** sodium chloride    sodium chloride    sodium chloride    Assessment & Plan   Assessment & Plan     Active Hospital Problems    Diagnosis  POA    **Traumatic orbital hematoma [S05.10XA]  Yes    Rhabdomyolysis [M62.82]  Yes    Fall [W19.XXXA]  Yes    Syncope [R55]  Yes    DM2 (diabetes mellitus, type 2) [E11.9]  Yes    Mixed hyperlipidemia [E78.2]  Yes    Hypothyroid [E03.9]  Yes    GERD (gastroesophageal reflux disease) [K21.9]  Yes    Primary hypertension [I10]  Yes    CAD (coronary artery disease) [I25.10]  Yes      Resolved Hospital Problems   No resolved problems to display.        Brief Hospital Course to date:  88 y.o. female with a past medical history significant for HTN, HLD, CAD s/p stent placement, s/p PPM, DM2, GERD, and hypothyroidism. Here with orbital hematoma and rhabdo after an unwitnessed fall.    Orbital hematoma  Shoulder pain  Rhabdo  -IVF were discontinued on 1/26 but she is a bit orthostatic now. Will resume fluids.   -WOC is following for wound care of orbital hematoma.   -PT/OT following    Orthostatic Hypotension  -patient orthostatic this morning.   -resume IV fluids for now. She is just now starting to eat and drink well.     Fall  -unwitnessed  -denies palpitations/vertigo  -unclear if syncope  -consider outpatient cardiac monitoring    CAD  HTN  HL  -s/p HANK 6/23, on DAPT, held, will go ahead and resume plavix today  -BB    DM  -SSI    Hypothyroidism  -synthroid    Anx/Depression  -Effexor    GERD  -PPI    Expected Discharge Location and Transportation: Rehab  Expected Discharge   Expected Discharge Date: 1/31/2024; Expected Discharge Time:      DVT prophylaxis:  Mechanical DVT  prophylaxis orders are present.         AM-PAC 6 Clicks Score (PT): 13 (01/26/24 8378)    CODE STATUS:   Code Status and Medical Interventions:   Ordered at: 01/25/24 1951     Level Of Support Discussed With:    Patient     Code Status (Patient has no pulse and is not breathing):    CPR (Attempt to Resuscitate)     Medical Interventions (Patient has pulse or is breathing):    Full Support       Melissa Alarcon, MARIELA  01/27/24

## 2024-01-28 LAB
GLUCOSE BLDC GLUCOMTR-MCNC: 114 MG/DL (ref 70–130)
GLUCOSE BLDC GLUCOMTR-MCNC: 115 MG/DL (ref 70–130)
GLUCOSE BLDC GLUCOMTR-MCNC: 132 MG/DL (ref 70–130)
GLUCOSE BLDC GLUCOMTR-MCNC: 153 MG/DL (ref 70–130)

## 2024-01-28 PROCEDURE — 99232 SBSQ HOSP IP/OBS MODERATE 35: CPT | Performed by: HOSPITALIST

## 2024-01-28 PROCEDURE — 63710000001 INSULIN LISPRO (HUMAN) PER 5 UNITS: Performed by: PHYSICIAN ASSISTANT

## 2024-01-28 PROCEDURE — 25810000003 SODIUM CHLORIDE 0.9 % SOLUTION: Performed by: NURSE PRACTITIONER

## 2024-01-28 PROCEDURE — 82948 REAGENT STRIP/BLOOD GLUCOSE: CPT

## 2024-01-28 RX ORDER — HYDRALAZINE HYDROCHLORIDE 25 MG/1
25 TABLET, FILM COATED ORAL ONCE
Status: COMPLETED | OUTPATIENT
Start: 2024-01-28 | End: 2024-01-28

## 2024-01-28 RX ORDER — COSYNTROPIN 0.25 MG/ML
0.25 INJECTION, POWDER, FOR SOLUTION INTRAMUSCULAR; INTRAVENOUS ONCE
Status: COMPLETED | OUTPATIENT
Start: 2024-01-29 | End: 2024-01-29

## 2024-01-28 RX ORDER — COSYNTROPIN 0.25 MG/ML
0.25 INJECTION, POWDER, FOR SOLUTION INTRAMUSCULAR; INTRAVENOUS ONCE
Status: DISCONTINUED | OUTPATIENT
Start: 2024-01-28 | End: 2024-01-28

## 2024-01-28 RX ADMIN — SENNOSIDES AND DOCUSATE SODIUM 2 TABLET: 8.6; 5 TABLET ORAL at 08:05

## 2024-01-28 RX ADMIN — SODIUM CHLORIDE 75 ML/HR: 9 INJECTION, SOLUTION INTRAVENOUS at 04:32

## 2024-01-28 RX ADMIN — HYDRALAZINE HYDROCHLORIDE 25 MG: 25 TABLET ORAL at 08:05

## 2024-01-28 RX ADMIN — LEVOTHYROXINE SODIUM 50 MCG: 0.05 TABLET ORAL at 05:30

## 2024-01-28 RX ADMIN — Medication 10 ML: at 21:46

## 2024-01-28 RX ADMIN — PANTOPRAZOLE SODIUM 40 MG: 40 TABLET, DELAYED RELEASE ORAL at 08:05

## 2024-01-28 RX ADMIN — INSULIN LISPRO 2 UNITS: 100 INJECTION, SOLUTION INTRAVENOUS; SUBCUTANEOUS at 11:56

## 2024-01-28 RX ADMIN — VENLAFAXINE HYDROCHLORIDE 150 MG: 75 CAPSULE, EXTENDED RELEASE ORAL at 08:05

## 2024-01-28 RX ADMIN — CLOPIDOGREL BISULFATE 75 MG: 75 TABLET ORAL at 08:05

## 2024-01-28 RX ADMIN — Medication 10 ML: at 08:05

## 2024-01-28 RX ADMIN — SODIUM CHLORIDE 75 ML/HR: 9 INJECTION, SOLUTION INTRAVENOUS at 23:10

## 2024-01-28 NOTE — PROGRESS NOTES
[facility] Medicine Services  PROGRESS NOTE    Patient Name: Janna Willis  : 1935  MRN: [id number]    Date of Admission: 2024  Primary Care Physician: Hever Ibrahim APRN    Subjective   Subjective     CC: S/P Fall    HPI: Sore. Dizziness remains. No f/c. No n/v. Tolerating PO. No dyspnea.       Objective   Objective     Vital Signs:   Temp:  [98.2 °F (36.8 °C)-98.3 °F (36.8 °C)] 98.3 °F (36.8 °C)  Heart Rate:  [69-82] 74  Resp:  [16-18] 18  BP: (109-175)/(54-79) 175/79     Physical Exam:  NAD, alert and oriented x 3  OP clear, dry MM  Neck supple  No LAD  RRR  CTAB  +BS, soft  R periorbital swelling/hematoma  R shoulder ROM intact    Results Reviewed:  LAB RESULTS:      Lab 24  0546 24  0519 24  1407   WBC 5.76 5.01 6.67   HEMOGLOBIN 11.9* 12.3 14.0   HEMATOCRIT 36.4 37.0 42.7   PLATELETS 125* 134* 149   NEUTROS ABS  --  3.42 5.14   IMMATURE GRANS (ABS)  --  0.01 0.01   LYMPHS ABS  --  1.12 0.92   MONOS ABS  --  0.36 0.54   EOS ABS  --  0.08 0.03   MCV 89.9 87.1 87.3         Lab 24  0546 24  0519 24  1407   SODIUM 136 136 134*   POTASSIUM 4.2 3.8 4.1   CHLORIDE 100 101 97*   CO2 24.0 26.0 24.0   ANION GAP 12.0 9.0 13.0   BUN 16 13 16   CREATININE 0.70 0.58 0.62   EGFR 83.8 87.7 86.3   GLUCOSE 103* 91 121*   CALCIUM 8.5* 8.7 9.1   HEMOGLOBIN A1C  --  5.60 5.60         Lab 24  0519 24  1407   TOTAL PROTEIN 6.4 7.1   ALBUMIN 3.7 4.1   GLOBULIN 2.7 3.0   ALT (SGPT) 10 12   AST (SGOT) 21 26   BILIRUBIN 0.7 0.7   ALK PHOS 64 74         Lab 24  1407   HSTROP T 10                 Brief Urine Lab Results  (Last result in the past 365 days)        Color   Clarity   Blood   Leuk Est   Nitrite   Protein   CREAT   Urine HCG        23 1710             42.7                 Microbiology Results Abnormal       None            No radiology results from the last 24 hrs    Results for orders placed during the hospital  encounter of 06/05/23    Adult Transthoracic Echo Complete w/ Color, Spectral and Contrast if Necessary Per Protocol    Interpretation Summary    Left ventricular systolic function is normal. Left ventricular ejection fraction appears to be 61 - 65%.    Left ventricular wall thickness is consistent with borderline concentric hypertrophy.    Left ventricular diastolic function is consistent with (grade II w/high LAP) pseudonormalization.    Left atrial volume is mildly increased.    There is moderate calcification of the aortic valve.    Moderate mitral annular calcification is present.    Mild mitral valve stenosis is present.    Mild mitral valve regurgitation is present.    Mild pulmonic valve regurgitation is present.    Moderate tricuspid valve regurgitation is present.    Estimated right ventricular systolic pressure from tricuspid regurgitation is mildly elevated (35-45 mmHg).      Current medications:  Scheduled Meds:clopidogrel, 75 mg, Oral, Daily  cosyntropin, 0.25 mg, Intravenous, Once  insulin lispro, 2-7 Units, Subcutaneous, 4x Daily AC & at Bedtime  levothyroxine, 50 mcg, Oral, Q AM  pantoprazole, 40 mg, Oral, QAM  senna-docusate sodium, 2 tablet, Oral, BID  sodium chloride, 10 mL, Intravenous, Q12H  venlafaxine XR, 150 mg, Oral, Daily With Breakfast      Continuous Infusions:sodium chloride, 75 mL/hr, Last Rate: 75 mL/hr (01/28/24 0432)      PRN Meds:.  acetaminophen    senna-docusate sodium **AND** polyethylene glycol **AND** bisacodyl **AND** bisacodyl    dextrose    dextrose    glucagon (human recombinant)    melatonin    ondansetron    [COMPLETED] Insert Peripheral IV **AND** sodium chloride    sodium chloride    sodium chloride    Assessment & Plan   Assessment & Plan     Active Hospital Problems    Diagnosis  POA    **Traumatic orbital hematoma [S05.10XA]  Yes    Rhabdomyolysis [M62.82]  Yes    Fall [W19.XXXA]  Yes    Syncope [R55]  Yes    DM2 (diabetes mellitus, type 2) [E11.9]  Yes    Mixed  hyperlipidemia [E78.2]  Yes    Hypothyroid [E03.9]  Yes    GERD (gastroesophageal reflux disease) [K21.9]  Yes    Primary hypertension [I10]  Yes    CAD (coronary artery disease) [I25.10]  Yes      Resolved Hospital Problems   No resolved problems to display.        Brief Hospital Course to date:  88 y.o. female with a past medical history significant for HTN, HLD, CAD s/p stent placement, s/p PPM, DM2, GERD, and hypothyroidism. Here with orbital hematoma and rhabdo after an unwitnessed fall.    Orbital hematoma  Shoulder pain  Rhabdomyolysis  -IVF, pain meds, PT/OT  -no fracture/dislocation shoulder    Fall  -unwitnessed  -denies palpitations/vertigo  -unclear if syncope  -mild orthostasis noted  -AM cortisol low, cosyntropin stim 1/29    CAD  HTN  HL  -s/p HANK 6/23, on DAPT, held, resume and observe soon  -BB    DM  -SSI, trending well    Hypothyroidism  -synthroid    Anx/Depression  -Effexor    GERD  -PPI    BP trending up, monitor    Expected Discharge Location and Transportation: Rehab  Expected Discharge   Expected Discharge Date: 1/31/2024; Expected Discharge Time:      DVT prophylaxis:  Mechanical DVT prophylaxis orders are present.         AM-PAC 6 Clicks Score (PT): 14 (01/27/24 0800)    CODE STATUS:   Code Status and Medical Interventions:   Ordered at: 01/25/24 1951     Level Of Support Discussed With:    Patient     Code Status (Patient has no pulse and is not breathing):    CPR (Attempt to Resuscitate)     Medical Interventions (Patient has pulse or is breathing):    Full Support       Chace Ware MD  01/28/24

## 2024-01-29 ENCOUNTER — APPOINTMENT (OUTPATIENT)
Dept: GENERAL RADIOLOGY | Facility: HOSPITAL | Age: 89
DRG: 558 | End: 2024-01-29
Payer: MEDICARE

## 2024-01-29 LAB
ANION GAP SERPL CALCULATED.3IONS-SCNC: 14 MMOL/L (ref 5–15)
BACTERIA UR QL AUTO: ABNORMAL /HPF
BILIRUB UR QL STRIP: NEGATIVE
BUN SERPL-MCNC: 8 MG/DL (ref 8–23)
BUN/CREAT SERPL: 16.7 (ref 7–25)
CALCIUM SPEC-SCNC: 8.6 MG/DL (ref 8.6–10.5)
CHLORIDE SERPL-SCNC: 97 MMOL/L (ref 98–107)
CLARITY UR: ABNORMAL
CO2 SERPL-SCNC: 23 MMOL/L (ref 22–29)
COLOR UR: YELLOW
CORTIS SERPL-MCNC: 11.64 MCG/DL
CORTIS SERPL-MCNC: 25.76 MCG/DL
CORTIS SERPL-MCNC: 29.24 MCG/DL
CREAT SERPL-MCNC: 0.48 MG/DL (ref 0.57–1)
D-LACTATE SERPL-SCNC: 0.9 MMOL/L (ref 0.5–2)
D-LACTATE SERPL-SCNC: 2.1 MMOL/L (ref 0.5–2)
DEPRECATED RDW RBC AUTO: 42 FL (ref 37–54)
EGFRCR SERPLBLD CKD-EPI 2021: 91.8 ML/MIN/1.73
ERYTHROCYTE [DISTWIDTH] IN BLOOD BY AUTOMATED COUNT: 13.1 % (ref 12.3–15.4)
GLUCOSE BLDC GLUCOMTR-MCNC: 124 MG/DL (ref 70–130)
GLUCOSE BLDC GLUCOMTR-MCNC: 142 MG/DL (ref 70–130)
GLUCOSE BLDC GLUCOMTR-MCNC: 160 MG/DL (ref 70–130)
GLUCOSE BLDC GLUCOMTR-MCNC: 172 MG/DL (ref 70–130)
GLUCOSE SERPL-MCNC: 152 MG/DL (ref 65–99)
GLUCOSE UR STRIP-MCNC: NEGATIVE MG/DL
HCT VFR BLD AUTO: 39.7 % (ref 34–46.6)
HGB BLD-MCNC: 13.3 G/DL (ref 12–15.9)
HGB UR QL STRIP.AUTO: ABNORMAL
HYALINE CASTS UR QL AUTO: ABNORMAL /LPF
KETONES UR QL STRIP: NEGATIVE
LEUKOCYTE ESTERASE UR QL STRIP.AUTO: ABNORMAL
MCH RBC QN AUTO: 29.6 PG (ref 26.6–33)
MCHC RBC AUTO-ENTMCNC: 33.5 G/DL (ref 31.5–35.7)
MCV RBC AUTO: 88.2 FL (ref 79–97)
NITRITE UR QL STRIP: NEGATIVE
PH UR STRIP.AUTO: 5.5 [PH] (ref 5–8)
PLATELET # BLD AUTO: 156 10*3/MM3 (ref 140–450)
PMV BLD AUTO: 10.3 FL (ref 6–12)
POTASSIUM SERPL-SCNC: 3.7 MMOL/L (ref 3.5–5.2)
PROT UR QL STRIP: NEGATIVE
RBC # BLD AUTO: 4.5 10*6/MM3 (ref 3.77–5.28)
RBC # UR STRIP: ABNORMAL /HPF
REF LAB TEST METHOD: ABNORMAL
SODIUM SERPL-SCNC: 134 MMOL/L (ref 136–145)
SP GR UR STRIP: 1.01 (ref 1–1.03)
SQUAMOUS #/AREA URNS HPF: ABNORMAL /HPF
UROBILINOGEN UR QL STRIP: ABNORMAL
WBC # UR STRIP: ABNORMAL /HPF
WBC NRBC COR # BLD AUTO: 8.63 10*3/MM3 (ref 3.4–10.8)

## 2024-01-29 PROCEDURE — 99221 1ST HOSP IP/OBS SF/LOW 40: CPT | Performed by: STUDENT IN AN ORGANIZED HEALTH CARE EDUCATION/TRAINING PROGRAM

## 2024-01-29 PROCEDURE — 82948 REAGENT STRIP/BLOOD GLUCOSE: CPT

## 2024-01-29 PROCEDURE — 87186 SC STD MICRODIL/AGAR DIL: CPT | Performed by: HOSPITALIST

## 2024-01-29 PROCEDURE — 25010000002 HYDRALAZINE PER 20 MG: Performed by: NURSE PRACTITIONER

## 2024-01-29 PROCEDURE — 74018 RADEX ABDOMEN 1 VIEW: CPT

## 2024-01-29 PROCEDURE — 80048 BASIC METABOLIC PNL TOTAL CA: CPT | Performed by: HOSPITALIST

## 2024-01-29 PROCEDURE — 82533 TOTAL CORTISOL: CPT | Performed by: HOSPITALIST

## 2024-01-29 PROCEDURE — 25010000002 COSYNTROPIN PER 0.25 MG: Performed by: HOSPITALIST

## 2024-01-29 PROCEDURE — 99232 SBSQ HOSP IP/OBS MODERATE 35: CPT | Performed by: HOSPITALIST

## 2024-01-29 PROCEDURE — 87086 URINE CULTURE/COLONY COUNT: CPT | Performed by: HOSPITALIST

## 2024-01-29 PROCEDURE — 87077 CULTURE AEROBIC IDENTIFY: CPT | Performed by: HOSPITALIST

## 2024-01-29 PROCEDURE — 52000 CYSTOURETHROSCOPY: CPT | Performed by: STUDENT IN AN ORGANIZED HEALTH CARE EDUCATION/TRAINING PROGRAM

## 2024-01-29 PROCEDURE — 63710000001 INSULIN LISPRO (HUMAN) PER 5 UNITS: Performed by: PHYSICIAN ASSISTANT

## 2024-01-29 PROCEDURE — P9612 CATHETERIZE FOR URINE SPEC: HCPCS

## 2024-01-29 PROCEDURE — 81001 URINALYSIS AUTO W/SCOPE: CPT | Performed by: HOSPITALIST

## 2024-01-29 PROCEDURE — 25010000002 CEFTRIAXONE PER 250 MG: Performed by: HOSPITALIST

## 2024-01-29 PROCEDURE — 85027 COMPLETE CBC AUTOMATED: CPT | Performed by: HOSPITALIST

## 2024-01-29 PROCEDURE — 97530 THERAPEUTIC ACTIVITIES: CPT

## 2024-01-29 PROCEDURE — 83605 ASSAY OF LACTIC ACID: CPT | Performed by: NURSE PRACTITIONER

## 2024-01-29 RX ORDER — TAMSULOSIN HYDROCHLORIDE 0.4 MG/1
0.4 CAPSULE ORAL DAILY
Status: DISCONTINUED | OUTPATIENT
Start: 2024-01-29 | End: 2024-02-05 | Stop reason: HOSPADM

## 2024-01-29 RX ORDER — ASPIRIN 81 MG/1
81 TABLET ORAL DAILY
Status: DISCONTINUED | OUTPATIENT
Start: 2024-01-29 | End: 2024-02-05 | Stop reason: HOSPADM

## 2024-01-29 RX ORDER — HYDRALAZINE HYDROCHLORIDE 20 MG/ML
10 INJECTION INTRAMUSCULAR; INTRAVENOUS ONCE
Status: COMPLETED | OUTPATIENT
Start: 2024-01-29 | End: 2024-01-29

## 2024-01-29 RX ADMIN — TAMSULOSIN HYDROCHLORIDE 0.4 MG: 0.4 CAPSULE ORAL at 09:03

## 2024-01-29 RX ADMIN — ASPIRIN 81 MG: 81 TABLET, COATED ORAL at 09:03

## 2024-01-29 RX ADMIN — COSYNTROPIN 0.25 MG: 0.25 INJECTION, POWDER, LYOPHILIZED, FOR SOLUTION INTRAMUSCULAR; INTRAVENOUS at 05:53

## 2024-01-29 RX ADMIN — SODIUM CHLORIDE 1000 MG: 900 INJECTION INTRAVENOUS at 11:39

## 2024-01-29 RX ADMIN — Medication 10 ML: at 09:03

## 2024-01-29 RX ADMIN — ACETAMINOPHEN 650 MG: 325 TABLET ORAL at 04:13

## 2024-01-29 RX ADMIN — Medication 10 ML: at 20:42

## 2024-01-29 RX ADMIN — INSULIN LISPRO 2 UNITS: 100 INJECTION, SOLUTION INTRAVENOUS; SUBCUTANEOUS at 18:13

## 2024-01-29 RX ADMIN — HYDRALAZINE HYDROCHLORIDE 10 MG: 20 INJECTION INTRAMUSCULAR; INTRAVENOUS at 04:07

## 2024-01-29 RX ADMIN — LEVOTHYROXINE SODIUM 50 MCG: 0.05 TABLET ORAL at 05:53

## 2024-01-29 RX ADMIN — INSULIN LISPRO 2 UNITS: 100 INJECTION, SOLUTION INTRAVENOUS; SUBCUTANEOUS at 11:39

## 2024-01-29 RX ADMIN — CLOPIDOGREL BISULFATE 75 MG: 75 TABLET ORAL at 09:03

## 2024-01-29 RX ADMIN — VENLAFAXINE HYDROCHLORIDE 150 MG: 75 CAPSULE, EXTENDED RELEASE ORAL at 09:03

## 2024-01-29 RX ADMIN — PANTOPRAZOLE SODIUM 40 MG: 40 TABLET, DELAYED RELEASE ORAL at 05:53

## 2024-01-29 NOTE — PROGRESS NOTES
[facility] Medicine Services  PROGRESS NOTE    Patient Name: Janna Willis  : 1935  MRN: [id number]    Date of Admission: 2024  Primary Care Physician: Hever Ibrahim APRN    Subjective   Subjective     CC: S/P Fall    HPI: Sore. Vision better. Still weak/dizzy. Some urinary retention this AM.       Objective   Objective     Vital Signs:   Temp:  [98.3 °F (36.8 °C)-98.8 °F (37.1 °C)] 98.6 °F (37 °C)  Heart Rate:  [82-99] 99  Resp:  [16-18] 16  BP: (137-182)/(60-94) 145/60     Physical Exam:  NAD, alert and oriented x 3  OP clear, dry MM  Neck supple  No LAD  RRR  CTAB  +BS, soft  R periorbital swelling/hematoma/bruising improving  R shoulder ROM intact    Results Reviewed:  LAB RESULTS:      Lab 24  0644 24  0552 24  0546 24  0519 24  1407   WBC  --  8.63 5.76 5.01 6.67   HEMOGLOBIN  --  13.3 11.9* 12.3 14.0   HEMATOCRIT  --  39.7 36.4 37.0 42.7   PLATELETS  --  156 125* 134* 149   NEUTROS ABS  --   --   --  3.42 5.14   IMMATURE GRANS (ABS)  --   --   --  0.01 0.01   LYMPHS ABS  --   --   --  1.12 0.92   MONOS ABS  --   --   --  0.36 0.54   EOS ABS  --   --   --  0.08 0.03   MCV  --  88.2 89.9 87.1 87.3   LACTATE 2.1*  --   --   --   --          Lab 24  0552 24  0546 24  0519 24  1407   SODIUM 134* 136 136 134*   POTASSIUM 3.7 4.2 3.8 4.1   CHLORIDE 97* 100 101 97*   CO2 23.0 24.0 26.0 24.0   ANION GAP 14.0 12.0 9.0 13.0   BUN 8 16 13 16   CREATININE 0.48* 0.70 0.58 0.62   EGFR 91.8 83.8 87.7 86.3   GLUCOSE 152* 103* 91 121*   CALCIUM 8.6 8.5* 8.7 9.1   HEMOGLOBIN A1C  --   --  5.60 5.60         Lab 24  0519 24  1407   TOTAL PROTEIN 6.4 7.1   ALBUMIN 3.7 4.1   GLOBULIN 2.7 3.0   ALT (SGPT) 10 12   AST (SGOT) 21 26   BILIRUBIN 0.7 0.7   ALK PHOS 64 74         Lab 24  1407   HSTROP T 10                 Brief Urine Lab Results  (Last result in the past 365 days)        Color   Clarity   Blood    Leuk Est   Nitrite   Protein   CREAT   Urine HCG        06/13/23 1710             42.7                 Microbiology Results Abnormal       None            XR Abdomen KUB    Result Date: 1/29/2024  XR ABDOMEN KUB Date of Exam: 1/29/2024 6:28 AM EST Indication: abdominal pain; diarrhea Comparison: 6/12/2023. Findings: No gross free air or pneumatosis though evaluation is slightly limited due to technique. There is a non obstructive bowel gas pattern. There is some gaseous distention of the colon. A mild to moderate stool burden is present. Large rounded opacity within  the lower abdomen and pelvis likely representing distended bladder. No suspicious calcifications identified. No acute osseous abnormality identified.     Impression: Impression: Nonobstructive bowel gas pattern. Mild to moderate stool burden present. Large rounded opacity within the lower abdomen and pelvis likely representing significantly distended bladder. Recommend clinical correlation. Electronically Signed: Deepa Polk MD  1/29/2024 6:50 AM EST  Workstation ID: [id number]     Results for orders placed during the hospital encounter of 06/05/23    Adult Transthoracic Echo Complete w/ Color, Spectral and Contrast if Necessary Per Protocol    Interpretation Summary    Left ventricular systolic function is normal. Left ventricular ejection fraction appears to be 61 - 65%.    Left ventricular wall thickness is consistent with borderline concentric hypertrophy.    Left ventricular diastolic function is consistent with (grade II w/high LAP) pseudonormalization.    Left atrial volume is mildly increased.    There is moderate calcification of the aortic valve.    Moderate mitral annular calcification is present.    Mild mitral valve stenosis is present.    Mild mitral valve regurgitation is present.    Mild pulmonic valve regurgitation is present.    Moderate tricuspid valve regurgitation is present.    Estimated right ventricular systolic pressure from  tricuspid regurgitation is mildly elevated (35-45 mmHg).      Current medications:  Scheduled Meds:clopidogrel, 75 mg, Oral, Daily  insulin lispro, 2-7 Units, Subcutaneous, 4x Daily AC & at Bedtime  levothyroxine, 50 mcg, Oral, Q AM  pantoprazole, 40 mg, Oral, QAM  senna-docusate sodium, 2 tablet, Oral, BID  sodium chloride, 10 mL, Intravenous, Q12H  tamsulosin, 0.4 mg, Oral, Daily  venlafaxine XR, 150 mg, Oral, Daily With Breakfast      Continuous Infusions:     PRN Meds:.  acetaminophen    senna-docusate sodium **AND** polyethylene glycol **AND** bisacodyl **AND** bisacodyl    dextrose    dextrose    glucagon (human recombinant)    melatonin    ondansetron    [COMPLETED] Insert Peripheral IV **AND** sodium chloride    sodium chloride    sodium chloride    Assessment & Plan   Assessment & Plan     Active Hospital Problems    Diagnosis  POA    **Traumatic orbital hematoma [S05.10XA]  Yes    Rhabdomyolysis [M62.82]  Yes    Fall [W19.XXXA]  Yes    Syncope [R55]  Yes    DM2 (diabetes mellitus, type 2) [E11.9]  Yes    Mixed hyperlipidemia [E78.2]  Yes    Hypothyroid [E03.9]  Yes    GERD (gastroesophageal reflux disease) [K21.9]  Yes    Primary hypertension [I10]  Yes    CAD (coronary artery disease) [I25.10]  Yes      Resolved Hospital Problems   No resolved problems to display.        Brief Hospital Course to date:  88 y.o. female with a past medical history significant for HTN, HLD, CAD s/p stent placement, s/p PPM, DM2, GERD, and hypothyroidism. Here with orbital hematoma and rhabdo after an unwitnessed fall.    Orbital hematoma  Shoulder pain  Rhabdomyolysis  -IVF, pain meds, PT/OT  -no fracture/dislocation shoulder    Fall  -unwitnessed  -denies palpitations/vertigo  -unclear if syncope  -mild orthostasis noted  -cosyntropin testing sub-optimal, but good response  -PT/OT  -may warrant rehab    CAD  HTN  HL  -s/p HANK 6/23, on DAPT resumed  -BB    DM  -SSI, trending well    UTI  Urinary retention  -UA markedly  abnormal  -ceftriaxone    Hypothyroidism  -synthroid    Anx/Depression  -Effexor    GERD  -PPI      Expected Discharge Location and Transportation: Rehab  Expected Discharge   Expected Discharge Date: 1/31/2024; Expected Discharge Time:      DVT prophylaxis:  Mechanical DVT prophylaxis orders are present.         AM-PAC 6 Clicks Score (PT): 13 (01/28/24 2000)    CODE STATUS:   Code Status and Medical Interventions:   Ordered at: 01/25/24 1951     Level Of Support Discussed With:    Patient     Code Status (Patient has no pulse and is not breathing):    CPR (Attempt to Resuscitate)     Medical Interventions (Patient has pulse or is breathing):    Full Support       Chace Ware MD  01/29/24

## 2024-01-29 NOTE — PAYOR COMM NOTE
"Bre Rosenhtal (88 y.o. Female)       Date of Birth   1935    Social Security Number       Address   [address] [address]    Home Phone   [phone]    MRN   [id number]       Gnosticist   Bahai    Marital Status   Single                            Admission Date   24    Admission Type   Emergency    Admitting Provider   Chace Ware MD    Attending Provider   Chace Ware MD    Department, Room/Bed   [facility] 3E, S345/1       Discharge Date       Discharge Disposition       Discharge Destination                                 Attending Provider: Chace Ware MD    Allergies: Avelox [Moxifloxacin], 2,4-d Dimethylamine, Azithromycin, Lisinopril, Metoclopramide, Statins    Isolation: None   Infection: None   Code Status: CPR    Ht: 167.6 cm (66\")   Wt: 75.6 kg (166 lb 11.2 oz)    Admission Cmt: None   Principal Problem: Traumatic orbital hematoma [S05.10XA]                   Active Insurance as of 2024       Primary Coverage       Payor Plan Insurance Group Employer/Plan Group    ANTH MEDICARE REPLACEMENT ANTH MEDICARE ADVANTAGE [id number]       Payor Plan Address Payor Plan Phone Number Payor Plan Fax Number Effective Dates    [address] [phone]  2024 - None Entered    [facility] [id number]         Subscriber Name Subscriber Birth Date Member ID       BRE ROSENTHAL 1935 [id number]                     Emergency Contacts        (Rel.) Home Phone Work Phone Mobile Phone    Carlee Kohli (Sister) -- -- [phone]    BRIDGER BO (Relative) [phone] -- [phone]             [facility] 3E  [address] [facility] [address]  Phone:  [phone]  Fax:  [phone]        Patient:     Bre Rosenthal MRN:  [id number]   [address] [address] :  1935  SSN:    Phone: [phone] Sex:  F      INSURANCE PAYOR PLAN GROUP # SUBSCRIBER ID "   Primary:    STEVEN MEDICARE REPLACEMENT [id number]   Admitting Diagnosis: Traumatic orbital hematoma [S05.10XA]  Order Date:  2024        Inpatient Admission       (Order ID: [id number])     Diagnosis:         Priority:  Routine Expected Date:   Expiration Date:        Interval:   Count:    Level of Care: Telemetry [5]  Diagnosis: Syncope [290933]  Admitting Physician: MONISHA SHI [[id number]]  Attending Physician: JENNFIER VALLES [[id number]]  Certification: I Certify That Inpatient Hospital Services Are Medically Necessary For Greater Than 2 Midnights     Specimen Type:   Specimen Source:   Specimen Taken Date:   Specimen Taken Time:                  Verbal Order Mode: Verbal with readback   Authorizing Provider: Jennifer Valles MD  Authorizing Provider's NPI: [id number]     Order Entered By: Aren Bettencourt RN 2024 10:08 AM     Electronically signed by: Jennifer Valles MD 2024 11:11 AM          History & Physical        Jean-Paul Lara PA-C at 24       Attestation signed by Monisha Shi DO at 24    I have reviewed this documentation and agree.                      [facility] Medicine Services  HISTORY AND PHYSICAL    Patient Name: Janna Willis  : 1935  MRN: [id number]  Primary Care Physician: Hever Ibrahim, MARIELA  Date of admission: 2024    Subjective  Subjective     Chief Complaint:  fall    HPI:  Janna Willis is an 88 y.o. female with a past medical history significant for HTN, HLD, CAD s/p stent placement, s/p PPM, DM2, GERD, and hypothyroidism. She presents today after a mechanical fall vs syncopal episode at home where she lives alone. Patient states she cannot fully remember the events of the episode but thinks she may have got her cane caught in something in the hallway causing her to trip and fall. Patient does remember hitting her right forehead but is unsure if she lost  consciousness. She is not anticoagulated but is on DAPT with ASA and Plavix. States she suspects she was on the floor from 1-2 hours. Was unable to get up on her own and had significant pain and swelling at right forehead and eye. She does have a life alert but apparently unable to reach it. Patient was eventually found by her sister who called EMS.   Patient adds that she has fallen multiple times over the past few weeks. States her knees are giving out and she may now be requiring assistance. Currently denies fever, cough, congestion, SOB, or chest pain. No abdominal pain or N/v/D. No headache or focal weakness/ paresthesias.         Review of Systems   Constitutional:  Negative for chills, fatigue and fever.   HENT:  Negative for congestion and trouble swallowing.    Eyes:  Negative for photophobia and visual disturbance.   Respiratory:  Negative for cough and shortness of breath.    Cardiovascular:  Negative for chest pain and leg swelling.   Gastrointestinal:  Negative for abdominal distention, diarrhea, nausea and vomiting.   Endocrine: Negative for cold intolerance and heat intolerance.   Genitourinary:  Negative for dysuria and flank pain.   Musculoskeletal:  Positive for arthralgias and gait problem.   Skin:  Positive for color change and wound.   Allergic/Immunologic: Negative for immunocompromised state.   Neurological:  Positive for weakness. Negative for dizziness and headaches.   Hematological:  Negative for adenopathy.   Psychiatric/Behavioral:  Negative for agitation and confusion.             Personal History     Past Medical History:   Diagnosis Date    Diabetes          Past Surgical History:   Procedure Laterality Date    CARDIAC CATHETERIZATION N/A 6/5/2023    Procedure: Left Heart Cath;  Surgeon: Kuldeep Miner MD;  Location: [facility] CATH INVASIVE LOCATION;  Service: Cardiology;  Laterality: N/A;    CARDIAC ELECTROPHYSIOLOGY PROCEDURE N/A 6/5/2023    Procedure: Temporary Pacemaker;  Surgeon:  Kuldeep Miner MD;  Location:  [facility] CATH INVASIVE LOCATION;  Service: Cardiology;  Laterality: N/A;    CARDIAC ELECTROPHYSIOLOGY PROCEDURE N/A 6/6/2023    Procedure: Pacemaker DC new;  Surgeon: Dudley Rodriguez DO;  Location:  [facility] EP INVASIVE LOCATION;  Service: Cardiology;  Laterality: N/A;    OVARY SURGERY         Family History: family history includes Heart attack in her mother; Stroke in her mother.     Social History:  reports that she has never smoked. She has never used smokeless tobacco. She reports that she does not drink alcohol and does not use drugs.  Social History     Social History Narrative    Not on file       Medications:  acetaminophen, aspirin, cholecalciferol, clopidogrel, empagliflozin, ezetimibe, levothyroxine, metoprolol succinate XL, nitroglycerin, pantoprazole, venlafaxine XR, and vitamin B-12    Allergies   Allergen Reactions    Avelox [Moxifloxacin] Rash    2,4-D Dimethylamine Hives    Azithromycin Hives    Lisinopril Hives    Metoclopramide Hives    Statins Hives       Objective  Objective     Vital Signs:   Temp:  [99.1 °F (37.3 °C)] 99.1 °F (37.3 °C)  Heart Rate:  [74-89] 89  Resp:  [20] 20  BP: (142-186)/(69-96) 145/75    Physical Exam   Constitutional: Awake, alert  Eyes: PERRLA, sclerae anicteric, no conjunctival injection  HENT: NCAT, mucous membranes moist  Neck: Supple, no thyromegaly, no lymphadenopathy, trachea midline  Respiratory: Clear to auscultation bilaterally, nonlabored respirations   Cardiovascular: RRR, no murmurs, rubs, or gallops, palpable pedal pulses bilaterally  Gastrointestinal: Positive bowel sounds, soft, nontender, nondistended  Musculoskeletal: No bilateral ankle edema, no clubbing or cyanosis to extremities  Psychiatric: Appropriate affect, cooperative  Neurologic: Oriented x 3, strength symmetric in all extremities, Cranial Nerves grossly intact to confrontation, speech clear  Skin: No rashes      Result Review:  I have personally reviewed the results  from the time of this admission to 1/25/2024 20:04 EST and agree with these findings:  [x]  Laboratory list / accordion  []  Microbiology  []  Radiology  []  EKG/Telemetry   []  Cardiology/Vascular   []  Pathology  [x]  Old records  []  Other:  Most notable findings include: vitals stable. Labs favorable. CT head negative    LAB RESULTS:      Lab 01/25/24  1407   WBC 6.67   HEMOGLOBIN 14.0   HEMATOCRIT 42.7   PLATELETS 149   NEUTROS ABS 5.14   IMMATURE GRANS (ABS) 0.01   LYMPHS ABS 0.92   MONOS ABS 0.54   EOS ABS 0.03   MCV 87.3   CK TOTAL 550*         Lab 01/25/24  1407   SODIUM 134*   POTASSIUM 4.1   CHLORIDE 97*   CO2 24.0   ANION GAP 13.0   BUN 16   CREATININE 0.62   EGFR 86.3   GLUCOSE 121*   CALCIUM 9.1         Lab 01/25/24  1407   TOTAL PROTEIN 7.1   ALBUMIN 4.1   GLOBULIN 3.0   ALT (SGPT) 12   AST (SGOT) 26   BILIRUBIN 0.7   ALK PHOS 74         Lab 01/25/24  1407   HSTROP T 10                 Brief Urine Lab Results  (Last result in the past 365 days)        Color   Clarity   Blood   Leuk Est   Nitrite   Protein   CREAT   Urine HCG        06/13/23 1710             42.7               Microbiology Results (last 10 days)       ** No results found for the last 240 hours. **            XR Shoulder 2+ View Right    Result Date: 1/25/2024  XR SHOULDER 2+ VW RIGHT Date of Exam: 1/25/2024 2:22 PM EST Indication: Fall, injury, contusion Comparison: None available. Findings: 3 views. The humeral head is mildly subluxed cephalad in relation to the glenoid. There is no dislocation. The AC joint appears maintained. There is no fracture.     Impression: Impression: Mild cephalad humeral head subluxation. This could be chronic. No fracture. Electronically Signed: Lizzette Amaya MD  1/25/2024 2:46 PM EST  Workstation ID: [id number]    CT Maxillofacial Without Contrast    Result Date: 1/25/2024  CT MAXILLOFACIAL WO CONT Date of Exam: 1/25/2024 2:20 PM EST Indication: Fall, head injury, on Plavix. Comparison: None available.  Technique: Axial CT images were obtained from the inferior aspect of the mandible through the frontal sinuses without contrast administration.  Reconstructed coronal and sagittal images were also obtained. Automated exposure control and iterative construction methods were used. Findings: No acute facial fracture is identified.bilateral lens prostheses are noted. Orbital structures are otherwise unremarkable. Paranasal sinuses and mastoid air cells appear well aerated without air-fluid levels identified. There is rightward bowing of the bony nasal septum. The pterygoid plates and zygomatic arches are intact. Right facial soft tissue swelling is present. Please refer to the accompanying head CT for description of intracranial findings.     Impression: 1.No acute facial fracture is identified. 2.Extensive right facial soft tissue swelling. Electronically Signed: Crow Calderon MD  1/25/2024 2:40 PM EST  Workstation ID: [id number]    CT Cervical Spine Without Contrast    Result Date: 1/25/2024  CT CERVICAL SPINE WO CONTRAST Date of Exam: 1/25/2024 2:20 PM EST Indication: Fall, head injury, on Plavix. Comparison: None available. Technique: Axial CT images were obtained of the cervical spine without contrast administration.  Reconstructed coronal and sagittal images were also obtained. Automated exposure control and iterative construction methods were used. Findings: There are normal vertebral body heights. There is mild anterolisthesis of C3 on C4 and C4 on C5. There is severe narrowing of the C5-6 and C6/7 disc interspaces with moderate spurring. There are no fractures. There is facet joint hypertrophy with moderate left neural foraminal stenosis at C3-4. There is mild left neural foraminal stenosis at C4-5. There is mild spinal stenosis with severe bilateral neural foraminal stenosis at C5-6, greatest on the left. There is a mild to moderate spinal stenosis with severe bilateral neural foraminal stenosis at C6/7.      Impression: Impression: Chronic findings as detailed. No acute fracture. Mildly abnormal alignment as noted, may be chronic. If clinically warranted, this could be further evaluated with flexion and extension views. Electronically Signed: Lizzette Amaya MD  1/25/2024 2:34 PM EST  Workstation ID: [id number]    CT Head Without Contrast    Result Date: 1/25/2024  CT HEAD WO CONTRAST Date of Exam: 1/25/2024 2:20 PM EST Indication: Fall, head injury, on Plavix. Comparison: None available. Technique: Axial CT images were obtained of the head without contrast administration.  Automated exposure control and iterative construction methods were used. Findings: The large right periorbital and frontal scalp hematoma is present with some soft tissue gas likely also reflecting laceration. Nondedicated evaluation of the orbit demonstrates no evidence of fracture and the calvarium is also intact. Age-related changes  are noted, with some generalized volume loss. There is no evidence of intracranial hemorrhage, mass or mass effect. The ventricles are normal in size and configuration, accounting for surrounding volume loss. The paranasal sinuses are grossly clear.     Impression: Impression: Prominent right frontal and periorbital scalp laceration and hematoma. No acute intracranial abnormality. Electronically Signed: Kris Basurto MD  1/25/2024 2:32 PM EST  Workstation ID: [id number]     Results for orders placed during the hospital encounter of 06/05/23    Adult Transthoracic Echo Complete w/ Color, Spectral and Contrast if Necessary Per Protocol    Interpretation Summary    Left ventricular systolic function is normal. Left ventricular ejection fraction appears to be 61 - 65%.    Left ventricular wall thickness is consistent with borderline concentric hypertrophy.    Left ventricular diastolic function is consistent with (grade II w/high LAP) pseudonormalization.    Left atrial volume is mildly increased.    There is moderate  calcification of the aortic valve.    Moderate mitral annular calcification is present.    Mild mitral valve stenosis is present.    Mild mitral valve regurgitation is present.    Mild pulmonic valve regurgitation is present.    Moderate tricuspid valve regurgitation is present.    Estimated right ventricular systolic pressure from tricuspid regurgitation is mildly elevated (35-45 mmHg).      Assessment & Plan  Assessment & Plan       Traumatic orbital hematoma    Rhabdomyolysis    Fall    Syncope    CAD (coronary artery disease)    Primary hypertension    DM2 (diabetes mellitus, type 2)    Mixed hyperlipidemia    GERD (gastroesophageal reflux disease)    Hypothyroid    88 y.o. female with a past medical history significant for HTN, HLD, CAD s/p stent placement, s/p PPM, DM2, GERD, and hypothyroidism. Here with orbital hematoma and rhabdo after mechanical fall.    Traumatic Orbital hematoma  Rhabdomyolysis   - 2/2 mechanical fall  - CT head demonstrates maxillofacial/orbits and cervical spine completed without acute abnormality   - does have large hematoma above right eye  - H/H stable  - holding ASA plavix overnight. Trend H/H  - PT/OT  - wound care  - case management  - gentle IV fluids overnight  - ma labs    CAD  HTN  HLD  - s/p HANK 6/2023 on DAPT with ASA, Plavix. Holding overnight. Resume as tolerated  - continue metoprolol, statin    DM2  - hold oral hypoglycemics  - SSI with scheduled accu checks  - check A1c    Hypothyroidism  - continue levothyroxine    Anxiety/depression  - Effexor    GERD  - PPI    DVT prophylaxis:  mechanical    CODE STATUS:  full code  Level Of Support Discussed With: Patient  Code Status (Patient has no pulse and is not breathing): CPR (Attempt to Resuscitate)  Medical Interventions (Patient has pulse or is breathing): Full Support      Expected Discharge  TBD      This note has been completed as part of a split-shared workflow.     Signature: Electronically signed by Jean-Paul CRUZ  CRYSTAL Lara, 01/25/24, 8:04 PM EST      Total time spent: 90 minutes  Time spent includes time reviewing chart, face-to-face time, counseling patient/family/caregiver, ordering medications/tests/procedures, communicating with other health care professionals, documenting clinical information in the electronic health record, and coordination of care.            Electronically signed by Ailyn Shi DO at 01/25/24 2114       Facility-Administered Medications as of 1/29/2024   Medication Dose Route Frequency Provider Last Rate Last Admin    [COMPLETED] acetaminophen (TYLENOL) tablet 1,000 mg  1,000 mg Oral Once Truong Guadarrama DO   1,000 mg at 01/25/24 1731    acetaminophen (TYLENOL) tablet 650 mg  650 mg Oral Q4H PRN Jean-Paul Lara PA-C   650 mg at 01/29/24 0413    aspirin EC tablet 81 mg  81 mg Oral Daily Chace Ware MD   81 mg at 01/29/24 0903    sennosides-docusate (PERICOLACE) 8.6-50 MG per tablet 2 tablet  2 tablet Oral BID Jean-Paul Lara PA-C   2 tablet at 01/28/24 0805    And    polyethylene glycol (MIRALAX) packet 17 g  17 g Oral Daily PRN Jean-Paul Lara PA-C        And    bisacodyl (DULCOLAX) EC tablet 5 mg  5 mg Oral Daily PRN Jean-Paul Lara PA-C        And    bisacodyl (DULCOLAX) suppository 10 mg  10 mg Rectal Daily PRN Jean-Paul Lara PA-C        clopidogrel (PLAVIX) tablet 75 mg  75 mg Oral Daily Melissa Alarcon APRN   75 mg at 01/29/24 0903    [COMPLETED] cosyntropin (CORTROSYN) injection 0.25 mg  0.25 mg Intravenous Once Chace Ware MD   0.25 mg at 01/29/24 0553    dextrose (D50W) (25 g/50 mL) IV injection 25 g  25 g Intravenous Q15 Min PRN Jean-Paul Lara PA-C        dextrose (GLUTOSE) oral gel 15 g  15 g Oral Q15 Min PRN Jean-Paul Lara PA-C        glucagon (GLUCAGEN) injection 1 mg  1 mg Intramuscular Q15 Min PRN Jean-Paul Lara PA-C        [COMPLETED] hydrALAZINE (APRESOLINE) injection 10 mg  10 mg Intravenous Once Ольга Villafana APRN   10 mg at  24 0407    [COMPLETED] hydrALAZINE (APRESOLINE) tablet 25 mg  25 mg Oral Once Chace Ware MD   25 mg at 24 0805    Insulin Lispro (humaLOG) injection 2-7 Units  2-7 Units Subcutaneous 4x Daily AC & at Bedtime Jean-Paul Lara PA-C   2 Units at 24 1156    [] lactated ringers infusion  75 mL/hr Intravenous Continuous Jean-Paul Lara PA-C   Stopped at 24 0854    levothyroxine (SYNTHROID, LEVOTHROID) tablet 50 mcg  50 mcg Oral Q AM Jean-Paul Lara PA-C   50 mcg at 24 0553    [COMPLETED] lidocaine (XYLOCAINE) 2% injection  - ADS Override Pull        5 mL at 24 1722    melatonin tablet 5 mg  5 mg Oral Nightly PRN Jean-Paul Lara PA-C   5 mg at 24 2110    ondansetron (ZOFRAN) injection 4 mg  4 mg Intravenous Q6H PRN Jean-Paul Lara PA-GLEN        pantoprazole (PROTONIX) EC tablet 40 mg  40 mg Oral QAM Jean-Paul Lara PA-C   40 mg at 24 0553    [COMPLETED] sodium chloride 0.9 % bolus 1,000 mL  1,000 mL Intravenous Once Truong Guadarrama DO   Stopped at 24 1733    sodium chloride 0.9 % flush 10 mL  10 mL Intravenous PRN Truong Guadarrama DO        sodium chloride 0.9 % flush 10 mL  10 mL Intravenous Q12H Jean-Paul Lara PA-C   10 mL at 24 0903    sodium chloride 0.9 % flush 10 mL  10 mL Intravenous PRN Jean-Paul Lara PA-C        sodium chloride 0.9 % infusion 40 mL  40 mL Intravenous PRN Jean-Paul Lara PA-C        tamsulosin (FLOMAX) 24 hr capsule 0.4 mg  0.4 mg Oral Daily Chace Ware MD   0.4 mg at 24 0903    venlafaxine XR (EFFEXOR-XR) 24 hr capsule 150 mg  150 mg Oral Daily With Breakfast Jean-Paul Lara PA-C   150 mg at 24 0903        Physician Progress Notes (all)        Chace Ware MD at 24 0851              [facility] Medicine Services  PROGRESS NOTE    Patient Name: Janna Willis  : 1935  MRN: [id number]    Date of Admission: 2024  Primary  Care Physician: Hever Ibrahim APRN    Subjective   Subjective     CC: S/P Fall    HPI: Sore. Vision better. Still weak/dizzy. Some urinary retention this AM.       Objective   Objective     Vital Signs:   Temp:  [98.3 °F (36.8 °C)-98.8 °F (37.1 °C)] 98.6 °F (37 °C)  Heart Rate:  [82-99] 99  Resp:  [16-18] 16  BP: (137-182)/(60-94) 145/60     Physical Exam:  NAD, alert and oriented x 3  OP clear, dry MM  Neck supple  No LAD  RRR  CTAB  +BS, soft  R periorbital swelling/hematoma/bruising improving  R shoulder ROM intact    Results Reviewed:  LAB RESULTS:      Lab 01/29/24  0644 01/29/24  0552 01/27/24  0546 01/26/24  0519 01/25/24  1407   WBC  --  8.63 5.76 5.01 6.67   HEMOGLOBIN  --  13.3 11.9* 12.3 14.0   HEMATOCRIT  --  39.7 36.4 37.0 42.7   PLATELETS  --  156 125* 134* 149   NEUTROS ABS  --   --   --  3.42 5.14   IMMATURE GRANS (ABS)  --   --   --  0.01 0.01   LYMPHS ABS  --   --   --  1.12 0.92   MONOS ABS  --   --   --  0.36 0.54   EOS ABS  --   --   --  0.08 0.03   MCV  --  88.2 89.9 87.1 87.3   LACTATE 2.1*  --   --   --   --          Lab 01/29/24  0552 01/27/24  0546 01/26/24  0519 01/25/24  1407   SODIUM 134* 136 136 134*   POTASSIUM 3.7 4.2 3.8 4.1   CHLORIDE 97* 100 101 97*   CO2 23.0 24.0 26.0 24.0   ANION GAP 14.0 12.0 9.0 13.0   BUN 8 16 13 16   CREATININE 0.48* 0.70 0.58 0.62   EGFR 91.8 83.8 87.7 86.3   GLUCOSE 152* 103* 91 121*   CALCIUM 8.6 8.5* 8.7 9.1   HEMOGLOBIN A1C  --   --  5.60 5.60         Lab 01/26/24  0519 01/25/24  1407   TOTAL PROTEIN 6.4 7.1   ALBUMIN 3.7 4.1   GLOBULIN 2.7 3.0   ALT (SGPT) 10 12   AST (SGOT) 21 26   BILIRUBIN 0.7 0.7   ALK PHOS 64 74         Lab 01/25/24  1407   HSTROP T 10                 Brief Urine Lab Results  (Last result in the past 365 days)        Color   Clarity   Blood   Leuk Est   Nitrite   Protein   CREAT   Urine HCG        06/13/23 1710             42.7                 Microbiology Results Abnormal       None            XR Abdomen KUB    Result  Date: 1/29/2024  XR ABDOMEN KUB Date of Exam: 1/29/2024 6:28 AM EST Indication: abdominal pain; diarrhea Comparison: 6/12/2023. Findings: No gross free air or pneumatosis though evaluation is slightly limited due to technique. There is a non obstructive bowel gas pattern. There is some gaseous distention of the colon. A mild to moderate stool burden is present. Large rounded opacity within  the lower abdomen and pelvis likely representing distended bladder. No suspicious calcifications identified. No acute osseous abnormality identified.     Impression: Impression: Nonobstructive bowel gas pattern. Mild to moderate stool burden present. Large rounded opacity within the lower abdomen and pelvis likely representing significantly distended bladder. Recommend clinical correlation. Electronically Signed: Deepa Polk MD  1/29/2024 6:50 AM EST  Workstation ID: [id number]     Results for orders placed during the hospital encounter of 06/05/23    Adult Transthoracic Echo Complete w/ Color, Spectral and Contrast if Necessary Per Protocol    Interpretation Summary    Left ventricular systolic function is normal. Left ventricular ejection fraction appears to be 61 - 65%.    Left ventricular wall thickness is consistent with borderline concentric hypertrophy.    Left ventricular diastolic function is consistent with (grade II w/high LAP) pseudonormalization.    Left atrial volume is mildly increased.    There is moderate calcification of the aortic valve.    Moderate mitral annular calcification is present.    Mild mitral valve stenosis is present.    Mild mitral valve regurgitation is present.    Mild pulmonic valve regurgitation is present.    Moderate tricuspid valve regurgitation is present.    Estimated right ventricular systolic pressure from tricuspid regurgitation is mildly elevated (35-45 mmHg).      Current medications:  Scheduled Meds:clopidogrel, 75 mg, Oral, Daily  insulin lispro, 2-7 Units, Subcutaneous, 4x Daily AC  & at Bedtime  levothyroxine, 50 mcg, Oral, Q AM  pantoprazole, 40 mg, Oral, QAM  senna-docusate sodium, 2 tablet, Oral, BID  sodium chloride, 10 mL, Intravenous, Q12H  tamsulosin, 0.4 mg, Oral, Daily  venlafaxine XR, 150 mg, Oral, Daily With Breakfast      Continuous Infusions:     PRN Meds:.  acetaminophen    senna-docusate sodium **AND** polyethylene glycol **AND** bisacodyl **AND** bisacodyl    dextrose    dextrose    glucagon (human recombinant)    melatonin    ondansetron    [COMPLETED] Insert Peripheral IV **AND** sodium chloride    sodium chloride    sodium chloride    Assessment & Plan   Assessment & Plan     Active Hospital Problems    Diagnosis  POA    **Traumatic orbital hematoma [S05.10XA]  Yes    Rhabdomyolysis [M62.82]  Yes    Fall [W19.XXXA]  Yes    Syncope [R55]  Yes    DM2 (diabetes mellitus, type 2) [E11.9]  Yes    Mixed hyperlipidemia [E78.2]  Yes    Hypothyroid [E03.9]  Yes    GERD (gastroesophageal reflux disease) [K21.9]  Yes    Primary hypertension [I10]  Yes    CAD (coronary artery disease) [I25.10]  Yes      Resolved Hospital Problems   No resolved problems to display.        Brief Hospital Course to date:  88 y.o. female with a past medical history significant for HTN, HLD, CAD s/p stent placement, s/p PPM, DM2, GERD, and hypothyroidism. Here with orbital hematoma and rhabdo after an unwitnessed fall.    Orbital hematoma  Shoulder pain  Rhabdomyolysis  -IVF, pain meds, PT/OT  -no fracture/dislocation shoulder    Fall  -unwitnessed  -denies palpitations/vertigo  -unclear if syncope  -mild orthostasis noted  -cosyntropin testing sub-optimal, but good response  -PT/OT  -may warrant rehab    CAD  HTN  HL  -s/p HANK 6/23, on DAPT resumed  -BB    DM  -SSI, trending well    Urinary retention  -check UA  -mobilize  -flomax    Hypothyroidism  -synthroid    Anx/Depression  -Effexor    GERD  -PPI      Expected Discharge Location and Transportation: Rehab  Expected Discharge   Expected Discharge Date:  2024; Expected Discharge Time:      DVT prophylaxis:  Mechanical DVT prophylaxis orders are present.         AM-PAC 6 Clicks Score (PT): 13 (24)    CODE STATUS:   Code Status and Medical Interventions:   Ordered at: 24     Level Of Support Discussed With:    Patient     Code Status (Patient has no pulse and is not breathing):    CPR (Attempt to Resuscitate)     Medical Interventions (Patient has pulse or is breathing):    Full Support       Chace Ware MD  24        Electronically signed by Chace Ware MD at 24 0853       Chace Ware MD at 24 0711              [facility] Medicine Services  PROGRESS NOTE    Patient Name: Janna Willis  : 1935  MRN: [id number]    Date of Admission: 2024  Primary Care Physician: Hever Ibrahim, MARIELA    Subjective   Subjective     CC: S/P Fall    HPI: Sore. Dizziness remains. No f/c. No n/v. Tolerating PO. No dyspnea.       Objective   Objective     Vital Signs:   Temp:  [98.2 °F (36.8 °C)-98.3 °F (36.8 °C)] 98.3 °F (36.8 °C)  Heart Rate:  [69-82] 74  Resp:  [16-18] 18  BP: (109-175)/(54-79) 175/79     Physical Exam:  NAD, alert and oriented x 3  OP clear, dry MM  Neck supple  No LAD  RRR  CTAB  +BS, soft  R periorbital swelling/hematoma  R shoulder ROM intact    Results Reviewed:  LAB RESULTS:      Lab 24  0546 24  0519 24  1407   WBC 5.76 5.01 6.67   HEMOGLOBIN 11.9* 12.3 14.0   HEMATOCRIT 36.4 37.0 42.7   PLATELETS 125* 134* 149   NEUTROS ABS  --  3.42 5.14   IMMATURE GRANS (ABS)  --  0.01 0.01   LYMPHS ABS  --  1.12 0.92   MONOS ABS  --  0.36 0.54   EOS ABS  --  0.08 0.03   MCV 89.9 87.1 87.3         Lab 24  0546 24  0519 01/25/24  1407   SODIUM 136 136 134*   POTASSIUM 4.2 3.8 4.1   CHLORIDE 100 101 97*   CO2 24.0 26.0 24.0   ANION GAP 12.0 9.0 13.0   BUN 16 13 16   CREATININE 0.70 0.58 0.62   EGFR 83.8 87.7 86.3   GLUCOSE 103* 91 121*    CALCIUM 8.5* 8.7 9.1   HEMOGLOBIN A1C  --  5.60 5.60         Lab 01/26/24  0519 01/25/24  1407   TOTAL PROTEIN 6.4 7.1   ALBUMIN 3.7 4.1   GLOBULIN 2.7 3.0   ALT (SGPT) 10 12   AST (SGOT) 21 26   BILIRUBIN 0.7 0.7   ALK PHOS 64 74         Lab 01/25/24  1407   HSTROP T 10                 Brief Urine Lab Results  (Last result in the past 365 days)        Color   Clarity   Blood   Leuk Est   Nitrite   Protein   CREAT   Urine HCG        06/13/23 1710             42.7                 Microbiology Results Abnormal       None            No radiology results from the last 24 hrs    Results for orders placed during the hospital encounter of 06/05/23    Adult Transthoracic Echo Complete w/ Color, Spectral and Contrast if Necessary Per Protocol    Interpretation Summary    Left ventricular systolic function is normal. Left ventricular ejection fraction appears to be 61 - 65%.    Left ventricular wall thickness is consistent with borderline concentric hypertrophy.    Left ventricular diastolic function is consistent with (grade II w/high LAP) pseudonormalization.    Left atrial volume is mildly increased.    There is moderate calcification of the aortic valve.    Moderate mitral annular calcification is present.    Mild mitral valve stenosis is present.    Mild mitral valve regurgitation is present.    Mild pulmonic valve regurgitation is present.    Moderate tricuspid valve regurgitation is present.    Estimated right ventricular systolic pressure from tricuspid regurgitation is mildly elevated (35-45 mmHg).      Current medications:  Scheduled Meds:clopidogrel, 75 mg, Oral, Daily  cosyntropin, 0.25 mg, Intravenous, Once  insulin lispro, 2-7 Units, Subcutaneous, 4x Daily AC & at Bedtime  levothyroxine, 50 mcg, Oral, Q AM  pantoprazole, 40 mg, Oral, QAM  senna-docusate sodium, 2 tablet, Oral, BID  sodium chloride, 10 mL, Intravenous, Q12H  venlafaxine XR, 150 mg, Oral, Daily With Breakfast      Continuous Infusions:sodium  chloride, 75 mL/hr, Last Rate: 75 mL/hr (01/28/24 0432)      PRN Meds:.  acetaminophen    senna-docusate sodium **AND** polyethylene glycol **AND** bisacodyl **AND** bisacodyl    dextrose    dextrose    glucagon (human recombinant)    melatonin    ondansetron    [COMPLETED] Insert Peripheral IV **AND** sodium chloride    sodium chloride    sodium chloride    Assessment & Plan   Assessment & Plan     Active Hospital Problems    Diagnosis  POA    **Traumatic orbital hematoma [S05.10XA]  Yes    Rhabdomyolysis [M62.82]  Yes    Fall [W19.XXXA]  Yes    Syncope [R55]  Yes    DM2 (diabetes mellitus, type 2) [E11.9]  Yes    Mixed hyperlipidemia [E78.2]  Yes    Hypothyroid [E03.9]  Yes    GERD (gastroesophageal reflux disease) [K21.9]  Yes    Primary hypertension [I10]  Yes    CAD (coronary artery disease) [I25.10]  Yes      Resolved Hospital Problems   No resolved problems to display.        Brief Hospital Course to date:  88 y.o. female with a past medical history significant for HTN, HLD, CAD s/p stent placement, s/p PPM, DM2, GERD, and hypothyroidism. Here with orbital hematoma and rhabdo after an unwitnessed fall.    Orbital hematoma  Shoulder pain  Rhabdomyolysis  -IVF, pain meds, PT/OT  -no fracture/dislocation shoulder    Fall  -unwitnessed  -denies palpitations/vertigo  -unclear if syncope  -mild orthostasis noted  -AM cortisol low, cosyntropin stim 1/29    CAD  HTN  HL  -s/p HANK 6/23, on DAPT, held, resume and observe soon  -BB    DM  -SSI, trending well    Hypothyroidism  -synthroid    Anx/Depression  -Effexor    GERD  -PPI    BP trending up, monitor    Expected Discharge Location and Transportation: Rehab  Expected Discharge   Expected Discharge Date: 1/31/2024; Expected Discharge Time:      DVT prophylaxis:  Mechanical DVT prophylaxis orders are present.         AM-PAC 6 Clicks Score (PT): 14 (01/27/24 0800)    CODE STATUS:   Code Status and Medical Interventions:   Ordered at: 01/25/24 1951     Level Of Support  Discussed With:    Patient     Code Status (Patient has no pulse and is not breathing):    CPR (Attempt to Resuscitate)     Medical Interventions (Patient has pulse or is breathing):    Full Support       Chace Ware MD  24        Electronically signed by Chace Ware MD at 24 0713       Melissa Alarcon APRN at 24 1225              [facility] Medicine Services  PROGRESS NOTE    Patient Name: Janna Willis  : 1935  MRN: [id number]    Date of Admission: 2024  Primary Care Physician: Hever Ibrahim APRN    Subjective   Subjective     CC: S/P Fall    HPI:     Sitting upright in bed getting ready to eat breakfast. Feels a bit rough today.  She got dizzy this morning when she stood up.  No other issues or complaints.       Objective   Objective     Vital Signs:   Temp:  [98 °F (36.7 °C)-98.7 °F (37.1 °C)] 98.3 °F (36.8 °C)  Heart Rate:  [72-82] 73  Resp:  [16-18] 16  BP: (110-175)/(57-78) 122/57     Physical Exam:  Constitutional: No acute distress, awake, alert  HENT: NCAT, mucous membranes moist, right periorbital swelling  Respiratory: Clear to auscultation bilaterally, respiratory effort normal   Cardiovascular: RRR, no murmurs, rubs, or gallops  Gastrointestinal: Positive bowel sounds, soft, nontender, nondistended  Musculoskeletal: No bilateral ankle edema  Psychiatric: Appropriate affect, cooperative  Neurologic: Oriented x 3, strength symmetric in all extremities, Cranial Nerves grossly intact to confrontation, speech clear  Skin: No rashes, echymosis noted to neck and down onto upper chest.      Results Reviewed:  LAB RESULTS:      Lab 24  0546 24  0519 24  1407   WBC 5.76 5.01 6.67   HEMOGLOBIN 11.9* 12.3 14.0   HEMATOCRIT 36.4 37.0 42.7   PLATELETS 125* 134* 149   NEUTROS ABS  --  3.42 5.14   IMMATURE GRANS (ABS)  --  0.01 0.01   LYMPHS ABS  --  1.12 0.92   MONOS ABS  --  0.36 0.54   EOS ABS  --  0.08 0.03   MCV 89.9  87.1 87.3         Lab 01/27/24  0546 01/26/24  0519 01/25/24  1407   SODIUM 136 136 134*   POTASSIUM 4.2 3.8 4.1   CHLORIDE 100 101 97*   CO2 24.0 26.0 24.0   ANION GAP 12.0 9.0 13.0   BUN 16 13 16   CREATININE 0.70 0.58 0.62   EGFR 83.8 87.7 86.3   GLUCOSE 103* 91 121*   CALCIUM 8.5* 8.7 9.1   HEMOGLOBIN A1C  --  5.60 5.60         Lab 01/26/24  0519 01/25/24  1407   TOTAL PROTEIN 6.4 7.1   ALBUMIN 3.7 4.1   GLOBULIN 2.7 3.0   ALT (SGPT) 10 12   AST (SGOT) 21 26   BILIRUBIN 0.7 0.7   ALK PHOS 64 74         Lab 01/25/24  1407   HSTROP T 10                 Brief Urine Lab Results  (Last result in the past 365 days)        Color   Clarity   Blood   Leuk Est   Nitrite   Protein   CREAT   Urine HCG        06/13/23 1710             42.7                 Microbiology Results Abnormal       None            XR Shoulder 2+ View Right    Result Date: 1/25/2024  XR SHOULDER 2+ VW RIGHT Date of Exam: 1/25/2024 2:22 PM EST Indication: Fall, injury, contusion Comparison: None available. Findings: 3 views. The humeral head is mildly subluxed cephalad in relation to the glenoid. There is no dislocation. The AC joint appears maintained. There is no fracture.     Impression: Impression: Mild cephalad humeral head subluxation. This could be chronic. No fracture. Electronically Signed: Lizzette Amaya MD  1/25/2024 2:46 PM EST  Workstation ID: [id number]    CT Maxillofacial Without Contrast    Result Date: 1/25/2024  CT MAXILLOFACIAL WO CONT Date of Exam: 1/25/2024 2:20 PM EST Indication: Fall, head injury, on Plavix. Comparison: None available. Technique: Axial CT images were obtained from the inferior aspect of the mandible through the frontal sinuses without contrast administration.  Reconstructed coronal and sagittal images were also obtained. Automated exposure control and iterative construction methods were used. Findings: No acute facial fracture is identified.bilateral lens prostheses are noted. Orbital structures are otherwise  unremarkable. Paranasal sinuses and mastoid air cells appear well aerated without air-fluid levels identified. There is rightward bowing of the bony nasal septum. The pterygoid plates and zygomatic arches are intact. Right facial soft tissue swelling is present. Please refer to the accompanying head CT for description of intracranial findings.     Impression: 1.No acute facial fracture is identified. 2.Extensive right facial soft tissue swelling. Electronically Signed: Crow Calderon MD  1/25/2024 2:40 PM EST  Workstation ID: [id number]    CT Cervical Spine Without Contrast    Result Date: 1/25/2024  CT CERVICAL SPINE WO CONTRAST Date of Exam: 1/25/2024 2:20 PM EST Indication: Fall, head injury, on Plavix. Comparison: None available. Technique: Axial CT images were obtained of the cervical spine without contrast administration.  Reconstructed coronal and sagittal images were also obtained. Automated exposure control and iterative construction methods were used. Findings: There are normal vertebral body heights. There is mild anterolisthesis of C3 on C4 and C4 on C5. There is severe narrowing of the C5-6 and C6/7 disc interspaces with moderate spurring. There are no fractures. There is facet joint hypertrophy with moderate left neural foraminal stenosis at C3-4. There is mild left neural foraminal stenosis at C4-5. There is mild spinal stenosis with severe bilateral neural foraminal stenosis at C5-6, greatest on the left. There is a mild to moderate spinal stenosis with severe bilateral neural foraminal stenosis at C6/7.     Impression: Impression: Chronic findings as detailed. No acute fracture. Mildly abnormal alignment as noted, may be chronic. If clinically warranted, this could be further evaluated with flexion and extension views. Electronically Signed: Lizzette Amaya MD  1/25/2024 2:34 PM EST  Workstation ID: [id number]    CT Head Without Contrast    Result Date: 1/25/2024  CT HEAD WO CONTRAST Date of Exam:  1/25/2024 2:20 PM EST Indication: Fall, head injury, on Plavix. Comparison: None available. Technique: Axial CT images were obtained of the head without contrast administration.  Automated exposure control and iterative construction methods were used. Findings: The large right periorbital and frontal scalp hematoma is present with some soft tissue gas likely also reflecting laceration. Nondedicated evaluation of the orbit demonstrates no evidence of fracture and the calvarium is also intact. Age-related changes  are noted, with some generalized volume loss. There is no evidence of intracranial hemorrhage, mass or mass effect. The ventricles are normal in size and configuration, accounting for surrounding volume loss. The paranasal sinuses are grossly clear.     Impression: Impression: Prominent right frontal and periorbital scalp laceration and hematoma. No acute intracranial abnormality. Electronically Signed: Kris Basurto MD  1/25/2024 2:32 PM EST  Workstation ID: [id number]     Results for orders placed during the hospital encounter of 06/05/23    Adult Transthoracic Echo Complete w/ Color, Spectral and Contrast if Necessary Per Protocol    Interpretation Summary    Left ventricular systolic function is normal. Left ventricular ejection fraction appears to be 61 - 65%.    Left ventricular wall thickness is consistent with borderline concentric hypertrophy.    Left ventricular diastolic function is consistent with (grade II w/high LAP) pseudonormalization.    Left atrial volume is mildly increased.    There is moderate calcification of the aortic valve.    Moderate mitral annular calcification is present.    Mild mitral valve stenosis is present.    Mild mitral valve regurgitation is present.    Mild pulmonic valve regurgitation is present.    Moderate tricuspid valve regurgitation is present.    Estimated right ventricular systolic pressure from tricuspid regurgitation is mildly elevated (35-45  mmHg).      Current medications:  Scheduled Meds:clopidogrel, 75 mg, Oral, Daily  insulin lispro, 2-7 Units, Subcutaneous, 4x Daily AC & at Bedtime  levothyroxine, 50 mcg, Oral, Q AM  pantoprazole, 40 mg, Oral, QAM  senna-docusate sodium, 2 tablet, Oral, BID  sodium chloride, 10 mL, Intravenous, Q12H  venlafaxine XR, 150 mg, Oral, Daily With Breakfast      Continuous Infusions:sodium chloride, 75 mL/hr      PRN Meds:.  acetaminophen    senna-docusate sodium **AND** polyethylene glycol **AND** bisacodyl **AND** bisacodyl    dextrose    dextrose    glucagon (human recombinant)    melatonin    ondansetron    [COMPLETED] Insert Peripheral IV **AND** sodium chloride    sodium chloride    sodium chloride    Assessment & Plan   Assessment & Plan     Active Hospital Problems    Diagnosis  POA    **Traumatic orbital hematoma [S05.10XA]  Yes    Rhabdomyolysis [M62.82]  Yes    Fall [W19.XXXA]  Yes    Syncope [R55]  Yes    DM2 (diabetes mellitus, type 2) [E11.9]  Yes    Mixed hyperlipidemia [E78.2]  Yes    Hypothyroid [E03.9]  Yes    GERD (gastroesophageal reflux disease) [K21.9]  Yes    Primary hypertension [I10]  Yes    CAD (coronary artery disease) [I25.10]  Yes      Resolved Hospital Problems   No resolved problems to display.        Brief Hospital Course to date:  88 y.o. female with a past medical history significant for HTN, HLD, CAD s/p stent placement, s/p PPM, DM2, GERD, and hypothyroidism. Here with orbital hematoma and rhabdo after an unwitnessed fall.    Orbital hematoma  Shoulder pain  Rhabdo  -IVF were discontinued on 1/26 but she is a bit orthostatic now. Will resume fluids.   -WOC is following for wound care of orbital hematoma.   -PT/OT following    Orthostatic Hypotension  -patient orthostatic this morning.   -resume IV fluids for now. She is just now starting to eat and drink well.     Fall  -unwitnessed  -denies palpitations/vertigo  -unclear if syncope  -consider outpatient cardiac  monitoring    CAD  HTN  HL  -s/p HANK , on DAPT, held, will go ahead and resume plavix today  -BB    DM  -SSI    Hypothyroidism  -synthroid    Anx/Depression  -Effexor    GERD  -PPI    Expected Discharge Location and Transportation: Rehab  Expected Discharge   Expected Discharge Date: 2024; Expected Discharge Time:      DVT prophylaxis:  Mechanical DVT prophylaxis orders are present.         AM-PAC 6 Clicks Score (PT): 13 (24 1335)    CODE STATUS:   Code Status and Medical Interventions:   Ordered at: 24 195     Level Of Support Discussed With:    Patient     Code Status (Patient has no pulse and is not breathing):    CPR (Attempt to Resuscitate)     Medical Interventions (Patient has pulse or is breathing):    Full Support       MARIELA Gunn  24        Electronically signed by Melissa Alarcon APRN at 24 1232       Chace Ware MD at 24 0928              [facility] Medicine Services  PROGRESS NOTE    Patient Name: Janna Willis  : 1935  MRN: [id number]    Date of Admission: 2024  Primary Care Physician: Hever Ibrahim APRN    Subjective   Subjective     CC: S/P Fall    HPI: Sore. No f/c. Denies dizziness/vertigo. Denies palpitations. No memory of events or fall. Tolerating PO. No dyspnea. NO f/c.       Objective   Objective     Vital Signs:   Temp:  [97.5 °F (36.4 °C)-99.1 °F (37.3 °C)] 97.5 °F (36.4 °C)  Heart Rate:  [72-89] 78  Resp:  [18-20] 18  BP: (117-186)/(55-96) 157/71     Physical Exam:  NAD, alert and oriented  OP clear, dry MM  Neck supple  No LAD  RRR  CTAB  +BS, soft  R periorbital swelling/hematoma  R shoulder ROM intact    Results Reviewed:  LAB RESULTS:      Lab 24  0519 24  1407   WBC 5.01 6.67   HEMOGLOBIN 12.3 14.0   HEMATOCRIT 37.0 42.7   PLATELETS 134* 149   NEUTROS ABS 3.42 5.14   IMMATURE GRANS (ABS) 0.01 0.01   LYMPHS ABS 1.12 0.92   MONOS ABS 0.36 0.54   EOS ABS 0.08 0.03   MCV 87.1  87.3         Lab 01/26/24  0519 01/25/24  1407   SODIUM 136 134*   POTASSIUM 3.8 4.1   CHLORIDE 101 97*   CO2 26.0 24.0   ANION GAP 9.0 13.0   BUN 13 16   CREATININE 0.58 0.62   EGFR 87.7 86.3   GLUCOSE 91 121*   CALCIUM 8.7 9.1   HEMOGLOBIN A1C 5.60 5.60         Lab 01/26/24  0519 01/25/24  1407   TOTAL PROTEIN 6.4 7.1   ALBUMIN 3.7 4.1   GLOBULIN 2.7 3.0   ALT (SGPT) 10 12   AST (SGOT) 21 26   BILIRUBIN 0.7 0.7   ALK PHOS 64 74         Lab 01/25/24  1407   HSTROP T 10                 Brief Urine Lab Results  (Last result in the past 365 days)        Color   Clarity   Blood   Leuk Est   Nitrite   Protein   CREAT   Urine HCG        06/13/23 1710             42.7                 Microbiology Results Abnormal       None            XR Shoulder 2+ View Right    Result Date: 1/25/2024  XR SHOULDER 2+ VW RIGHT Date of Exam: 1/25/2024 2:22 PM EST Indication: Fall, injury, contusion Comparison: None available. Findings: 3 views. The humeral head is mildly subluxed cephalad in relation to the glenoid. There is no dislocation. The AC joint appears maintained. There is no fracture.     Impression: Impression: Mild cephalad humeral head subluxation. This could be chronic. No fracture. Electronically Signed: Lizzette Amaya MD  1/25/2024 2:46 PM EST  Workstation ID: [id number]    CT Maxillofacial Without Contrast    Result Date: 1/25/2024  CT MAXILLOFACIAL WO CONT Date of Exam: 1/25/2024 2:20 PM EST Indication: Fall, head injury, on Plavix. Comparison: None available. Technique: Axial CT images were obtained from the inferior aspect of the mandible through the frontal sinuses without contrast administration.  Reconstructed coronal and sagittal images were also obtained. Automated exposure control and iterative construction methods were used. Findings: No acute facial fracture is identified.bilateral lens prostheses are noted. Orbital structures are otherwise unremarkable. Paranasal sinuses and mastoid air cells appear well aerated  without air-fluid levels identified. There is rightward bowing of the bony nasal septum. The pterygoid plates and zygomatic arches are intact. Right facial soft tissue swelling is present. Please refer to the accompanying head CT for description of intracranial findings.     Impression: 1.No acute facial fracture is identified. 2.Extensive right facial soft tissue swelling. Electronically Signed: Crow Calderon MD  1/25/2024 2:40 PM EST  Workstation ID: [id number]    CT Cervical Spine Without Contrast    Result Date: 1/25/2024  CT CERVICAL SPINE WO CONTRAST Date of Exam: 1/25/2024 2:20 PM EST Indication: Fall, head injury, on Plavix. Comparison: None available. Technique: Axial CT images were obtained of the cervical spine without contrast administration.  Reconstructed coronal and sagittal images were also obtained. Automated exposure control and iterative construction methods were used. Findings: There are normal vertebral body heights. There is mild anterolisthesis of C3 on C4 and C4 on C5. There is severe narrowing of the C5-6 and C6/7 disc interspaces with moderate spurring. There are no fractures. There is facet joint hypertrophy with moderate left neural foraminal stenosis at C3-4. There is mild left neural foraminal stenosis at C4-5. There is mild spinal stenosis with severe bilateral neural foraminal stenosis at C5-6, greatest on the left. There is a mild to moderate spinal stenosis with severe bilateral neural foraminal stenosis at C6/7.     Impression: Impression: Chronic findings as detailed. No acute fracture. Mildly abnormal alignment as noted, may be chronic. If clinically warranted, this could be further evaluated with flexion and extension views. Electronically Signed: Lizzette Amaya MD  1/25/2024 2:34 PM EST  Workstation ID: [id number]    CT Head Without Contrast    Result Date: 1/25/2024  CT HEAD WO CONTRAST Date of Exam: 1/25/2024 2:20 PM EST Indication: Fall, head injury, on Plavix. Comparison:  None available. Technique: Axial CT images were obtained of the head without contrast administration.  Automated exposure control and iterative construction methods were used. Findings: The large right periorbital and frontal scalp hematoma is present with some soft tissue gas likely also reflecting laceration. Nondedicated evaluation of the orbit demonstrates no evidence of fracture and the calvarium is also intact. Age-related changes  are noted, with some generalized volume loss. There is no evidence of intracranial hemorrhage, mass or mass effect. The ventricles are normal in size and configuration, accounting for surrounding volume loss. The paranasal sinuses are grossly clear.     Impression: Impression: Prominent right frontal and periorbital scalp laceration and hematoma. No acute intracranial abnormality. Electronically Signed: Kris Basurto MD  1/25/2024 2:32 PM EST  Workstation ID: [id number]     Results for orders placed during the hospital encounter of 06/05/23    Adult Transthoracic Echo Complete w/ Color, Spectral and Contrast if Necessary Per Protocol    Interpretation Summary    Left ventricular systolic function is normal. Left ventricular ejection fraction appears to be 61 - 65%.    Left ventricular wall thickness is consistent with borderline concentric hypertrophy.    Left ventricular diastolic function is consistent with (grade II w/high LAP) pseudonormalization.    Left atrial volume is mildly increased.    There is moderate calcification of the aortic valve.    Moderate mitral annular calcification is present.    Mild mitral valve stenosis is present.    Mild mitral valve regurgitation is present.    Mild pulmonic valve regurgitation is present.    Moderate tricuspid valve regurgitation is present.    Estimated right ventricular systolic pressure from tricuspid regurgitation is mildly elevated (35-45 mmHg).      Current medications:  Scheduled Meds:insulin lispro, 2-7 Units, Subcutaneous, 4x  Daily AC & at Bedtime  levothyroxine, 50 mcg, Oral, Q AM  pantoprazole, 40 mg, Oral, QAM  senna-docusate sodium, 2 tablet, Oral, BID  sodium chloride, 10 mL, Intravenous, Q12H  venlafaxine XR, 150 mg, Oral, Daily With Breakfast      Continuous Infusions:   PRN Meds:.  acetaminophen    senna-docusate sodium **AND** polyethylene glycol **AND** bisacodyl **AND** bisacodyl    dextrose    dextrose    glucagon (human recombinant)    melatonin    ondansetron    [COMPLETED] Insert Peripheral IV **AND** sodium chloride    sodium chloride    sodium chloride    Assessment & Plan   Assessment & Plan     Active Hospital Problems    Diagnosis  POA    **Traumatic orbital hematoma [S05.10XA]  Yes    Rhabdomyolysis [M62.82]  Yes    Fall [W19.XXXA]  Yes    Syncope [R55]  Yes    DM2 (diabetes mellitus, type 2) [E11.9]  Yes    Mixed hyperlipidemia [E78.2]  Yes    Hypothyroid [E03.9]  Yes    GERD (gastroesophageal reflux disease) [K21.9]  Yes    Primary hypertension [I10]  Yes    CAD (coronary artery disease) [I25.10]  Yes      Resolved Hospital Problems   No resolved problems to display.        Brief Hospital Course to date:  88 y.o. female with a past medical history significant for HTN, HLD, CAD s/p stent placement, s/p PPM, DM2, GERD, and hypothyroidism. Here with orbital hematoma and rhabdo after an unwitnessed fall.    Orbital hematoma  Shoulder pain  Rhabdo  -IVF, pain meds, PT/OT  -no fracture/dislocation shoulder    Fall  -unwitnessed  -denies palpitations/vertigo  -unclear if syncope  -check orthostatics, monitor on telemetry  -consider outpatient cardiac monitoring    CAD  HTN  HL  -s/p HANK 6/23, on DAPT, held, resume and observe soon  -BB    DM  -SSI    Hypothyroidism  -synthroid    Anx/Depression  -Effexor    GERD  -PPI    Expected Discharge Location and Transportation: Rehab  Expected Discharge   Expected Discharge Date: 1/31/2024; Expected Discharge Time:      DVT prophylaxis:  Mechanical DVT prophylaxis orders are  present.         AM-PAC 6 Clicks Score (PT): 14 (01/25/24 2000)    CODE STATUS:   Code Status and Medical Interventions:   Ordered at: 01/25/24 1951     Level Of Support Discussed With:    Patient     Code Status (Patient has no pulse and is not breathing):    CPR (Attempt to Resuscitate)     Medical Interventions (Patient has pulse or is breathing):    Full Support       Chace Ware MD  01/26/24        Electronically signed by Chace Ware MD at 01/26/24 0905       Consult Notes (all)    No notes of this type exist for this encounter.

## 2024-01-29 NOTE — CASE MANAGEMENT/SOCIAL WORK
Continued Stay Note  [facility]     Patient Name: Janna Willis  MRN: [id number]  Today's Date: 1/29/2024    Admit Date: 1/25/2024    Plan: discharge plan   Discharge Plan       Row Name 01/29/24 1654       Plan    Plan discharge plan    Plan Comments Per pt's primary nurse, pt is confused. I spoke with sister, Carlee Kohli, on cell([phone]) regarding discharge plan and PT/OT recommendations. Per sister's request, a referral was made to [facility] (SNF in [place]).  I also emailed sister a list of SNF in [place]. CM will follow up tomorrow.    Final Discharge Disposition Code 03 - skilled nursing facility (SNF)                   Discharge Codes    No documentation.                 Expected Discharge Date and Time       Expected Discharge Date Expected Discharge Time    Jan 31, 2024               Maggy Romo RN

## 2024-01-29 NOTE — SIGNIFICANT NOTE
Pt c/o generalized abdominal pain this morning. Overnight, pt w/ ~4 soft/loose bowel movements. RN notes pt's abdomin is full but soft and pt reporting TTP. VS stable.   -KUB  -Lactic acid   -CBC resulted and WNL  -BMP pending

## 2024-01-29 NOTE — PLAN OF CARE
Goal Outcome Evaluation:   Multiple soft BM's overnight. This morning abdomen is distended, tender and firm. APRN notified, see orders. Hypertensive /85. APRN notified, Hydralazine 10mg IV given x1. VSS. Will continue to monitor.

## 2024-01-29 NOTE — CONSULTS
Urology Hospital Consult Note     Date of Consult: 2024     Patient Name: Janna Willis  MRN: [id number]   : 1935     Referring Provider: * No referring provider recorded for this case *    Care Team: Patient Care Team:  Hever Ibrahim APRN as PCP - General (Nurse Practitioner)     Reason for Hospitalization: Orbital hematoma    History of Present Illness: Was contacted for hospital consultation on Janna Willis a 88 y.o. female who presents to the hospita on 2024 due to an unwitnessed fall and orbital hematoma. She was down at home for around 6 hours prior to being found by her family, is now admitted to the hospital where she is also being treated for rhabdomyolysis.  Since admission she has reportedly been urinating on her own according to nursing.  She does not report any prior issues with urination or urinary infections.  Yesterday evening into early this morning she began having lower abdominal discomfort, which prompted KUB showing possible largely distended bladder.  Bladder scan was obtained showing over 1 L within the bladder, and straight cath was performed with extreme difficulty this morning with return of 1.8 L of urine.  Urinalysis was obtained at this time revealing 4+ bacteria on microscopy.  She was started on ceftriaxone and allowed to continue voiding on her own.  She reportedly had small void today with postvoid residual bladder scan greater than 700mL.  Most recent bladder scan was greater than 800mL, prompting attempt at anchoring a catheter which was unsuccessful.  She is not in any significant discomfort at this time and does not have a stronger urge to urinate. Urology was then consulted to aid in management.    She reports no history of urinary issues.  She feels she is able to void to completion normally.  She has no significant history of urinary tract infections, stones.  She has never seen urologist before.  No significant  family  history.    Subjective      Pertinent items are noted in HPI.     Past Medical History:   Past Medical History:   Diagnosis Date    Diabetes        Past Surgical History:   Past Surgical History:   Procedure Laterality Date    CARDIAC CATHETERIZATION N/A 6/5/2023    Procedure: Left Heart Cath;  Surgeon: Kuldeep Miner MD;  Location:  [facility];  Service: Cardiology;  Laterality: N/A;    CARDIAC ELECTROPHYSIOLOGY PROCEDURE N/A 6/5/2023    Procedure: Temporary Pacemaker;  Surgeon: Kuldeep Miner MD;  Location:  [facility];  Service: Cardiology;  Laterality: N/A;    CARDIAC ELECTROPHYSIOLOGY PROCEDURE N/A 6/6/2023    Procedure: Pacemaker DC new;  Surgeon: Dudley Rodriguez DO;  Location:  [facility];  Service: Cardiology;  Laterality: N/A;    OVARY SURGERY         Family History:   Family History   Problem Relation Age of Onset    Stroke Mother     Heart attack Mother        Social History:   Social History     Socioeconomic History    Marital status: Single   Tobacco Use    Smoking status: Never    Smokeless tobacco: Never   Vaping Use    Vaping Use: Never used   Substance and Sexual Activity    Alcohol use: No    Drug use: No    Sexual activity: Defer       Medications:     Current Facility-Administered Medications:     acetaminophen (TYLENOL) tablet 650 mg, 650 mg, Oral, Q4H PRN, Jean-Paul Lara PA-C, 650 mg at 01/29/24 0413    aspirin EC tablet 81 mg, 81 mg, Oral, Daily, Chace Ware MD, 81 mg at 01/29/24 0903    sennosides-docusate (PERICOLACE) 8.6-50 MG per tablet 2 tablet, 2 tablet, Oral, BID, 2 tablet at 01/28/24 0805 **AND** polyethylene glycol (MIRALAX) packet 17 g, 17 g, Oral, Daily PRN **AND** bisacodyl (DULCOLAX) EC tablet 5 mg, 5 mg, Oral, Daily PRN **AND** bisacodyl (DULCOLAX) suppository 10 mg, 10 mg, Rectal, Daily PRN, Jean-Paul Lara PA-C    cefTRIAXone (ROCEPHIN) 1,000 mg in sodium chloride 0.9 % 100 mL IVPB, 1,000 mg, Intravenous, Daily,  Chace Ware MD, Last Rate: 200 mL/hr at 01/29/24 1139, 1,000 mg at 01/29/24 1139    clopidogrel (PLAVIX) tablet 75 mg, 75 mg, Oral, Daily, Melissa Alarcon, APRN, 75 mg at 01/29/24 0903    dextrose (D50W) (25 g/50 mL) IV injection 25 g, 25 g, Intravenous, Q15 Min PRN, Jean-Paul Lara PA-C    dextrose (GLUTOSE) oral gel 15 g, 15 g, Oral, Q15 Min PRN, Jean-Paul Lara, PA-C    glucagon (GLUCAGEN) injection 1 mg, 1 mg, Intramuscular, Q15 Min PRN, Jean-Paul Lara PA-C    Insulin Lispro (humaLOG) injection 2-7 Units, 2-7 Units, Subcutaneous, 4x Daily AC & at Bedtime, Jean-Paul Lara PA-C, 2 Units at 01/29/24 1139    levothyroxine (SYNTHROID, LEVOTHROID) tablet 50 mcg, 50 mcg, Oral, Q AM, Jean-Paul Lara, PA-C, 50 mcg at 01/29/24 0553    melatonin tablet 5 mg, 5 mg, Oral, Nightly PRN, Jean-Paul Lara, PA-C, 5 mg at 01/27/24 2110    ondansetron (ZOFRAN) injection 4 mg, 4 mg, Intravenous, Q6H PRN, Jean-Paul Lara, PA-C    pantoprazole (PROTONIX) EC tablet 40 mg, 40 mg, Oral, QAM, Jean-Paul Lara, PA-C, 40 mg at 01/29/24 0553    [COMPLETED] Insert Peripheral IV, , , Once **AND** sodium chloride 0.9 % flush 10 mL, 10 mL, Intravenous, PRN, Truong Guadarrama,     sodium chloride 0.9 % flush 10 mL, 10 mL, Intravenous, Q12H, Jean-Paul Lara, PA-C, 10 mL at 01/29/24 0903    sodium chloride 0.9 % flush 10 mL, 10 mL, Intravenous, PRN, Jean-Paul Lara, PA-C    sodium chloride 0.9 % infusion 40 mL, 40 mL, Intravenous, PRN, Jean-Paul Lara PA-C    tamsulosin (FLOMAX) 24 hr capsule 0.4 mg, 0.4 mg, Oral, Daily, Chace Ware MD, 0.4 mg at 01/29/24 0903    venlafaxine XR (EFFEXOR-XR) 24 hr capsule 150 mg, 150 mg, Oral, Daily With Breakfast, Jean-Paul Lara PA-C, 150 mg at 01/29/24 0903    Allergies:   Allergies   Allergen Reactions    Avelox [Moxifloxacin] Rash    2,4-D Dimethylamine Hives    Azithromycin Hives    Lisinopril Hives    Metoclopramide Hives    Statins Hives       Problem:   Patient  Active Problem List   Diagnosis    Heart block AV third degree    CAD (coronary artery disease)    Primary hypertension    Mixed hyperlipidemia    GERD (gastroesophageal reflux disease)    Hypothyroid    Class 1 obesity in adult    Traumatic orbital hematoma    Rhabdomyolysis    Fall    Syncope    DM2 (diabetes mellitus, type 2)       Review of Systems   The following systems were reviewed and negative;  respiratory, cardiovascular, gastrointestinal, neurological, and behavioral/psych.   positive for decreased urination, suprapubic fullness last night.  Constitutional positive for significant facial bruising.      Objective     Physical Exam:  Vital Signs:   Temp:  [97.6 °F (36.4 °C)-98.8 °F (37.1 °C)] 97.6 °F (36.4 °C)  Heart Rate:  [82-99] 99  Resp:  [16-20] 20  BP: (108-182)/(55-95) 108/70  75.6 kg (166 lb 11.2 oz)  Body mass index is 26.91 kg/m².       GEN: NAD, alert and oriented.  HEENT: NCAT, EOMI. Significant periorbital and additional facial bruising with small laceration seen over the right eye with signs of healing  RESP: Equal bilateral chest rise, normal work of breathing  CV: Regular rate, appears well perfused  ABD: Soft, non-tender, non-distended.  No CVA tenderness  Ext: No gross deformities, normal ROM  MSK: Full ROM in the bilateral upper extremities  NEURO: No focal deficits  PSYCH: Normal mood and affect      Genitourinary  Normal female external genitalia for given age, anterior fusion of the introitus    I/O last 3 completed shifts:  In: 1480 [I.V.:1480]  Out: 2500 [Urine:2500]    Labs  Lab Results   Component Value Date    GLUCOSE 152 (H) 01/29/2024    CALCIUM 8.6 01/29/2024     (L) 01/29/2024    K 3.7 01/29/2024    CO2 23.0 01/29/2024    CL 97 (L) 01/29/2024    BUN 8 01/29/2024    CREATININE 0.48 (L) 01/29/2024    BCR 16.7 01/29/2024    ANIONGAP 14.0 01/29/2024       Lab Results   Component Value Date    WBC 8.63 01/29/2024    HGB 13.3 01/29/2024    HCT 39.7 01/29/2024    MCV 88.2  "01/29/2024     01/29/2024       No results found for: \"URINECX\"    Brief Urine Lab Results  (Last result in the past 365 days)        Color   Clarity   Blood   Leuk Est   Nitrite   Protein   CREAT   Urine HCG        01/29/24 0908 Yellow   Cloudy   Small (1+)   Moderate (2+)   Negative   Negative                   Result Review    Result Review:  I have personally reviewed the results from the time of this admission to 1/29/2024 16:48 EST and agree with these findings:  [x]  Laboratory  [x]  Microbiology  [x]  Radiology  []  EKG/Telemetry   []  Cardiology/Vascular   []  Pathology  []  Old records  []  Other:  Most notable findings include: WBC of 8.63, creatinine of 0.48 down from 0.6 on admission.  Urinalysis with too numerous to count WBCs, 4+ bacteria on cath specimen.  Lactate of 0.9 down from 2.1.  KUB personally reviewed with likely distended bladder appreciated.    Imaging Results (Last 72 Hours)       Procedure Component Value Units Date/Time    XR Abdomen KUB [[id number]] Collected: 01/29/24 0648     Updated: 01/29/24 0653    Narrative:      XR ABDOMEN KUB    Date of Exam: 1/29/2024 6:28 AM EST    Indication: abdominal pain; diarrhea    Comparison: 6/12/2023.    Findings:  No gross free air or pneumatosis though evaluation is slightly limited due to technique. There is a non obstructive bowel gas pattern. There is some gaseous distention of the colon. A mild to moderate stool burden is present. Large rounded opacity within   the lower abdomen and pelvis likely representing distended bladder. No suspicious calcifications identified. No acute osseous abnormality identified.      Impression:      Impression:  Nonobstructive bowel gas pattern. Mild to moderate stool burden present. Large rounded opacity within the lower abdomen and pelvis likely representing significantly distended bladder. Recommend clinical correlation.      Electronically Signed: Deepa Polk MD    1/29/2024 6:50 AM EST    Workstation " ID: [id number]            Procedure:  Verbal consent was obtained to proceed with catheter placement.  Patient was prepped and draped in the normal sterile fashion, then an attempt was made to pass a 16 Burmese straight catheter.  This attempt was unsuccessful, and the patient noted significant discomfort due to soreness in the region.  At this point the decision was made to use a cystoscope.  A 16 Burmese flexible cystoscope was advanced through the introitus, which was noted to have significant anterior fusion.  Anterior flexion revealed a patent meatus without significant abnormalities.  The scope was advanced without difficulty into the bladder.  A 0.35 sensor wire was advanced through the scope into the bladder under direct visualization.  The scope was removed with care taken to ensure the wire remained in the bladder.  A 16 Burmese [place] tip catheter was advanced over the wire into the bladder without difficulty.  The wire was then removed, and 10 cc of sterile water was used to inflate the balloon in the bladder.  The catheter was allowed to drain, with over 1 L of urine returned.  Of note towards the end of drainage, significant purulent urine was noted.    Assessment / Plan      Assessment/Plan:   Ms. Willis is an 88-year-old female with no significant urologic history who presents due to a fall at home, now hospitalized for periorbital hematoma and rhabdomyolysis.  She was found to be having difficulty with urination over the last 24 hours, prompting straight catheterization with return of 1.8 L of urine from her bladder.  She has been intermittently voiding small volumes since this time, with bladder scans elevated at over 800mL.  Urology was consulted to aid in management as nursing was unsuccessful in placing a catheter.  She was noted to have very difficult catheter placement due to their anatomy.  Eventually we were able to place a catheter via cystoscopy.  After catheter placement around 1 L of urine  was drained, with significant purulent urine noted at the end of drainage.  At this time we recommend continuing catheter drainage for minimum of 3-5 days, and waiting until she is at complete baseline function prior to void trial given the difficulty with catheter placement.    Plan:  - Continue Marie catheter for 3 to 5 days minimum, recommend waiting until she is completely at baseline functional status prior to void trial given significant difficulty with catheter placement  - Follow-up urine culture results, continue broad-spectrum antibiotics and tailor as able once results are available  -If patient has void trial performed prior to discharge, please document postvoid residual volumes to ensure appropriate bladder emptying before discharge  - If patient is to be discharged home with catheter in place, urology will coordinate outpatient follow-up for void trial in clinic    I discussed the patients findings and my recommendations with the patient.     Time: Total face-to-face/floor time. 45 min    Jaren Alfred MD   01/29/24  16:48 EST       ATTENDING NOTE  I have reviewed the notes, assessments, and/or procedures performed with resident physician, Jaren Alfred MD, I concur with her/his documentation of Janna Willis.      I have personally evaluated Janna at the bedside.  This is an 88-year-old female with a history significant for hypertension, hyperlipidemia, CAD status post stents, pacemaker, type 2 diabetes, GERD and hypothyroidism admitted after fall and syncopal episode with orbital hematoma.  She has been anticoagulated with dual antiplatelet therapy.  She is being treated for rhabdomyolysis.  Admitted to the hospital on 1/25/2024.  During her admission has had incomplete bladder emptying, nursing notes and I-O flowsheet reviewed, significant bladder scan greater than 700 mL.  Has required intermittent catheterization but due to stenotic vaginal introitus and likely retracted urethral  meatus, nursing has been unable to place Marie catheter.     Prior to her hospitalization Janna indicates that she has had absolutely no issue urinating.  She denies significant UTI issues.  She denies weak stream or sensation of incomplete bladder emptying.  Lives at [facility], in [facility], assisted living facility presumably.     The Marie catheter was placed via cystoscopy and complex placement over a sensor wire via Jaren Alfred, resident assisting.  We discussed plan of care prior to his procedure and I was available via indirect supervision.     Physical exam  GEN: NAD, alert and oriented, eating dinner at the bedside currently  HEENT: NCAT, EOMI. Significant periorbital ecchymosis with small laceration seen over the right eye   RESP: Equal bilateral chest rise, normal work of breathing  CV: Regular rate, appears well perfused  ABD: Soft, non-tender, non-distended.  No CVA tenderness  : Normal external female genitalia, Marie catheter draining clear yellow urine with mild sediment  Ext: No gross deformities, normal ROM  MSK: Full ROM in the bilateral upper extremities  NEURO: No focal deficits  PSYCH: Normal mood and affect     Assessment and plan  88-year-old female admitted with rhabdomyolysis after mechanical fall, orbital hematoma, has developed urinary retention with severe incomplete bladder emptying.  Nursing unable to place Marie catheter.  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     I recommend maintain Marie catheter, plan for outpatient urologic follow-up for cath removal and voiding trial with my physicians assistant team.  If she is found to have significant or persistent incomplete bladder emptying, given her age and comorbidities she can learn self-catheterization which may be challenging given her anatomy or replacement of Marie catheter.  Subsequent voiding trials can be performed over the coming weeks as  she maintains and regains her functional status.  Acute urinary retention episode likely secondary to hospitalization and rhabdo.      AHMET Corona

## 2024-01-29 NOTE — THERAPY TREATMENT NOTE
Patient Name: Janna Willis  : 1935    MRN: [id number]                              Today's Date: 2024       Admit Date: 2024    Visit Dx:     ICD-10-CM ICD-9-CM   1. Fall, initial encounter  W19.XXXA E888.9   2. Facial laceration, initial encounter  S01.81XA 873.40   3. Traumatic hematoma of right orbit, initial encounter  S05.11XA 921.2   4. Closed head injury, initial encounter  S09.90XA 959.01   5. Anticoagulated  Z79.01 V58.61     Patient Active Problem List   Diagnosis    Heart block AV third degree    CAD (coronary artery disease)    Primary hypertension    Mixed hyperlipidemia    GERD (gastroesophageal reflux disease)    Hypothyroid    Class 1 obesity in adult    Traumatic orbital hematoma    Rhabdomyolysis    Fall    Syncope    DM2 (diabetes mellitus, type 2)     Past Medical History:   Diagnosis Date    Diabetes      Past Surgical History:   Procedure Laterality Date    CARDIAC CATHETERIZATION N/A 2023    Procedure: Left Heart Cath;  Surgeon: Kuldeep Miner MD;  Location:  [facility];  Service: Cardiology;  Laterality: N/A;    CARDIAC ELECTROPHYSIOLOGY PROCEDURE N/A 2023    Procedure: Temporary Pacemaker;  Surgeon: Kuldeep Miner MD;  Location:  [facility];  Service: Cardiology;  Laterality: N/A;    CARDIAC ELECTROPHYSIOLOGY PROCEDURE N/A 2023    Procedure: Pacemaker DC new;  Surgeon: Dudley Rodriguez DO;  Location:  [email or web address];  Service: Cardiology;  Laterality: N/A;    OVARY SURGERY        General Information       Row Name 24 1400          Physical Therapy Time and Intention    Document Type therapy note (daily note)  -CM     Mode of Treatment physical therapy;individual therapy  -CM       Row Name 24 1400          General Information    Patient Profile Reviewed yes  -CM     Existing Precautions/Restrictions fall;other (see comments)  tremors  -CM     Barriers to Rehab medically complex;previous functional  deficit  -CM       Row Name 01/29/24 1400          Cognition    Orientation Status (Cognition) oriented to;person;disoriented to;place;situation;other (see comments)  states she is at home  -CM       Row Name 01/29/24 1400          Safety Issues, Functional Mobility    Safety Issues Affecting Function (Mobility) awareness of need for assistance;insight into deficits/self-awareness;safety precaution awareness;safety precautions follow-through/compliance;sequencing abilities  -CM     Impairments Affecting Function (Mobility) balance;endurance/activity tolerance;pain;postural/trunk control;strength  -CM               User Key  (r) = Recorded By, (t) = Taken By, (c) = Cosigned By      Initials Name Provider Type    CM Audrey Smith, PT Physical Therapist                   Mobility       Row Name 01/29/24 1402          Bed Mobility    Bed Mobility supine-sit;sit-supine;scooting/bridging  -CM     Scooting/Bridging [place] (Bed Mobility) dependent (less than 25% patient effort)  -CM     Supine-Sit [place] (Bed Mobility) moderate assist (50% patient effort);verbal cues;1 person assist  -CM     Sit-Supine [place] (Bed Mobility) moderate assist (50% patient effort);verbal cues;1 person assist  -CM     Assistive Device (Bed Mobility) bed rails;head of bed elevated;draw sheet  -CM     Comment, (Bed Mobility) cues for sequencing with bed mobility, patient very sleepy and requiring increased time for processing, patient did have to return to bed as RN was present to place lindsay catheter  -CM       Row Name 01/29/24 1402          Bed-Chair Transfer    Bed-Chair [place] (Transfers) minimum assist (75% patient effort);1 person assist;verbal cues  -CM     Assistive Device (Bed-Chair Transfers) walker, front-wheeled  -CM     Comment, (Bed-Chair Transfer) performed x2 as patient had just transferred to chair when RN present to then place lindsay and patient transferred back to bed, patient required Wei for  walker management  -CM       Row Name 01/29/24 1402          Sit-Stand Transfer    Sit-Stand [place] (Transfers) contact guard;1 person assist;verbal cues  -CM     Assistive Device (Sit-Stand Transfers) walker, front-wheeled  -CM     Comment, (Sit-Stand Transfer) cues to push up from bed rather than pulling on walker  -CM       Row Name 01/29/24 1402          Gait/Stairs (Locomotion)    [place] Level (Gait) unable to assess  -CM     Comment, (Gait/Stairs) patient was fatigued following prolonged stand for orthostatic BP measurement, then required to return to bed for placement of lindsay  -CM               User Key  (r) = Recorded By, (t) = Taken By, (c) = Cosigned By      Initials Name Provider Type    Audrey Martin PT Physical Therapist                   Obj/Interventions       Row Name 01/29/24 1405          Balance    Balance Assessment sitting static balance;standing static balance;standing dynamic balance  -CM     Static Sitting Balance standby assist  -CM     Position, Sitting Balance unsupported;sitting edge of bed  -CM     Static Standing Balance contact guard  -CM     Dynamic Standing Balance minimal assist  -CM     Position/Device Used, Standing Balance supported;walker, front-wheeled  -CM     Comment, Balance no overt LOB, does require assist for management of AD  -CM               User Key  (r) = Recorded By, (t) = Taken By, (c) = Cosigned By      Initials Name Provider Type    Audrey Martin, BOGDAN Physical Therapist                   Goals/Plan    No documentation.                  Clinical Impression       Row Name 01/29/24 1407          Pain    Pretreatment Pain Rating 0/10 - no pain  -CM     Posttreatment Pain Rating 0/10 - no pain  -CM       Row Name 01/29/24 1407          Plan of Care Review    Plan of Care Reviewed With patient  -CM     Progress improving  -CM     Outcome Evaluation Patient required decreased assist for all mobility and transfers today. She continues to  be limited by deficits in strength, endurance, and balance with mobility below baseline. IPPT remains indicated to address current deficits. Continue to recommend D/C to IPR.  -CM       Row Name 01/29/24 1407          Vital Signs    Pre Systolic BP Rehab 125  -CM     Pre Treatment Diastolic BP 61  -CM     Intra Systolic BP Rehab 125  -CM     Intra Treatment Diastolic BP 95  -CM     Post Systolic BP Rehab 108  -CM     Post Treatment Diastolic BP 70  -CM     Pretreatment Heart Rate (beats/min) 90  -CM     Posttreatment Heart Rate (beats/min) 90  -CM     Pre SpO2 (%) 92  -CM     O2 Delivery Pre Treatment room air  -CM     O2 Delivery Intra Treatment room air  -CM     Post SpO2 (%) 96  -CM     O2 Delivery Post Treatment room air  -CM     Pre Patient Position Supine  -CM     Intra Patient Position Sitting  -CM     Post Patient Position Standing  -CM       Row Name 01/29/24 1407          Positioning and Restraints    Pre-Treatment Position in bed  -CM     Post Treatment Position bed  -CM     In Bed supine;with nsg  multiple RNs present to place lindsay catheter  -CM               User Key  (r) = Recorded By, (t) = Taken By, (c) = Cosigned By      Initials Name Provider Type    Audrey Martin, PT Physical Therapist                   Outcome Measures       Row Name 01/29/24 1409 01/29/24 0745       How much help from another person do you currently need...    Turning from your back to your side while in flat bed without using bedrails? 3  -CM 3  -HS    Moving from lying on back to sitting on the side of a flat bed without bedrails? 2  -CM 3  -HS    Moving to and from a bed to a chair (including a wheelchair)? 3  -CM 2  -HS    Standing up from a chair using your arms (e.g., wheelchair, bedside chair)? 3  -CM 2  -HS    Climbing 3-5 steps with a railing? 2  -CM 1  -HS    To walk in hospital room? 3  -CM 2  -HS    AM-PAC 6 Clicks Score (PT) 16  -CM 13  -HS    Highest Level of Mobility Goal 5 --> Static standing  -CM 4  --> Transfer to chair/commode  -      Row Name 01/29/24 1409          Functional Assessment    Outcome Measure Options AM-PAC 6 Clicks Basic Mobility (PT)  -CM               User Key  (r) = Recorded By, (t) = Taken By, (c) = Cosigned By      Initials Name Provider Type     Lindsay Moy, RN Registered Nurse    Audrey Martin, PT Physical Therapist                                 Physical Therapy Education       Title: PT OT SLP Therapies (In Progress)       Topic: Physical Therapy (In Progress)       Point: Mobility training (In Progress)       Learning Progress Summary             Patient Acceptance, E, NR by CM at 1/29/2024 1409    Eager, E, VU,DU,NR by  at 1/26/2024 1336    Comment: Educated pt. safety/technique w/bed mobility, transfers, PT POC                         Point: Home exercise program (Not Started)       Learner Progress:  Not documented in this visit.              Point: Body mechanics (In Progress)       Learning Progress Summary             Patient Acceptance, E, NR by CM at 1/29/2024 1409    Eager, E, VU,DU,NR by  at 1/26/2024 1336    Comment: Educated pt. safety/technique w/bed mobility, transfers, PT POC                         Point: Precautions (In Progress)       Learning Progress Summary             Patient Acceptance, E, NR by CM at 1/29/2024 1409    Eager, E, VU,DU,NR by  at 1/26/2024 1336    Comment: Educated pt. safety/technique w/bed mobility, transfers, PT POC                                         User Key       Initials Effective Dates Name Provider Type Discipline     06/01/21 -  Hyacinth Johansen, PT Physical Therapist PT     09/22/22 -  Audrey Smith, BOGDAN Physical Therapist PT                  PT Recommendation and Plan     Plan of Care Reviewed With: patient  Progress: improving  Outcome Evaluation: Patient required decreased assist for all mobility and transfers today. She continues to be limited by deficits in strength, endurance, and balance with  mobility below baseline. IPPT remains indicated to address current deficits. Continue to recommend D/C to IPR.     Time Calculation:         PT Charges       Row Name 01/29/24 1409             Time Calculation    Start Time 1328  -CM      PT Received On 01/29/24  -CM      PT Goal Re-Cert Due Date 02/05/24  -CM         Timed Charges    37020 - PT Therapeutic Activity Minutes 25  -CM         Total Minutes    Timed Charges Total Minutes 25  -CM       Total Minutes 25  -CM                User Key  (r) = Recorded By, (t) = Taken By, (c) = Cosigned By      Initials Name Provider Type    Audrey Martin, PT Physical Therapist                  Therapy Charges for Today       Code Description Service Date Service Provider Modifiers Qty    96995692029 HC PT THERAPEUTIC ACT EA 15 MIN 1/29/2024 Audrey Smith, BOGDAN GP 2            PT G-Codes  Outcome Measure Options: AM-PAC 6 Clicks Basic Mobility (PT)  AM-PAC 6 Clicks Score (PT): 16  AM-PAC 6 Clicks Score (OT): 14  PT Discharge Summary  Anticipated Discharge Disposition (PT): inpatient rehabilitation facility    Audrey Smith PT  1/29/2024

## 2024-01-29 NOTE — PLAN OF CARE
Goal Outcome Evaluation:  Plan of Care Reviewed With: patient        Progress: improving  Outcome Evaluation: Patient required decreased assist for all mobility and transfers today. She continues to be limited by deficits in strength, endurance, and balance with mobility below baseline. IPPT remains indicated to address current deficits. Continue to recommend D/C to IPR.      Anticipated Discharge Disposition (PT): inpatient rehabilitation facility

## 2024-01-30 LAB
ANION GAP SERPL CALCULATED.3IONS-SCNC: 9 MMOL/L (ref 5–15)
BUN SERPL-MCNC: 11 MG/DL (ref 8–23)
BUN/CREAT SERPL: 18 (ref 7–25)
CALCIUM SPEC-SCNC: 8 MG/DL (ref 8.6–10.5)
CHLORIDE SERPL-SCNC: 100 MMOL/L (ref 98–107)
CO2 SERPL-SCNC: 25 MMOL/L (ref 22–29)
CREAT SERPL-MCNC: 0.61 MG/DL (ref 0.57–1)
DEPRECATED RDW RBC AUTO: 43.1 FL (ref 37–54)
EGFRCR SERPLBLD CKD-EPI 2021: 86.7 ML/MIN/1.73
ERYTHROCYTE [DISTWIDTH] IN BLOOD BY AUTOMATED COUNT: 13.2 % (ref 12.3–15.4)
GLUCOSE BLDC GLUCOMTR-MCNC: 122 MG/DL (ref 70–130)
GLUCOSE BLDC GLUCOMTR-MCNC: 137 MG/DL (ref 70–130)
GLUCOSE BLDC GLUCOMTR-MCNC: 142 MG/DL (ref 70–130)
GLUCOSE BLDC GLUCOMTR-MCNC: 171 MG/DL (ref 70–130)
GLUCOSE SERPL-MCNC: 108 MG/DL (ref 65–99)
HCT VFR BLD AUTO: 32.2 % (ref 34–46.6)
HGB BLD-MCNC: 10.6 G/DL (ref 12–15.9)
MCH RBC QN AUTO: 29.3 PG (ref 26.6–33)
MCHC RBC AUTO-ENTMCNC: 32.9 G/DL (ref 31.5–35.7)
MCV RBC AUTO: 89 FL (ref 79–97)
PLATELET # BLD AUTO: 132 10*3/MM3 (ref 140–450)
PMV BLD AUTO: 10.1 FL (ref 6–12)
POTASSIUM SERPL-SCNC: 3.5 MMOL/L (ref 3.5–5.2)
RBC # BLD AUTO: 3.62 10*6/MM3 (ref 3.77–5.28)
SODIUM SERPL-SCNC: 134 MMOL/L (ref 136–145)
WBC NRBC COR # BLD AUTO: 5.94 10*3/MM3 (ref 3.4–10.8)

## 2024-01-30 PROCEDURE — 80048 BASIC METABOLIC PNL TOTAL CA: CPT | Performed by: HOSPITALIST

## 2024-01-30 PROCEDURE — 99232 SBSQ HOSP IP/OBS MODERATE 35: CPT | Performed by: INTERNAL MEDICINE

## 2024-01-30 PROCEDURE — 97116 GAIT TRAINING THERAPY: CPT

## 2024-01-30 PROCEDURE — 25810000003 SODIUM CHLORIDE 0.9 % SOLUTION: Performed by: INTERNAL MEDICINE

## 2024-01-30 PROCEDURE — 85027 COMPLETE CBC AUTOMATED: CPT | Performed by: HOSPITALIST

## 2024-01-30 PROCEDURE — 25010000002 CEFTRIAXONE PER 250 MG: Performed by: HOSPITALIST

## 2024-01-30 PROCEDURE — 82948 REAGENT STRIP/BLOOD GLUCOSE: CPT

## 2024-01-30 PROCEDURE — 97110 THERAPEUTIC EXERCISES: CPT

## 2024-01-30 PROCEDURE — 63710000001 INSULIN LISPRO (HUMAN) PER 5 UNITS: Performed by: PHYSICIAN ASSISTANT

## 2024-01-30 RX ORDER — SODIUM CHLORIDE 9 MG/ML
50 INJECTION, SOLUTION INTRAVENOUS CONTINUOUS
Status: ACTIVE | OUTPATIENT
Start: 2024-01-30 | End: 2024-01-31

## 2024-01-30 RX ADMIN — TAMSULOSIN HYDROCHLORIDE 0.4 MG: 0.4 CAPSULE ORAL at 08:18

## 2024-01-30 RX ADMIN — VENLAFAXINE HYDROCHLORIDE 150 MG: 75 CAPSULE, EXTENDED RELEASE ORAL at 08:18

## 2024-01-30 RX ADMIN — SENNOSIDES AND DOCUSATE SODIUM 2 TABLET: 8.6; 5 TABLET ORAL at 08:18

## 2024-01-30 RX ADMIN — SODIUM CHLORIDE 50 ML/HR: 9 INJECTION, SOLUTION INTRAVENOUS at 11:38

## 2024-01-30 RX ADMIN — CLOPIDOGREL BISULFATE 75 MG: 75 TABLET ORAL at 08:18

## 2024-01-30 RX ADMIN — PANTOPRAZOLE SODIUM 40 MG: 40 TABLET, DELAYED RELEASE ORAL at 08:18

## 2024-01-30 RX ADMIN — SENNOSIDES AND DOCUSATE SODIUM 2 TABLET: 8.6; 5 TABLET ORAL at 21:52

## 2024-01-30 RX ADMIN — ASPIRIN 81 MG: 81 TABLET, COATED ORAL at 08:18

## 2024-01-30 RX ADMIN — ACETAMINOPHEN 650 MG: 325 TABLET ORAL at 21:52

## 2024-01-30 RX ADMIN — LEVOTHYROXINE SODIUM 50 MCG: 0.05 TABLET ORAL at 05:46

## 2024-01-30 RX ADMIN — Medication 10 ML: at 08:18

## 2024-01-30 RX ADMIN — Medication 10 ML: at 21:52

## 2024-01-30 RX ADMIN — SODIUM CHLORIDE 1000 MG: 900 INJECTION INTRAVENOUS at 08:17

## 2024-01-30 RX ADMIN — INSULIN LISPRO 2 UNITS: 100 INJECTION, SOLUTION INTRAVENOUS; SUBCUTANEOUS at 11:37

## 2024-01-30 NOTE — THERAPY TREATMENT NOTE
Patient Name: Janna Willis  : 1935    MRN: [id number]                              Today's Date: 2024       Admit Date: 2024    Visit Dx:     ICD-10-CM ICD-9-CM   1. Fall, initial encounter  W19.XXXA E888.9   2. Facial laceration, initial encounter  S01.81XA 873.40   3. Traumatic hematoma of right orbit, initial encounter  S05.11XA 921.2   4. Closed head injury, initial encounter  S09.90XA 959.01   5. Anticoagulated  Z79.01 V58.61     Patient Active Problem List   Diagnosis    Heart block AV third degree    CAD (coronary artery disease)    Primary hypertension    Mixed hyperlipidemia    GERD (gastroesophageal reflux disease)    Hypothyroid    Class 1 obesity in adult    Traumatic orbital hematoma    Rhabdomyolysis    Fall    Syncope    DM2 (diabetes mellitus, type 2)     Past Medical History:   Diagnosis Date    Diabetes      Past Surgical History:   Procedure Laterality Date    CARDIAC CATHETERIZATION N/A 2023    Procedure: Left Heart Cath;  Surgeon: Kuldeep Miner MD;  Location:  [facility];  Service: Cardiology;  Laterality: N/A;    CARDIAC ELECTROPHYSIOLOGY PROCEDURE N/A 2023    Procedure: Temporary Pacemaker;  Surgeon: Kuldeep Miner MD;  Location:  [facility];  Service: Cardiology;  Laterality: N/A;    CARDIAC ELECTROPHYSIOLOGY PROCEDURE N/A 2023    Procedure: Pacemaker DC new;  Surgeon: Dudley Rodriguez DO;  Location:  [facility];  Service: Cardiology;  Laterality: N/A;    OVARY SURGERY        General Information       Row Name 24 1624          Physical Therapy Time and Intention    Document Type therapy note (daily note)  -SC     Mode of Treatment physical therapy;individual therapy  -SC       Row Name 24 1624          General Information    Patient Profile Reviewed yes  -SC     Existing Precautions/Restrictions fall  -SC       Row Name 24 1624          Cognition    Orientation Status (Cognition) oriented  to;person;disoriented to;place;situation;other (see comments)  -SC       Row Name 01/30/24 1624          Safety Issues, Functional Mobility    Impairments Affecting Function (Mobility) balance;endurance/activity tolerance;pain;postural/trunk control;strength  -SC     Comment, Safety Issues/Impairments (Mobility) alert, following commands  -SC               User Key  (r) = Recorded By, (t) = Taken By, (c) = Cosigned By      Initials Name Provider Type    SC Mumtaz Villareal PT Physical Therapist                   Mobility       Row Name 01/30/24 1624          Bed Mobility    Comment, (Bed Mobility) uic  -SC       Row Name 01/30/24 1624          Transfers    Comment, (Transfers) cues for hand placement. Demonstrated slow unsteady transfer into standing. Time taken in standing to work on upright posture  -SC       Row Name 01/30/24 1624          Sit-Stand Transfer    Sit-Stand [place] (Transfers) 1 person assist;verbal cues;1 person to manage equipment;minimum assist (75% patient effort)  -SC     Assistive Device (Sit-Stand Transfers) walker, front-wheeled  -SC       Row Name 01/30/24 1624          Gait/Stairs (Locomotion)    [place] Level (Gait) 2 person assist;1 person assist;minimum assist (75% patient effort)  -SC     Assistive Device (Gait) walker, front-wheeled  -SC     Distance in Feet (Gait) 100  -SC     Deviations/Abnormal Patterns (Gait) stride length decreased;weight shifting decreased;gilberto decreased  -SC     Bilateral Gait Deviations forward flexed posture  -SC     Comment, (Gait/Stairs) Gt training focused on controling walker with step through gait pattern. Cues to stay close to walker. hue requried assistance to control walker in hallway. Unsteady gait  -SC               User Key  (r) = Recorded By, (t) = Taken By, (c) = Cosigned By      Initials Name Provider Type    SC Mumtaz Villareal PT Physical Therapist                   Obj/Interventions       Row Name 01/30/24 1627          Motor  Skills    Therapeutic Exercise hip;knee;ankle  -[facility] Name 01/30/24 1627          Hip (Therapeutic Exercise)    Hip (Therapeutic Exercise) AROM (active range of motion)  -SC     Hip AROM (Therapeutic Exercise) bilateral;flexion;extension;aBduction;aDduction;10 repetitions  -[facility] Name 01/30/24 1627          Knee (Therapeutic Exercise)    Knee (Therapeutic Exercise) AROM (active range of motion)  -SC     Knee AROM (Therapeutic Exercise) bilateral;flexion;extension;10 repetitions  -[facility] Name 01/30/24 1627          Ankle (Therapeutic Exercise)    Ankle (Therapeutic Exercise) AROM (active range of motion)  -SC     Ankle AROM (Therapeutic Exercise) bilateral;dorsiflexion;plantarflexion;10 repetitions  -[facility] Name 01/30/24 1627          Balance    Balance Assessment standing static balance  -SC     Dynamic Standing Balance 2-person assist;minimal assist;1 person to manage equipment  -SC     Position/Device Used, Standing Balance supported;walker, rolling  -SC     Comment, Balance unsteady  -SC               User Key  (r) = Recorded By, (t) = Taken By, (c) = Cosigned By      Initials Name Provider Type    SC Mumtaz Villareal, PT Physical Therapist                   Goals/Plan    No documentation.                  Clinical Impression       [facility] Name 01/30/24 1628          Pain Scale: FACES Pre/Post-Treatment    Pain: FACES Scale, Pretreatment 2-->hurts little bit  -SC     Posttreatment Pain Rating 4-->hurts little more  -SC     Pain Location - Side/Orientation Bilateral  -SC     Pain Location - knee  -[facility] Name 01/30/24 1628          Plan of Care Review    Plan of Care Reviewed With patient  -SC     Progress improving  -SC     Outcome Evaluation Patietn able to ambulate in hallway today with assistance to control walker. Continue to recommend rehab at discharge  -[facility] Name 01/30/24 1628          Therapy Assessment/Plan (PT)    Rehab Potential (PT) good, to achieve stated therapy  goals  -SC     Criteria for Skilled Interventions Met (PT) yes;meets criteria;skilled treatment is necessary  -SC     Therapy Frequency (PT) daily  -SC       Row Name 01/30/24 1628          Positioning and Restraints    Pre-Treatment Position sitting in chair/recliner  -SC     Post Treatment Position chair  -SC     In Chair notified nsg;reclined;sitting;call light within reach;encouraged to call for assist;exit alarm on;with family/caregiver  -SC               User Key  (r) = Recorded By, (t) = Taken By, (c) = Cosigned By      Initials Name Provider Type    SC Mumtaz Villareal, PT Physical Therapist                   Outcome Measures       Row Name 01/30/24 1629 01/30/24 0800       How much help from another person do you currently need...    Turning from your back to your side while in flat bed without using bedrails? 3  -SC 3  -AS    Moving from lying on back to sitting on the side of a flat bed without bedrails? 2  -SC 2  -AS    Moving to and from a bed to a chair (including a wheelchair)? 3  -SC 2  -AS    Standing up from a chair using your arms (e.g., wheelchair, bedside chair)? 3  -SC 2  -AS    Climbing 3-5 steps with a railing? 1  -SC 2  -AS    To walk in hospital room? 3  -SC 2  -AS    AM-PAC 6 Clicks Score (PT) 15  -SC 13  -AS    Highest Level of Mobility Goal 4 --> Transfer to chair/commode  -SC 4 --> Transfer to chair/commode  -AS      Row Name 01/30/24 1629          Functional Assessment    Outcome Measure Options AM-PAC 6 Clicks Basic Mobility (PT)  -SC               User Key  (r) = Recorded By, (t) = Taken By, (c) = Cosigned By      Initials Name Provider Type    SC Mumtaz Villareal, PT Physical Therapist    AS Rubi Woods RN Registered Nurse                                 Physical Therapy Education       Title: PT OT SLP Therapies (In Progress)       Topic: Physical Therapy (Done)       Point: Mobility training (Done)       Learning Progress Summary             Patient MEL Alvarez, VU by SC at  1/30/2024 1630    Comment: reviewed HEP    Acceptance, E, NR by  at 1/29/2024 1409    Eager, E, VU,DU,NR by  at 1/26/2024 1336    Comment: Educated pt. safety/technique w/bed mobility, transfers, PT POC                         Point: Home exercise program (Done)       Learning Progress Summary             Patient Eager, E, VU by SC at 1/30/2024 1630    Comment: reviewed HEP                         Point: Body mechanics (Done)       Learning Progress Summary             Patient Eager, E, VU by SC at 1/30/2024 1630    Comment: reviewed HEP    Acceptance, E, NR by  at 1/29/2024 1409    Eager, E, VU,DU,NR by  at 1/26/2024 1336    Comment: Educated pt. safety/technique w/bed mobility, transfers, PT POC                         Point: Precautions (Done)       Learning Progress Summary             Patient Eager, E, VU by SC at 1/30/2024 1630    Comment: reviewed HEP    Acceptance, E, NR by  at 1/29/2024 1409    Eager, E, VU,DU,NR by  at 1/26/2024 1336    Comment: Educated pt. safety/technique w/bed mobility, transfers, PT POC                                         User Key       Initials Effective Dates Name Provider Type Discipline    SC 02/03/23 -  Mumtaz Villareal, PT Physical Therapist PT     06/01/21 -  Hyacinth Johansen, PT Physical Therapist PT     09/22/22 -  Audrey Smith, PT Physical Therapist PT                  PT Recommendation and Plan     Plan of Care Reviewed With: patient  Progress: improving  Outcome Evaluation: Amos able to ambulate in hallway today with assistance to control walker. Continue to recommend rehab at discharge     Time Calculation:         PT Charges       Row Name 01/30/24 1502             Time Calculation    Start Time 1502  -SC      PT Received On 01/30/24  -SC      PT Goal Re-Cert Due Date 02/05/24  -SC         Timed Charges    93804 - PT Therapeutic Exercise Minutes 8  -SC      06217 - Gait Training Minutes  10  -SC      64895 - PT Therapeutic Activity Minutes 6   -SC         Total Minutes    Timed Charges Total Minutes 24  -SC       Total Minutes 24  -SC                User Key  (r) = Recorded By, (t) = Taken By, (c) = Cosigned By      Initials Name Provider Type    SC Mumtaz Villareal PT Physical Therapist                  Therapy Charges for Today       Code Description Service Date Service Provider Modifiers Qty    00305997098  PT THER PROC EA 15 MIN 1/30/2024 Mumtaz Villareal, PT GP 1    [id number] HC GAIT TRAINING EA 15 MIN 1/30/2024 Mumtaz Villareal, PT GP 1    [id number]  PT THER SUPP EA 15 MIN 1/30/2024 Mumtaz Villareal PT GP 2            PT G-Codes  Outcome Measure Options: AM-PAC 6 Clicks Basic Mobility (PT)  AM-PAC 6 Clicks Score (PT): 15  AM-PAC 6 Clicks Score (OT): 14  PT Discharge Summary  Anticipated Discharge Disposition (PT): inpatient rehabilitation facility    Mumtaz Villareal PT  1/30/2024

## 2024-01-30 NOTE — PLAN OF CARE
Goal Outcome Evaluation:  Plan of Care Reviewed With: patient        Progress: improving  Outcome Evaluation: Suetn able to ambulate in hallway today with assistance to control walker. Continue to recommend rehab at discharge      Anticipated Discharge Disposition (PT): inpatient rehabilitation facility                         Pls see TE 4/9/18. Thanks.

## 2024-01-30 NOTE — PROGRESS NOTES
[facility] Medicine Services  PROGRESS NOTE    Patient Name: Janna Willis  : 1935  MRN: [id number]    Date of Admission: 2024  Primary Care Physician: Hever Ibrahim APRN    Subjective   Subjective     CC: S/P Fall    HPI: seen in hallway ambulating; no dyspnea      Objective   Objective     Vital Signs:   Temp:  [97.2 °F (36.2 °C)-98.4 °F (36.9 °C)] 97.2 °F (36.2 °C)  Heart Rate:  [69-98] 69  Resp:  [16-18] 16  BP: (100-155)/(47-69) 120/60  Flow (L/min):  [2] 2     Physical Exam:  NAD, alert and oriented x 3  Bilateral ecchymoses surrounding eyes, aging now purple bruising  Ambulating w/ walker (w/ pt assistance)  R periorbital swelling/hematoma/bruising improving  R shoulder ROM intact    Results Reviewed:  LAB RESULTS:      Lab 24  0620 24  1038 24  0644 24  0552 24  0546 24  0519 24  1407   WBC 5.94  --   --  8.63 5.76 5.01 6.67   HEMOGLOBIN 10.6*  --   --  13.3 11.9* 12.3 14.0   HEMATOCRIT 32.2*  --   --  39.7 36.4 37.0 42.7   PLATELETS 132*  --   --  156 125* 134* 149   NEUTROS ABS  --   --   --   --   --  3.42 5.14   IMMATURE GRANS (ABS)  --   --   --   --   --  0.01 0.01   LYMPHS ABS  --   --   --   --   --  1.12 0.92   MONOS ABS  --   --   --   --   --  0.36 0.54   EOS ABS  --   --   --   --   --  0.08 0.03   MCV 89.0  --   --  88.2 89.9 87.1 87.3   LACTATE  --  0.9 2.1*  --   --   --   --          Lab 24  0620 24  0552 24  0546 24  0519 24  1407   SODIUM 134* 134* 136 136 134*   POTASSIUM 3.5 3.7 4.2 3.8 4.1   CHLORIDE 100 97* 100 101 97*   CO2 25.0 23.0 24.0 26.0 24.0   ANION GAP 9.0 14.0 12.0 9.0 13.0   BUN 11 8 16 13 16   CREATININE 0.61 0.48* 0.70 0.58 0.62   EGFR 86.7 91.8 83.8 87.7 86.3   GLUCOSE 108* 152* 103* 91 121*   CALCIUM 8.0* 8.6 8.5* 8.7 9.1   HEMOGLOBIN A1C  --   --   --  5.60 5.60         Lab 24  0519 24  1407   TOTAL PROTEIN 6.4 7.1   ALBUMIN 3.7 4.1    GLOBULIN 2.7 3.0   ALT (SGPT) 10 12   AST (SGOT) 21 26   BILIRUBIN 0.7 0.7   ALK PHOS 64 74         Lab 01/25/24  1407   HSTROP T 10                 Brief Urine Lab Results  (Last result in the past 365 days)        Color   Clarity   Blood   Leuk Est   Nitrite   Protein   CREAT   Urine HCG        01/29/24 0908 Yellow   Cloudy   Small (1+)   Moderate (2+)   Negative   Negative                   Microbiology Results Abnormal       None            XR Abdomen KUB    Result Date: 1/29/2024  XR ABDOMEN KUB Date of Exam: 1/29/2024 6:28 AM EST Indication: abdominal pain; diarrhea Comparison: 6/12/2023. Findings: No gross free air or pneumatosis though evaluation is slightly limited due to technique. There is a non obstructive bowel gas pattern. There is some gaseous distention of the colon. A mild to moderate stool burden is present. Large rounded opacity within  the lower abdomen and pelvis likely representing distended bladder. No suspicious calcifications identified. No acute osseous abnormality identified.     Impression: Impression: Nonobstructive bowel gas pattern. Mild to moderate stool burden present. Large rounded opacity within the lower abdomen and pelvis likely representing significantly distended bladder. Recommend clinical correlation. Electronically Signed: Deepa Polk MD  1/29/2024 6:50 AM EST  Workstation ID: [id number]     Results for orders placed during the hospital encounter of 06/05/23    Adult Transthoracic Echo Complete w/ Color, Spectral and Contrast if Necessary Per Protocol    Interpretation Summary    Left ventricular systolic function is normal. Left ventricular ejection fraction appears to be 61 - 65%.    Left ventricular wall thickness is consistent with borderline concentric hypertrophy.    Left ventricular diastolic function is consistent with (grade II w/high LAP) pseudonormalization.    Left atrial volume is mildly increased.    There is moderate calcification of the aortic valve.     Moderate mitral annular calcification is present.    Mild mitral valve stenosis is present.    Mild mitral valve regurgitation is present.    Mild pulmonic valve regurgitation is present.    Moderate tricuspid valve regurgitation is present.    Estimated right ventricular systolic pressure from tricuspid regurgitation is mildly elevated (35-45 mmHg).      Current medications:  Scheduled Meds:aspirin, 81 mg, Oral, Daily  cefTRIAXone, 1,000 mg, Intravenous, Daily  clopidogrel, 75 mg, Oral, Daily  insulin lispro, 2-7 Units, Subcutaneous, 4x Daily AC & at Bedtime  levothyroxine, 50 mcg, Oral, Q AM  pantoprazole, 40 mg, Oral, QAM  senna-docusate sodium, 2 tablet, Oral, BID  sodium chloride, 10 mL, Intravenous, Q12H  tamsulosin, 0.4 mg, Oral, Daily  venlafaxine XR, 150 mg, Oral, Daily With Breakfast      Continuous Infusions:sodium chloride, 50 mL/hr, Last Rate: 50 mL/hr (01/30/24 1138)        PRN Meds:.  acetaminophen    senna-docusate sodium **AND** polyethylene glycol **AND** bisacodyl **AND** bisacodyl    dextrose    dextrose    glucagon (human recombinant)    Magnesium Standard Dose Replacement - Follow Nurse / BPA Driven Protocol    melatonin    ondansetron    [COMPLETED] Insert Peripheral IV **AND** sodium chloride    sodium chloride    sodium chloride    Assessment & Plan   Assessment & Plan     Active Hospital Problems    Diagnosis  POA    **Traumatic orbital hematoma [S05.10XA]  Yes    Rhabdomyolysis [M62.82]  Yes    Fall [W19.XXXA]  Yes    Syncope [R55]  Yes    DM2 (diabetes mellitus, type 2) [E11.9]  Yes    Mixed hyperlipidemia [E78.2]  Yes    Hypothyroid [E03.9]  Yes    GERD (gastroesophageal reflux disease) [K21.9]  Yes    Primary hypertension [I10]  Yes    CAD (coronary artery disease) [I25.10]  Yes      Resolved Hospital Problems   No resolved problems to display.        Brief Hospital Course to date:  88 y.o. female with a past medical history significant for HTN, HLD, CAD s/p stent placement, s/p PPM,  DM2, GERD, and hypothyroidism. Here with orbital hematoma and rhabdo after an unwitnessed fall.    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 Jacobo's Physician assistant team). If found to have significant or persistent incomplete bladder emptying, given age and comorbidities, can learn to self-cath (which may challenging given her anatomy) vs replacement of lindsay catheter    Hypothyroidism  -synthroid    Anx/Depression  -Effexor    GERD  -PPI    Am labs:  cbc,bmp,mag, urine culture      Expected Discharge Location and Transportation: Rehab  Expected Discharge   Expected Discharge Date: 1/31/2024; Expected Discharge Time:      DVT prophylaxis:  Mechanical DVT prophylaxis orders are present.         AM-PAC 6 Clicks Score (PT): 13 (01/30/24 0800)    CODE STATUS:   Code Status and Medical Interventions:   Ordered at: 01/25/24 1951     Level Of Support Discussed With:    Patient     Code Status (Patient has no pulse and is not breathing):    CPR (Attempt to Resuscitate)     Medical Interventions (Patient has pulse or is breathing):    Full Support       Ton Mann MD  01/30/24

## 2024-01-30 NOTE — PLAN OF CARE
Goal Outcome Evaluation:  Plan of Care Reviewed With: patient        Progress: improving  Outcome Evaluation: a/o x 3; VSS, RA -2L n.c. PRN while resting; denies pain, turned q2h; a new IV was placed - saline locked; pleasant, rested comfortably; turned q2h; no acute changes to report; will continue POC    Marie care completed       Problem: Adult Inpatient Plan of Care  Goal: Absence of Hospital-Acquired Illness or Injury  Intervention: Identify and Manage Fall Risk  Recent Flowsheet Documentation  Taken 1/30/2024 0400 by Neymar Nieto, RN  Safety Promotion/Fall Prevention:   assistive device/personal items within reach   clutter free environment maintained   activity supervised   fall prevention program maintained   nonskid shoes/slippers when out of bed   room organization consistent   safety round/check completed  Taken 1/30/2024 0200 by Neymar Nieto, RN  Safety Promotion/Fall Prevention:   clutter free environment maintained   assistive device/personal items within reach   activity supervised   fall prevention program maintained   nonskid shoes/slippers when out of bed   room organization consistent   safety round/check completed  Taken 1/30/2024 0000 by Neymar Nieto, RN  Safety Promotion/Fall Prevention:   assistive device/personal items within reach   clutter free environment maintained   activity supervised   fall prevention program maintained   nonskid shoes/slippers when out of bed   room organization consistent   safety round/check completed  Taken 1/29/2024 2200 by Neymar Nieto, RN  Safety Promotion/Fall Prevention:   assistive device/personal items within reach   clutter free environment maintained   activity supervised   fall prevention program maintained   nonskid shoes/slippers when out of bed   room organization consistent   toileting scheduled  Taken 1/29/2024 1953 by Neymar Nieto RN  Safety Promotion/Fall Prevention:   activity supervised   assistive device/personal  items within reach   clutter free environment maintained   fall prevention program maintained   nonskid shoes/slippers when out of bed   room organization consistent   safety round/check completed     Problem: Adult Inpatient Plan of Care  Goal: Absence of Hospital-Acquired Illness or Injury  Intervention: Prevent Skin Injury  Recent Flowsheet Documentation  Taken 1/30/2024 0400 by Neymar Nieto RN  Body Position:   turned   right  Skin Protection:   adhesive use limited   incontinence pads utilized   transparent dressing maintained   tubing/devices free from skin contact   skin-to-device areas padded  Taken 1/30/2024 0200 by Neymar Nieto RN  Body Position:   turned   supine  Skin Protection:   adhesive use limited   incontinence pads utilized   transparent dressing maintained   tubing/devices free from skin contact   skin-to-device areas padded  Taken 1/30/2024 0000 by Neymar Nieto RN  Body Position:   turned   left   legs elevated  Skin Protection:   adhesive use limited   incontinence pads utilized   transparent dressing maintained   tubing/devices free from skin contact  Taken 1/29/2024 2200 by Neymar Nieto RN  Body Position:   turned   supine  Skin Protection:   adhesive use limited   incontinence pads utilized   transparent dressing maintained   tubing/devices free from skin contact   skin-to-device areas padded  Taken 1/29/2024 1953 by Neymar Nieto RN  Body Position:   turned   tilted   right  Skin Protection:   adhesive use limited   incontinence pads utilized   transparent dressing maintained   tubing/devices free from skin contact   skin-to-device areas padded   skin sealant/moisture barrier applied   pulse oximeter probe site changed

## 2024-01-31 ENCOUNTER — TELEPHONE (OUTPATIENT)
Dept: UROLOGY | Facility: CLINIC | Age: 89
End: 2024-01-31
Payer: MEDICARE

## 2024-01-31 LAB
ANION GAP SERPL CALCULATED.3IONS-SCNC: 10 MMOL/L (ref 5–15)
BACTERIA SPEC AEROBE CULT: ABNORMAL
BUN SERPL-MCNC: 12 MG/DL (ref 8–23)
BUN/CREAT SERPL: 22.2 (ref 7–25)
CALCIUM SPEC-SCNC: 8.1 MG/DL (ref 8.6–10.5)
CHLORIDE SERPL-SCNC: 102 MMOL/L (ref 98–107)
CO2 SERPL-SCNC: 24 MMOL/L (ref 22–29)
CREAT SERPL-MCNC: 0.54 MG/DL (ref 0.57–1)
DEPRECATED RDW RBC AUTO: 41.7 FL (ref 37–54)
EGFRCR SERPLBLD CKD-EPI 2021: 89.2 ML/MIN/1.73
ERYTHROCYTE [DISTWIDTH] IN BLOOD BY AUTOMATED COUNT: 13 % (ref 12.3–15.4)
GLUCOSE BLDC GLUCOMTR-MCNC: 109 MG/DL (ref 70–130)
GLUCOSE BLDC GLUCOMTR-MCNC: 126 MG/DL (ref 70–130)
GLUCOSE BLDC GLUCOMTR-MCNC: 147 MG/DL (ref 70–130)
GLUCOSE BLDC GLUCOMTR-MCNC: 150 MG/DL (ref 70–130)
GLUCOSE SERPL-MCNC: 99 MG/DL (ref 65–99)
HCT VFR BLD AUTO: 32.9 % (ref 34–46.6)
HGB BLD-MCNC: 10.8 G/DL (ref 12–15.9)
MAGNESIUM SERPL-MCNC: 1.8 MG/DL (ref 1.6–2.4)
MCH RBC QN AUTO: 28.6 PG (ref 26.6–33)
MCHC RBC AUTO-ENTMCNC: 32.8 G/DL (ref 31.5–35.7)
MCV RBC AUTO: 87.3 FL (ref 79–97)
PLATELET # BLD AUTO: 150 10*3/MM3 (ref 140–450)
PMV BLD AUTO: 9.9 FL (ref 6–12)
POTASSIUM SERPL-SCNC: 3.9 MMOL/L (ref 3.5–5.2)
RBC # BLD AUTO: 3.77 10*6/MM3 (ref 3.77–5.28)
SODIUM SERPL-SCNC: 136 MMOL/L (ref 136–145)
WBC NRBC COR # BLD AUTO: 5.1 10*3/MM3 (ref 3.4–10.8)

## 2024-01-31 PROCEDURE — 85027 COMPLETE CBC AUTOMATED: CPT | Performed by: INTERNAL MEDICINE

## 2024-01-31 PROCEDURE — 80048 BASIC METABOLIC PNL TOTAL CA: CPT | Performed by: INTERNAL MEDICINE

## 2024-01-31 PROCEDURE — 63710000001 INSULIN LISPRO (HUMAN) PER 5 UNITS: Performed by: PHYSICIAN ASSISTANT

## 2024-01-31 PROCEDURE — 97116 GAIT TRAINING THERAPY: CPT

## 2024-01-31 PROCEDURE — 25810000003 SODIUM CHLORIDE 0.9 % SOLUTION: Performed by: INTERNAL MEDICINE

## 2024-01-31 PROCEDURE — 25010000002 CEFTRIAXONE PER 250 MG: Performed by: HOSPITALIST

## 2024-01-31 PROCEDURE — 83735 ASSAY OF MAGNESIUM: CPT | Performed by: INTERNAL MEDICINE

## 2024-01-31 PROCEDURE — 82948 REAGENT STRIP/BLOOD GLUCOSE: CPT

## 2024-01-31 PROCEDURE — 99232 SBSQ HOSP IP/OBS MODERATE 35: CPT | Performed by: INTERNAL MEDICINE

## 2024-01-31 PROCEDURE — 97530 THERAPEUTIC ACTIVITIES: CPT

## 2024-01-31 RX ADMIN — Medication 10 ML: at 21:10

## 2024-01-31 RX ADMIN — VENLAFAXINE HYDROCHLORIDE 150 MG: 75 CAPSULE, EXTENDED RELEASE ORAL at 08:38

## 2024-01-31 RX ADMIN — SODIUM CHLORIDE 50 ML/HR: 9 INJECTION, SOLUTION INTRAVENOUS at 08:44

## 2024-01-31 RX ADMIN — SENNOSIDES AND DOCUSATE SODIUM 2 TABLET: 8.6; 5 TABLET ORAL at 21:10

## 2024-01-31 RX ADMIN — INSULIN LISPRO 2 UNITS: 100 INJECTION, SOLUTION INTRAVENOUS; SUBCUTANEOUS at 21:09

## 2024-01-31 RX ADMIN — Medication 5 MG: at 21:10

## 2024-01-31 RX ADMIN — ASPIRIN 81 MG: 81 TABLET, COATED ORAL at 08:38

## 2024-01-31 RX ADMIN — Medication 10 ML: at 08:38

## 2024-01-31 RX ADMIN — TAMSULOSIN HYDROCHLORIDE 0.4 MG: 0.4 CAPSULE ORAL at 08:38

## 2024-01-31 RX ADMIN — SODIUM CHLORIDE 1000 MG: 900 INJECTION INTRAVENOUS at 08:37

## 2024-01-31 RX ADMIN — CLOPIDOGREL BISULFATE 75 MG: 75 TABLET ORAL at 08:38

## 2024-01-31 RX ADMIN — LEVOTHYROXINE SODIUM 50 MCG: 0.05 TABLET ORAL at 05:18

## 2024-01-31 RX ADMIN — PANTOPRAZOLE SODIUM 40 MG: 40 TABLET, DELAYED RELEASE ORAL at 08:38

## 2024-01-31 NOTE — PLAN OF CARE
Goal Outcome Evaluation:  Plan of Care Reviewed With: patient        Progress: improving  Outcome Evaluation: a/o x 3, forgetful; VSS, 2L n.c prn; no acute changes to report; CHG/lindsay care provided; skin care provided; will continue POC           Problem: Adult Inpatient Plan of Care  Goal: Absence of Hospital-Acquired Illness or Injury  Intervention: Identify and Manage Fall Risk  Recent Flowsheet Documentation  Taken 1/31/2024 0400 by Neymar Nieto, RN  Safety Promotion/Fall Prevention:   activity supervised   assistive device/personal items within reach   clutter free environment maintained   fall prevention program maintained   nonskid shoes/slippers when out of bed   room organization consistent   safety round/check completed  Taken 1/31/2024 0200 by Neymar Nieto, RN  Safety Promotion/Fall Prevention:   assistive device/personal items within reach   clutter free environment maintained   activity supervised   fall prevention program maintained   nonskid shoes/slippers when out of bed   room organization consistent   safety round/check completed  Taken 1/31/2024 0000 by Neymar Nieto, RN  Safety Promotion/Fall Prevention:   activity supervised   assistive device/personal items within reach   clutter free environment maintained   fall prevention program maintained   nonskid shoes/slippers when out of bed   room organization consistent   safety round/check completed  Taken 1/30/2024 2200 by Neymar Nieto, RN  Safety Promotion/Fall Prevention:   assistive device/personal items within reach   clutter free environment maintained   activity supervised   fall prevention program maintained   nonskid shoes/slippers when out of bed   room organization consistent   safety round/check completed  Taken 1/30/2024 1936 by Neymar Nieto, RN  Safety Promotion/Fall Prevention:   activity supervised   assistive device/personal items within reach   clutter free environment maintained   fall prevention  program maintained   nonskid shoes/slippers when out of bed   room organization consistent   safety round/check completed     Problem: Adult Inpatient Plan of Care  Goal: Absence of Hospital-Acquired Illness or Injury  Intervention: Prevent Skin Injury  Recent Flowsheet Documentation  Taken 1/31/2024 0400 by Neymar Nieto RN  Body Position:   tilted   turned   right  Skin Protection:   adhesive use limited   incontinence pads utilized   transparent dressing maintained   tubing/devices free from skin contact   skin-to-device areas padded  Taken 1/31/2024 0200 by Neymar Nieto RN  Body Position: position changed independently  Skin Protection:   adhesive use limited   incontinence pads utilized   tubing/devices free from skin contact   transparent dressing maintained   skin-to-device areas padded  Taken 1/31/2024 0000 by Neymar Nieto RN  Body Position: position changed independently  Skin Protection:   adhesive use limited   incontinence pads utilized   tubing/devices free from skin contact   transparent dressing maintained   skin-to-device areas padded  Taken 1/30/2024 2200 by Neymar Nieto RN  Body Position: position changed independently  Skin Protection:   adhesive use limited   incontinence pads utilized   transparent dressing maintained   tubing/devices free from skin contact   skin-to-device areas padded  Taken 1/30/2024 1936 by Neymar Nieto RN  Body Position:   position changed independently   legs elevated  Skin Protection:   adhesive use limited   incontinence pads utilized   transparent dressing maintained   tubing/devices free from skin contact   skin-to-device areas padded   skin sealant/moisture barrier applied   pulse oximeter probe site changed

## 2024-01-31 NOTE — PROGRESS NOTES
[facility] Medicine Services  PROGRESS NOTE    Patient Name: Janna Willis  : 1935  MRN: [id number]    Date of Admission: 2024  Primary Care Physician: Hever Ibrahim APRN    Subjective   Subjective     CC: S/P Fall    HPI: seen in hallway ambulating; no dyspnea      Objective   Objective     Vital Signs:   Temp:  [97.8 °F (36.6 °C)-98.7 °F (37.1 °C)] 98.3 °F (36.8 °C)  Heart Rate:  [71-82] 77  Resp:  [16] 16  BP: (129-158)/(61-94) 129/61  Flow (L/min):  [2] 2     Physical Exam:  Alert, nontoxic appearing, sitting up in bed  Periorbital bruising stable; oroph clear  Lungs clear  Rrr  Abd soft, nontender    Results Reviewed:  LAB RESULTS:      Lab 24  0522 24  0620 24  1038 24  0644 24  0552 24  0546 24  0519 24  1407   WBC 5.10 5.94  --   --  8.63 5.76 5.01 6.67   HEMOGLOBIN 10.8* 10.6*  --   --  13.3 11.9* 12.3 14.0   HEMATOCRIT 32.9* 32.2*  --   --  39.7 36.4 37.0 42.7   PLATELETS 150 132*  --   --  156 125* 134* 149   NEUTROS ABS  --   --   --   --   --   --  3.42 5.14   IMMATURE GRANS (ABS)  --   --   --   --   --   --  0.01 0.01   LYMPHS ABS  --   --   --   --   --   --  1.12 0.92   MONOS ABS  --   --   --   --   --   --  0.36 0.54   EOS ABS  --   --   --   --   --   --  0.08 0.03   MCV 87.3 89.0  --   --  88.2 89.9 87.1 87.3   LACTATE  --   --  0.9 2.1*  --   --   --   --          Lab 24  0522 24  0620 24  0552 24  0546 24  0519 24  1407   SODIUM 136 134* 134* 136 136 134*   POTASSIUM 3.9 3.5 3.7 4.2 3.8 4.1   CHLORIDE 102 100 97* 100 101 97*   CO2 24.0 25.0 23.0 24.0 26.0 24.0   ANION GAP 10.0 9.0 14.0 12.0 9.0 13.0   BUN 12 11 8 16 13 16   CREATININE 0.54* 0.61 0.48* 0.70 0.58 0.62   EGFR 89.2 86.7 91.8 83.8 87.7 86.3   GLUCOSE 99 108* 152* 103* 91 121*   CALCIUM 8.1* 8.0* 8.6 8.5* 8.7 9.1   MAGNESIUM 1.8  --   --   --   --   --    HEMOGLOBIN A1C  --   --   --   --  5.60  5.60         Lab 01/26/24  0519 01/25/24  1407   TOTAL PROTEIN 6.4 7.1   ALBUMIN 3.7 4.1   GLOBULIN 2.7 3.0   ALT (SGPT) 10 12   AST (SGOT) 21 26   BILIRUBIN 0.7 0.7   ALK PHOS 64 74         Lab 01/25/24  1407   HSTROP T 10                 Brief Urine Lab Results  (Last result in the past 365 days)        Color   Clarity   Blood   Leuk Est   Nitrite   Protein   CREAT   Urine HCG        01/29/24 0908 Yellow   Cloudy   Small (1+)   Moderate (2+)   Negative   Negative                   Microbiology Results Abnormal       None            No radiology results from the last 24 hrs    Results for orders placed during the hospital encounter of 06/05/23    Adult Transthoracic Echo Complete w/ Color, Spectral and Contrast if Necessary Per Protocol    Interpretation Summary    Left ventricular systolic function is normal. Left ventricular ejection fraction appears to be 61 - 65%.    Left ventricular wall thickness is consistent with borderline concentric hypertrophy.    Left ventricular diastolic function is consistent with (grade II w/high LAP) pseudonormalization.    Left atrial volume is mildly increased.    There is moderate calcification of the aortic valve.    Moderate mitral annular calcification is present.    Mild mitral valve stenosis is present.    Mild mitral valve regurgitation is present.    Mild pulmonic valve regurgitation is present.    Moderate tricuspid valve regurgitation is present.    Estimated right ventricular systolic pressure from tricuspid regurgitation is mildly elevated (35-45 mmHg).      Current medications:  Scheduled Meds:aspirin, 81 mg, Oral, Daily  cefTRIAXone, 1,000 mg, Intravenous, Daily  clopidogrel, 75 mg, Oral, Daily  insulin lispro, 2-7 Units, Subcutaneous, 4x Daily AC & at Bedtime  levothyroxine, 50 mcg, Oral, Q AM  pantoprazole, 40 mg, Oral, QAM  senna-docusate sodium, 2 tablet, Oral, BID  sodium chloride, 10 mL, Intravenous, Q12H  tamsulosin, 0.4 mg, Oral, Daily  venlafaxine XR, 150  mg, Oral, Daily With Breakfast      Continuous Infusions:       PRN Meds:.  acetaminophen    senna-docusate sodium **AND** polyethylene glycol **AND** bisacodyl **AND** bisacodyl    dextrose    dextrose    glucagon (human recombinant)    Magnesium Standard Dose Replacement - Follow Nurse / BPA Driven Protocol    melatonin    ondansetron    [COMPLETED] Insert Peripheral IV **AND** sodium chloride    sodium chloride    sodium chloride    Assessment & Plan   Assessment & Plan     Active Hospital Problems    Diagnosis  POA    **Traumatic orbital hematoma [S05.10XA]  Yes    Rhabdomyolysis [M62.82]  Yes    Fall [W19.XXXA]  Yes    Syncope [R55]  Yes    DM2 (diabetes mellitus, type 2) [E11.9]  Yes    Mixed hyperlipidemia [E78.2]  Yes    Hypothyroid [E03.9]  Yes    GERD (gastroesophageal reflux disease) [K21.9]  Yes    Primary hypertension [I10]  Yes    CAD (coronary artery disease) [I25.10]  Yes      Resolved Hospital Problems   No resolved problems to display.        Brief Hospital Course to date:  88 y.o. female with a past medical history significant for HTN, HLD, CAD s/p stent placement, s/p PPM, DM2, GERD, and hypothyroidism. Here with orbital hematoma and rhabdo after an unwitnessed fall.    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 Jacobo's Physician assistant team). If found to have significant or persistent incomplete bladder emptying, given age and comorbidities, can learn to self-cath (which may challenging given her anatomy) vs replacement of lindsay catheter    Hypothyroidism  -synthroid    Anx/Depression  -Effexor    GERD  -PPI    Am labs: cbc,bmp      Expected Discharge Location and Transportation: Rehab   Expected Discharge  medically ready, awaiting facility acceptance    DVT prophylaxis:  Mechanical DVT prophylaxis orders are present.         AM-PAC 6 Clicks Score (PT): 12 (01/31/24 1400)    CODE STATUS:   Code Status and Medical Interventions:   Ordered at: 01/25/24 1951     Level Of Support Discussed With:    Patient     Code Status (Patient has no pulse and is not breathing):    CPR (Attempt to Resuscitate)     Medical Interventions (Patient has pulse or is breathing):    Full Support       Ton Mann MD  01/31/24

## 2024-01-31 NOTE — THERAPY TREATMENT NOTE
Patient Name: Janna Willis  : 1935    MRN: [id number]                              Today's Date: 2024       Admit Date: 2024    Visit Dx:     ICD-10-CM ICD-9-CM   1. Fall, initial encounter  W19.XXXA E888.9   2. Facial laceration, initial encounter  S01.81XA 873.40   3. Traumatic hematoma of right orbit, initial encounter  S05.11XA 921.2   4. Closed head injury, initial encounter  S09.90XA 959.01   5. Anticoagulated  Z79.01 V58.61     Patient Active Problem List   Diagnosis    Heart block AV third degree    CAD (coronary artery disease)    Primary hypertension    Mixed hyperlipidemia    GERD (gastroesophageal reflux disease)    Hypothyroid    Class 1 obesity in adult    Traumatic orbital hematoma    Rhabdomyolysis    Fall    Syncope    DM2 (diabetes mellitus, type 2)     Past Medical History:   Diagnosis Date    Diabetes      Past Surgical History:   Procedure Laterality Date    CARDIAC CATHETERIZATION N/A 2023    Procedure: Left Heart Cath;  Surgeon: Kuldeep Miner MD;  Location:  [facility];  Service: Cardiology;  Laterality: N/A;    CARDIAC ELECTROPHYSIOLOGY PROCEDURE N/A 2023    Procedure: Temporary Pacemaker;  Surgeon: Kuldeep Miner MD;  Location:  [facility];  Service: Cardiology;  Laterality: N/A;    CARDIAC ELECTROPHYSIOLOGY PROCEDURE N/A 2023    Procedure: Pacemaker DC new;  Surgeon: Dudley Rodriguez DO;  Location:  [facility];  Service: Cardiology;  Laterality: N/A;    OVARY SURGERY        General Information       Row Name 24 2488          Physical Therapy Time and Intention    Document Type therapy note (daily note)  -SC     Mode of Treatment physical therapy;individual therapy  -SC       Row Name 24 1353          General Information    Patient Profile Reviewed yes  -SC     Existing Precautions/Restrictions fall  -SC       Row Name 24 1929          Cognition    Orientation Status (Cognition) oriented  to;person;place;verbal cues/prompts needed for orientation;time  -SC       Row Name 01/31/24 1350          Safety Issues, Functional Mobility    Impairments Affecting Function (Mobility) balance;endurance/activity tolerance;pain;postural/trunk control;strength  -SC     Comment, Safety Issues/Impairments (Mobility) alert, following commands  -SC               User Key  (r) = Recorded By, (t) = Taken By, (c) = Cosigned By      Initials Name Provider Type    SC Mumtaz Villareal PT Physical Therapist                   Mobility       Row Name 01/31/24 1358          Transfers    Comment, (Transfers) cues for hand placement. Demonstrated very slow transfers  -SC       Row Name 01/31/24 1353          Sit-Stand Transfer    Sit-Stand [place] (Transfers) minimum assist (75% patient effort);2 person assist  -SC     Assistive Device (Sit-Stand Transfers) walker, front-wheeled  -[facility] Name 01/31/24 1350          Gait/Stairs (Locomotion)    [place] Level (Gait) 2 person assist;1 person assist;minimum assist (75% patient effort)  -SC     Assistive Device (Gait) walker, front-wheeled  -SC     Distance in Feet (Gait) 50+50  -SC     Deviations/Abnormal Patterns (Gait) stride length decreased;weight shifting decreased;gilberto decreased  -SC     Bilateral Gait Deviations forward flexed posture  -SC     Comment, (Gait/Stairs) Cues for upright posture and  to stay close to walker. Patient required assistance to control walker at times. Had to stop and rest due to c/o dizziness  -SC               User Key  (r) = Recorded By, (t) = Taken By, (c) = Cosigned By      Initials Name Provider Type    SC Mumtaz Villareal PT Physical Therapist                   Obj/Interventions       Row Name 01/31/24 1358          Motor Skills    Therapeutic Exercise knee;ankle  -SC       Row Name 01/31/24 1350          Knee (Therapeutic Exercise)    Knee AROM (Therapeutic Exercise) bilateral;flexion;extension;sitting;LAQ (long arc quad);15  repititions  -[facility] Name 01/31/24 2056          Ankle (Therapeutic Exercise)    Ankle (Therapeutic Exercise) AROM (active range of motion)  -SC     Ankle AROM (Therapeutic Exercise) dorsiflexion;plantarflexion;bilateral;10 repetitions  -[facility] Name 01/31/24 1771          Balance    Balance Assessment standing dynamic balance  -SC     Dynamic Standing Balance 2-person assist;minimal assist  -SC     Position/Device Used, Standing Balance supported  -SC     Balance Interventions standing  -SC     Comment, Balance very unsteady  -SC               User Key  (r) = Recorded By, (t) = Taken By, (c) = Cosigned By      Initials Name Provider Type    SC Mumtaz Villareal, PT Physical Therapist                   Goals/Plan    No documentation.                  Clinical Impression       [facility] Name 01/31/24 6339          Pain Scale: FACES Pre/Post-Treatment    Pain: FACES Scale, Pretreatment 2-->hurts little bit  -SC     Posttreatment Pain Rating 2-->hurts little bit  -SC     Pain Location - Side/Orientation Bilateral  -SC     Pain Location - knee  -[facility] 01/31/24 0432          Plan of Care Review    Plan of Care Reviewed With patient  -SC     Progress improving  -SC     Outcome Evaluation Patient continues to be limited by weakness and today c/o dizziness during ambulation.  Vitals stable at documented.  -[facility] Name 01/31/24 6462          Therapy Assessment/Plan (PT)    Rehab Potential (PT) good, to achieve stated therapy goals  -SC     Criteria for Skilled Interventions Met (PT) yes;meets criteria;skilled treatment is necessary  -SC     Therapy Frequency (PT) daily  -[facility] Name 01/31/24 0581          Vital Signs    Pre Systolic BP Rehab 159  -SC     Pre Treatment Diastolic BP 79  -SC     Posttreatment Heart Rate (beats/min) 80  -SC     Post SpO2 (%) 97  -SC     O2 Delivery Post Treatment supplemental O2  -[facility] Name 01/31/24 4757          Positioning and Restraints    Pre-Treatment  Position in bed  -SC     Post Treatment Position chair  -SC     In Chair notified nsg;reclined;sitting;call light within reach;encouraged to call for assist;exit alarm on  -SC               User Key  (r) = Recorded By, (t) = Taken By, (c) = Cosigned By      Initials Name Provider Type    SC Mumtaz Villareal PT Physical Therapist                   Outcome Measures       Row Name 01/31/24 1400 01/31/24 0800       How much help from another person do you currently need...    Turning from your back to your side while in flat bed without using bedrails? 2  -SC 3  -AS    Moving from lying on back to sitting on the side of a flat bed without bedrails? 2  -SC 2  -AS    Moving to and from a bed to a chair (including a wheelchair)? 2  -SC 3  -AS    Standing up from a chair using your arms (e.g., wheelchair, bedside chair)? 2  -SC 3  -AS    Climbing 3-5 steps with a railing? 1  -SC 1  -AS    To walk in hospital room? 3  -SC 3  -AS    AM-PAC 6 Clicks Score (PT) 12  -SC 15  -AS    Highest Level of Mobility Goal 4 --> Transfer to chair/commode  -SC 4 --> Transfer to chair/commode  -AS      Row Name 01/31/24 1400          Functional Assessment    Outcome Measure Options AM-PAC 6 Clicks Basic Mobility (PT)  -SC               User Key  (r) = Recorded By, (t) = Taken By, (c) = Cosigned By      Initials Name Provider Type    SC Mumtaz Villareal PT Physical Therapist    AS Rubi Woods RN Registered Nurse                                 Physical Therapy Education       Title: PT OT SLP Therapies (In Progress)       Topic: Physical Therapy (Done)       Point: Mobility training (Done)       Learning Progress Summary             Patient Kim, MEL, VU by SC at 1/31/2024 1402    Comment: reviewed  HEP    MEL Alvarez VU by SC at 1/30/2024 1630    Comment: reviewed HEP    Acceptance, E, NR by  at 1/29/2024 1409    Kim, E, VU,DU,NR by  at 1/26/2024 1336    Comment: Educated pt. safety/technique w/bed mobility, transfers, PT POC                          Point: Home exercise program (Done)       Learning Progress Summary             Patient Eager, E, VU by SC at 1/31/2024 1402    Comment: reviewed  HEP    Eager, E, VU by SC at 1/30/2024 1630    Comment: reviewed HEP                         Point: Body mechanics (Done)       Learning Progress Summary             Patient Eager, E, VU by SC at 1/31/2024 1402    Comment: reviewed  HEP    Eager, E, VU by SC at 1/30/2024 1630    Comment: reviewed HEP    Acceptance, E, NR by  at 1/29/2024 1409    Eager, E, VU,DU,NR by  at 1/26/2024 1336    Comment: Educated pt. safety/technique w/bed mobility, transfers, PT POC                         Point: Precautions (Done)       Learning Progress Summary             Patient Eager, E, VU by SC at 1/31/2024 1402    Comment: reviewed  HEP    Eager, E, VU by SC at 1/30/2024 1630    Comment: reviewed HEP    Acceptance, E, NR by  at 1/29/2024 1409    Eager, E, VU,DU,NR by  at 1/26/2024 1336    Comment: Educated pt. safety/technique w/bed mobility, transfers, PT POC                                         User Key       Initials Effective Dates Name Provider Type Discipline    SC 02/03/23 -  Mumtaz Villareal, PT Physical Therapist PT     06/01/21 -  Hyacinth Johansen, PT Physical Therapist PT     09/22/22 -  Audrey Smith, PT Physical Therapist PT                  PT Recommendation and Plan     Plan of Care Reviewed With: patient  Progress: improving  Outcome Evaluation: Patient continues to be limited by weakness and today c/o dizziness during ambulation.  Vitals stable at documented.     Time Calculation:         PT Charges       Row Name 01/31/24 1322             Time Calculation    Start Time 1322  -SC      PT Received On 01/31/24  -SC      PT Goal Re-Cert Due Date 02/05/24  -SC         Timed Charges    47363 - PT Therapeutic Exercise Minutes 5  -SC      69537 - Gait Training Minutes  8  -SC      56205 - PT Therapeutic Activity Minutes 10  -SC         Total  Minutes    Timed Charges Total Minutes 23  -SC       Total Minutes 23  -SC                User Key  (r) = Recorded By, (t) = Taken By, (c) = Cosigned By      Initials Name Provider Type    Mumtaz Slaughter PT Physical Therapist                  Therapy Charges for Today       Code Description Service Date Service Provider Modifiers Qty    62506257549 HC PT THER PROC EA 15 MIN 1/30/2024 Mumtaz Villareal, PT GP 1    12025797452 HC GAIT TRAINING EA 15 MIN 1/30/2024 Dionna, Mumtaz PARNELL, PT GP 1    71480070564 HC PT THER SUPP EA 15 MIN 1/30/2024 Mumtaz Villareal, PT GP 2    94410661319 HC GAIT TRAINING EA 15 MIN 1/31/2024 Mumtaz Villareal, PT GP 1    69640548761 HC PT THERAPEUTIC ACT EA 15 MIN 1/31/2024 Mumtaz Villareal, PT GP 1    23670613842 HC PT THER SUPP EA 15 MIN 1/31/2024 Mumtaz Villareal, PT GP 2            PT G-Codes  Outcome Measure Options: AM-PAC 6 Clicks Basic Mobility (PT)  AM-PAC 6 Clicks Score (PT): 12  AM-PAC 6 Clicks Score (OT): 14  PT Discharge Summary  Anticipated Discharge Disposition (PT): inpatient rehabilitation facility    Mumtaz Villareal PT  1/31/2024

## 2024-01-31 NOTE — TELEPHONE ENCOUNTER
----- Message from Memo Jacobo MD sent at 1/30/2024  3:38 PM EST -----  Regarding: consult patient follow up  Cannot remember if I already messaged about her, apologies if so.  Needs to see Brendon or Rodrick in 2 weeks for catheter removal and voiding trial. Urinary retention patient, thanks!    Memo Jacobo MD

## 2024-01-31 NOTE — CASE MANAGEMENT/SOCIAL WORK
Continued Stay Note   Ang     Patient Name: Janna Willis  MRN: [id number]  Today's Date: 1/31/2024    Admit Date: 1/25/2024    Plan: Ongoing   Discharge Plan       Row Name 01/31/24 1608       Plan    Plan Ongoing    Patient/Family in Agreement with Plan yes    Plan Comments Discussed in MDR. Ms. Willis is not medically ready for discharge today. She will need placement vs. AL. [facility] Rhona, Liaison with [facility] said, she doesn't think her insurance would approve SNF since Mr. Willis ambulated 100 feet. They have no long term bed availability. Will discuss with family about LTC vs. Assisted Living. CM will continue to follow.    Final Discharge Disposition Code 30 - still a patient                   Discharge Codes    No documentation.                 Expected Discharge Date and Time       Expected Discharge Date Expected Discharge Time    Jan 31, 2024               Nae Reyes RN

## 2024-01-31 NOTE — PLAN OF CARE
Goal Outcome Evaluation:  Plan of Care Reviewed With: patient        Progress: improving  Outcome Evaluation: Patient continues to be limited by weakness and today c/o dizziness during ambulation.  Vitals stable at documented.      Anticipated Discharge Disposition (PT): inpatient rehabilitation facility

## 2024-02-01 LAB
ANION GAP SERPL CALCULATED.3IONS-SCNC: 7 MMOL/L (ref 5–15)
BUN SERPL-MCNC: 12 MG/DL (ref 8–23)
BUN/CREAT SERPL: 24 (ref 7–25)
CALCIUM SPEC-SCNC: 8.5 MG/DL (ref 8.6–10.5)
CHLORIDE SERPL-SCNC: 103 MMOL/L (ref 98–107)
CO2 SERPL-SCNC: 29 MMOL/L (ref 22–29)
CREAT SERPL-MCNC: 0.5 MG/DL (ref 0.57–1)
DEPRECATED RDW RBC AUTO: 41.6 FL (ref 37–54)
EGFRCR SERPLBLD CKD-EPI 2021: 90.9 ML/MIN/1.73
ERYTHROCYTE [DISTWIDTH] IN BLOOD BY AUTOMATED COUNT: 13.2 % (ref 12.3–15.4)
GLUCOSE BLDC GLUCOMTR-MCNC: 103 MG/DL (ref 70–130)
GLUCOSE BLDC GLUCOMTR-MCNC: 124 MG/DL (ref 70–130)
GLUCOSE BLDC GLUCOMTR-MCNC: 136 MG/DL (ref 70–130)
GLUCOSE BLDC GLUCOMTR-MCNC: 148 MG/DL (ref 70–130)
GLUCOSE SERPL-MCNC: 99 MG/DL (ref 65–99)
HCT VFR BLD AUTO: 34.3 % (ref 34–46.6)
HGB BLD-MCNC: 11.3 G/DL (ref 12–15.9)
MCH RBC QN AUTO: 28.8 PG (ref 26.6–33)
MCHC RBC AUTO-ENTMCNC: 32.9 G/DL (ref 31.5–35.7)
MCV RBC AUTO: 87.5 FL (ref 79–97)
PLATELET # BLD AUTO: 173 10*3/MM3 (ref 140–450)
PMV BLD AUTO: 9.7 FL (ref 6–12)
POTASSIUM SERPL-SCNC: 3.9 MMOL/L (ref 3.5–5.2)
RBC # BLD AUTO: 3.92 10*6/MM3 (ref 3.77–5.28)
SODIUM SERPL-SCNC: 139 MMOL/L (ref 136–145)
WBC NRBC COR # BLD AUTO: 3.92 10*3/MM3 (ref 3.4–10.8)

## 2024-02-01 PROCEDURE — 25010000002 CEFTRIAXONE PER 250 MG: Performed by: HOSPITALIST

## 2024-02-01 PROCEDURE — 97530 THERAPEUTIC ACTIVITIES: CPT

## 2024-02-01 PROCEDURE — 80048 BASIC METABOLIC PNL TOTAL CA: CPT | Performed by: INTERNAL MEDICINE

## 2024-02-01 PROCEDURE — 99232 SBSQ HOSP IP/OBS MODERATE 35: CPT | Performed by: INTERNAL MEDICINE

## 2024-02-01 PROCEDURE — 85027 COMPLETE CBC AUTOMATED: CPT | Performed by: INTERNAL MEDICINE

## 2024-02-01 PROCEDURE — 97535 SELF CARE MNGMENT TRAINING: CPT

## 2024-02-01 PROCEDURE — 82948 REAGENT STRIP/BLOOD GLUCOSE: CPT

## 2024-02-01 PROCEDURE — 97110 THERAPEUTIC EXERCISES: CPT

## 2024-02-01 RX ADMIN — SENNOSIDES AND DOCUSATE SODIUM 2 TABLET: 8.6; 5 TABLET ORAL at 20:08

## 2024-02-01 RX ADMIN — TAMSULOSIN HYDROCHLORIDE 0.4 MG: 0.4 CAPSULE ORAL at 10:02

## 2024-02-01 RX ADMIN — PANTOPRAZOLE SODIUM 40 MG: 40 TABLET, DELAYED RELEASE ORAL at 06:19

## 2024-02-01 RX ADMIN — Medication 10 ML: at 10:02

## 2024-02-01 RX ADMIN — VENLAFAXINE HYDROCHLORIDE 150 MG: 75 CAPSULE, EXTENDED RELEASE ORAL at 10:02

## 2024-02-01 RX ADMIN — Medication 10 ML: at 20:08

## 2024-02-01 RX ADMIN — SENNOSIDES AND DOCUSATE SODIUM 2 TABLET: 8.6; 5 TABLET ORAL at 10:02

## 2024-02-01 RX ADMIN — CLOPIDOGREL BISULFATE 75 MG: 75 TABLET ORAL at 10:02

## 2024-02-01 RX ADMIN — ASPIRIN 81 MG: 81 TABLET, COATED ORAL at 10:02

## 2024-02-01 RX ADMIN — LEVOTHYROXINE SODIUM 50 MCG: 0.05 TABLET ORAL at 06:19

## 2024-02-01 RX ADMIN — SODIUM CHLORIDE 1000 MG: 900 INJECTION INTRAVENOUS at 10:01

## 2024-02-01 NOTE — PLAN OF CARE
Goal Outcome Evaluation: Pt denies needs overnight. VS WDL at this time. POC ongoing. Pam Alfonso RN

## 2024-02-01 NOTE — THERAPY TREATMENT NOTE
Patient Name: Janna Willis  : 1935    MRN: [id number]                              Today's Date: 2024       Admit Date: 2024    Visit Dx:     ICD-10-CM ICD-9-CM   1. Fall, initial encounter  W19.XXXA E888.9   2. Facial laceration, initial encounter  S01.81XA 873.40   3. Traumatic hematoma of right orbit, initial encounter  S05.11XA 921.2   4. Closed head injury, initial encounter  S09.90XA 959.01   5. Anticoagulated  Z79.01 V58.61     Patient Active Problem List   Diagnosis    Heart block AV third degree    CAD (coronary artery disease)    Primary hypertension    Mixed hyperlipidemia    GERD (gastroesophageal reflux disease)    Hypothyroid    Class 1 obesity in adult    Traumatic orbital hematoma    Rhabdomyolysis    Fall    Syncope    DM2 (diabetes mellitus, type 2)     Past Medical History:   Diagnosis Date    Diabetes      Past Surgical History:   Procedure Laterality Date    CARDIAC CATHETERIZATION N/A 2023    Procedure: Left Heart Cath;  Surgeon: Kuldeep Miner MD;  Location:  [facility];  Service: Cardiology;  Laterality: N/A;    CARDIAC ELECTROPHYSIOLOGY PROCEDURE N/A 2023    Procedure: Temporary Pacemaker;  Surgeon: Kuldeep Miner MD;  Location:  [facility];  Service: Cardiology;  Laterality: N/A;    CARDIAC ELECTROPHYSIOLOGY PROCEDURE N/A 2023    Procedure: Pacemaker DC new;  Surgeon: Dudley Rodriguez DO;  Location:  [facility];  Service: Cardiology;  Laterality: N/A;    OVARY SURGERY        General Information       Row Name 24 1116          OT Time and Intention    Document Type therapy note (daily note)  -LC     Mode of Treatment occupational therapy  -       Row Name 24 1116          General Information    Patient Profile Reviewed yes  -LC     Prior Level of Function --  See IE  -LC     Existing Precautions/Restrictions fall  -LC     Barriers to Rehab medically complex;previous functional deficit  -LC        Row Name 02/01/24 1116          Cognition    Orientation Status (Cognition) oriented to;person;place;verbal cues/prompts needed for orientation;time  -       Row Name 02/01/24 1116          Safety Issues, Functional Mobility    Safety Issues Affecting Function (Mobility) awareness of need for assistance;insight into deficits/self-awareness;safety precaution awareness;safety precautions follow-through/compliance;sequencing abilities  -     Impairments Affecting Function (Mobility) balance;endurance/activity tolerance;pain;postural/trunk control;strength  -               User Key  (r) = Recorded By, (t) = Taken By, (c) = Cosigned By      Initials Name Provider Type     Aletha Kohli OT Occupational Therapist                     Mobility/ADL's       Row Name 02/01/24 1117          Bed Mobility    Bed Mobility sit-supine  -     Scooting/Bridging [place] (Bed Mobility) moderate assist (50% patient effort);verbal cues  -     Sit-Supine [place] (Bed Mobility) moderate assist (50% patient effort);verbal cues  -     Assistive Device (Bed Mobility) bed rails;head of bed elevated  -     Comment, (Bed Mobility) VC's to sequence transitioning from sitting EOB to supine. Assist to bring LE's into bed.  -[facility] Name 02/01/24 1117          Transfers    Transfers sit-stand transfer;bed-chair transfer  -     Comment, (Transfers) VC's for hand placement and sequencing. Denied dizziness in standing.  -[facility] Name 02/01/24 1117          Bed-Chair Transfer    Bed-Chair [place] (Transfers) minimum assist (75% patient effort);verbal cues  -     Assistive Device (Bed-Chair Transfers) other (see comments)  BUE support  -       Row Name 02/01/24 1117          Sit-Stand Transfer    Sit-Stand [place] (Transfers) minimum assist (75% patient effort);verbal cues  -     Assistive Device (Sit-Stand Transfers) other (see comments)  BUE support  -       Row Name 02/01/24 1117           Activities of Daily Living    BADL Assessment/Intervention grooming;lower body dressing  -       Row Name 02/01/24 1117          Lower Body Dressing Assessment/Training    [place] Level (Lower Body Dressing) don;doff;shoes/slippers;maximum assist (25% patient effort)  -     Position (Lower Body Dressing) supported sitting  -       Row Name 02/01/24 1117          Grooming Assessment/Training    [place] Level (Grooming) wash face, hands;oral care regimen;hair care, combing/brushing;set up  -     Position (Grooming) unsupported sitting  -     Comment, (Grooming) Increased time to complete  -               User Key  (r) = Recorded By, (t) = Taken By, (c) = Cosigned By      Initials Name Provider Type    Aletha Timmons OT Occupational Therapist                   Obj/Interventions       Row Name 02/01/24 1121          Balance    Balance Assessment sitting static balance;sitting dynamic balance;standing static balance;standing dynamic balance  -     Static Sitting Balance standby assist  -     Dynamic Sitting Balance contact guard  -     Position, Sitting Balance unsupported;sitting in chair;sitting edge of bed  -     Static Standing Balance contact guard;verbal cues  -     Dynamic Standing Balance minimal assist;verbal cues  -     Position/Device Used, Standing Balance supported;other (see comments)  BUE support  -     Balance Interventions sitting;standing;sit to stand;supported;occupation based/functional task;weight shifting activity  -     Comment, Balance Min A to steady  -               User Key  (r) = Recorded By, (t) = Taken By, (c) = Cosigned By      Initials Name Provider Type    Aletha Timmons OT Occupational Therapist                   Goals/Plan    No documentation.                  Clinical Impression       Row Name 02/01/24 1123          Pain Assessment    Pretreatment Pain Rating 0/10 - no pain  -     Posttreatment Pain Rating 0/10 - no pain  -      Additional Documentation Pain Scale: Word Pre/Post-Treatment (Group)  -       Row Name 02/01/24 1123          Plan of Care Review    Plan of Care Reviewed With patient  -     Progress improving  -     Outcome Evaluation Pt. progressing towards baseline with ADLs and functional mobility. Continues to be limited by decreased activity tolerance and generalized weakness. Completed T/Fs with Min A and grooming with SUA. Will continue to progress as able per OT POC.  -       Row Name 02/01/24 1123          Positioning and Restraints    Pre-Treatment Position sitting in chair/recliner  -     Post Treatment Position bed  -     In Bed notified nsg;supine;call light within reach;encouraged to call for assist;with nsg  -               User Key  (r) = Recorded By, (t) = Taken By, (c) = Cosigned By      Initials Name Provider Type    Aletha Timmons OT Occupational Therapist                   Outcome Measures       Row Name 02/01/24 1126          How much help from another is currently needed...    Putting on and taking off regular lower body clothing? 2  -LC     Bathing (including washing, rinsing, and drying) 2  -LC     Toileting (which includes using toilet bed pan or urinal) 2  -LC     Putting on and taking off regular upper body clothing 3  -LC     Taking care of personal grooming (such as brushing teeth) 3  -LC     Eating meals 3  -     AM-PAC 6 Clicks Score (OT) 15  -       Row Name 02/01/24 1126          Functional Assessment    Outcome Measure Options AM-PAC 6 Clicks Daily Activity (OT)  -               User Key  (r) = Recorded By, (t) = Taken By, (c) = Cosigned By      Initials Name Provider Type    Aletha Timmons OT Occupational Therapist                    Occupational Therapy Education       Title: PT OT SLP Therapies (In Progress)       Topic: Occupational Therapy (In Progress)       Point: ADL training (In Progress)       Description:   Instruct learner(s) on proper safety adaptation and  remediation techniques during self care or transfers.   Instruct in proper use of assistive devices.                  Learning Progress Summary             Patient Acceptance, E, NR by  at 2/1/2024 1023    Acceptance, E, NR by  at 1/26/2024 1446                         Point: Home exercise program (Not Started)       Description:   Instruct learner(s) on appropriate technique for monitoring, assisting and/or progressing therapeutic exercises/activities.                  Learner Progress:  Not documented in this visit.              Point: Precautions (In Progress)       Description:   Instruct learner(s) on prescribed precautions during self-care and functional transfers.                  Learning Progress Summary             Patient Acceptance, E, NR by  at 2/1/2024 1023    Acceptance, E, NR by  at 1/26/2024 1446                         Point: Body mechanics (In Progress)       Description:   Instruct learner(s) on proper positioning and spine alignment during self-care, functional mobility activities and/or exercises.                  Learning Progress Summary             Patient Acceptance, E, NR by  at 2/1/2024 1023    Acceptance, E, NR by  at 1/26/2024 1446                                         User Key       Initials Effective Dates Name Provider Type Discipline     06/16/21 -  Aletha Kohli, OT Occupational Therapist OT     10/14/22 -  Taty Armas OT Occupational Therapist OT                  OT Recommendation and Plan     Plan of Care Review  Plan of Care Reviewed With: patient  Progress: improving  Outcome Evaluation: Pt. progressing towards baseline with ADLs and functional mobility. Continues to be limited by decreased activity tolerance and generalized weakness. Completed T/Fs with Min A and grooming with SUA. Will continue to progress as able per OT POC.     Time Calculation:         Time Calculation- OT       Row Name 02/01/24 1127             Time Calculation- OT    OT Start  Time 1023  -      OT Received On 02/01/24  -      OT Goal Re-Cert Due Date 02/05/24  -         Timed Charges    59911 - OT Therapeutic Activity Minutes 8  -LC      23475 - OT Self Care/Mgmt Minutes 15  -         Total Minutes    Timed Charges Total Minutes 23  -       Total Minutes 23  -                User Key  (r) = Recorded By, (t) = Taken By, (c) = Cosigned By      Initials Name Provider Type     Aletha Kohli OT Occupational Therapist                  Therapy Charges for Today       Code Description Service Date Service Provider Modifiers Qty    94852155495 HC OT THERAPEUTIC ACT EA 15 MIN 2/1/2024 Aletha Kohli OT GO 1    97688681786 HC OT SELF CARE/MGMT/TRAIN EA 15 MIN 2/1/2024 Aletha Kohli OT GO 1    48814424354 HC OT THER SUPP EA 15 MIN 2/1/2024 Aletha Kohli OT GO 1                 Aletha Kohli OT  2/1/2024

## 2024-02-01 NOTE — PROGRESS NOTES
[facility]  PROGRESS NOTE    Patient Name: Janna Willis  : 1935  MRN: [id number]    Date of Admission: 2024  Primary Care Physician: Hever Ibrahim APRN    Subjective   Subjective     CC: S/P Fall    HPI:     Awake, alert, pain improved. No dyspnea. No n/v/d.     Objective   Objective     Vital Signs:   Temp:  [98 °F (36.7 °C)-98.7 °F (37.1 °C)] 98.7 °F (37.1 °C)  Heart Rate:  [65-81] 80  Resp:  [16] 16  BP: (101-156)/(53-79) 130/54  Flow (L/min):  [0-2] 0     Physical Exam:  Alert, nontoxic appearing, sitting up in bed, normal work of breathing  Periorbital bruising stable; oroph clear  Lungs clear  Rrr  Abd soft, nontender  No cce  Marie w/ clear urine    Results Reviewed:  LAB RESULTS:      Lab 24  0535 24  0522 24  0620 24  1038 24  0644 24  0552 24  0546 24  0519   WBC 3.92 5.10 5.94  --   --  8.63 5.76 5.01   HEMOGLOBIN 11.3* 10.8* 10.6*  --   --  13.3 11.9* 12.3   HEMATOCRIT 34.3 32.9* 32.2*  --   --  39.7 36.4 37.0   PLATELETS 173 150 132*  --   --  156 125* 134*   NEUTROS ABS  --   --   --   --   --   --   --  3.42   IMMATURE GRANS (ABS)  --   --   --   --   --   --   --  0.01   LYMPHS ABS  --   --   --   --   --   --   --  1.12   MONOS ABS  --   --   --   --   --   --   --  0.36   EOS ABS  --   --   --   --   --   --   --  0.08   MCV 87.5 87.3 89.0  --   --  88.2 89.9 87.1   LACTATE  --   --   --  0.9 2.1*  --   --   --          Lab 24  0535 24  0522 24  0620 24  0552 24  0546 24  0519   SODIUM 139 136 134* 134* 136 136   POTASSIUM 3.9 3.9 3.5 3.7 4.2 3.8   CHLORIDE 103 102 100 97* 100 101   CO2 29.0 24.0 25.0 23.0 24.0 26.0   ANION GAP 7.0 10.0 9.0 14.0 12.0 9.0   BUN 12 12 11 8 16 13   CREATININE 0.50* 0.54* 0.61 0.48* 0.70 0.58   EGFR 90.9 89.2 86.7 91.8 83.8 87.7   GLUCOSE 99 99 108* 152* 103* 91   CALCIUM 8.5* 8.1* 8.0* 8.6 8.5* 8.7   MAGNESIUM   --  1.8  --   --   --   --    HEMOGLOBIN A1C  --   --   --   --   --  5.60         Lab 01/26/24  0519   TOTAL PROTEIN 6.4   ALBUMIN 3.7   GLOBULIN 2.7   ALT (SGPT) 10   AST (SGOT) 21   BILIRUBIN 0.7   ALK PHOS 64                       Brief Urine Lab Results  (Last result in the past 365 days)        Color   Clarity   Blood   Leuk Est   Nitrite   Protein   CREAT   Urine HCG        01/29/24 0908 Yellow   Cloudy   Small (1+)   Moderate (2+)   Negative   Negative                   Microbiology Results Abnormal       None            No radiology results from the last 24 hrs    Results for orders placed during the hospital encounter of 06/05/23    Adult Transthoracic Echo Complete w/ Color, Spectral and Contrast if Necessary Per Protocol    Interpretation Summary    Left ventricular systolic function is normal. Left ventricular ejection fraction appears to be 61 - 65%.    Left ventricular wall thickness is consistent with borderline concentric hypertrophy.    Left ventricular diastolic function is consistent with (grade II w/high LAP) pseudonormalization.    Left atrial volume is mildly increased.    There is moderate calcification of the aortic valve.    Moderate mitral annular calcification is present.    Mild mitral valve stenosis is present.    Mild mitral valve regurgitation is present.    Mild pulmonic valve regurgitation is present.    Moderate tricuspid valve regurgitation is present.    Estimated right ventricular systolic pressure from tricuspid regurgitation is mildly elevated (35-45 mmHg).      Current medications:  Scheduled Meds:aspirin, 81 mg, Oral, Daily  cefTRIAXone, 1,000 mg, Intravenous, Daily  clopidogrel, 75 mg, Oral, Daily  insulin lispro, 2-7 Units, Subcutaneous, 4x Daily AC & at Bedtime  levothyroxine, 50 mcg, Oral, Q AM  pantoprazole, 40 mg, Oral, QAM  senna-docusate sodium, 2 tablet, Oral, BID  sodium chloride, 10 mL, Intravenous, Q12H  tamsulosin, 0.4 mg, Oral, Daily  venlafaxine XR, 150 mg,  Oral, Daily With Breakfast      Continuous Infusions:       PRN Meds:.  acetaminophen    senna-docusate sodium **AND** polyethylene glycol **AND** bisacodyl **AND** bisacodyl    dextrose    dextrose    glucagon (human recombinant)    Magnesium Standard Dose Replacement - Follow Nurse / BPA Driven Protocol    melatonin    ondansetron    [COMPLETED] Insert Peripheral IV **AND** sodium chloride    sodium chloride    sodium chloride    Assessment & Plan   Assessment & Plan     Active Hospital Problems    Diagnosis  POA    **Traumatic orbital hematoma [S05.10XA]  Yes    Rhabdomyolysis [M62.82]  Yes    Fall [W19.XXXA]  Yes    Syncope [R55]  Yes    DM2 (diabetes mellitus, type 2) [E11.9]  Yes    Mixed hyperlipidemia [E78.2]  Yes    Hypothyroid [E03.9]  Yes    GERD (gastroesophageal reflux disease) [K21.9]  Yes    Primary hypertension [I10]  Yes    CAD (coronary artery disease) [I25.10]  Yes      Resolved Hospital Problems   No resolved problems to display.        Brief Hospital Course to date:  88 y.o. female with a past medical history significant for HTN, HLD, CAD s/p stent placement, s/p PPM, DM2, GERD, and hypothyroidism. Here with orbital hematoma and rhabdo after an unwitnessed fall.    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 Jacobo's Physician assistant team). If found to have significant or persistent incomplete bladder emptying, given age and comorbidities, can learn to self-cath (which may challenging given her anatomy) vs replacement of lindsay catheter  -day #4/7 rocephin (can change to oral ceftin to complete rx)    Hypothyroidism  -synthroid    Anx/Depression  -Effexor    GERD  -PPI          Expected Discharge Location and Transportation: awaiting rehab placement  Expected Discharge  medically ready, awaiting facility acceptance    DVT prophylaxis:  Mechanical DVT prophylaxis orders are present.         AM-PAC 6 Clicks Score (PT): 16 (02/01/24 1451)    CODE STATUS:   Code Status and Medical Interventions:   Ordered at: 01/25/24 1951     Level Of Support Discussed With:    Patient     Code Status (Patient has no pulse and is not breathing):    CPR (Attempt to Resuscitate)     Medical Interventions (Patient has pulse or is breathing):    Full Support       Ton Mann MD  02/01/24

## 2024-02-01 NOTE — PLAN OF CARE
Goal Outcome Evaluation:  Plan of Care Reviewed With: patient        Progress: no change  Outcome Evaluation: Pt continues to present below baseline function d/t dizziness and deficits in strength, balance, and activity tolerance. Pt required Wei for STS and to take 5 steps toward the HOB using the FWW. Pt performed standing balance activities requiring Wei to steady throughout. Ambulation distance this date limited by dizziness. Pt would continue to benefit from skilled IP PT. Recommend IRF at d/c.      Anticipated Discharge Disposition (PT): inpatient rehabilitation facility

## 2024-02-01 NOTE — THERAPY TREATMENT NOTE
Patient Name: Janna Willis  : 1935    MRN: [id number]                              Today's Date: 2024       Admit Date: 2024    Visit Dx:     ICD-10-CM ICD-9-CM   1. Fall, initial encounter  W19.XXXA E888.9   2. Facial laceration, initial encounter  S01.81XA 873.40   3. Traumatic hematoma of right orbit, initial encounter  S05.11XA 921.2   4. Closed head injury, initial encounter  S09.90XA 959.01   5. Anticoagulated  Z79.01 V58.61     Patient Active Problem List   Diagnosis    Heart block AV third degree    CAD (coronary artery disease)    Primary hypertension    Mixed hyperlipidemia    GERD (gastroesophageal reflux disease)    Hypothyroid    Class 1 obesity in adult    Traumatic orbital hematoma    Rhabdomyolysis    Fall    Syncope    DM2 (diabetes mellitus, type 2)     Past Medical History:   Diagnosis Date    Diabetes      Past Surgical History:   Procedure Laterality Date    CARDIAC CATHETERIZATION N/A 2023    Procedure: Left Heart Cath;  Surgeon: Kuldeep Miner MD;  Location:  [facility];  Service: Cardiology;  Laterality: N/A;    CARDIAC ELECTROPHYSIOLOGY PROCEDURE N/A 2023    Procedure: Temporary Pacemaker;  Surgeon: Kuldeep Miner MD;  Location:  [facility];  Service: Cardiology;  Laterality: N/A;    CARDIAC ELECTROPHYSIOLOGY PROCEDURE N/A 2023    Procedure: Pacemaker DC new;  Surgeon: Dudley Rodriguez DO;  Location:  [facility];  Service: Cardiology;  Laterality: N/A;    OVARY SURGERY        General Information       Row Name 24 1438          Physical Therapy Time and Intention    Document Type therapy note (daily note)  -     Mode of Treatment physical therapy  -       Row Name 24 1438          General Information    Patient Profile Reviewed yes  -     Existing Precautions/Restrictions fall  -     Barriers to Rehab medically complex;previous functional deficit  -       Row Name 24 1437           Cognition    Orientation Status (Cognition) oriented to;person;place;time  -       Row Name 02/01/24 1438          Safety Issues, Functional Mobility    Safety Issues Affecting Function (Mobility) awareness of need for assistance;insight into deficits/self-awareness;safety precaution awareness;safety precautions follow-through/compliance;sequencing abilities  -     Impairments Affecting Function (Mobility) balance;endurance/activity tolerance;pain;postural/trunk control;strength;shortness of breath  -               User Key  (r) = Recorded By, (t) = Taken By, (c) = Cosigned By      Initials Name Provider Type     Heather Loja, PT Physical Therapist                   Mobility       Row Name 02/01/24 1438          Bed Mobility    Bed Mobility supine-sit;sit-supine  -     Supine-Sit Lares (Bed Mobility) moderate assist (50% patient effort);verbal cues  -     Sit-Supine Lares (Bed Mobility) moderate assist (50% patient effort);verbal cues  -     Assistive Device (Bed Mobility) bed rails;head of bed elevated  -     Comment, (Bed Mobility) VCs for hand placement and sequencing. Required assist at B LEs and trunk. Reported dizziness in sitting - BP stable at 166/88. Performed STS after dizziness resolved  -       Row Name 02/01/24 1438          Transfers    Comment, (Transfers) VCs for hand placement and sequencing. Unsteadiness noted in standing w/ dizziness reported. Pt performed balance activities in standing requiring Wei to steady. Pt continued to report dizziness so had pt take steps to HOB and return to supine. BP stable at 175/72  -       Row Name 02/01/24 1438          Sit-Stand Transfer    Sit-Stand Lares (Transfers) minimum assist (75% patient effort);verbal cues  -     Assistive Device (Sit-Stand Transfers) walker, front-wheeled  -     Comment, (Sit-Stand Transfer) STS x1 from EOB. Unsteadiness noted  -       Row Name 02/01/24 1438          Gait/Stairs  (Locomotion)    [place] Level (Gait) minimum assist (75% patient effort);1 person assist;verbal cues  -     Assistive Device (Gait) walker, front-wheeled  -     Distance in Feet (Gait) 5  -     Deviations/Abnormal Patterns (Gait) stride length decreased;weight shifting decreased;gilberto decreased  -     Bilateral Gait Deviations forward flexed posture  -     Comment, (Gait/Stairs) Pt completed 5 steps to HOB using FWW. VCs for upright posture and sequencing. Required assist to navigate walker. Moderately unsteady but no overt LOB. Distance limited by dizziness and safety  -               User Key  (r) = Recorded By, (t) = Taken By, (c) = Cosigned By      Initials Name Provider Type     Heather Loja PT Physical Therapist                   Obj/Interventions       [facility] Name 02/01/24 1444          Motor Skills    Therapeutic Exercise hip;knee;ankle  -[facility] Name 02/01/24 1444          Hip (Therapeutic Exercise)    Hip (Therapeutic Exercise) isometric exercises;strengthening exercise  -     Hip Isometrics (Therapeutic Exercise) bilateral;gluteal sets;supine;10 repetitions  -     Hip Strengthening (Therapeutic Exercise) bilateral;aBduction;aDduction;heel slides;10 repetitions;30 repititions  -       Row Name 02/01/24 1444          Knee (Therapeutic Exercise)    Knee (Therapeutic Exercise) isometric exercises;strengthening exercise  -     Knee Isometrics (Therapeutic Exercise) bilateral;quad sets;supine;10 repetitions  -     Knee Strengthening (Therapeutic Exercise) bilateral;SLR (straight leg raise);supine;5 repetitions  attempted 10 reps but unable to complete d/t fatigue  -       Row Name 02/01/24 1444          Ankle (Therapeutic Exercise)    Ankle (Therapeutic Exercise) AROM (active range of motion)  -     Ankle AROM (Therapeutic Exercise) bilateral;dorsiflexion;plantarflexion;10 repetitions  -       Row Name 02/01/24 1444          Balance    Balance Assessment sitting static  balance;sitting dynamic balance;sit to stand dynamic balance;standing static balance;standing dynamic balance  -     Static Sitting Balance standby assist  -     Dynamic Sitting Balance contact guard  -     Position, Sitting Balance unsupported;sitting edge of bed  -     Sit to Stand Dynamic Balance minimal assist;verbal cues  -     Static Standing Balance contact guard;verbal cues  -     Dynamic Standing Balance minimal assist;verbal cues  [facility]     Position/Device Used, Standing Balance supported;walker, front-wheeled  -     Balance Interventions sitting;standing;sit to stand;supported;static;dynamic  -     Comment, Balance Performed MIP in standing x10 and lateral weightshifting in standing x20 seconds. Required Wei to steady  -               User Key  (r) = Recorded By, (t) = Taken By, (c) = Cosigned By      Initials Name Provider Type     Heather Loja, BOGDAN Physical Therapist                   Goals/Plan    No documentation.                  Clinical Impression       [facility] Name 02/01/24 1447          Pain    Pretreatment Pain Rating 0/10 - no pain  -     Posttreatment Pain Rating 0/10 - no pain  -       Row Name 02/01/24 1447          Plan of Care Review    Plan of Care Reviewed With patient  -     Progress no change  -     Outcome Evaluation Pt continues to present below baseline function d/t dizziness and deficits in strength, balance, and activity tolerance. Pt required Wei for STS and to take 5 steps toward the HOB using the FWW. Pt performed standing balance activities requiring Wei to steady throughout. Ambulation distance this date limited by dizziness. Pt would continue to benefit from skilled IP PT. Recommend IRF at d/c.  -       Row Name 02/01/24 1447          Therapy Assessment/Plan (PT)    Rehab Potential (PT) good, to achieve stated therapy goals  -     Criteria for Skilled Interventions Met (PT) yes;meets criteria;skilled treatment is necessary  -     Therapy  Frequency (PT) daily  -       Row Name 02/01/24 1447          Vital Signs    Pre Systolic BP Rehab --  RN cleared PT  -     Intra Systolic BP Rehab 168  -LH     Intra Treatment Diastolic BP 88  -LH     Post Systolic BP Rehab 175  -LH     Post Treatment Diastolic BP 72  -LH     Posttreatment Heart Rate (beats/min) 74  -LH     Pre SpO2 (%) 93  -LH     O2 Delivery Pre Treatment room air  -LH     O2 Delivery Intra Treatment room air  -LH     Post SpO2 (%) 95  -LH     O2 Delivery Post Treatment room air  -LH     Pre Patient Position Supine  -     Intra Patient Position Standing  -     Post Patient Position Supine  -       Row Name 02/01/24 1447          Positioning and Restraints    Pre-Treatment Position in bed  -LH     Post Treatment Position bed  -LH     In Bed supine;call light within reach;encouraged to call for assist;exit alarm on;SCD pump applied;side rails up x3;notified [facility]  -               User Key  (r) = Recorded By, (t) = Taken By, (c) = Cosigned By      Initials Name Provider Type     Heather Loja, PT Physical Therapist                   Outcome Measures       Row Name 02/01/24 1451          How much help from another person do you currently need...    Turning from your back to your side while in flat bed without using bedrails? 3  -LH     Moving from lying on back to sitting on the side of a flat bed without bedrails? 2  -LH     Moving to and from a bed to a chair (including a wheelchair)? 3  -LH     Standing up from a chair using your arms (e.g., wheelchair, bedside chair)? 3  -LH     Climbing 3-5 steps with a railing? 2  -LH     To walk in hospital room? 3  -     AM-PAC 6 Clicks Score (PT) 16  -     Highest Level of Mobility Goal 5 --> Static standing  -       Row Name 02/01/24 1451 02/01/24 1126       Functional Assessment    Outcome Measure Options AM-PAC 6 Clicks Basic Mobility (PT)  - AM-PAC 6 Clicks Daily Activity (OT)  -              User Key  (r) = Recorded By, (t) =  Taken By, (c) = Cosigned By      Initials Name Provider Type    Aletha Timmons, OT Occupational Therapist     Heather Loja, PT Physical Therapist                                 Physical Therapy Education       Title: PT OT SLP Therapies (In Progress)       Topic: Physical Therapy (Done)       Point: Mobility training (Done)       Learning Progress Summary             Patient Acceptance, E, VU,NR by  at 2/1/2024 1451    Eager, E, VU by SC at 1/31/2024 1402    Comment: reviewed  HEP    Eager, E, VU by SC at 1/30/2024 1630    Comment: reviewed HEP    Acceptance, E, NR by CM at 1/29/2024 1409    Eager, E, VU,DU,NR by  at 1/26/2024 1336    Comment: Educated pt. safety/technique w/bed mobility, transfers, PT POC                         Point: Home exercise program (Done)       Learning Progress Summary             Patient Acceptance, E, VU,NR by  at 2/1/2024 1451    Eager, E, VU by SC at 1/31/2024 1402    Comment: reviewed  HEP    Eager, E, VU by SC at 1/30/2024 1630    Comment: reviewed HEP                         Point: Body mechanics (Done)       Learning Progress Summary             Patient Acceptance, E, VU,NR by  at 2/1/2024 1451    Eager, E, VU by SC at 1/31/2024 1402    Comment: reviewed  HEP    Eager, E, VU by SC at 1/30/2024 1630    Comment: reviewed HEP    Acceptance, E, NR by  at 1/29/2024 1409    Eager, E, VU,DU,NR by  at 1/26/2024 1336    Comment: Educated pt. safety/technique w/bed mobility, transfers, PT POC                         Point: Precautions (Done)       Learning Progress Summary             Patient Acceptance, E, VU,NR by  at 2/1/2024 1451    Eager, E, VU by SC at 1/31/2024 1402    Comment: reviewed  HEP    Eager, E, VU by SC at 1/30/2024 1630    Comment: reviewed HEP    Acceptance, E, NR by  at 1/29/2024 1409    Eager, E, VU,DU,NR by  at 1/26/2024 1336    Comment: Educated pt. safety/technique w/bed mobility, transfers, PT POC                                          User Key       Initials Effective Dates Name Provider Type Discipline    SC 02/03/23 -  Mumtaz Villareal PT Physical Therapist PT     06/01/21 -  Hyacinth Johansen, PT Physical Therapist PT     09/22/22 -  uAdrey Smith, PT Physical Therapist PT     09/21/23 -  Heather Loja, BOGDAN Physical Therapist PT                  PT Recommendation and Plan     Plan of Care Reviewed With: patient  Progress: no change  Outcome Evaluation: Pt continues to present below baseline function d/t dizziness and deficits in strength, balance, and activity tolerance. Pt required Wei for STS and to take 5 steps toward the HOB using the FWW. Pt performed standing balance activities requiring Wei to steady throughout. Ambulation distance this date limited by dizziness. Pt would continue to benefit from skilled IP PT. Recommend IRF at d/c.     Time Calculation:         PT Charges       Row Name 02/01/24 1451             Time Calculation    Start Time 1356  -LH      PT Received On 02/01/24  -      PT Goal Re-Cert Due Date 02/05/24  -         Timed Charges    91105 - PT Therapeutic Exercise Minutes 15  -      17623 - PT Therapeutic Activity Minutes 14  -         Total Minutes    Timed Charges Total Minutes 29  -       Total Minutes 29  -                User Key  (r) = Recorded By, (t) = Taken By, (c) = Cosigned By      Initials Name Provider Type     Heather Loja, BOGDAN Physical Therapist                  Therapy Charges for Today       Code Description Service Date Service Provider Modifiers Qty    41930436521 HC PT THER PROC EA 15 MIN 2/1/2024 Heather Loja, PT GP 1    00109536565 HC PT THERAPEUTIC ACT EA 15 MIN 2/1/2024 Heather Loja, PT GP 1            PT G-Codes  Outcome Measure Options: AM-PAC 6 Clicks Basic Mobility (PT)  AM-PAC 6 Clicks Score (PT): 16  AM-PAC 6 Clicks Score (OT): 15  PT Discharge Summary  Anticipated Discharge Disposition (PT): inpatient rehabilitation facility    Heather Loja  PT  2/1/2024

## 2024-02-01 NOTE — PLAN OF CARE
Goal Outcome Evaluation:  Plan of Care Reviewed With: patient        Progress: improving  Outcome Evaluation: Pt. progressing towards baseline with ADLs and functional mobility. Continues to be limited by decreased activity tolerance and generalized weakness. Completed T/Fs with Min A and grooming with SUA. Will continue to progress as able per OT POC.

## 2024-02-02 LAB
GLUCOSE BLDC GLUCOMTR-MCNC: 115 MG/DL (ref 70–130)
GLUCOSE BLDC GLUCOMTR-MCNC: 135 MG/DL (ref 70–130)
GLUCOSE BLDC GLUCOMTR-MCNC: 156 MG/DL (ref 70–130)
GLUCOSE BLDC GLUCOMTR-MCNC: 96 MG/DL (ref 70–130)

## 2024-02-02 PROCEDURE — 99232 SBSQ HOSP IP/OBS MODERATE 35: CPT | Performed by: INTERNAL MEDICINE

## 2024-02-02 PROCEDURE — 63710000001 INSULIN LISPRO (HUMAN) PER 5 UNITS: Performed by: PHYSICIAN ASSISTANT

## 2024-02-02 PROCEDURE — 97530 THERAPEUTIC ACTIVITIES: CPT

## 2024-02-02 PROCEDURE — 25010000002 CEFTRIAXONE PER 250 MG: Performed by: HOSPITALIST

## 2024-02-02 PROCEDURE — 97110 THERAPEUTIC EXERCISES: CPT

## 2024-02-02 PROCEDURE — 82948 REAGENT STRIP/BLOOD GLUCOSE: CPT

## 2024-02-02 RX ORDER — CEFUROXIME AXETIL 250 MG/1
500 TABLET ORAL EVERY 12 HOURS SCHEDULED
Status: COMPLETED | OUTPATIENT
Start: 2024-02-03 | End: 2024-02-04

## 2024-02-02 RX ADMIN — PANTOPRAZOLE SODIUM 40 MG: 40 TABLET, DELAYED RELEASE ORAL at 10:40

## 2024-02-02 RX ADMIN — SODIUM CHLORIDE 1000 MG: 900 INJECTION INTRAVENOUS at 10:40

## 2024-02-02 RX ADMIN — SENNOSIDES AND DOCUSATE SODIUM 2 TABLET: 8.6; 5 TABLET ORAL at 21:07

## 2024-02-02 RX ADMIN — INSULIN LISPRO 2 UNITS: 100 INJECTION, SOLUTION INTRAVENOUS; SUBCUTANEOUS at 21:07

## 2024-02-02 RX ADMIN — SENNOSIDES AND DOCUSATE SODIUM 2 TABLET: 8.6; 5 TABLET ORAL at 10:42

## 2024-02-02 RX ADMIN — ASPIRIN 81 MG: 81 TABLET, COATED ORAL at 10:40

## 2024-02-02 RX ADMIN — Medication 10 ML: at 21:08

## 2024-02-02 RX ADMIN — ACETAMINOPHEN 650 MG: 325 TABLET ORAL at 10:40

## 2024-02-02 RX ADMIN — CLOPIDOGREL BISULFATE 75 MG: 75 TABLET ORAL at 10:40

## 2024-02-02 RX ADMIN — TAMSULOSIN HYDROCHLORIDE 0.4 MG: 0.4 CAPSULE ORAL at 10:40

## 2024-02-02 RX ADMIN — VENLAFAXINE HYDROCHLORIDE 150 MG: 75 CAPSULE, EXTENDED RELEASE ORAL at 10:40

## 2024-02-02 NOTE — PLAN OF CARE
Goal Outcome Evaluation:  Plan of Care Reviewed With: patient        Progress: no change  Outcome Evaluation: Pt continues to present below baseline function d/t dizziness and deficits in strength, balance, and activity tolerance. Pt required Wei for STS and to take 6 steps from bed>chair using FWW. Ambulation distance continues to be limited by dizziness. BP stable at 136/63 after transfer. Pt would continue to benefit from skilled IP PT. Recommend IRF at d/c.      Anticipated Discharge Disposition (PT): inpatient rehabilitation facility

## 2024-02-02 NOTE — THERAPY TREATMENT NOTE
Patient Name: Janna Willis  : 1935    MRN: [id number]                              Today's Date: 2024       Admit Date: 2024    Visit Dx:     ICD-10-CM ICD-9-CM   1. Fall, initial encounter  W19.XXXA E888.9   2. Facial laceration, initial encounter  S01.81XA 873.40   3. Traumatic hematoma of right orbit, initial encounter  S05.11XA 921.2   4. Closed head injury, initial encounter  S09.90XA 959.01   5. Anticoagulated  Z79.01 V58.61     Patient Active Problem List   Diagnosis    Heart block AV third degree    CAD (coronary artery disease)    Primary hypertension    Mixed hyperlipidemia    GERD (gastroesophageal reflux disease)    Hypothyroid    Class 1 obesity in adult    Traumatic orbital hematoma    Rhabdomyolysis    Fall    Syncope    DM2 (diabetes mellitus, type 2)     Past Medical History:   Diagnosis Date    Diabetes      Past Surgical History:   Procedure Laterality Date    CARDIAC CATHETERIZATION N/A 2023    Procedure: Left Heart Cath;  Surgeon: Kuldeep Miner MD;  Location:  [facility];  Service: Cardiology;  Laterality: N/A;    CARDIAC ELECTROPHYSIOLOGY PROCEDURE N/A 2023    Procedure: Temporary Pacemaker;  Surgeon: Kuldeep Miner MD;  Location:  [facility];  Service: Cardiology;  Laterality: N/A;    CARDIAC ELECTROPHYSIOLOGY PROCEDURE N/A 2023    Procedure: Pacemaker DC new;  Surgeon: Dudley Rodriguez DO;  Location:  [facility];  Service: Cardiology;  Laterality: N/A;    OVARY SURGERY        General Information       Row Name 24 1613          Physical Therapy Time and Intention    Document Type therapy note (daily note)  -     Mode of Treatment physical therapy  -       Row Name 24 1613          General Information    Patient Profile Reviewed yes  -     Existing Precautions/Restrictions fall  -     Barriers to Rehab medically complex;previous functional deficit  -       Row Name 24 1612           Cognition    Orientation Status (Cognition) oriented to;person;time;disoriented to;place  -[facility] Name 02/02/24 1613          Safety Issues, Functional Mobility    Safety Issues Affecting Function (Mobility) awareness of need for assistance;insight into deficits/self-awareness;safety precaution awareness;safety precautions follow-through/compliance;sequencing abilities  -     Impairments Affecting Function (Mobility) balance;endurance/activity tolerance;pain;postural/trunk control;strength;shortness of breath  [facility]               User Key  (r) = Recorded By, (t) = Taken By, (c) = Cosigned By      Initials Name Provider Type     Heather Loja, PT Physical Therapist                   Mobility       [facility] Name 02/02/24 1613          Bed Mobility    Bed Mobility supine-sit  -     Supine-Sit [place] (Bed Mobility) minimum assist (75% patient effort);verbal cues  -     Assistive Device (Bed Mobility) bed rails;head of bed elevated  -     Comment, (Bed Mobility) VCs for hand placement and sequencing. Required assist at trunk. Reported dizziness in sitting w/ BP stable at 141/73  -[facility] Name 02/02/24 1613          Transfers    Comment, (Transfers) VCs for hand placement and sequencing. Reported dizziness in standing that did not resolve w/ time. Had pt take steps from bed>chair. BP stable at 136/63  -[facility] Name 02/02/24 1613          Bed-Chair Transfer    Bed-Chair [place] (Transfers) minimum assist (75% patient effort);verbal cues  -     Assistive Device (Bed-Chair Transfers) walker, front-wheeled  -     Comment, (Bed-Chair Transfer) Steps from bed>chair  -[facility] Name 02/02/24 1613          Sit-Stand Transfer    Sit-Stand [place] (Transfers) minimum assist (75% patient effort);verbal cues  -     Assistive Device (Sit-Stand Transfers) walker, front-wheeled  -     Comment, (Sit-Stand Transfer) STS x1 from EOB  -[facility] Name 02/02/24 1613          Gait/Stairs  (Locomotion)    [place] Level (Gait) minimum assist (75% patient effort);1 person assist;verbal cues  -     Assistive Device (Gait) walker, front-wheeled  -     Patient was able to Ambulate yes  -     Distance in Feet (Gait) 6  -     Deviations/Abnormal Patterns (Gait) stride length decreased;weight shifting decreased;gilberto decreased  -     Bilateral Gait Deviations forward flexed posture  -     Comment, (Gait/Stairs) Pt took 6 steps from bed>chair. Cues for upright posture, sequencing, and forward gaze. Required assist to naviagte walker. Mildly unsetady but no overt LOB. Distance limited by dizziness  -               User Key  (r) = Recorded By, (t) = Taken By, (c) = Cosigned By      Initials Name Provider Type     Heather Loja PT Physical Therapist                   Obj/Interventions       [facility] Name 02/02/24 1617          Motor Skills    Therapeutic Exercise hip;knee;ankle  -[facility] Name 02/02/24 1617          Hip (Therapeutic Exercise)    Hip (Therapeutic Exercise) strengthening exercise  -     Hip Strengthening (Therapeutic Exercise) bilateral;heel slides;10 repetitions  -[facility] Name 02/02/24 1617          Knee (Therapeutic Exercise)    Knee (Therapeutic Exercise) isometric exercises;strengthening exercise  -     Knee Isometrics (Therapeutic Exercise) bilateral;quad sets;10 repetitions  -     Knee Strengthening (Therapeutic Exercise) bilateral;SLR (straight leg raise);10 repetitions  -[facility] Name 02/02/24 1617          Ankle (Therapeutic Exercise)    Ankle (Therapeutic Exercise) AROM (active range of motion)  -     Ankle AROM (Therapeutic Exercise) bilateral;dorsiflexion;plantarflexion;10 repetitions  -       Row Name 02/02/24 1617          Balance    Balance Assessment sitting static balance;sitting dynamic balance;sit to stand dynamic balance;standing static balance;standing dynamic balance  -     Static Sitting Balance standby assist  -     Dynamic  Sitting Balance contact guard  -     Position, Sitting Balance unsupported;sitting edge of bed  -     Sit to Stand Dynamic Balance minimal assist;verbal cues  -     Static Standing Balance contact guard;verbal cues  -     Dynamic Standing Balance minimal assist;verbal cues  -     Position/Device Used, Standing Balance supported;walker, front-wheeled  -     Balance Interventions sitting;standing;sit to stand;supported;static;dynamic  -               User Key  (r) = Recorded By, (t) = Taken By, (c) = Cosigned By      Initials Name Provider Type     Heather Loja, PT Physical Therapist                   Goals/Plan    No documentation.                  Clinical Impression       [facility] Name 02/02/24 1618          Pain    Pretreatment Pain Rating 0/10 - no pain  -     Posttreatment Pain Rating 0/10 - no pain  -[facility] Name 02/02/24 1618          Plan of Care Review    Plan of Care Reviewed With patient  -     Progress no change  -     Outcome Evaluation Pt continues to present below baseline function d/t dizziness and deficits in strength, balance, and activity tolerance. Pt required Wei for STS and to take 6 steps from bed>chair using FWW. Ambulation distance continues to be limited by dizziness. BP stable at 136/63 after transfer. Pt would continue to benefit from skilled IP PT. Recommend IRF at d/c.  -[facility] Name 02/02/24 1618          Therapy Assessment/Plan (PT)    Rehab Potential (PT) good, to achieve stated therapy goals  -     Criteria for Skilled Interventions Met (PT) yes;meets criteria;skilled treatment is necessary  -     Therapy Frequency (PT) daily  -       Row Name 02/02/24 1618          Vital Signs    Pre Systolic BP Rehab 134  supine  -     Pre Treatment Diastolic BP 61  -     Intra Systolic BP Rehab 141  sitting EOB  -     Intra Treatment Diastolic BP 73  -LH     Post Systolic BP Rehab 136  sitting in recliner  -     Post Treatment Diastolic BP 63  -LH      Pretreatment Heart Rate (beats/min) 67  -     Posttreatment Heart Rate (beats/min) 70  -     Pre SpO2 (%) 97  -     O2 Delivery Pre Treatment nasal cannula  -     O2 Delivery Intra Treatment room air  -     Post SpO2 (%) 97  -     O2 Delivery Post Treatment nasal cannula  -LH     Pre Patient Position Supine  -     Intra Patient Position Standing  -LH     Post Patient Position Sitting  -[facility] 02/02/24 1618          Positioning and Restraints    Pre-Treatment Position in bed  -     Post Treatment Position chair  -     In Chair reclined;sitting;call light within reach;encouraged to call for assist;exit alarm on;waffle cushion;legs elevated;compression device;notified [facility]  -               User Key  (r) = Recorded By, (t) = Taken By, (c) = Cosigned By      Initials Name Provider Type     Heather Loja, PT Physical Therapist                   Outcome Measures       Row Name 02/02/24 1620 02/02/24 0800       How much help from another person do you currently need...    Turning from your back to your side while in flat bed without using bedrails? 3  - 3  -NC    Moving from lying on back to sitting on the side of a flat bed without bedrails? 3  - 2  -NC    Moving to and from a bed to a chair (including a wheelchair)? 3  - 2  -NC    Standing up from a chair using your arms (e.g., wheelchair, bedside chair)? 3  - 3  -NC    Climbing 3-5 steps with a railing? 2  - 2  -NC    To walk in hospital room? 3  - 3  -NC    AM-PAC 6 Clicks Score (PT) 17  - 15  -NC    Highest Level of Mobility Goal 5 --> Static standing  - 4 --> Transfer to chair/commode  -NC      Row Name 02/02/24 1620          Functional Assessment    Outcome Measure Options AM-PAC 6 Clicks Basic Mobility (PT)  -               User Key  (r) = Recorded By, (t) = Taken By, (c) = Cosigned By      Initials Name Provider Type    Daniela Orosco RN Registered Nurse     Heather Loja, PT Physical Therapist                                  Physical Therapy Education       Title: PT OT SLP Therapies (In Progress)       Topic: Physical Therapy (Done)       Point: Mobility training (Done)       Learning Progress Summary             Patient Acceptance, E, VU,NR by  at 2/2/2024 1621    Acceptance, E, VU,NR by  at 2/1/2024 1451    Eager, E, VU by SC at 1/31/2024 1402    Comment: reviewed  HEP    Eager, E, VU by SC at 1/30/2024 1630    Comment: reviewed HEP    Acceptance, E, NR by  at 1/29/2024 1409    Eager, E, VU,DU,NR by  at 1/26/2024 1336    Comment: Educated pt. safety/technique w/bed mobility, transfers, PT POC                         Point: Home exercise program (Done)       Learning Progress Summary             Patient Acceptance, E, VU,NR by  at 2/1/2024 1451    Eager, E, VU by SC at 1/31/2024 1402    Comment: reviewed  HEP    Eager, E, VU by SC at 1/30/2024 1630    Comment: reviewed HEP                         Point: Body mechanics (Done)       Learning Progress Summary             Patient Acceptance, E, VU,NR by  at 2/2/2024 1621    Acceptance, E, VU,NR by  at 2/1/2024 1451    Eager, E, VU by SC at 1/31/2024 1402    Comment: reviewed  HEP    Eager, E, VU by SC at 1/30/2024 1630    Comment: reviewed HEP    Acceptance, E, NR by  at 1/29/2024 1409    Eager, E, VU,DU,NR by  at 1/26/2024 1336    Comment: Educated pt. safety/technique w/bed mobility, transfers, PT POC                         Point: Precautions (Done)       Learning Progress Summary             Patient Acceptance, E, VU,NR by  at 2/2/2024 1621    Acceptance, E, VU,NR by  at 2/1/2024 1451    Eager, E, VU by SC at 1/31/2024 1402    Comment: reviewed  HEP    Eager, E, VU by SC at 1/30/2024 1630    Comment: reviewed HEP    Acceptance, E, NR by  at 1/29/2024 1409    Eager, E, VU,DU,NR by  at 1/26/2024 1336    Comment: Educated pt. safety/technique w/bed mobility, transfers, PT POC                                         User Key       Initials  Effective Dates Name Provider Type Discipline    SC 02/03/23 -  Mumtaz Villareal, PT Physical Therapist PT     06/01/21 -  Hyacinth Johansen, PT Physical Therapist PT     09/22/22 -  Audrey Smith, PT Physical Therapist PT     09/21/23 -  Heather Loja, PT Physical Therapist PT                  PT Recommendation and Plan     Plan of Care Reviewed With: patient  Progress: no change  Outcome Evaluation: Pt continues to present below baseline function d/t dizziness and deficits in strength, balance, and activity tolerance. Pt required Wei for STS and to take 6 steps from bed>chair using FWW. Ambulation distance continues to be limited by dizziness. BP stable at 136/63 after transfer. Pt would continue to benefit from skilled IP PT. Recommend IRF at d/c.     Time Calculation:         PT Charges       Row Name 02/02/24 1621             Time Calculation    Start Time 1523  -      PT Received On 02/02/24  -      PT Goal Re-Cert Due Date 02/05/24  -         Timed Charges    29540 - PT Therapeutic Exercise Minutes 10  -      74715 - PT Therapeutic Activity Minutes 13  -         Total Minutes    Timed Charges Total Minutes 23  -       Total Minutes 23  -                User Key  (r) = Recorded By, (t) = Taken By, (c) = Cosigned By      Initials Name Provider Type     Heather Loja, PT Physical Therapist                  Therapy Charges for Today       Code Description Service Date Service Provider Modifiers Qty    56191680762 HC PT THER PROC EA 15 MIN 2/1/2024 Heather Loja, PT GP 1    72282465697 HC PT THERAPEUTIC ACT EA 15 MIN 2/1/2024 Heather Loja, PT GP 1    41718920937 HC PT THER PROC EA 15 MIN 2/2/2024 Heather Loja, PT GP 1    45388155528 HC PT THERAPEUTIC ACT EA 15 MIN 2/2/2024 Heather Loja, PT GP 1            PT G-Codes  Outcome Measure Options: AM-PAC 6 Clicks Basic Mobility (PT)  AM-PAC 6 Clicks Score (PT): 17  AM-PAC 6 Clicks Score (OT): 15  PT Discharge Summary  Anticipated  Discharge Disposition (PT): inpatient rehabilitation facility    Heather Loja, PT  2/2/2024

## 2024-02-02 NOTE — PROGRESS NOTES
[facility] Medicine Services  PROGRESS NOTE    Patient Name: Janna Willis  : 1935  MRN: [id number]    Date of Admission: 2024  Primary Care Physician: Hever Ibrahim APRN    Subjective   Subjective     CC: S/P Fall    HPI:   Awake, alert, no dyspnea at rest. Tolerating po. Working w/ PT    Objective   Objective     Vital Signs:   Temp:  [97.9 °F (36.6 °C)-98.6 °F (37 °C)] 98.6 °F (37 °C)  Heart Rate:  [68-86] 68  Resp:  [16-18] 18  BP: (125-151)/(52-78) 125/52  Flow (L/min):  [2] 2     Physical Exam:  Alert, nontoxic appearing, sitting up in bed, normal work of breathing  Periorbital bruising stable; oroph clear  Lungs clear  Rrr  Abd soft, nontender to palpation  No cce  Marie w/ clear urine    Results Reviewed:  LAB RESULTS:      Lab 24  0524  0524  0620 24  1038 24  0644 24  0552 24  0546   WBC 3.92 5.10 5.94  --   --  8.63 5.76   HEMOGLOBIN 11.3* 10.8* 10.6*  --   --  13.3 11.9*   HEMATOCRIT 34.3 32.9* 32.2*  --   --  39.7 36.4   PLATELETS 173 150 132*  --   --  156 125*   MCV 87.5 87.3 89.0  --   --  88.2 89.9   LACTATE  --   --   --  0.9 2.1*  --   --          Lab 24  0535 24  0522 24  0620 24  0552 24  0546   SODIUM 139 136 134* 134* 136   POTASSIUM 3.9 3.9 3.5 3.7 4.2   CHLORIDE 103 102 100 97* 100   CO2 29.0 24.0 25.0 23.0 24.0   ANION GAP 7.0 10.0 9.0 14.0 12.0   BUN 12 12 11 8 16   CREATININE 0.50* 0.54* 0.61 0.48* 0.70   EGFR 90.9 89.2 86.7 91.8 83.8   GLUCOSE 99 99 108* 152* 103*   CALCIUM 8.5* 8.1* 8.0* 8.6 8.5*   MAGNESIUM  --  1.8  --   --   --                              Brief Urine Lab Results  (Last result in the past 365 days)        Color   Clarity   Blood   Leuk Est   Nitrite   Protein   CREAT   Urine HCG        24 0908 Yellow   Cloudy   Small (1+)   Moderate (2+)   Negative   Negative                   Microbiology Results Abnormal       None             No radiology results from the last 24 hrs    Results for orders placed during the hospital encounter of 06/05/23    Adult Transthoracic Echo Complete w/ Color, Spectral and Contrast if Necessary Per Protocol    Interpretation Summary    Left ventricular systolic function is normal. Left ventricular ejection fraction appears to be 61 - 65%.    Left ventricular wall thickness is consistent with borderline concentric hypertrophy.    Left ventricular diastolic function is consistent with (grade II w/high LAP) pseudonormalization.    Left atrial volume is mildly increased.    There is moderate calcification of the aortic valve.    Moderate mitral annular calcification is present.    Mild mitral valve stenosis is present.    Mild mitral valve regurgitation is present.    Mild pulmonic valve regurgitation is present.    Moderate tricuspid valve regurgitation is present.    Estimated right ventricular systolic pressure from tricuspid regurgitation is mildly elevated (35-45 mmHg).      Current medications:  Scheduled Meds:aspirin, 81 mg, Oral, Daily  clopidogrel, 75 mg, Oral, Daily  insulin lispro, 2-7 Units, Subcutaneous, 4x Daily AC & at Bedtime  levothyroxine, 50 mcg, Oral, Q AM  pantoprazole, 40 mg, Oral, QAM  senna-docusate sodium, 2 tablet, Oral, BID  sodium chloride, 10 mL, Intravenous, Q12H  tamsulosin, 0.4 mg, Oral, Daily  venlafaxine XR, 150 mg, Oral, Daily With Breakfast      Continuous Infusions:       PRN Meds:.  acetaminophen    senna-docusate sodium **AND** polyethylene glycol **AND** bisacodyl **AND** bisacodyl    dextrose    dextrose    glucagon (human recombinant)    Magnesium Standard Dose Replacement - Follow Nurse / BPA Driven Protocol    melatonin    ondansetron    [COMPLETED] Insert Peripheral IV **AND** sodium chloride    sodium chloride    sodium chloride    Assessment & Plan   Assessment & Plan     Active Hospital Problems    Diagnosis  POA    **Traumatic orbital hematoma [S05.10XA]  Yes     Rhabdomyolysis [M62.82]  Yes    Fall [W19.XXXA]  Yes    Syncope [R55]  Yes    DM2 (diabetes mellitus, type 2) [E11.9]  Yes    Mixed hyperlipidemia [E78.2]  Yes    Hypothyroid [E03.9]  Yes    GERD (gastroesophageal reflux disease) [K21.9]  Yes    Primary hypertension [I10]  Yes    CAD (coronary artery disease) [I25.10]  Yes      Resolved Hospital Problems   No resolved problems to display.        Brief Hospital Course to date:  88 y.o. female with a past medical history significant for HTN, HLD, CAD s/p stent placement, s/p PPM, DM2, GERD, and hypothyroidism. Here with orbital hematoma and rhabdo after an unwitnessed fall.    Orbital hematoma  Shoulder pain  Rhabdomyolysis, resolving  -IVF, pain meds, PT/OT  -no fracture or dislocation shoulder noted on imaging    Falls  -unwitnessed  -denies palpitations/vertigo  -unclear if syncope  -mild orthostasis noted  -cortisol >29  -participating w/ PT/OT. 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 Jacobo) following: lindsay placed bedside cystoscopy 1/29/24 (difficult placement due to stenotic vaginal introitus w/ retracted urethral meatus);  had incomplete bladder emptying (possibly related to transient neurogenic bladder) ;recommends continue lindsay catheter & outpatient Urology follow up for lindsay cath removal and voiding trial (w/ Dr. Jacobo's Physician assistant team). If found to have significant or persistent incomplete bladder emptying, given age and comorbidities, can learn to self-cath (which may challenging given her anatomy) vs replacement of lindsay catheter  -day #5/7 antibiotics (changed to oral ceftin from rocephin).    -synthroid    Anx/Depression  -Effexor    GERD  -PPI          Expected Discharge  Location and Transportation: awaiting rehab placement  Expected Discharge  medically ready, awaiting facility acceptance    DVT prophylaxis:  Mechanical DVT prophylaxis orders are present.         AM-PAC 6 Clicks Score (PT): 15 (02/02/24 0800)    CODE STATUS:   Code Status and Medical Interventions:   Ordered at: 01/25/24 1951     Level Of Support Discussed With:    Patient     Code Status (Patient has no pulse and is not breathing):    CPR (Attempt to Resuscitate)     Medical Interventions (Patient has pulse or is breathing):    Full Support       Ton Mann MD  02/02/24

## 2024-02-02 NOTE — CASE MANAGEMENT/SOCIAL WORK
Continued Stay Note  [facility]     Patient Name: Janna Willis  MRN: [id number]  Today's Date: 2/2/2024    Admit Date: 1/25/2024    Plan: SNF   Discharge Plan       Row Name 02/02/24 1527       Plan    Plan SNF    Patient/Family in Agreement with Plan yes    Plan Comments Ms. Willis is ready for discharge. Awaiting placement acceptance, to start pre-cert. Rhona, Liaison with [facility] is following. Called referral to Dionte, Liaison with [facility]. CM will continue to follow.    Final Discharge Disposition Code 30 - still a patient                   Discharge Codes    No documentation.                 Expected Discharge Date and Time       Expected Discharge Date Expected Discharge Time    Feb 6, 2024               Nae Reyes RN

## 2024-02-03 LAB
GLUCOSE BLDC GLUCOMTR-MCNC: 118 MG/DL (ref 70–130)
GLUCOSE BLDC GLUCOMTR-MCNC: 118 MG/DL (ref 70–130)
GLUCOSE BLDC GLUCOMTR-MCNC: 144 MG/DL (ref 70–130)
GLUCOSE BLDC GLUCOMTR-MCNC: 85 MG/DL (ref 70–130)

## 2024-02-03 PROCEDURE — 82948 REAGENT STRIP/BLOOD GLUCOSE: CPT

## 2024-02-03 PROCEDURE — 99232 SBSQ HOSP IP/OBS MODERATE 35: CPT | Performed by: STUDENT IN AN ORGANIZED HEALTH CARE EDUCATION/TRAINING PROGRAM

## 2024-02-03 RX ADMIN — SENNOSIDES AND DOCUSATE SODIUM 2 TABLET: 8.6; 5 TABLET ORAL at 08:39

## 2024-02-03 RX ADMIN — CEFUROXIME AXETIL 500 MG: 250 TABLET, FILM COATED ORAL at 08:39

## 2024-02-03 RX ADMIN — PANTOPRAZOLE SODIUM 40 MG: 40 TABLET, DELAYED RELEASE ORAL at 08:39

## 2024-02-03 RX ADMIN — SENNOSIDES AND DOCUSATE SODIUM 2 TABLET: 8.6; 5 TABLET ORAL at 20:36

## 2024-02-03 RX ADMIN — TAMSULOSIN HYDROCHLORIDE 0.4 MG: 0.4 CAPSULE ORAL at 08:39

## 2024-02-03 RX ADMIN — BISACODYL 5 MG: 5 TABLET, COATED ORAL at 08:45

## 2024-02-03 RX ADMIN — Medication 10 ML: at 08:40

## 2024-02-03 RX ADMIN — POLYETHYLENE GLYCOL 3350 17 G: 17 POWDER, FOR SOLUTION ORAL at 20:36

## 2024-02-03 RX ADMIN — VENLAFAXINE HYDROCHLORIDE 150 MG: 75 CAPSULE, EXTENDED RELEASE ORAL at 08:39

## 2024-02-03 RX ADMIN — LEVOTHYROXINE SODIUM 50 MCG: 0.05 TABLET ORAL at 05:28

## 2024-02-03 RX ADMIN — CEFUROXIME AXETIL 500 MG: 250 TABLET, FILM COATED ORAL at 20:36

## 2024-02-03 RX ADMIN — Medication 10 ML: at 20:40

## 2024-02-03 RX ADMIN — ASPIRIN 81 MG: 81 TABLET, COATED ORAL at 08:39

## 2024-02-03 RX ADMIN — CLOPIDOGREL BISULFATE 75 MG: 75 TABLET ORAL at 08:39

## 2024-02-03 NOTE — CASE MANAGEMENT/SOCIAL WORK
Continued Stay Note  [facility]     Patient Name: Janna Willis  MRN: [id number]  Today's Date: 2/3/2024    Admit Date: 1/25/2024    Plan: SNF   Discharge Plan       Row Name 02/03/24 1408       Plan    Plan Comments Weekend CM received a call from [facility]. They were trying to submit to insurance but [facility] [place] already has one in process for this patient. Weekday CM, please follow up with Hyacinth at [place] and Dionte at [facility] on Monday. CM following.    Final Discharge Disposition Code 30 - still a patient                   Discharge Codes    No documentation.                 Expected Discharge Date and Time       Expected Discharge Date Expected Discharge Time    Feb 6, 2024               Ashlee Garcia RN

## 2024-02-03 NOTE — PROGRESS NOTES
[facility] Medicine Services  PROGRESS NOTE    Patient Name: Janna Willis  : 1935  MRN: [id number]    Date of Admission: 2024  Primary Care Physician: Hever Ibrahim APRN    Subjective   Subjective     CC: S/P Fall    HPI:   Marie in place. On 2L NC overnight.  Reports having a bowel movement today.  Slept well.  Reports eating all of her breakfast.  Denied complaints.    Objective   Objective     Vital Signs:   Temp:  [97.7 °F (36.5 °C)-98.7 °F (37.1 °C)] 98 °F (36.7 °C)  Heart Rate:  [68-72] 68  Resp:  [16-18] 16  BP: (125-157)/(52-77) 148/69  Flow (L/min):  [2] 2     Physical Exam:  Constitutional: No acute distress, awake, alert  HENT: Mucous membranes moist  Respiratory: Clear to auscultation bilaterally, respiratory effort normal   Cardiovascular: RRR, no murmurs, rubs, or gallops  Gastrointestinal: Positive bowel sounds, soft, nontender, nondistended  Musculoskeletal: No bilateral ankle edema  Psychiatric: Appropriate affect, cooperative  Neurologic: PERRL, symmetric facies, speech clear  Skin: No rashes on exposed skin    Results Reviewed:  LAB RESULTS:      Lab 24  0524  0524  0620 24  [facility] 24  0644 24  0552   WBC 3.92 5.10 5.94  --   --  8.63   HEMOGLOBIN 11.3* 10.8* 10.6*  --   --  13.3   HEMATOCRIT 34.3 32.9* 32.2*  --   --  39.7   PLATELETS 173 150 132*  --   --  156   MCV 87.5 87.3 89.0  --   --  88.2   LACTATE  --   --   --  0.9 2.1*  --          Lab 24  0535 24  0522 24  0620 24  0552   SODIUM 139 136 134* 134*   POTASSIUM 3.9 3.9 3.5 3.7   CHLORIDE 103 102 100 97*   CO2 29.0 24.0 25.0 23.0   ANION GAP 7.0 10.0 9.0 14.0   BUN 12 12 11 8   CREATININE 0.50* 0.54* 0.61 0.48*   EGFR 90.9 89.2 86.7 91.8   GLUCOSE 99 99 108* 152*   CALCIUM 8.5* 8.1* 8.0* 8.6   MAGNESIUM  --  1.8  --   --                              Brief Urine Lab Results  (Last result in the past 365 days)         Color   Clarity   Blood   Leuk Est   Nitrite   Protein   CREAT   Urine HCG        01/29/24 0908 Yellow   Cloudy   Small (1+)   Moderate (2+)   Negative   Negative                   Microbiology Results Abnormal       None            No radiology results from the last 24 hrs    Results for orders placed during the hospital encounter of 06/05/23    Adult Transthoracic Echo Complete w/ Color, Spectral and Contrast if Necessary Per Protocol    Interpretation Summary    Left ventricular systolic function is normal. Left ventricular ejection fraction appears to be 61 - 65%.    Left ventricular wall thickness is consistent with borderline concentric hypertrophy.    Left ventricular diastolic function is consistent with (grade II w/high LAP) pseudonormalization.    Left atrial volume is mildly increased.    There is moderate calcification of the aortic valve.    Moderate mitral annular calcification is present.    Mild mitral valve stenosis is present.    Mild mitral valve regurgitation is present.    Mild pulmonic valve regurgitation is present.    Moderate tricuspid valve regurgitation is present.    Estimated right ventricular systolic pressure from tricuspid regurgitation is mildly elevated (35-45 mmHg).      Current medications:  Scheduled Meds:aspirin, 81 mg, Oral, Daily  cefuroxime, 500 mg, Oral, Q12H  clopidogrel, 75 mg, Oral, Daily  insulin lispro, 2-7 Units, Subcutaneous, 4x Daily AC & at Bedtime  levothyroxine, 50 mcg, Oral, Q AM  pantoprazole, 40 mg, Oral, QAM  senna-docusate sodium, 2 tablet, Oral, BID  sodium chloride, 10 mL, Intravenous, Q12H  tamsulosin, 0.4 mg, Oral, Daily  venlafaxine XR, 150 mg, Oral, Daily With Breakfast      Continuous Infusions:       PRN Meds:.  acetaminophen    senna-docusate sodium **AND** polyethylene glycol **AND** bisacodyl **AND** bisacodyl    dextrose    dextrose    glucagon (human recombinant)    Magnesium Standard Dose Replacement - Follow Nurse / BPA Driven Protocol     melatonin    ondansetron    [COMPLETED] Insert Peripheral IV **AND** sodium chloride    sodium chloride    sodium chloride    Assessment & Plan   Assessment & Plan     Active Hospital Problems    Diagnosis  POA    **Traumatic orbital hematoma [S05.10XA]  Yes    Rhabdomyolysis [M62.82]  Yes    Fall [W19.XXXA]  Yes    Syncope [R55]  Yes    DM2 (diabetes mellitus, type 2) [E11.9]  Yes    Mixed hyperlipidemia [E78.2]  Yes    Hypothyroid [E03.9]  Yes    GERD (gastroesophageal reflux disease) [K21.9]  Yes    Primary hypertension [I10]  Yes    CAD (coronary artery disease) [I25.10]  Yes      Resolved Hospital Problems   No resolved problems to display.        Brief Hospital Course to date:  88 y.o. female with a past medical history significant for HTN, HLD, CAD s/p stent placement, s/p PPM, DM2, GERD, and hypothyroidism. Here with orbital hematoma and rhabdo after an unwitnessed fall.    This patient's problems and plans were partially entered by my partner and updated as appropriate by me 02/03/24.    Orbital hematoma  Shoulder pain  Rhabdomyolysis, resolving  -IVF, pain meds, PT/OT  -no fracture or dislocation shoulder noted on imaging    Falls  -unwitnessed  -denies palpitations/vertigo  -unclear if syncope  -mild orthostasis noted  -cortisol >29  -participating w/ PT/OT. 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 If found to have significant or persistent incomplete bladder emptying, given age and comorbidities, can learn to self-cath (which may challenging given her anatomy) vs replacement of lindsay catheter  -complete 7d of antibiotics (changed to oral ceftin from rocephin).    Hypothyroidism  -synthroid    Anx/Depression  -Effexor    GERD  -PPI    Expected Discharge Location and Transportation: awaiting rehab placement  Expected Discharge  medically ready, awaiting facility acceptance    DVT prophylaxis:  Mechanical DVT prophylaxis orders are present.         AM-PAC 6 Clicks Score (PT): 17 (02/02/24 1620)    CODE STATUS:   Code Status and Medical Interventions:   Ordered at: 01/25/24 1951     Level Of Support Discussed With:    Patient     Code Status (Patient has no pulse and is not breathing):    CPR (Attempt to Resuscitate)     Medical Interventions (Patient has pulse or is breathing):    Full Support       Felicia Huffman MD  02/03/24

## 2024-02-04 LAB
GLUCOSE BLDC GLUCOMTR-MCNC: 128 MG/DL (ref 70–130)
GLUCOSE BLDC GLUCOMTR-MCNC: 136 MG/DL (ref 70–130)
GLUCOSE BLDC GLUCOMTR-MCNC: 172 MG/DL (ref 70–130)
GLUCOSE BLDC GLUCOMTR-MCNC: 91 MG/DL (ref 70–130)

## 2024-02-04 PROCEDURE — 63710000001 INSULIN LISPRO (HUMAN) PER 5 UNITS: Performed by: PHYSICIAN ASSISTANT

## 2024-02-04 PROCEDURE — 99232 SBSQ HOSP IP/OBS MODERATE 35: CPT | Performed by: STUDENT IN AN ORGANIZED HEALTH CARE EDUCATION/TRAINING PROGRAM

## 2024-02-04 PROCEDURE — 82948 REAGENT STRIP/BLOOD GLUCOSE: CPT

## 2024-02-04 RX ADMIN — CEFUROXIME AXETIL 500 MG: 250 TABLET, FILM COATED ORAL at 08:37

## 2024-02-04 RX ADMIN — CLOPIDOGREL BISULFATE 75 MG: 75 TABLET ORAL at 08:37

## 2024-02-04 RX ADMIN — BISACODYL 5 MG: 5 TABLET, COATED ORAL at 08:41

## 2024-02-04 RX ADMIN — CEFUROXIME AXETIL 500 MG: 250 TABLET, FILM COATED ORAL at 20:14

## 2024-02-04 RX ADMIN — Medication 10 ML: at 08:38

## 2024-02-04 RX ADMIN — INSULIN LISPRO 2 UNITS: 100 INJECTION, SOLUTION INTRAVENOUS; SUBCUTANEOUS at 11:42

## 2024-02-04 RX ADMIN — VENLAFAXINE HYDROCHLORIDE 150 MG: 75 CAPSULE, EXTENDED RELEASE ORAL at 08:37

## 2024-02-04 RX ADMIN — PANTOPRAZOLE SODIUM 40 MG: 40 TABLET, DELAYED RELEASE ORAL at 06:05

## 2024-02-04 RX ADMIN — SENNOSIDES AND DOCUSATE SODIUM 2 TABLET: 8.6; 5 TABLET ORAL at 08:36

## 2024-02-04 RX ADMIN — LEVOTHYROXINE SODIUM 50 MCG: 0.05 TABLET ORAL at 06:05

## 2024-02-04 RX ADMIN — SENNOSIDES AND DOCUSATE SODIUM 2 TABLET: 8.6; 5 TABLET ORAL at 20:14

## 2024-02-04 RX ADMIN — ASPIRIN 81 MG: 81 TABLET, COATED ORAL at 08:37

## 2024-02-04 RX ADMIN — TAMSULOSIN HYDROCHLORIDE 0.4 MG: 0.4 CAPSULE ORAL at 08:37

## 2024-02-04 NOTE — PROGRESS NOTES
[facility] Medicine Services  PROGRESS NOTE    Patient Name: Janna Willis  : 1935  MRN: [id number]    Date of Admission: 2024  Primary Care Physician: Hever Ibrahim APRN    Subjective   Subjective     CC: S/P Fall    HPI:   No BM. Afebrile. On RA.  Denied complaints.    Objective   Objective     Vital Signs:   Temp:  [98.1 °F (36.7 °C)-98.9 °F (37.2 °C)] 98.4 °F (36.9 °C)  Heart Rate:  [67-77] 72  Resp:  [18] 18  BP: (119-155)/(52-93) 155/93     Physical Exam:  Constitutional: No acute distress, awake, alert, sitting up in bed  HENT: Mucous membranes moist, periorbital ecchymoses  Respiratory: Clear to auscultation bilaterally, respiratory effort normal   Cardiovascular: RRR, no murmurs, rubs, or gallops  Gastrointestinal: Positive bowel sounds, soft, nontender, nondistended  Musculoskeletal: No bilateral ankle edema  Psychiatric: Appropriate affect, cooperative  Neurologic: PERRL, symmetric facies, speech clear  Skin: No rashes on exposed skin    Results Reviewed:  LAB RESULTS:      Lab 24  0535 24  0522 24  0620 24  [facility] 24  06 24  0552   WBC 3.92 5.10 5.94  --   --  8.63   HEMOGLOBIN 11.3* 10.8* 10.6*  --   --  13.3   HEMATOCRIT 34.3 32.9* 32.2*  --   --  39.7   PLATELETS 173 150 132*  --   --  156   MCV 87.5 87.3 89.0  --   --  88.2   LACTATE  --   --   --  0.9 2.1*  --          Lab 24  0535 24  0522 24  0620 24  0552   SODIUM 139 136 134* 134*   POTASSIUM 3.9 3.9 3.5 3.7   CHLORIDE 103 102 100 97*   CO2 29.0 24.0 25.0 23.0   ANION GAP 7.0 10.0 9.0 14.0   BUN 12 12 11 8   CREATININE 0.50* 0.54* 0.61 0.48*   EGFR 90.9 89.2 86.7 91.8   GLUCOSE 99 99 108* 152*   CALCIUM 8.5* 8.1* 8.0* 8.6   MAGNESIUM  --  1.8  --   --                              Brief Urine Lab Results  (Last result in the past 365 days)        Color   Clarity   Blood   Leuk Est   Nitrite   Protein   CREAT   Urine HCG         01/29/24 0908 Yellow   Cloudy   Small (1+)   Moderate (2+)   Negative   Negative                   Microbiology Results Abnormal       None            No radiology results from the last 24 hrs    Results for orders placed during the hospital encounter of 06/05/23    Adult Transthoracic Echo Complete w/ Color, Spectral and Contrast if Necessary Per Protocol    Interpretation Summary    Left ventricular systolic function is normal. Left ventricular ejection fraction appears to be 61 - 65%.    Left ventricular wall thickness is consistent with borderline concentric hypertrophy.    Left ventricular diastolic function is consistent with (grade II w/high LAP) pseudonormalization.    Left atrial volume is mildly increased.    There is moderate calcification of the aortic valve.    Moderate mitral annular calcification is present.    Mild mitral valve stenosis is present.    Mild mitral valve regurgitation is present.    Mild pulmonic valve regurgitation is present.    Moderate tricuspid valve regurgitation is present.    Estimated right ventricular systolic pressure from tricuspid regurgitation is mildly elevated (35-45 mmHg).      Current medications:  Scheduled Meds:aspirin, 81 mg, Oral, Daily  cefuroxime, 500 mg, Oral, Q12H  clopidogrel, 75 mg, Oral, Daily  insulin lispro, 2-7 Units, Subcutaneous, 4x Daily AC & at Bedtime  levothyroxine, 50 mcg, Oral, Q AM  pantoprazole, 40 mg, Oral, QAM  senna-docusate sodium, 2 tablet, Oral, BID  sodium chloride, 10 mL, Intravenous, Q12H  tamsulosin, 0.4 mg, Oral, Daily  venlafaxine XR, 150 mg, Oral, Daily With Breakfast      Continuous Infusions:       PRN Meds:.  acetaminophen    senna-docusate sodium **AND** polyethylene glycol **AND** bisacodyl **AND** bisacodyl    dextrose    dextrose    glucagon (human recombinant)    Magnesium Standard Dose Replacement - Follow Nurse / BPA Driven Protocol    melatonin    ondansetron    [COMPLETED] Insert Peripheral IV **AND** sodium chloride     sodium chloride    sodium chloride    Assessment & Plan   Assessment & Plan     Active Hospital Problems    Diagnosis  POA    **Traumatic orbital hematoma [S05.10XA]  Yes    Rhabdomyolysis [M62.82]  Yes    Fall [W19.XXXA]  Yes    Syncope [R55]  Yes    DM2 (diabetes mellitus, type 2) [E11.9]  Yes    Mixed hyperlipidemia [E78.2]  Yes    Hypothyroid [E03.9]  Yes    GERD (gastroesophageal reflux disease) [K21.9]  Yes    Primary hypertension [I10]  Yes    CAD (coronary artery disease) [I25.10]  Yes      Resolved Hospital Problems   No resolved problems to display.        Brief Hospital Course to date:  88 y.o. female with a past medical history significant for HTN, HLD, CAD s/p stent placement, s/p PPM, DM2, GERD, and hypothyroidism. Here with orbital hematoma and rhabdo after an unwitnessed fall.    This patient's problems and plans were partially entered by my partner and updated as appropriate by me 02/04/24.    Orbital hematoma  Shoulder pain  Rhabdomyolysis, resolving  -IVF, pain meds, PT/OT  -no fracture or dislocation shoulder noted on imaging    Falls  -unwitnessed  -denies palpitations/vertigo  -unclear if syncope  -mild orthostasis noted  -cortisol >29  -participating w/ PT/OT. 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 Jacobo's Physician assistant team). If found to have significant or persistent incomplete  bladder emptying, given age and comorbidities, can learn to self-cath (which may challenging given her anatomy) vs replacement of lindsay catheter  -complete 7d of antibiotics (changed to oral ceftin from rocephin).    Hypothyroidism  -synthroid    Anx/Depression  -Effexor    GERD  -PPI    Expected Discharge Location and Transportation: awaiting rehab placement  Expected Discharge  medically ready, awaiting facility acceptance    DVT prophylaxis:  Mechanical DVT prophylaxis orders are present.         AM-PAC 6 Clicks Score (PT): 17 (02/03/24 0361)    CODE STATUS:   Code Status and Medical Interventions:   Ordered at: 01/25/24 1951     Level Of Support Discussed With:    Patient     Code Status (Patient has no pulse and is not breathing):    CPR (Attempt to Resuscitate)     Medical Interventions (Patient has pulse or is breathing):    Full Support       Felicia Huffman MD  02/04/24

## 2024-02-05 VITALS
WEIGHT: 166.7 LBS | BODY MASS INDEX: 26.79 KG/M2 | DIASTOLIC BLOOD PRESSURE: 53 MMHG | HEART RATE: 72 BPM | HEIGHT: 66 IN | OXYGEN SATURATION: 97 % | SYSTOLIC BLOOD PRESSURE: 115 MMHG | RESPIRATION RATE: 16 BRPM | TEMPERATURE: 98.8 F

## 2024-02-05 PROBLEM — M62.82 RHABDOMYOLYSIS: Status: RESOLVED | Noted: 2024-01-25 | Resolved: 2024-02-05

## 2024-02-05 LAB
GLUCOSE BLDC GLUCOMTR-MCNC: 105 MG/DL (ref 70–130)
GLUCOSE BLDC GLUCOMTR-MCNC: 109 MG/DL (ref 70–130)

## 2024-02-05 PROCEDURE — 99239 HOSP IP/OBS DSCHRG MGMT >30: CPT | Performed by: NURSE PRACTITIONER

## 2024-02-05 PROCEDURE — 82948 REAGENT STRIP/BLOOD GLUCOSE: CPT

## 2024-02-05 RX ORDER — TAMSULOSIN HYDROCHLORIDE 0.4 MG/1
0.4 CAPSULE ORAL DAILY
Start: 2024-02-06

## 2024-02-05 RX ADMIN — CLOPIDOGREL BISULFATE 75 MG: 75 TABLET ORAL at 09:14

## 2024-02-05 RX ADMIN — LEVOTHYROXINE SODIUM 50 MCG: 0.05 TABLET ORAL at 06:20

## 2024-02-05 RX ADMIN — ASPIRIN 81 MG: 81 TABLET, COATED ORAL at 09:14

## 2024-02-05 RX ADMIN — VENLAFAXINE HYDROCHLORIDE 150 MG: 75 CAPSULE, EXTENDED RELEASE ORAL at 09:14

## 2024-02-05 RX ADMIN — SENNOSIDES AND DOCUSATE SODIUM 2 TABLET: 8.6; 5 TABLET ORAL at 09:14

## 2024-02-05 RX ADMIN — TAMSULOSIN HYDROCHLORIDE 0.4 MG: 0.4 CAPSULE ORAL at 09:14

## 2024-02-05 RX ADMIN — PANTOPRAZOLE SODIUM 40 MG: 40 TABLET, DELAYED RELEASE ORAL at 06:20

## 2024-02-05 NOTE — CASE MANAGEMENT/SOCIAL WORK
Case Management Discharge Note      Final Note: Ms. Willis is being discharged today, 02/05/24. She will be going to [place]. Nurse to call report to [phone]. Notified Carlee Kohli (sister)  [phone] & Chuyita Arguello (niece)  [phone] of discharge. Family will transport.         Selected Continued Care - Admitted Since 1/25/2024       Destination    No services have been selected for the patient.                Durable Medical Equipment    No services have been selected for the patient.                Dialysis/Infusion    No services have been selected for the patient.                Home Medical Care    No services have been selected for the patient.                Therapy    No services have been selected for the patient.                Community Resources    No services have been selected for the patient.                Community & DME    No services have been selected for the patient.                         Final Discharge Disposition Code: 03 - skilled nursing facility (SNF)

## 2024-02-05 NOTE — PROGRESS NOTES
[facility] Medicine Services  PROGRESS NOTE    Patient Name: Janna Willis  : 1935  MRN: [id number]    Date of Admission: 2024  Primary Care Physician: Hever Ibrahim APRN    Subjective   Subjective     CC: S/P Fall    HPI:         No BM. Afebrile. On RA.  Denied complaints.    Objective   Objective     Vital Signs:   Temp:  [97.8 °F (36.6 °C)-98.5 °F (36.9 °C)] 98.5 °F (36.9 °C)  Heart Rate:  [70-93] 89  Resp:  [16-18] 16  BP: ()/(58-65) 133/64     Physical Exam:  Constitutional: Alert, WD, WN male in NAD  Eyes: PERRLA, EOMI, sclerae anicteric, no conjunctival injection  Head: NCAT  ENT: [place], moist mucous membranes   Neck: Supple, nontender, no thyromegaly, no lymphadenopathy, trachea midline  Respiratory: Nonlabored, symmetrical chest expansion, CTAB  Cardiovascular: RRR, no M/R/G, +DP pulses bilaterally  Gastrointestinal: Soft, NT, ND +BS  Musculoskeletal: ROBERTO; no LE edema bilaterally  Neurologic: Oriented x4, strength symmetric in all extremities, follows all commands, CN II-XII intact, speech clear  Skin: No rashes on exposed skin  Psychiatric:Pleasant and cooperative; normal affect    Constitutional: No acute distress, awake, alert, sitting up in bed  HENT: Mucous membranes moist, periorbital ecchymoses  Respiratory: Clear to auscultation bilaterally, respiratory effort normal   Cardiovascular: RRR, no murmurs, rubs, or gallops  Gastrointestinal: Positive bowel sounds, soft, nontender, nondistended  Musculoskeletal: No bilateral ankle edema  Psychiatric: Appropriate affect, cooperative  Neurologic: PERRL, symmetric facies, speech clear  Skin: No rashes on exposed skin    Results Reviewed:  LAB RESULTS:      Lab 24  0535 24  0522 24  0620 24  1038   WBC 3.92 5.10 5.94  --    HEMOGLOBIN 11.3* 10.8* 10.6*  --    HEMATOCRIT 34.3 32.9* 32.2*  --    PLATELETS 173 150 132*  --    MCV 87.5 87.3 89.0  --    LACTATE  --   --   --  0.9          Lab 02/01/24  0535 01/31/24  0522 01/30/24  0620   SODIUM 139 136 134*   POTASSIUM 3.9 3.9 3.5   CHLORIDE 103 102 100   CO2 29.0 24.0 25.0   ANION GAP 7.0 10.0 9.0   BUN 12 12 11   CREATININE 0.50* 0.54* 0.61   EGFR 90.9 89.2 86.7   GLUCOSE 99 99 108*   CALCIUM 8.5* 8.1* 8.0*   MAGNESIUM  --  1.8  --                              Brief Urine Lab Results  (Last result in the past 365 days)        Color   Clarity   Blood   Leuk Est   Nitrite   Protein   CREAT   Urine HCG        01/29/24 0908 Yellow   Cloudy   Small (1+)   Moderate (2+)   Negative   Negative                   Microbiology Results Abnormal       None            No radiology results from the last 24 hrs    Results for orders placed during the hospital encounter of 06/05/23    Adult Transthoracic Echo Complete w/ Color, Spectral and Contrast if Necessary Per Protocol    Interpretation Summary    Left ventricular systolic function is normal. Left ventricular ejection fraction appears to be 61 - 65%.    Left ventricular wall thickness is consistent with borderline concentric hypertrophy.    Left ventricular diastolic function is consistent with (grade II w/high LAP) pseudonormalization.    Left atrial volume is mildly increased.    There is moderate calcification of the aortic valve.    Moderate mitral annular calcification is present.    Mild mitral valve stenosis is present.    Mild mitral valve regurgitation is present.    Mild pulmonic valve regurgitation is present.    Moderate tricuspid valve regurgitation is present.    Estimated right ventricular systolic pressure from tricuspid regurgitation is mildly elevated (35-45 mmHg).      Current medications:  Scheduled Meds:aspirin, 81 mg, Oral, Daily  clopidogrel, 75 mg, Oral, Daily  insulin lispro, 2-7 Units, Subcutaneous, 4x Daily AC & at Bedtime  levothyroxine, 50 mcg, Oral, Q AM  pantoprazole, 40 mg, Oral, QAM  senna-docusate sodium, 2 tablet, Oral, BID  sodium chloride, 10 mL, Intravenous,  Q12H  tamsulosin, 0.4 mg, Oral, Daily  venlafaxine XR, 150 mg, Oral, Daily With Breakfast      Continuous Infusions:       PRN Meds:.  acetaminophen    senna-docusate sodium **AND** polyethylene glycol **AND** bisacodyl **AND** bisacodyl    dextrose    dextrose    glucagon (human recombinant)    Magnesium Standard Dose Replacement - Follow Nurse / BPA Driven Protocol    melatonin    ondansetron    [COMPLETED] Insert Peripheral IV **AND** sodium chloride    sodium chloride    sodium chloride    Assessment & Plan   Assessment & Plan     Active Hospital Problems    Diagnosis  POA    **Traumatic orbital hematoma [S05.10XA]  Yes    Rhabdomyolysis [M62.82]  Yes    Fall [W19.XXXA]  Yes    Syncope [R55]  Yes    DM2 (diabetes mellitus, type 2) [E11.9]  Yes    Mixed hyperlipidemia [E78.2]  Yes    Hypothyroid [E03.9]  Yes    GERD (gastroesophageal reflux disease) [K21.9]  Yes    Primary hypertension [I10]  Yes    CAD (coronary artery disease) [I25.10]  Yes      Resolved Hospital Problems   No resolved problems to display.        Brief Hospital Course to date:  88 y.o. female with a past medical history significant for HTN, HLD, CAD s/p stent placement, s/p PPM, DM2, GERD, and hypothyroidism. Here with orbital hematoma and rhabdo after an unwitnessed fall.    These problems are new tomorrow    This patient's problems and plans were partially entered by my partner and updated as appropriate by me 02/05/24.    Orbital hematoma  Shoulder pain  Rhabdomyolysis, resolving  -IVF, pain meds, PT/OT  -no fracture or dislocation shoulder noted on imaging    Falls  -unwitnessed  -denies palpitations/vertigo  -unclear if syncope  -mild orthostasis noted  -cortisol >29  -participating w/ PT/OT. PT recommends inpt rehab    CAD  HTN  HL  -s/p HANK 6/23, on DAPT resumed  -continue BB    DM w/A1c 5.6  --24H   --SSI    UTI, e.coli  Urinary retention, w/ difficult lindsay placement (due to stenotic vaginal introitus w/ retracted urethral  meatus)  -nursing was unable to place lindsay bedside  -Urology (Dr. 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 Jacobo's Physician assistant team). If found to have significant or persistent incomplete bladder emptying, given age and comorbidities, can learn to self-cath (which may challenging given her anatomy) vs replacement of lindsay catheter  -complete 7d of antibiotics (changed to oral ceftin from rocephin).    Hypothyroidism  -synthroid    Anx/Depression  -Effexor    GERD  -PPI    Expected Discharge Location and Transportation: awaiting rehab placement  Expected Discharge  medically ready, awaiting facility acceptance    DVT prophylaxis:  Mechanical DVT prophylaxis orders are present.       AM-PAC 6 Clicks Score (PT): 17 (02/04/24 0837)    CODE STATUS:   Code Status and Medical Interventions:   Ordered at: 01/25/24 1951     Level Of Support Discussed With:    Patient     Code Status (Patient has no pulse and is not breathing):    CPR (Attempt to Resuscitate)     Medical Interventions (Patient has pulse or is breathing):    Full Support       MARIELA Collado  02/05/24

## 2024-02-05 NOTE — DISCHARGE SUMMARY
[facility] Medicine Services  DISCHARGE SUMMARY    Patient Name: Janna Willis  : 1935  MRN: [id number]    Date of Admission: 2024  1:35 PM  Date of Discharge:  2024  Primary Care Physician: Hever Ibrahim APRN    Consults       Date and Time Order Name Status Description    2024  2:58 PM Inpatient Urology Consult Completed     2024  2:57 PM Inpatient Urology Consult              Hospital Course     Active Hospital Problems    Diagnosis  POA    **Traumatic orbital hematoma [S05.10XA]  Yes    Fall [W19.XXXA]  Yes    Syncope [R55]  Yes    DM2 (diabetes mellitus, type 2) [E11.9]  Yes    Mixed hyperlipidemia [E78.2]  Yes    Hypothyroid [E03.9]  Yes    GERD (gastroesophageal reflux disease) [K21.9]  Yes    Primary hypertension [I10]  Yes    CAD (coronary artery disease) [I25.10]  Yes      Resolved Hospital Problems    Diagnosis Date Resolved POA    Rhabdomyolysis [M62.82] 2024 Yes      Hospital Course:  Janna Willis is a 88 y.o. female  with a past medical history significant for HTN, HLD, CAD s/p stent placement, s/p PPM, DM2, GERD, and hypothyroidism. Here with orbital hematoma and rhabdo after an unwitnessed fall.     Orbital hematoma  Shoulder pain  Rhabdomyolysis, resolving  -IVF, pain meds, PT/OT  -no fracture or dislocation shoulder noted on imaging     Falls  -unwitnessed  -denies palpitations/vertigo  -unclear if syncope  -mild orthostasis noted  -cortisol >29  -participating w/ PT/OT. 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 Jacobo) following: lindsay placed bedside cystoscopy 24 (difficult placement due to stenotic vaginal introitus w/ retracted urethral meatus);   had incomplete bladder emptying (possibly related to transient neurogenic bladder)  --recommends continue lindsay catheter & outpatient Urology follow up for lindsay cath removal and voiding trial (w/ Dr. Jacobo's Physician assistant team). If found to have significant or persistent incomplete bladder emptying, given age and comorbidities, can learn to self-cath (which may challenging given her anatomy) vs replacement of lindsay catheter  -completed 7d of antibiotics     Hypothyroidism  -synthroid     Anx/Depression  -Effexor     GERD  -PPI    Patient is ready for discharge home and will be transported by family ambulance.  Discharge follow-up recommendations are listed below and in the AVS.    Discharge Follow Up Recommendations for outpatient labs/diagnostics:  --Follow-up with facility provider in 1 to 2 days  --Follow-up with your family 1 week   --Follow-up with Dr. Jacobo in 1 to 2 weeks  --Start taking tamsulosin (Flomax) 1 capsule daily    Day of Discharge     HPI:   Patient sitting up in bed with no complaints.  Explained that I would update full sutures should and she was fine with that.  She was just worried about it leaving the scar.  Also complaining of intermittent dizziness. no adverse events overnight per nursing    Vital Signs:   Temp:  [97.3 °F (36.3 °C)-98.5 °F (36.9 °C)] 97.3 °F (36.3 °C)  Heart Rate:  [70-93] 90  Resp:  [16-18] 18  BP: ()/(58-78) 118/78    Physical Exam:  Constitutional: Alert, elderly female in NAD  Eyes: PERRLA, EOMI, sclerae anicteric, no conjunctival injection, +periorbital ecchymosis bilaterally, small suture laceration to upper outer corner of left eye  Head: NCAT  ENT: [place], moist mucous membranes   Neck: Supple, nontender, ecchymosis on neck going around to the back of neck trachea midline  Respiratory: Nonlabored, symmetrical chest expansion, CTAB, 96% 1L  Cardiovascular: RRR, no M/R/G, +DP pulses bilaterally  Gastrointestinal: Soft, NT, ND +BS  Musculoskeletal:  ROBERTO; no LE edema bilaterally  Neurologic: Oriented x4, strength symmetric in all extremities, follows all commands, speech clear  Skin: No rashes on exposed skin; large ecchymotic areas on face neck and torso in different stages of healing.  Psychiatric: Pleasant and cooperative; flat affect    Pertinent  and/or Most Recent Results     LAB RESULTS:      Lab 02/01/24 0535 01/31/24  0522 01/30/24  0620 01/29/24  1038   WBC 3.92 5.10 5.94  --    HEMOGLOBIN 11.3* 10.8* 10.6*  --    HEMATOCRIT 34.3 32.9* 32.2*  --    PLATELETS 173 150 132*  --    MCV 87.5 87.3 89.0  --    LACTATE  --   --   --  0.9         Lab 02/01/24 0535 01/31/24 0522 01/30/24 0620   SODIUM 139 136 134*   POTASSIUM 3.9 3.9 3.5   CHLORIDE 103 102 100   CO2 29.0 24.0 25.0   ANION GAP 7.0 10.0 9.0   BUN 12 12 11   CREATININE 0.50* 0.54* 0.61   EGFR 90.9 89.2 86.7   GLUCOSE 99 99 108*   CALCIUM 8.5* 8.1* 8.0*   MAGNESIUM  --  1.8  --                          Brief Urine Lab Results  (Last result in the past 365 days)        Color   Clarity   Blood   Leuk Est   Nitrite   Protein   CREAT   Urine HCG        01/29/24 0908 Yellow   Cloudy   Small (1+)   Moderate (2+)   Negative   Negative                 Microbiology Results (last 10 days)       Procedure Component Value - Date/Time    Urine Culture - Urine, Straight Cath [[id number]]  (Abnormal)  (Susceptibility) Collected: 01/29/24 0908    Lab Status: Final result Specimen: Urine from Straight Cath Updated: 01/31/24 0520     Urine Culture >100,000 CFU/mL Escherichia coli    Narrative:      Colonization of the urinary tract without infection is common. Treatment is discouraged unless the patient is symptomatic, pregnant, or undergoing an invasive urologic procedure.    Susceptibility        Escherichia coli      LEILA      Amoxicillin + Clavulanate Susceptible      Ampicillin Susceptible      Ampicillin + Sulbactam Susceptible      Cefazolin Susceptible      Cefepime Susceptible      Ceftazidime Susceptible       Ceftriaxone Susceptible      Gentamicin Susceptible      Levofloxacin Susceptible      Nitrofurantoin Susceptible      Piperacillin + Tazobactam Susceptible      Trimethoprim + Sulfamethoxazole Susceptible                                   XR Abdomen KUB    Result Date: 1/29/2024  XR ABDOMEN KUB Date of Exam: 1/29/2024 6:28 AM EST Indication: abdominal pain; diarrhea Comparison: 6/12/2023. Findings: No gross free air or pneumatosis though evaluation is slightly limited due to technique. There is a non obstructive bowel gas pattern. There is some gaseous distention of the colon. A mild to moderate stool burden is present. Large rounded opacity within  the lower abdomen and pelvis likely representing distended bladder. No suspicious calcifications identified. No acute osseous abnormality identified.     Impression: Nonobstructive bowel gas pattern. Mild to moderate stool burden present. Large rounded opacity within the lower abdomen and pelvis likely representing significantly distended bladder. Recommend clinical correlation. Electronically Signed: Deepa Polk MD  1/29/2024 6:50 AM EST  Workstation ID: [id number]    XR Shoulder 2+ View Right    Result Date: 1/25/2024  XR SHOULDER 2+ VW RIGHT Date of Exam: 1/25/2024 2:22 PM EST Indication: Fall, injury, contusion Comparison: None available. Findings: 3 views. The humeral head is mildly subluxed cephalad in relation to the glenoid. There is no dislocation. The AC joint appears maintained. There is no fracture.     Impression: Mild cephalad humeral head subluxation. This could be chronic. No fracture. Electronically Signed: Lizzette Amaya MD  1/25/2024 2:46 PM EST  Workstation ID: [id number]    CT Maxillofacial Without Contrast    Result Date: 1/25/2024  CT MAXILLOFACIAL WO CONT Date of Exam: 1/25/2024 2:20 PM EST Indication: Fall, head injury, on Plavix. Comparison: None available. Technique: Axial CT images were obtained from the inferior aspect of the mandible  through the frontal sinuses without contrast administration.  Reconstructed coronal and sagittal images were also obtained. Automated exposure control and iterative construction methods were used. Findings: No acute facial fracture is identified.bilateral lens prostheses are noted. Orbital structures are otherwise unremarkable. Paranasal sinuses and mastoid air cells appear well aerated without air-fluid levels identified. There is rightward bowing of the bony nasal septum. The pterygoid plates and zygomatic arches are intact. Right facial soft tissue swelling is present. Please refer to the accompanying head CT for description of intracranial findings.     1.No acute facial fracture is identified. 2.Extensive right facial soft tissue swelling. Electronically Signed: Crow Calderon MD  1/25/2024 2:40 PM EST  Workstation ID: [id number]    CT Cervical Spine Without Contrast    Result Date: 1/25/2024  CT CERVICAL SPINE WO CONTRAST Date of Exam: 1/25/2024 2:20 PM EST Indication: Fall, head injury, on Plavix. Comparison: None available. Technique: Axial CT images were obtained of the cervical spine without contrast administration.  Reconstructed coronal and sagittal images were also obtained. Automated exposure control and iterative construction methods were used. Findings: There are normal vertebral body heights. There is mild anterolisthesis of C3 on C4 and C4 on C5. There is severe narrowing of the C5-6 and C6/7 disc interspaces with moderate spurring. There are no fractures. There is facet joint hypertrophy with moderate left neural foraminal stenosis at C3-4. There is mild left neural foraminal stenosis at C4-5. There is mild spinal stenosis with severe bilateral neural foraminal stenosis at C5-6, greatest on the left. There is a mild to moderate spinal stenosis with severe bilateral neural foraminal stenosis at C6/7.     Impression: Chronic findings as detailed. No acute fracture. Mildly abnormal alignment as noted,  may be chronic. If clinically warranted, this could be further evaluated with flexion and extension views. Electronically Signed: Lizzette Amaya MD  1/25/2024 2:34 PM EST  Workstation ID: [id number]    CT Head Without Contrast    Result Date: 1/25/2024  CT HEAD WO CONTRAST Date of Exam: 1/25/2024 2:20 PM EST Indication: Fall, head injury, on Plavix. Comparison: None available. Technique: Axial CT images were obtained of the head without contrast administration.  Automated exposure control and iterative construction methods were used. Findings: The large right periorbital and frontal scalp hematoma is present with some soft tissue gas likely also reflecting laceration. Nondedicated evaluation of the orbit demonstrates no evidence of fracture and the calvarium is also intact. Age-related changes  are noted, with some generalized volume loss. There is no evidence of intracranial hemorrhage, mass or mass effect. The ventricles are normal in size and configuration, accounting for surrounding volume loss. The paranasal sinuses are grossly clear.     Impression: Prominent right frontal and periorbital scalp laceration and hematoma. No acute intracranial abnormality. Electronically Signed: Kris Basurto MD  1/25/2024 2:32 PM EST  Workstation ID: [id number]             Results for orders placed during the hospital encounter of 06/05/23    Adult Transthoracic Echo Complete w/ Color, Spectral and Contrast if Necessary Per Protocol    Interpretation Summary    Left ventricular systolic function is normal. Left ventricular ejection fraction appears to be 61 - 65%.    Left ventricular wall thickness is consistent with borderline concentric hypertrophy.    Left ventricular diastolic function is consistent with (grade II w/high LAP) pseudonormalization.    Left atrial volume is mildly increased.    There is moderate calcification of the aortic valve.    Moderate mitral annular calcification is present.    Mild mitral valve stenosis  is present.    Mild mitral valve regurgitation is present.    Mild pulmonic valve regurgitation is present.    Moderate tricuspid valve regurgitation is present.    Estimated right ventricular systolic pressure from tricuspid regurgitation is mildly elevated (35-45 mmHg).      Plan for Follow-up of Pending Labs/Results:     Discharge Details        Discharge Medications        New Medications        Instructions Start Date   tamsulosin 0.4 MG capsule 24 hr capsule  Commonly known as: FLOMAX   0.4 mg, Oral, Daily   Start Date: February 6, 2024            Continue These Medications        Instructions Start Date   acetaminophen 325 MG tablet  Commonly known as: TYLENOL   650 mg, Oral, Every 4 Hours PRN      aspirin 81 MG EC tablet   81 mg, Oral, Daily      cholecalciferol 25 MCG (1000 UT) tablet  Commonly known as: VITAMIN D3   2,000 Units, Oral, Daily      clopidogrel 75 MG tablet  Commonly known as: PLAVIX   75 mg, Oral, Daily      empagliflozin 10 MG tablet tablet  Commonly known as: JARDIANCE   10 mg, Oral, Daily      ezetimibe 10 MG tablet  Commonly known as: ZETIA   Oral, Daily      levothyroxine 50 MCG tablet  Commonly known as: SYNTHROID, LEVOTHROID   TAKE ONE TABLET EVERY DAY FOR FOR THYROID      metoprolol succinate XL 25 MG 24 hr tablet  Commonly known as: TOPROL-XL   25 mg, Oral, Daily      nitroglycerin 0.4 MG SL tablet  Commonly known as: NITROSTAT   0.4 mg, Sublingual, Every 5 Minutes PRN, Take no more than 3 doses in 15 minutes.      pantoprazole 40 MG EC tablet  Commonly known as: PROTONIX   40 mg, Oral, Every Morning      venlafaxine  MG 24 hr capsule  Commonly known as: EFFEXOR-XR   elva 1 capsule with food Once a day for 90 days      vitamin B-12 1000 MCG tablet  Commonly known as: CYANOCOBALAMIN   1,000 mcg, Oral, Weekly               Allergies   Allergen Reactions    Avelox [Moxifloxacin] Rash    2,4-D Dimethylamine Hives    Azithromycin Hives    Lisinopril Hives    Metoclopramide Hives     Statins Hives         Discharge Disposition:      Diet:  Hospital:  Diet Order   Procedures    Diet: Diabetic Diets; Consistent Carbohydrate; Texture: Regular Texture (IDDSI 7); Fluid Consistency: Thin (IDDSI 0)       Diet Instructions       Diet: Diabetic Diets; Consistent Carbohydrate; Regular Texture (IDDSI 7); Thin (IDDSI 0)      Discharge Diet: Diabetic Diets    Diabetic Diet: Consistent Carbohydrate    Texture: Regular Texture (IDDSI 7)    Fluid Consistency: Thin (IDDSI 0)             Activity:  Activity Instructions       Activity as Tolerated              Restrictions or Other Recommendations:  None       CODE STATUS:    Code Status and Medical Interventions:   Ordered at: 01/25/24 1951     Level Of Support Discussed With:    Patient     Code Status (Patient has no pulse and is not breathing):    CPR (Attempt to Resuscitate)     Medical Interventions (Patient has pulse or is breathing):    Full Support       Future Appointments   Date Time Provider Department Center   5/13/2024  3:30 PM Kuldeep Miner MD [facility]   8/28/2024  4:30 PM Xiang Jacobs MD [facility]       MARIELA Collado  02/05/24      Time Spent on Discharge:  I spent 40  minutes on this discharge activity which included: face-to-face encounter with the patient, reviewing the data in the system, coordination of the care with the nursing staff as well as consultants, documentation, and entering orders.

## 2024-02-05 NOTE — NURSING NOTE
WOC consulted for left posterior thigh wound, patient states spot goes away and returns every time.    The wound on the patient's left posterior thigh appears chronic and flush with skin surface.  Etiology unknown.  Tissue appears to be too moist.  Recommend covering with Optifoam dressing to protect underlying tissue.  Also suggested patient establish a dermatologist to have area assessed as this has been an ongoing issue for the patient.    Sign off.        Curt Wade RN, BSN, CCRN, CWOCN  Wound, Ostomy and Continence (WOC) Department  [facility]

## 2024-02-07 ENCOUNTER — TELEPHONE (OUTPATIENT)
Dept: CARDIOLOGY | Facility: CLINIC | Age: 89
End: 2024-02-07
Payer: MEDICARE

## 2024-02-07 NOTE — TELEPHONE ENCOUNTER
Called and left message in regards to missed remote transmission.  Also sent a letter via [employer].

## 2024-02-09 ENCOUNTER — OFFICE VISIT (OUTPATIENT)
Dept: UROLOGY | Facility: CLINIC | Age: 89
End: 2024-02-09
Payer: MEDICARE

## 2024-02-09 VITALS — HEART RATE: 70 BPM | WEIGHT: 163.2 LBS | BODY MASS INDEX: 26.23 KG/M2 | OXYGEN SATURATION: 97 % | HEIGHT: 66 IN

## 2024-02-09 DIAGNOSIS — K59.00 CONSTIPATION, UNSPECIFIED CONSTIPATION TYPE: ICD-10-CM

## 2024-02-09 DIAGNOSIS — B37.31 VAGINAL CANDIDIASIS: Primary | ICD-10-CM

## 2024-02-09 RX ORDER — FLUCONAZOLE 150 MG/1
150 TABLET ORAL ONCE AS NEEDED
Qty: 2 TABLET | Refills: 0 | Status: SHIPPED | OUTPATIENT
Start: 2024-02-09

## 2024-02-09 RX ORDER — POLYETHYLENE GLYCOL 3350 17 G/17G
17 POWDER, FOR SOLUTION ORAL 2 TIMES DAILY PRN
Qty: 30 PACKET | Refills: 11 | Status: SHIPPED | OUTPATIENT
Start: 2024-02-09

## 2024-02-09 NOTE — PROGRESS NOTES
Chief Complaint  Chief Complaint   Patient presents with    Hospital Follow Up Visit       Referring Provider:  No ref. provider found    HPI  Ms. Willis is a 88 y.o. female with below past medical history who presents with urinary retention, indwelling Marie catheter.    Patient was initially hospitalized on January 25, 2024 after mechanical fall with orbital hematomas and rhabdomyolysis.  During her hospital stay, she was not able to ambulate readily and a PureWick catheter was placed.  She was uncomfortable using the pure wick and developed urinary tract infection and urinary retention requiring catheter placement under cystoscopy.    She has since been discharged and is at [facility].  She is walking every day with the help of physical therapy and regaining her strength.  She originally lived alone prior to this hospital stay.  She admits to some ongoing constipation and states it has been several days since she had a bowel movement.    Past Medical History  Past Medical History:   Diagnosis Date    Diabetes     Hypertension     Urinary tract infection        Past Surgical History  Past Surgical History:   Procedure Laterality Date    CARDIAC CATHETERIZATION N/A 06/05/2023    Procedure: Left Heart Cath;  Surgeon: Kuldeep Miner MD;  Location:  [facility];  Service: Cardiology;  Laterality: N/A;    CARDIAC ELECTROPHYSIOLOGY PROCEDURE N/A 06/05/2023    Procedure: Temporary Pacemaker;  Surgeon: Kuldeep Miner MD;  Location:  [facility] C[facility];  Service: Cardiology;  Laterality: N/A;    CARDIAC ELECTROPHYSIOLOGY PROCEDURE N/A 06/06/2023    Procedure: Pacemaker DC new;  Surgeon: Dudley Rodriguez DO;  Location:  [facility] EP INVASIVE LOCATION;  Service: Cardiology;  Laterality: N/A;    OVARY SURGERY         Medications    Current Outpatient Medications:     acetaminophen (TYLENOL) 325 MG tablet, Take 2 tablets by mouth Every 4 (Four) Hours As Needed for Mild Pain or Fever  (temperature greater than 101F)., Disp: 90 tablet, Rfl: 0    aspirin 81 MG EC tablet, Take 1 tablet by mouth Daily., Disp: 90 tablet, Rfl: 2    cholecalciferol (VITAMIN D3) 25 MCG (1000 UT) tablet, Take 2 tablets by mouth Daily., Disp: , Rfl:     clopidogrel (PLAVIX) 75 MG tablet, Take 1 tablet by mouth Daily., Disp: 90 tablet, Rfl: 2    empagliflozin (JARDIANCE) 10 MG tablet tablet, Take 1 tablet by mouth Daily., Disp: 90 tablet, Rfl: 2    ezetimibe (ZETIA) 10 MG tablet, Take  by mouth Daily., Disp: , Rfl:     levothyroxine (SYNTHROID, LEVOTHROID) 50 MCG tablet, TAKE ONE TABLET EVERY DAY FOR FOR THYROID, Disp: , Rfl: 3    metoprolol succinate XL (TOPROL-XL) 25 MG 24 hr tablet, Take 1 tablet by mouth Daily., Disp: 30 tablet, Rfl: 11    nitroglycerin (NITROSTAT) 0.4 MG SL tablet, Place 1 tablet under the tongue Every 5 (Five) Minutes As Needed for Chest Pain (Systolic BP Greater Than 100). Take no more than 3 doses in 15 minutes., Disp: 30 tablet, Rfl: 0    tamsulosin (FLOMAX) 0.4 MG capsule 24 hr capsule, Take 1 capsule by mouth Daily., Disp: , Rfl:     venlafaxine XR (EFFEXOR-XR) 150 MG 24 hr capsule, elva 1 capsule with food Once a day for 90 days, Disp: , Rfl:     vitamin B-12 (CYANOCOBALAMIN) 1000 MCG tablet, Take 1 tablet by mouth 1 (One) Time Per Week., Disp: , Rfl:     pantoprazole (PROTONIX) 40 MG EC tablet, Take 1 tablet by mouth Every Morning. (Patient not taking: Reported on 2/9/2024), Disp: 90 tablet, Rfl: 2    Allergies  Allergies   Allergen Reactions    Avelox [Moxifloxacin] Rash    2,4-D Dimethylamine Hives    Azithromycin Hives    Lisinopril Hives    Metoclopramide Hives    Statins Hives       Social History  Social History     Socioeconomic History    Marital status: Single   Tobacco Use    Smoking status: Never    Smokeless tobacco: Never   Vaping Use    Vaping Use: Never used   Substance and Sexual Activity    Alcohol use: No    Drug use: No    Sexual activity: Not Currently       Family  "History  Family History   Problem Relation Age of Onset    Stroke Mother     Heart attack Mother     Heart disease Mother     Hypertension Mother     Hypertension Father           Physical Exam  Visit Vitals  Pulse 70   Ht 167.6 cm (66\")   Wt 74 kg (163 lb 3.2 oz)   SpO2 97%   BMI 26.34 kg/m²     Physical exam was notable for 16 [place] Marie catheter currently anchored with cloudy yellow urine present in collection bag.  There is notable erythema at the vaginal introitus consistent with vaginitis.    Labs  Brief Urine Lab Results  (Last result in the past 365 days)        Color   Clarity   Blood   Leuk Est   Nitrite   Protein   CREAT   Urine HCG        01/29/24 0908 Yellow   Cloudy   Small (1+)   Moderate (2+)   Negative   Negative                   Urine Culture          1/29/2024    09:08   Urine Culture   Urine Culture >100,000 CFU/mL Escherichia coli         Lab Results   Component Value Date    GLUCOSE 99 02/01/2024    CALCIUM 8.5 (L) 02/01/2024     02/01/2024    K 3.9 02/01/2024    CO2 29.0 02/01/2024     02/01/2024    BUN 12 02/01/2024    CREATININE 0.50 (L) 02/01/2024    BCR 24.0 02/01/2024    ANIONGAP 7.0 02/01/2024       Lab Results   Component Value Date    WBC 3.92 02/01/2024    HGB 11.3 (L) 02/01/2024    HCT 34.3 02/01/2024    MCV 87.5 02/01/2024     02/01/2024         Radiographic Studies  XR Abdomen KUB  Result Date: 1/29/2024  Impression: Nonobstructive bowel gas pattern. Mild to moderate stool burden present. Large rounded opacity within the lower abdomen and pelvis likely representing significantly distended bladder. Recommend clinical correlation. Electronically Signed: Deepa Polk MD  1/29/2024 6:50 AM EST  Workstation ID: [id number]      I have personally reviewed the patient's labs and relevant imaging.     PVR  Post-void residual performed with ultrasound scanner by staff and interpreted by [address]      Assessment  Ms. Willis is a 88 y.o. female with urinary retention and " Marie catheter following hospital stay.    While hospitalized, she had limited ambulation, developed urinary tract infection.  Culture revealed E. coli and she completed a full treatment course.  She is now ambulating and has never needed a Marie catheter in the past.  Remaining risk factor includes constipation and I recommend aggressive management to produce daily BM starting today to prevent repeat urinary retention.        Plan  1.  Continue voiding ad haile  2.  Goal fluid intake of 64 ounces per day, ideally mostly water  3.  Start daily MiraLAX and titrate up as needed to produce a daily BM  4.  Stop Flomax  5.  Start Diflucan 150 mg p.o. x 1 to be repeated in 72 hours if vaginitis persists    Follow-up in clinic with repeat UA and PVR in 1 month      Emily Cardenas PA-C

## 2024-05-03 ENCOUNTER — EXTERNAL PBMM DATA (OUTPATIENT)
Dept: PHARMACY | Facility: OTHER | Age: 89
End: 2024-05-03
Payer: MEDICARE

## 2024-05-21 NOTE — CASE MANAGEMENT/SOCIAL WORK
Continued Stay Note  [facility]     Patient Name: Janna Willis  MRN: [id number]  Today's Date: 6/7/2023    Admit Date: 6/5/2023    Plan: Home   Discharge Plan       Row Name 06/07/23 1309       Plan    Plan Home    Patient/Family in Agreement with Plan yes    Plan Comments Continue to anticipate home at discharge with family to transport. No discharge needs identified at this time. Orders to transfer to the floor. CM will continue to follow.    Final Discharge Disposition Code 01 - home or self-care                   Discharge Codes    No documentation.                 Expected Discharge Date and Time       Expected Discharge Date Expected Discharge Time    Jun 9, 2023               Michael Gold RN     Please notify patient I have placed this order.

## 2024-05-29 ENCOUNTER — TELEPHONE (OUTPATIENT)
Dept: CARDIOLOGY | Facility: CLINIC | Age: 89
End: 2024-05-29
Payer: MEDICARE

## 2024-08-28 ENCOUNTER — OFFICE VISIT (OUTPATIENT)
Dept: CARDIOLOGY | Facility: CLINIC | Age: 89
End: 2024-08-28
Payer: MEDICARE

## 2024-08-28 VITALS
WEIGHT: 187 LBS | SYSTOLIC BLOOD PRESSURE: 144 MMHG | BODY MASS INDEX: 30.05 KG/M2 | DIASTOLIC BLOOD PRESSURE: 82 MMHG | HEART RATE: 85 BPM | HEIGHT: 66 IN | OXYGEN SATURATION: 95 %

## 2024-08-28 DIAGNOSIS — I44.2 HEART BLOCK AV THIRD DEGREE: Primary | ICD-10-CM

## 2024-08-28 DIAGNOSIS — I25.112 CORONARY ARTERY DISEASE INVOLVING NATIVE HEART WITH REFRACTORY ANGINA PECTORIS, UNSPECIFIED VESSEL OR LESION TYPE: ICD-10-CM

## 2024-08-28 DIAGNOSIS — I10 PRIMARY HYPERTENSION: ICD-10-CM

## 2024-08-28 RX ORDER — FUROSEMIDE 20 MG
20 TABLET ORAL DAILY PRN
Qty: 30 TABLET | Refills: 11 | Status: SHIPPED | OUTPATIENT
Start: 2024-08-28

## 2024-08-28 NOTE — PROGRESS NOTES
Janna Willis  1935  [phone]    08/28/2024    [facility] CARDIOLOGY     Referring Provider: No ref. provider found     Hever Ibrahim, APRN  [address] [place] [id number]    Chief Complaint   Patient presents with    Heart block AV third degree       Problem List:   Complete heart block  MDT Micra AV leadless pacemaker implant 6/6/23  Echo 6/6/2023: EF 61-65%, moderate mitral annular calcification, mild MR, mild MS, mild WI, moderate TR, OVSK27-53 mmHg  CAD  [facility] 6/5/23, no clear obstructive disease to explain the patient's complete heart block, but did have an 80% proximal RCA stenosis and a small sized vessel.  In the lieu of ongoing chest discomfort, decision was made to proceed with HANK x1   HTN  HLD  GERD  Hypothyroidism        Allergies  Allergies   Allergen Reactions    Avelox [Moxifloxacin] Rash    2,4-D Dimethylamine Hives    Azithromycin Hives    Lisinopril Hives    Metoclopramide Hives    Statins Hives       Current Medications    Current Outpatient Medications:     acetaminophen (TYLENOL) 325 MG tablet, Take 2 tablets by mouth Every 4 (Four) Hours As Needed for Mild Pain or Fever (temperature greater than 101F)., Disp: 90 tablet, Rfl: 0    aspirin 81 MG EC tablet, Take 1 tablet by mouth Daily., Disp: 90 tablet, Rfl: 2    cholecalciferol (VITAMIN D3) 25 MCG (1000 UT) tablet, Take 2 tablets by mouth Daily., Disp: , Rfl:     clopidogrel (PLAVIX) 75 MG tablet, Take 1 tablet by mouth Daily., Disp: 90 tablet, Rfl: 2    empagliflozin (JARDIANCE) 10 MG tablet tablet, Take 1 tablet by mouth Daily., Disp: 90 tablet, Rfl: 2    ezetimibe (ZETIA) 10 MG tablet, Take  by mouth Daily., Disp: , Rfl:     levothyroxine (SYNTHROID, LEVOTHROID) 50 MCG tablet, TAKE ONE TABLET EVERY DAY FOR FOR THYROID, Disp: , Rfl: 3    metoprolol succinate XL (TOPROL-XL) 25 MG 24 hr tablet, Take 1 tablet by mouth Daily., Disp: 30 tablet, Rfl: 11    nitroglycerin (NITROSTAT) 0.4 MG SL tablet, Place 1  "tablet under the tongue Every 5 (Five) Minutes As Needed for Chest Pain (Systolic BP Greater Than 100). Take no more than 3 doses in 15 minutes., Disp: 30 tablet, Rfl: 0    pantoprazole (PROTONIX) 40 MG EC tablet, Take 1 tablet by mouth Every Morning., Disp: 90 tablet, Rfl: 2    polyethylene glycol (MIRALAX) 17 g packet, Take 17 g by mouth 2 (Two) Times a Day As Needed (to produce daily BM)., Disp: 30 packet, Rfl: 11    venlafaxine XR (EFFEXOR-XR) 150 MG 24 hr capsule, elva 1 capsule with food Once a day for 90 days, Disp: , Rfl:     vitamin B-12 (CYANOCOBALAMIN) 1000 MCG tablet, Take 1 tablet by mouth 1 (One) Time Per Week., Disp: , Rfl:     History of Present Illness     Pt presents for follow up of  third-degree AV block, hypertension, and Medtronic leadless pacemaker. Since we last saw the pt, pt denies any CP, LH, and dizziness. SOB is at baseline. Denies any hospitalizations, ER visits. BP is well controlled at home.     ROS:  General:  Denies fatigue, weight gain or loss  Cardiovascular:  Denies CP, PND, syncope, near syncope, edema or palpitations.  Pulmonary:  Denies COHN, cough, or wheezing      Vitals:    08/28/24 1607   BP: 144/82   BP Location: Right arm   Patient Position: Sitting   Cuff Size: Adult   Pulse: 85   SpO2: 95%   Weight: 84.8 kg (187 lb)   Height: 167.6 cm (66\")     Body mass index is 30.18 kg/m².  PE:  General: NAD  Neck: no JVD, no carotid bruits, no TM  Heart RRR, NL S1, S2, S4 present, no rubs, murmurs  Lungs: CTA, no wheezes, rhonchi, or rales  Abd: soft, non-tender, NL BS  Ext: No musculoskeletal deformities, no edema, cyanosis, or clubbing  Psych: normal mood and affect    Diagnostic Data:  MDT leadless PM, V DDD rate 50, V paced 37%, 7.5 years battery    Procedures        1. Heart block AV third degree    2. 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 Primary hypertension        Plan:  [facility]  -Micra leadless " normal function,  37%    2. CAD  -Left heart catheterization revealing 80% RCA, Xience drug-eluting stent placement by Dr. Miner 6/5/2023   -She reports some intermittent BLE swelling. No swelling today. Will add lasix 20mg PRN for BLE swelling or weight gain of 3-5 lbs in a day. She should let us know if she has to use lasix 3 days or more in a row.     3. HTN  -controlled on Toprol XL    F/up in 6 months    Electronically signed by MARIELA Hernandez, 08/28/24, 4:44 PM EDT.

## 2024-09-04 ENCOUNTER — OFFICE VISIT (OUTPATIENT)
Dept: CARDIOLOGY | Facility: CLINIC | Age: 89
End: 2024-09-04
Payer: MEDICARE

## 2024-09-04 ENCOUNTER — EXTERNAL PBMM DATA (OUTPATIENT)
Dept: PHARMACY | Facility: OTHER | Age: 89
End: 2024-09-04
Payer: MEDICARE

## 2024-09-04 VITALS
DIASTOLIC BLOOD PRESSURE: 68 MMHG | HEIGHT: 66 IN | HEART RATE: 64 BPM | WEIGHT: 191.2 LBS | SYSTOLIC BLOOD PRESSURE: 130 MMHG | OXYGEN SATURATION: 95 % | BODY MASS INDEX: 30.73 KG/M2

## 2024-09-04 DIAGNOSIS — I10 PRIMARY HYPERTENSION: ICD-10-CM

## 2024-09-04 DIAGNOSIS — E78.2 MIXED HYPERLIPIDEMIA: ICD-10-CM

## 2024-09-04 DIAGNOSIS — I25.112 CORONARY ARTERY DISEASE INVOLVING NATIVE HEART WITH REFRACTORY ANGINA PECTORIS, UNSPECIFIED VESSEL OR LESION TYPE: Primary | ICD-10-CM

## 2024-09-04 RX ORDER — IBUPROFEN 600 MG/1
TABLET, FILM COATED ORAL
COMMUNITY
Start: 2024-04-23

## 2024-09-04 NOTE — PROGRESS NOTES
[facility] Cardiology   [address], [address]  [place], [place], [address]    [phone]  [email or web address]           OUTPATIENT CLINIC FOLLOW UP NOTE    Patient Care Team:  Patient Care Team:  Hever Ibrahim APRN as PCP - General (Nurse Practitioner)  Kuldeep Miner MD as Consulting Physician (Cardiology)    Subjective:      Chief Complaint   Patient presents with    Heart block AV third degree         Janna Willis is a 89 y.o. female.  Cardiac focused, problem list:  Complete heart block with very slow ventricular response with a ventricular rate of less than 20 bpm  Status post Medtronic leadless pacemaker placement 6/2023  CAD   C 6/5/2023, no clear obstructive disease to explain the patient's complete heart block, but did have an 80% proximal RCA stenosis and a small sized vessel.  In the lieu of ongoing chest discomfort, decision was made to proceed with HANK x1   Mixed hyperlipidemia  Statin intolerant  History of hypertension prior to admission  Possible aortic aneurysm  Hyponatremia    HPI:    Today the patient presents for follow up. Continues to have fleeting chest pain but has not needed nitroglycerin.  With some recent lower extremity edema.  PRN Lasix prescribed last week at EP follow up.  Patient reports she has not taken Lasix, believes her edema has improved.  Continues to have chronic, stable fatigue.      Review of Systems:  As noted above in the HPI     PFSH:  Patient Active Problem List   Diagnosis    Heart block AV third degree    CAD (coronary artery disease)    Primary hypertension    Mixed hyperlipidemia    GERD (gastroesophageal reflux disease)    Hypothyroid    Class 1 obesity in adult    Traumatic orbital hematoma    Fall    Syncope    DM2 (diabetes mellitus, type 2)         Current Outpatient Medications:     acetaminophen (TYLENOL) 325 MG tablet, Take 2 tablets by mouth Every 4 (Four) Hours As Needed for Mild Pain or Fever (temperature  "greater than 101F)., Disp: 90 tablet, Rfl: 0    aspirin 81 MG EC tablet, Take 1 tablet by mouth Daily., Disp: 90 tablet, Rfl: 2    cholecalciferol (VITAMIN D3) 25 MCG (1000 UT) tablet, Take 2 tablets by mouth Daily., Disp: , Rfl:     empagliflozin (JARDIANCE) 10 MG tablet tablet, Take 1 tablet by mouth Daily., Disp: 90 tablet, Rfl: 2    ezetimibe (ZETIA) 10 MG tablet, Take  by mouth Daily., Disp: , Rfl:     furosemide (LASIX) 20 MG tablet, Take 1 tablet by mouth Daily As Needed (BLE swelling or weight gain of 3-5 lbs in a day)., Disp: 30 tablet, Rfl: 11    ibuprofen (ADVIL,MOTRIN) 600 MG tablet, , Disp: , Rfl:     levothyroxine (SYNTHROID, LEVOTHROID) 50 MCG tablet, TAKE ONE TABLET EVERY DAY FOR FOR THYROID, Disp: , Rfl: 3    metoprolol succinate XL (TOPROL-XL) 25 MG 24 hr tablet, Take 1 tablet by mouth Daily., Disp: 30 tablet, Rfl: 11    nitroglycerin (NITROSTAT) 0.4 MG SL tablet, Place 1 tablet under the tongue Every 5 (Five) Minutes As Needed for Chest Pain (Systolic BP Greater Than 100). Take no more than 3 doses in 15 minutes., Disp: 30 tablet, Rfl: 0    polyethylene glycol (MIRALAX) 17 g packet, Take 17 g by mouth 2 (Two) Times a Day As Needed (to produce daily BM)., Disp: 30 packet, Rfl: 11    venlafaxine XR (EFFEXOR-XR) 150 MG 24 hr capsule, elva 1 capsule with food Once a day for 90 days, Disp: , Rfl:     vitamin B-12 (CYANOCOBALAMIN) 1000 MCG tablet, Take 1 tablet by mouth 1 (One) Time Per Week., Disp: , Rfl:      reports that she has never smoked. She has never been exposed to tobacco smoke. She has never used smokeless tobacco.      Objective:   Physical exam:  /68 (BP Location: Left arm, Patient Position: Sitting, Cuff Size: Adult)   Pulse 64   Ht 167.6 cm (65.98\")   Wt 86.7 kg (191 lb 3.2 oz)   SpO2 95%   BMI 30.88 kg/m²   CONSTITUTIONAL: No acute distress  CARDIOVASCULAR: Carotids with normal upstrokes without bruits.  Regular rate and rhythm with normal S1 and S2. Without murmur. Normal " "radial pulse.     Labs:    Lab Results   Component Value Date    LDL 84 06/06/2023     No components found for: \"LDLDIRECTC\"    Diagnostic Data:    Procedures    Results for orders placed during the hospital encounter of 06/05/23    Adult Transthoracic Echo Complete w/ Color, Spectral and Contrast if Necessary Per Protocol    Interpretation Summary    Left ventricular systolic function is normal. Left ventricular ejection fraction appears to be 61 - 65%.    Left ventricular wall thickness is consistent with borderline concentric hypertrophy.    Left ventricular diastolic function is consistent with (grade II w/high LAP) pseudonormalization.    Left atrial volume is mildly increased.    There is moderate calcification of the aortic valve.    Moderate mitral annular calcification is present.    Mild mitral valve stenosis is present.    Mild mitral valve regurgitation is present.    Mild pulmonic valve regurgitation is present.    Moderate tricuspid valve regurgitation is present.    Estimated right ventricular systolic pressure from tricuspid regurgitation is mildly elevated (35-45 mmHg).      Assessment and Plan:     Heart block AV third degree  -Followed by EP    Coronary artery disease  Mixed hyperlipidemia   -Stable cardiac status.   -Discontinue clopidogrel at this time.    -Continue all other cardiac medications.   -Lasix as needed for edema.   -Statin intolerant  -Considered risk versus benefit of a PCSK9 inhibitor, but will defer due to patient preference and advanced age      - Return in about 6 months (around 3/4/2025) for Next scheduled follow up. Will extend to yearly follow ups if stable at next visit so that cardiology and EP follow ups do not overlap.     Electronically signed by MARIELA Flaherty, 09/04/24, 3:59 PM EDT.    "

## 2024-09-16 ENCOUNTER — TELEPHONE (OUTPATIENT)
Dept: CARDIOLOGY | Facility: CLINIC | Age: 89
End: 2024-09-16
Payer: MEDICARE

## 2024-09-19 ENCOUNTER — HOSPITAL ENCOUNTER (OUTPATIENT)
Dept: HOSPITAL 22 - RAD | Age: 89
Discharge: HOME | End: 2024-09-19
Payer: MEDICARE

## 2024-09-19 DIAGNOSIS — M79.672: Primary | ICD-10-CM

## 2024-09-19 PROCEDURE — 73630 X-RAY EXAM OF FOOT: CPT

## 2024-12-18 ENCOUNTER — TELEPHONE (OUTPATIENT)
Dept: CARDIOLOGY | Facility: CLINIC | Age: 89
End: 2024-12-18
Payer: MEDICARE

## 2024-12-18 NOTE — TELEPHONE ENCOUNTER
Called to remind to send in a her pacemaker manual readings.Left a message to send in a reading.

## 2025-03-26 ENCOUNTER — TELEPHONE (OUTPATIENT)
Dept: CARDIOLOGY | Facility: CLINIC | Age: 89
End: 2025-03-26
Payer: MEDICARE

## 2025-03-26 PROCEDURE — 93294 REM INTERROG EVL PM/LDLS PM: CPT | Performed by: INTERNAL MEDICINE

## 2025-03-26 PROCEDURE — 93296 REM INTERROG EVL PM/IDS: CPT | Performed by: INTERNAL MEDICINE

## 2025-06-25 ENCOUNTER — TELEPHONE (OUTPATIENT)
Dept: CARDIOLOGY | Facility: CLINIC | Age: 89
End: 2025-06-25
Payer: MEDICARE

## 2025-06-25 LAB
MDC_IDC_MSMT_BATTERY_REMAINING_LONGEVITY: 76 MO
MDC_IDC_MSMT_BATTERY_RRT_TRIGGER: 2.56
MDC_IDC_MSMT_BATTERY_STATUS: NORMAL
MDC_IDC_MSMT_BATTERY_VOLTAGE: 2.98
MDC_IDC_MSMT_LEADCHNL_RV_DTM: NORMAL
MDC_IDC_MSMT_LEADCHNL_RV_IMPEDANCE_VALUE: 500
MDC_IDC_MSMT_LEADCHNL_RV_PACING_THRESHOLD_AMPLITUDE: 1.12
MDC_IDC_MSMT_LEADCHNL_RV_PACING_THRESHOLD_POLARITY: NORMAL
MDC_IDC_MSMT_LEADCHNL_RV_PACING_THRESHOLD_PULSEWIDTH: 0.4
MDC_IDC_MSMT_LEADCHNL_RV_SENSING_INTR_AMPL: 11.25
MDC_IDC_PG_IMPLANT_DTM: NORMAL
MDC_IDC_PG_MFG: NORMAL
MDC_IDC_PG_MODEL: NORMAL
MDC_IDC_PG_SERIAL: NORMAL
MDC_IDC_PG_TYPE: NORMAL
MDC_IDC_SESS_DTM: NORMAL
MDC_IDC_SESS_TYPE: NORMAL
MDC_IDC_SET_BRADY_LOWRATE: 50
MDC_IDC_SET_BRADY_MAX_SENSOR_RATE: 120
MDC_IDC_SET_BRADY_MAX_TRACKING_RATE: 105
MDC_IDC_SET_BRADY_MODE: NORMAL
MDC_IDC_SET_BRADY_SAV_DELAY: 100
MDC_IDC_SET_LEADCHNL_RV_PACING_AMPLITUDE: 1.75
MDC_IDC_SET_LEADCHNL_RV_PACING_POLARITY: NORMAL
MDC_IDC_SET_LEADCHNL_RV_PACING_PULSEWIDTH: 0.4
MDC_IDC_SET_LEADCHNL_RV_SENSING_POLARITY: NORMAL
MDC_IDC_SET_LEADCHNL_RV_SENSING_SENSITIVITY: 2

## 2025-07-01 LAB
MDC_IDC_MSMT_BATTERY_REMAINING_LONGEVITY: 76 MO
MDC_IDC_MSMT_BATTERY_RRT_TRIGGER: 2.56
MDC_IDC_MSMT_BATTERY_STATUS: NORMAL
MDC_IDC_MSMT_BATTERY_VOLTAGE: 2.98
MDC_IDC_MSMT_LEADCHNL_RV_DTM: NORMAL
MDC_IDC_MSMT_LEADCHNL_RV_IMPEDANCE_VALUE: 500
MDC_IDC_MSMT_LEADCHNL_RV_PACING_THRESHOLD_AMPLITUDE: 1.12
MDC_IDC_MSMT_LEADCHNL_RV_PACING_THRESHOLD_POLARITY: NORMAL
MDC_IDC_MSMT_LEADCHNL_RV_PACING_THRESHOLD_PULSEWIDTH: 0.4
MDC_IDC_MSMT_LEADCHNL_RV_SENSING_INTR_AMPL: 11.25
MDC_IDC_PG_IMPLANT_DTM: NORMAL
MDC_IDC_PG_MFG: NORMAL
MDC_IDC_PG_MODEL: NORMAL
MDC_IDC_PG_SERIAL: NORMAL
MDC_IDC_PG_TYPE: NORMAL
MDC_IDC_SESS_DTM: NORMAL
MDC_IDC_SESS_TYPE: NORMAL
MDC_IDC_SET_BRADY_LOWRATE: 50
MDC_IDC_SET_BRADY_MAX_SENSOR_RATE: 120
MDC_IDC_SET_BRADY_MAX_TRACKING_RATE: 105
MDC_IDC_SET_BRADY_MODE: NORMAL
MDC_IDC_SET_BRADY_SAV_DELAY: 100
MDC_IDC_SET_LEADCHNL_RV_PACING_AMPLITUDE: 1.75
MDC_IDC_SET_LEADCHNL_RV_PACING_POLARITY: NORMAL
MDC_IDC_SET_LEADCHNL_RV_PACING_PULSEWIDTH: 0.4
MDC_IDC_SET_LEADCHNL_RV_SENSING_POLARITY: NORMAL
MDC_IDC_SET_LEADCHNL_RV_SENSING_SENSITIVITY: 2

## (undated) DEVICE — DEV COMPR RADL PRELUDESYNCEZ 30ML 32CM

## (undated) DEVICE — CABL PACE ATRIAL PT BLU

## (undated) DEVICE — CATH PACE PACEL BIPOL 5F110CM

## (undated) DEVICE — GW XCHG AMPLTZ XSTIF PTFE CRV .035IN 3X180CM

## (undated) DEVICE — LEX ELECTRO PHYSIOLOGY: Brand: MEDLINE INDUSTRIES, INC.

## (undated) DEVICE — Device: Brand: ASAHI SION BLUE

## (undated) DEVICE — DECANT BG O JET

## (undated) DEVICE — MINI TREK CORONARY DILATATION CATHETER 2.0 MM X 12 MM / RAPID-EXCHANGE: Brand: MINI TREK

## (undated) DEVICE — DEV INFL MONARCH 25W

## (undated) DEVICE — CATH DIAG EXPO M/ PK 5F FL4/FR4 PIG

## (undated) DEVICE — Device: Brand: MEDEX

## (undated) DEVICE — GW PRESSUREWIRE X WIRELESS FFR 175CM

## (undated) DEVICE — ANGIO-SEAL VIP VASCULAR CLOSURE DEVICE: Brand: ANGIO-SEAL

## (undated) DEVICE — GW PERIPH GUIDERIGHT STD/EXCHNG/J/TIP SS 0.035IN 5X260CM

## (undated) DEVICE — SLV REPOSTNG CATH STRL 60CM

## (undated) DEVICE — GUIDE CATHETER: Brand: MACH1™

## (undated) DEVICE — GW PERIPH VASC ADX J/TP SS .035 150CM 3MM

## (undated) DEVICE — DIL VESL .038 14F 19CM

## (undated) DEVICE — CANN NASL CO2 DIVIDED A/

## (undated) DEVICE — PINNACLE INTRODUCER SHEATH: Brand: PINNACLE

## (undated) DEVICE — CATH DIAG EXPO .045 FL3  5F 100CM

## (undated) DEVICE — NC TREK NEO™ CORONARY DILATATION CATHETER 2.50 X 8 MM / RAPID-EXCHANGE: Brand: NC TREK NEO™

## (undated) DEVICE — ADULT, W/LG. BACK PAD, RADIOTRANSPARENT ELEMENT AND LEAD WIRE: Brand: DEFIBRILLATION ELECTRODES

## (undated) DEVICE — ADULT, W/LG. BACK PAD, RADIOTRANSPARENT ELEMENT AND LEAD WIRE COMPATIBLE W/: Brand: DEFIBRILLATION ELECTRODES

## (undated) DEVICE — DIL VESL .038 12F 19CM

## (undated) DEVICE — LIMB HOLDER, WRIST/ANKLE: Brand: DEROYAL

## (undated) DEVICE — CVR PROB ULTRASND/TRANSD W/GEL 7X11IN STRL

## (undated) DEVICE — SHEATH INTRO MICRA HC 23F 55.7CM

## (undated) DEVICE — SI AVANTI+ 8F MID W/O GW&OBT: Brand: AVANTI

## (undated) DEVICE — ST INF PRI SMRTSTE 20DRP 2VLV 24ML 117

## (undated) DEVICE — MODEL AT P65, P/N 701554-001KIT CONTENTS: HAND CONTROLLER, 3-WAY HIGH-PRESSURE STOPCOCK WITH ROTATING END AND PREMIUM HIGH-PRESSURE TUBING: Brand: ANGIOTOUCH® KIT

## (undated) DEVICE — GLIDESHEATH SLENDER STAINLESS STEEL KIT: Brand: GLIDESHEATH SLENDER

## (undated) DEVICE — DIL VESL .038 16F 19CM

## (undated) DEVICE — ST EXT IV SMRTSTE 2VLV FIX M LL 6ML 41

## (undated) DEVICE — PK CATH CARD 10

## (undated) DEVICE — MODEL BT2000 P/N 700287-012KIT CONTENTS: MANIFOLD WITH SALINE AND CONTRAST PORTS, SALINE TUBING WITH SPIKE AND HAND SYRINGE, TRANSDUCER: Brand: BT2000 AUTOMATED MANIFOLD KIT